# Patient Record
Sex: MALE | Race: WHITE | NOT HISPANIC OR LATINO | Employment: OTHER | URBAN - METROPOLITAN AREA
[De-identification: names, ages, dates, MRNs, and addresses within clinical notes are randomized per-mention and may not be internally consistent; named-entity substitution may affect disease eponyms.]

---

## 2018-01-05 ENCOUNTER — ALLSCRIPTS OFFICE VISIT (OUTPATIENT)
Dept: OTHER | Facility: OTHER | Age: 78
End: 2018-01-05

## 2018-01-06 NOTE — PROGRESS NOTES
Assessment   1  Acute bronchitis (466 0) (J20 9)    Plan   Acute bronchitis    · Azithromycin 250 MG Oral Tablet; TAKE 2 TABLETS ON DAY 1 THEN TAKE 1    TABLET A DAY FOR 4 DAYS   · Promethazine-Codeine 6 25-10 MG/5ML Oral Syrup; TAKE 5 ML EVERY 4 TO 6    HOURS AS NEEDED FOR COUGH    Discussion/Summary   Discussion Summary:    Start Zithromax and use cough med prn 1 wk if not better  Medication SE Review and Pt Understands Tx: Possible side effects of new medications were reviewed with the patient/guardian today  The treatment plan was reviewed with the patient/guardian  The patient/guardian understands and agrees with the treatment plan      Chief Complaint   Chief Complaint Free Text Note Form: Patient is here for cough and congestion  He has about one more day of Augmentin left and took Tessalon  He is no better  History of Present Illness   HPI: 69year old new pt with no significant PMH here for cough, congestion, started on 12/26  He was seen at Urgent Care and treated with Augmentin x 10 days  Also taking Mucinex and Sudafed  Still has wet cough  Going on vacation tomorrow  has no chronic medical conditions and takes no medications  PMH was reviewed  Review of Systems   Complete-Male:      Constitutional: no fever-- and-- no chills  Eyes: No complaints of eye pain, no red eyes, no discharge from eyes, no itchy eyes  ENT: as noted in HPI  Cardiovascular: No complaints of slow heart rate, no fast heart rate, no chest pain, no palpitations, no leg claudication, no lower extremity  Respiratory: cough, but-- no shortness of breath during exertion  Gastrointestinal: No complaints of abdominal pain, no constipation, no nausea or vomiting, no diarrhea or bloody stools  Preventive Quality 65 Older:      Preventive Quality 65 and Older: Falls Risk: The patient fell 0 times in the past 12 months   Urinary Incontinence Symptoms includes: no urinary incontinence       Past Medical History   Active Problems And Past Medical History Reviewed: The active problems and past medical history were reviewed and updated today  Surgical History   Surgical History Reviewed: The surgical history was reviewed and updated today  Family History   Mother    1  Family history of cardiac disorder (V17 49) (Z82 49)  Family History Reviewed: The family history was reviewed and updated today  Social History    · Former smoker (I44 49) (Z39 030)   · Social drinker (V49 89) (Z78 9)  Social History Reviewed: The social history was reviewed and updated today  Allergies   1  CeleBREX CAPS    Vitals   Vital Signs    Recorded: [id number] 01:32PM   Temperature 97 6 F   Heart Rate 90   Systolic 402   Diastolic 76   Height 5 ft 8 in   Weight 194 lb    BMI Calculated 29 5   BSA Calculated 2 02   O2 Saturation 98     Physical Exam        Constitutional      General appearance: No acute distress, well appearing and well nourished  Eyes      Pupils and irises: Equal, round and reactive to light  Ears, Nose, Mouth, and Throat      Otoscopic examination: Tympanic membrance translucent with normal light reflex  Canals patent without erythema  Oropharynx: Abnormal  -- clear PND  Pulmonary      Respiratory effort: Abnormal   Respiratory Findings: wet cough  Auscultation of lungs: Clear to auscultation, equal breath sounds bilaterally, no wheezes, no rales, no rhonci  Cardiovascular      Auscultation of heart: Normal rate and rhythm, normal S1 and S2, without murmurs         Psychiatric      Orientation to person, place and time: Normal        Mood and affect: Normal           Signatures    Electronically signed by : Maegan Christianson MD; Jan 5 2018  2:02PM EST                       (Author)

## 2018-01-22 VITALS
WEIGHT: 194 LBS | OXYGEN SATURATION: 98 % | DIASTOLIC BLOOD PRESSURE: 76 MMHG | HEART RATE: 90 BPM | SYSTOLIC BLOOD PRESSURE: 118 MMHG | BODY MASS INDEX: 29.4 KG/M2 | HEIGHT: 68 IN | TEMPERATURE: 97.6 F

## 2018-05-08 ENCOUNTER — TELEPHONE (OUTPATIENT)
Dept: FAMILY MEDICINE CLINIC | Facility: CLINIC | Age: 78
End: 2018-05-08

## 2018-05-08 DIAGNOSIS — N52.9 ERECTILE DYSFUNCTION, UNSPECIFIED ERECTILE DYSFUNCTION TYPE: Primary | ICD-10-CM

## 2018-05-08 RX ORDER — SILDENAFIL CITRATE 20 MG/1
20 TABLET ORAL ONCE AS NEEDED
Qty: 50 TABLET | Refills: 1 | Status: SHIPPED | OUTPATIENT
Start: 2018-05-08 | End: 2020-03-06

## 2018-05-08 NOTE — TELEPHONE ENCOUNTER
Patient needs a refill on his Viagra but needs it as   [facility]  This is going to be an out of pocket rx, will not need prior auth     This pharmacy is entered in patient's records

## 2018-06-25 ENCOUNTER — OFFICE VISIT (OUTPATIENT)
Dept: FAMILY MEDICINE CLINIC | Facility: CLINIC | Age: 78
End: 2018-06-25
Payer: MEDICARE

## 2018-06-25 VITALS
TEMPERATURE: 97.4 F | OXYGEN SATURATION: 98 % | BODY MASS INDEX: 30.52 KG/M2 | WEIGHT: 201.4 LBS | DIASTOLIC BLOOD PRESSURE: 84 MMHG | SYSTOLIC BLOOD PRESSURE: 124 MMHG | HEIGHT: 68 IN | HEART RATE: 84 BPM

## 2018-06-25 DIAGNOSIS — Z23 NEED FOR PNEUMOCOCCAL VACCINE: ICD-10-CM

## 2018-06-25 DIAGNOSIS — K04.7 DENTAL INFECTION: ICD-10-CM

## 2018-06-25 DIAGNOSIS — Z13.220 LIPID SCREENING: ICD-10-CM

## 2018-06-25 DIAGNOSIS — Z12.5 SCREENING PSA (PROSTATE SPECIFIC ANTIGEN): ICD-10-CM

## 2018-06-25 DIAGNOSIS — Z12.11 COLON CANCER SCREENING: ICD-10-CM

## 2018-06-25 DIAGNOSIS — Z13.1 SCREENING FOR DIABETES MELLITUS: ICD-10-CM

## 2018-06-25 DIAGNOSIS — Z23 NEED FOR SHINGLES VACCINE: ICD-10-CM

## 2018-06-25 DIAGNOSIS — Z00.00 MEDICARE ANNUAL WELLNESS VISIT, INITIAL: Primary | ICD-10-CM

## 2018-06-25 PROCEDURE — G0438 PPPS, INITIAL VISIT: HCPCS | Performed by: FAMILY MEDICINE

## 2018-06-25 PROCEDURE — G0009 ADMIN PNEUMOCOCCAL VACCINE: HCPCS

## 2018-06-25 PROCEDURE — 90670 PCV13 VACCINE IM: CPT

## 2018-06-25 RX ORDER — AMOXICILLIN 500 MG/1
CAPSULE ORAL
Qty: 10 CAPSULE | Refills: 0 | Status: SHIPPED | OUTPATIENT
Start: 2018-06-25 | End: 2018-07-02

## 2018-06-25 RX ORDER — AMOXICILLIN 500 MG/1
CAPSULE ORAL
Refills: 0 | COMMUNITY
Start: 2018-06-20 | End: 2018-06-25 | Stop reason: ALTCHOICE

## 2018-06-25 NOTE — PROGRESS NOTES
Assessment and Plan:  Problem List Items Addressed This Visit     Medicare annual wellness visit, initial - Primary    Dental infection    Relevant Medications    amoxicillin (AMOXIL) 500 mg capsule    Other Relevant Orders    CBC and differential    Need for pneumococcal vaccine      Other Visit Diagnoses     Need for shingles vaccine        Relevant Medications    Zoster Vac Recomb Adjuvanted ([address]) 50 MCG SUSR    Colon cancer screening        Relevant Orders    Cologuard    Screening PSA (prostate specific antigen)        Relevant Orders    PSA, Total Screen    Lipid screening        Relevant Orders    Lipid Panel with Direct LDL reflex    Screening for diabetes mellitus        Relevant Orders    Comprehensive metabolic panel        Health Maintenance Due   Topic Date Due    Annual Wellnes Visit (AWV)  1940    DTaP,Tdap,and Td Vaccines (1 - Tdap) 09/20/1962    PNEUMOCOCCAL POLYSACCHARIDE VACCINE AGE 72 AND OVER  09/20/2006    GLAUCOMA SCREENING 67+ YR  09/20/2008         HPI:  Nancy Rodriguez is a 69 y o  male here for his Initial Wellness Visit  Patient Active Problem List   Diagnosis    Medicare annual wellness visit, initial   [address] infection    Need for pneumococcal vaccine     No past medical history on file  No past surgical history on file    Family History   Problem Relation Age of Onset    Heart disease Mother         Cardiac Disorder    No Known Problems Father      History   Smoking Status    Former Smoker   Smokeless Tobacco    Never Used     History   Alcohol Use    Yes     Comment: Social      History   Drug use: Unknown         Current Outpatient Prescriptions   Medication Sig Dispense Refill    amoxicillin (AMOXIL) 500 mg capsule Take as directed by dentist 10 capsule 0    sildenafil (REVATIO) 20 mg tablet Take 1 tablet (20 mg total) by mouth once as needed (erectile dysfunction) for up to 1 dose 50 tablet 1    Zoster Vac Recomb Adjuvanted (SHINGRIX) 50 MCG SUSR Inject 1 Dose into the shoulder, thigh, or buttocks once for 1 dose 1 each 0     No current facility-administered medications for this visit  Allergies   Allergen Reactions    Celecoxib      Other reaction(s): itchy  Other reaction(s): itchy       There is no immunization history on file for this patient  Patient Care Team:  Cherie Borges MD as PCP - General    Medicare Screening Tests and Risk Assessments:  [facility] Clinical     ISAR:   Previous hospitalizations?:  No       Once in a Lifetime Medicare Screening:   EKG performed?:  No    AAA screening performed? (if performed, please add date to Health Maintenance):  No       Medicare Screening Tests and Risk Assessment:   AAA Risk Assessment     Tobacco use (males only):   Yes   Age over 72 (males only):  Yes    Osteoporosis Risk Assessment    None indicated:  Yes    HIV Risk Assessment    None indicated:  Yes        Drug and Alcohol Use:   Tobacco use    Cigarettes:  former smoker    Quit date:  6/25/1980   Smokeless:  never used smokeless tobacco    Tobacco use duration    Tobacco Cessation Readiness    Alcohol use    Alcohol use:  never    Alcohol Treatment Readiness   Illicit Drug Use    Drug use:  never    Drug type:  no sedative use       Diet & Exercise:   Diet   What is your diet?:  Unhealthy   How many servings a day of the following:   Fruits and Vegetables:  1-2 Meat:  1-2   Whole Grains:  0 Simple Carbs:  0   Dairy:  2 Soda:  0   Coffee:  4 Tea:  0   Exercise    Do you currently exercise?:  yes    Frequency:  daily    Type of exercise:  cardio       Cognitive Impairment Screening:   Depression screening preformed:  Yes     PHQ-9 Depression scale score:  0   Depression screening results:  negative for symptoms   Cognitive Impairment Screening    Do you have difficulty learning or retaining new information?:  No Do you have difficulty handling new tasks?:  No   Do you have difficulty with reasoning?:  No Do you have difficulty with spatial ability and orientation?:  No   Do you have difficulty with language?:  No Do you have difficulty with behavior?:  No       Functional Ability/Level of Safety:   Hearing    Hearing difficulties:  No Bilateral:  normal   Left:  normal Right:  normal   Hearing aid:  No    Hearing Impairment Assessment    Hearing status:  No impairment   Do your family members ever complain that you turn on the radio or T V  too loudly?:  No Do you find that other people have to repeat themselves when talking to you?:  No   Do you have difficulty hearing while talking on the phone?:  No Has anyone ever told you that you are speaking too loudly when talking with them?:  No   Do you have trouble hearing the doorbell or phone ringing?:  No Do you have difficulty hearing such that you feel frustrated talking to people?:  No   Do you feel sad because you cannot hear well?:  No Do you feel inconvienced due to your hearing problem?:  No   Do you think you would be a happier person if you could hear better?:  No Would you be willing to go for a hearing aid fitting if suggested?:  No   Current Activities    Status:  unlimited driving   Help needed with the folllowing:    ADL    Feeding:  Independant   Oral hygiene and Facial grooming:  Independant   Bathing:  Independant   Upper Body Dressing:  Independant   Lower Body Dressing:  Independant   Toileting:  Independant   Bed Mobility:  Independant   Fall Risk   Have you fallen in the last 12 months?:  No Are you unsteady on your feet?:  No    Are you taking any medications that may cause fatigue or dizziness?:  No    Do you rush to the bathroom potentially risking a fall?:  No   Injury History   Polypharmacy:  No Antidepressant Use:  No   Sedative Use:  No Antihypertensive Use:  No   Previous Fall:  No Alcohol Use:  No   Deconditioning:  No Visual Impairment:  No   Cogitive Impairment:  No Mmobility Impairment:  No   Postural Hypotension:  No Urinary Incontinence:  No       Home Safety:   Are there hazards in your environment?:  No   If you fell, would you need help to get back up from the ground?:  No Do you have problems or concerns getting in/out of a bed, chair, tub, or toilet?:  No   Do you feel unsteady when walking?:  No Is your activity limited by pain?:  No   Do you have handrails and grab-bars in the home?:  Yes Are emergency numbers kept by the phone and regularly updated?:  Yes   Are you and/or family members aware of the dangers of smoking in bed?:  No    Do you have working smoke alarms and fire extinguisher?:  Yes Do all household members know how to use them?:  Yes   Have you left the stove on unsupervised?:  No    Home Safety Risk Factors   Unfamilar with surroundings:  No Uneven floors:  No   Stairs or handrail saftey risk:  Yes Loose rugs:  No   Household clutter:  No Poor household lighting:  No   No grab bars in bathroom:  Yes Further evaluation needed:  No       Advanced Directives:   Advanced Directives    Living Will:  No Durable POA for healthcare:  No   Advanced directive:  No    Patient's End of Life Decisions    Reviewed with patient:  Yes Provider agrees with end of life decisions:  Yes   End of life decisions comments:  Five Wishes provided        Urinary Incontinence:   Do you have urinary incontinence?:  No        Glaucoma:            Provider Screening     Preventative Screening/Counseling:   Cardiovascular Screening/Counseling:   (Labs Q5 years, EKG optional one-time)   General:  Risks and Benefits Discussed, Screening Current Counseling:  Healthy Weight, Healthy Diet, Improve Cholesterol Due for: Lab Panel/Analytes:  Lipid Panel         Diabetes Screening/Counseling:   (2 tests/year if Pre-Diabetes or 1 test/year if no Diabetes)   General:  Risks and Benefits Discussed, Screening Current Counseling:  Healthy Diet, Healthy Weight Due for: Labs:  Blood Glucose         Colorectal Cancer Screening/Counseling:   (FOBT Q1 yr; Flex Sig Q4 yrs or Q10 yrs after Screening Colonoscopy; Screening Colonoscpy Q2 yrs High Risk or Q10 yrs Low Risk; Barium Enema Q2 yrs High Risk or Q4 yrs Low Risk)   General:  Risks and Benefits Discussed  Due for: Studies:  Fecal Occult Blood         Prostate Cancer Screening/Counseling:   (Annual)    General:  Risks and Benefits Discussed Due for: Labs:  PSA         Breast Cancer Screening/Counseling:   (Baseline Age 28 - 43; Annual Age 36+)         Cervical Cancer Screening/Counseling:   (Annual for High Risk or Childbearing Age with Abnormal Pap in Last 3 yrs; Every 2 all others)         Osteoporosis Screening/Counseling:   (Every 2 Yrs if at risk or more if medically necessary)   General:  Risks and Benefits Discussed, Patient Declines, Screening Not Indicated           AAA Screening/Counseling:   (Once per Lifetime with risk factors)     Age over 72 (males only):  Yes Tobacco use (males only):  Yes   General:  Patient Declines, Risks and Benefits Discussed           Glaucoma Screening/Counseling:   (Annual)   General:  Screening Current, Risks and Benefits Discussed          HIV Screening/Counseling:   (Voluntary; Once annually for high risk OR 3 times for Pregnancy at diagnosis of IUP; 3rd trimester; and at Labor   General:  Risks and Benefits Discussed, Patient Declines, Screening Not Indicated           Hepatitis C Screening:   Hepatitis C Counseling Provided:   Yes               Immunizations:   Influenza (annual):  Risks & Benefits Discussed, Influenza Recommended Annually, Influenza UTD This Year   Pneumococcal (Once in a Lifetime):  Risks & Benefits Discussed, Pneumococcal Due Today   Zostavax (Medicare D Coverage, Pt >72 yo):  Risks & Benefits Discussed, Zostavox Vaccine Needed Today       Other Preventative Couseling (Non-Medicare Wellness Visit Required):   nutrition counseling performed, fall prevention education provided, alcohol use counseling provided, weight reduction was discussed, Skin self-exam counseling given, Increased physical activity counseling given       Referrals (Non-Medicare Wellness Visit Required):       Medical Equipment/Suppliers:           No exam data present    Physical Exam :  History obtained from the patient  General ROS: negative  Psychological ROS: negative  Ophthalmic ROS: negative  ENT ROS: negative  Allergy and Immunology ROS: negative  Hematological and Lymphatic ROS: negative  Endocrine ROS: negative for - malaise/lethargy, mood swings or palpitations  Respiratory ROS: positive for - cough  Cardiovascular ROS: no chest pain or dyspnea on exertion  Gastrointestinal ROS: no abdominal pain, change in bowel habits, or black or bloody stools  Genito-Urinary ROS: no dysuria, trouble voiding, or hematuria  Musculoskeletal ROS: negative  Neurological ROS: no TIA or stroke symptoms  Dermatological ROS: positive for lump on scalp - seeing Derm  Physical Exam   Constitutional: He is oriented to person, place, and time  He appears well-developed and well-nourished  No distress  HENT:   Head: Normocephalic and atraumatic  Right Ear: External ear normal    Left Ear: External ear normal    Nose: Nose normal    Mouth/Throat: Oropharynx is clear and moist  No oropharyngeal exudate  Eyes: Conjunctivae and EOM are normal  Pupils are equal, round, and reactive to light  Right eye exhibits no discharge  Left eye exhibits no discharge  No scleral icterus  Neck: No thyromegaly present  Cardiovascular: Normal rate, regular rhythm and normal heart sounds  Exam reveals no gallop and no friction rub  No murmur heard  Pulmonary/Chest: Effort normal and breath sounds normal  No respiratory distress  He has no wheezes  He has no rales  Abdominal: Soft  Bowel sounds are normal  He exhibits no distension and no mass  There is no tenderness  There is no rebound and no guarding  No hernia  Small umbilical hernia  Reducible  Nontender  Musculoskeletal: He exhibits no edema  Lymphadenopathy:     He has no cervical adenopathy  Neurological: He is alert and oriented to person, place, and time  No cranial nerve deficit  Skin: Skin is warm and dry  No rash noted  He is not diaphoretic  Psychiatric: He has a normal mood and affect  His behavior is normal  Judgment and thought content normal    Nursing note and vitals reviewed

## 2018-06-26 ENCOUNTER — APPOINTMENT (OUTPATIENT)
Dept: LAB | Facility: CLINIC | Age: 78
End: 2018-06-26
Payer: MEDICARE

## 2018-06-26 DIAGNOSIS — Z13.220 LIPID SCREENING: ICD-10-CM

## 2018-06-26 DIAGNOSIS — Z12.5 SCREENING PSA (PROSTATE SPECIFIC ANTIGEN): ICD-10-CM

## 2018-06-26 DIAGNOSIS — Z13.1 SCREENING FOR DIABETES MELLITUS: ICD-10-CM

## 2018-06-26 DIAGNOSIS — K04.7 DENTAL INFECTION: ICD-10-CM

## 2018-06-26 LAB
ALBUMIN SERPL BCP-MCNC: 3.8 G/DL (ref 3.5–5)
ALP SERPL-CCNC: 46 U/L (ref 46–116)
ALT SERPL W P-5'-P-CCNC: 22 U/L (ref 12–78)
ANION GAP SERPL CALCULATED.3IONS-SCNC: 9 MMOL/L (ref 4–13)
AST SERPL W P-5'-P-CCNC: 18 U/L (ref 5–45)
BASOPHILS # BLD AUTO: 0.05 THOUSANDS/ΜL (ref 0–0.1)
BASOPHILS NFR BLD AUTO: 1 % (ref 0–1)
BILIRUB SERPL-MCNC: 0.52 MG/DL (ref 0.2–1)
BUN SERPL-MCNC: 22 MG/DL (ref 5–25)
CALCIUM SERPL-MCNC: 8.6 MG/DL (ref 8.3–10.1)
CHLORIDE SERPL-SCNC: 106 MMOL/L (ref 100–108)
CHOLEST SERPL-MCNC: 162 MG/DL (ref 50–200)
CO2 SERPL-SCNC: 24 MMOL/L (ref 21–32)
CREAT SERPL-MCNC: 1.26 MG/DL (ref 0.6–1.3)
EOSINOPHIL # BLD AUTO: 0.2 THOUSAND/ΜL (ref 0–0.61)
EOSINOPHIL NFR BLD AUTO: 3 % (ref 0–6)
ERYTHROCYTE [DISTWIDTH] IN BLOOD BY AUTOMATED COUNT: 12.8 % (ref 11.6–15.1)
GFR SERPL CREATININE-BSD FRML MDRD: 55 ML/MIN/1.73SQ M
GLUCOSE P FAST SERPL-MCNC: 161 MG/DL (ref 65–99)
HCT VFR BLD AUTO: 41.9 % (ref 36.5–49.3)
HDLC SERPL-MCNC: 39 MG/DL (ref 40–60)
HGB BLD-MCNC: 13.5 G/DL (ref 12–17)
IMM GRANULOCYTES # BLD AUTO: 0.02 THOUSAND/UL (ref 0–0.2)
IMM GRANULOCYTES NFR BLD AUTO: 0 % (ref 0–2)
LDLC SERPL CALC-MCNC: 107 MG/DL (ref 0–100)
LYMPHOCYTES # BLD AUTO: 2.09 THOUSANDS/ΜL (ref 0.6–4.47)
LYMPHOCYTES NFR BLD AUTO: 31 % (ref 14–44)
MCH RBC QN AUTO: 33.3 PG (ref 26.8–34.3)
MCHC RBC AUTO-ENTMCNC: 32.2 G/DL (ref 31.4–37.4)
MCV RBC AUTO: 103 FL (ref 82–98)
MONOCYTES # BLD AUTO: 0.64 THOUSAND/ΜL (ref 0.17–1.22)
MONOCYTES NFR BLD AUTO: 9 % (ref 4–12)
NEUTROPHILS # BLD AUTO: 3.79 THOUSANDS/ΜL (ref 1.85–7.62)
NEUTS SEG NFR BLD AUTO: 56 % (ref 43–75)
NRBC BLD AUTO-RTO: 0 /100 WBCS
PLATELET # BLD AUTO: 232 THOUSANDS/UL (ref 149–390)
PMV BLD AUTO: 10.6 FL (ref 8.9–12.7)
POTASSIUM SERPL-SCNC: 4 MMOL/L (ref 3.5–5.3)
PROT SERPL-MCNC: 7.3 G/DL (ref 6.4–8.2)
PSA SERPL-MCNC: 1.3 NG/ML (ref 0–4)
RBC # BLD AUTO: 4.06 MILLION/UL (ref 3.88–5.62)
SODIUM SERPL-SCNC: 139 MMOL/L (ref 136–145)
TRIGL SERPL-MCNC: 79 MG/DL
WBC # BLD AUTO: 6.79 THOUSAND/UL (ref 4.31–10.16)

## 2018-06-26 PROCEDURE — 80061 LIPID PANEL: CPT

## 2018-06-26 PROCEDURE — 85025 COMPLETE CBC W/AUTO DIFF WBC: CPT

## 2018-06-26 PROCEDURE — 80053 COMPREHEN METABOLIC PANEL: CPT

## 2018-06-26 PROCEDURE — 36415 COLL VENOUS BLD VENIPUNCTURE: CPT

## 2018-06-26 PROCEDURE — G0103 PSA SCREENING: HCPCS

## 2018-07-03 ENCOUNTER — OFFICE VISIT (OUTPATIENT)
Dept: FAMILY MEDICINE CLINIC | Facility: CLINIC | Age: 78
End: 2018-07-03
Payer: MEDICARE

## 2018-07-03 VITALS
OXYGEN SATURATION: 98 % | RESPIRATION RATE: 16 BRPM | HEART RATE: 77 BPM | SYSTOLIC BLOOD PRESSURE: 128 MMHG | HEIGHT: 68 IN | BODY MASS INDEX: 29.89 KG/M2 | WEIGHT: 197.2 LBS | DIASTOLIC BLOOD PRESSURE: 84 MMHG | TEMPERATURE: 97.7 F

## 2018-07-03 DIAGNOSIS — E11.9 TYPE 2 DIABETES MELLITUS WITHOUT COMPLICATION, WITHOUT LONG-TERM CURRENT USE OF INSULIN (HCC): Primary | ICD-10-CM

## 2018-07-03 PROBLEM — Z00.00 MEDICARE ANNUAL WELLNESS VISIT, INITIAL: Status: RESOLVED | Noted: 2018-06-25 | Resolved: 2018-07-03

## 2018-07-03 LAB
SL AMB POCT HEMOGLOBIN AIC: 7.2
SL AMB POCT UR MICROALBUMIN: NORMAL

## 2018-07-03 PROCEDURE — 82043 UR ALBUMIN QUANTITATIVE: CPT | Performed by: FAMILY MEDICINE

## 2018-07-03 PROCEDURE — 83036 HEMOGLOBIN GLYCOSYLATED A1C: CPT | Performed by: FAMILY MEDICINE

## 2018-07-03 PROCEDURE — 82570 ASSAY OF URINE CREATININE: CPT | Performed by: FAMILY MEDICINE

## 2018-07-03 PROCEDURE — 36416 COLLJ CAPILLARY BLOOD SPEC: CPT | Performed by: FAMILY MEDICINE

## 2018-07-03 PROCEDURE — 99214 OFFICE O/P EST MOD 30 MIN: CPT | Performed by: FAMILY MEDICINE

## 2018-07-03 RX ORDER — LANCETS 28 GAUGE
EACH MISCELLANEOUS
Qty: 100 EACH | Refills: 0 | Status: SHIPPED | OUTPATIENT
Start: 2018-07-03 | End: 2018-08-27 | Stop reason: SDUPTHER

## 2018-07-03 RX ORDER — BLOOD-GLUCOSE METER
KIT MISCELLANEOUS ONCE
Qty: 1 EACH | Refills: 0 | Status: SHIPPED | OUTPATIENT
Start: 2018-07-03 | End: 2018-07-12 | Stop reason: SDUPTHER

## 2018-07-03 NOTE — PROGRESS NOTES
Assessment/Plan:     Diagnoses and all orders for this visit:    Type 2 diabetes mellitus without complication, without long-term current use of insulin ([facility])  -     POCT hemoglobin A1c  -     Blood Glucose Monitoring Suppl (FREESTYLE FREEDOM LITE) w/Device KIT; by Does not apply route once for 1 dose Check fasting glucose today  -     glucose blood (FREESTYLE LITE) test strip; Use as instructed  -     Lancets (FREESTYLE) lancets; Use as instructed        We discussed diabetes  He wants to work on diet and weight loss before starting medications  I advised him to follow a low carb diet  He can start checking his fasting glucose with a goal of   We will recheck his A1c here in 3 months  Foot exam and microalbumin done today  Subjective:      Patient ID: Naomie Boyer is a 69 y o  male  Patient is here to review his blood work  His fasting blood sugar was 161  His A1c today is 7 2%  He was unaware of his elevated glucose  He does eat a lot of pasta and carbs  He denies any polyuria, polydipsia  He denies any FH of DM  He takes no medications for this  His other labs were reviewed and we discussed LDL was slightly elevated  The following portions of the patient's history were reviewed and updated as appropriate: He  has no past medical history on file  He   Patient Active Problem List    Diagnosis Date Noted    Dental infection 06/25/2018    Need for pneumococcal vaccine 06/25/2018     He  has no past surgical history on file  His family history includes Heart disease in his mother; No Known Problems in his father  He  reports that he has quit smoking  He has never used smokeless tobacco  He reports that he drinks alcohol  His drug history is not on file    Current Outpatient Prescriptions   Medication Sig Dispense Refill    sildenafil (REVATIO) 20 mg tablet Take 1 tablet (20 mg total) by mouth once as needed (erectile dysfunction) for up to 1 dose 50 tablet 1    Blood Glucose Monitoring Suppl (FREESTYLE FREEDOM LITE) w/Device KIT by Does not apply route once for 1 dose Check fasting glucose today 1 each 0    glucose blood (FREESTYLE LITE) test strip Use as instructed 100 each 0    Lancets (FREESTYLE) lancets Use as instructed 100 each 0     No current facility-administered medications for this visit  Current Outpatient Prescriptions on File Prior to Visit   Medication Sig    sildenafil (REVATIO) 20 mg tablet Take 1 tablet (20 mg total) by mouth once as needed (erectile dysfunction) for up to 1 dose    [] amoxicillin (AMOXIL) 500 mg capsule Take as directed by dentist     No current facility-administered medications on file prior to visit  He is allergic to celecoxib  Christy Golden Review of Systems   Constitutional: Negative for activity change, appetite change, fatigue and unexpected weight change  Respiratory: Negative for chest tightness and shortness of breath  Cardiovascular: Negative for chest pain and leg swelling  Gastrointestinal: Negative for abdominal pain  Neurological: Negative for headaches  Objective:      /84 (BP Location: Left arm, Patient Position: Sitting, Cuff Size: Adult)   Pulse 77   Temp 97 7 °F (36 5 °C) (Tympanic)   Resp 16   Ht 5' 8" (1 727 m)   Wt 89 4 kg (197 lb 3 2 oz)   SpO2 98%   BMI 29 98 kg/m²          Physical Exam   Constitutional: He is oriented to person, place, and time  He appears well-developed and well-nourished  No distress  HENT:   Head: Normocephalic and atraumatic  Cardiovascular: Normal rate, regular rhythm and normal heart sounds  Exam reveals no gallop and no friction rub  Pulses are no weak pulses  No murmur heard  Pulses:       Dorsalis pedis pulses are 2+ on the right side, and 2+ on the left side  Posterior tibial pulses are 2+ on the right side, and 2+ on the left side  Pulmonary/Chest: Effort normal and breath sounds normal  No respiratory distress  He has no wheezes   He has no rales  He exhibits no tenderness  Musculoskeletal: He exhibits no edema  Feet:   Right Foot:   Skin Integrity: Negative for ulcer, skin breakdown, erythema, warmth, callus or dry skin  Left Foot:   Skin Integrity: Negative for ulcer, skin breakdown, erythema, warmth, callus or dry skin  Neurological: He is alert and oriented to person, place, and time  Skin: He is not diaphoretic  Psychiatric: He has a normal mood and affect  His behavior is normal  Judgment and thought content normal    Nursing note and vitals reviewed  Patient's shoes and socks removed  Right Foot/Ankle   Right Foot Inspection  Skin Exam: skin normal and skin intact no dry skin, no warmth, no callus, no erythema, no maceration, no abnormal color, no pre-ulcer, no ulcer and no callus                          Toe Exam: no swelling, no tenderness, erythema and  no right toe deformity  Sensory   Vibration: intact    Monofilament testing: intact  Vascular  Capillary refills: < 3 seconds  The right DP pulse is 2+  The right PT pulse is 2+  Left Foot/Ankle  Left Foot Inspection  Skin Exam: skin normal and skin intactno dry skin, no warmth, no erythema, no maceration, normal color, no pre-ulcer, no ulcer and no callus                         Toe Exam: no swelling, no tenderness, no erythema and no left toe deformity                   Sensory   Vibration: intact    Monofilament: intact  Vascular  Capillary refills: < 3 seconds  The left DP pulse is 2+  The left PT pulse is 2+  Assign Risk Category:  No deformity present; No loss of protective sensation;  No weak pulses       Risk: 0     Component      Latest Ref Rng & Units 6/26/2018   Sodium      136 - 145 mmol/L 139   Potassium      3 5 - 5 3 mmol/L 4 0   Chloride      100 - 108 mmol/L 106   CO2      21 - 32 mmol/L 24   Anion Gap      4 - 13 mmol/L 9   BUN      5 - 25 mg/dL 22   Creatinine      0 60 - 1 30 mg/dL 1 26   GLUCOSE FASTING      65 - 99 mg/dL 161 (H)   Calcium      8 3 - 10 1 mg/dL 8 6   AST      5 - 45 U/L 18   ALT      12 - 78 U/L 22   Alkaline Phosphatase      46 - 116 U/L 46   Total Protein      6 4 - 8 2 g/dL 7 3   Albumin      3 5 - 5 0 g/dL 3 8   Total Bilirubin      0 20 - 1 00 mg/dL 0 52   eGFR      ml/min/1 73sq m 55

## 2018-07-03 NOTE — PATIENT INSTRUCTIONS
10% - bad control"> 10% - bad control,Hemoglobin A1c (HbA1c) greater than 10% indicating poor diabetic control,Haemoglobin A1c greater than 10% indicating poor diabetic control">   Diabetes Mellitus Type 2 in Adults, Ambulatory Care   GENERAL INFORMATION:   Diabetes mellitus type 2  is a disease that affects how your body uses glucose (sugar)  Insulin helps move sugar out of the blood so it can be used for energy  Normally, when the blood sugar level increases, the pancreas makes more insulin  Type 2 diabetes develops because either the body cannot make enough insulin, or it cannot use the insulin correctly  After many years, your pancreas may stop making insulin  Common symptoms include the following:   · More hunger or thirst than usual     · Frequent urination     · Weight loss without trying     · Blurred vision  Seek immediate care for the following symptoms:   · Severe abdominal pain, or pain that spreads to your back  You may also be vomiting  · Trouble staying awake or focusing    · Shaking or sweating    · Blurred or double vision    · Breath has a fruity, sweet smell    · Breathing is deep and labored, or rapid and shallow    · Heartbeat is fast and weak  Treatment for diabetes mellitus type 2  includes keeping your blood sugar at a normal level  You must eat the right foods, and exercise regularly  You may also need medicine if you cannot control your blood sugar level with nutrition and exercise  Manage diabetes mellitus type 2:   · Check your blood sugar level  You will be taught how to check a small drop of blood in a glucose monitor  Ask your healthcare provider when and how often to check during the day  Ask your healthcare provider what your blood sugar levels should be when you check them  · Keep track of carbohydrates (sugar and starchy foods)  Your blood sugar level can get too high if you eat too many carbohydrates   Your dietitian will help you plan meals and snacks that have the right amount of carbohydrates  · Eat low-fat foods  Some examples are skinless chicken and low-fat milk  · Eat less sodium (salt)  Some examples of high-sodium foods to limit are soy sauce, potato chips, and soup  Do not add salt to food you cook  Limit your use of table salt  · Eat high-fiber foods  Foods that are a good source of fiber include vegetables, whole grain bread, and beans  · Limit alcohol  Alcohol affects your blood sugar level and can make it harder to manage your diabetes  Women should limit alcohol to 1 drink a day  Men should limit alcohol to 2 drinks a day  A drink of alcohol is 12 ounces of beer, 5 ounces of wine, or 1½ ounces of liquor  · Get regular exercise  Exercise can help keep your blood sugar level steady, decrease your risk of heart disease, and help you lose weight  Exercise for at least 30 minutes, 5 days a week  Include muscle strengthening activities 2 days each week  Work with your healthcare provider to create an exercise plan  · Check your feet each day  for injuries or open sores  Ask your healthcare provider for activities you can do if you have an open sore  · Quit smoking  If you smoke, it is never too late to quit  Smoking can worsen the problems that may occur with diabetes  Ask your healthcare provider for information about how to stop smoking if you are having trouble quitting  · Ask about your weight:  Ask healthcare providers if you need to lose weight, and how much to lose  Ask them to help you with a weight loss program  Even a 10 to 15 pound weight loss can help you manage your blood sugar level  · Carry medical alert identification  Wear medical alert jewelry or carry a card that says you have diabetes  Ask your healthcare provider where to get these items  · Ask about vaccines  Diabetes puts you at risk of serious illness if you get the flu, pneumonia, or hepatitis   Ask your healthcare provider if you should get a flu, pneumonia, or hepatitis B vaccine, and when to get the vaccine  Follow up with your healthcare provider as directed:  Write down your questions so you remember to ask them during your visits  CARE AGREEMENT:   You have the right to help plan your care  Learn about your health condition and how it may be treated  Discuss treatment options with your caregivers to decide what care you want to receive  You always have the right to refuse treatment  The above information is an  only  It is not intended as medical advice for individual conditions or treatments  Talk to your doctor, nurse or pharmacist before following any medical regimen to see if it is safe and effective for you  © 2014 [address] is for End User's use only and may not be sold, redistributed or otherwise used for commercial purposes  All illustrations and images included in CareNotes® are the copyrighted property of A D A M [employer]  or Gianni Ravi

## 2018-07-11 ENCOUNTER — OFFICE VISIT (OUTPATIENT)
Dept: FAMILY MEDICINE CLINIC | Facility: CLINIC | Age: 78
End: 2018-07-11
Payer: MEDICARE

## 2018-07-11 ENCOUNTER — TELEPHONE (OUTPATIENT)
Dept: FAMILY MEDICINE CLINIC | Facility: CLINIC | Age: 78
End: 2018-07-11

## 2018-07-11 VITALS
BODY MASS INDEX: 28.55 KG/M2 | DIASTOLIC BLOOD PRESSURE: 76 MMHG | HEART RATE: 84 BPM | SYSTOLIC BLOOD PRESSURE: 110 MMHG | OXYGEN SATURATION: 98 % | TEMPERATURE: 98.4 F | HEIGHT: 68 IN | WEIGHT: 188.4 LBS

## 2018-07-11 DIAGNOSIS — J02.9 SORE THROAT: Primary | ICD-10-CM

## 2018-07-11 DIAGNOSIS — R05.9 COUGH: ICD-10-CM

## 2018-07-11 PROCEDURE — 99213 OFFICE O/P EST LOW 20 MIN: CPT | Performed by: FAMILY MEDICINE

## 2018-07-11 RX ORDER — BENZONATATE 100 MG/1
100 CAPSULE ORAL 3 TIMES DAILY PRN
Qty: 20 CAPSULE | Refills: 0 | Status: SHIPPED | OUTPATIENT
Start: 2018-07-11 | End: 2019-01-11

## 2018-07-11 NOTE — TELEPHONE ENCOUNTER
Ryley Tai came in asking about his glucose esthela because [facility] says they dont have anything for him  In his chart it says Does not apply route once and we're wondering if that is why [facility] isnt fulfilling the order

## 2018-07-11 NOTE — PROGRESS NOTES
Assessment/Plan:     Diagnoses and all orders for this visit:    Sore throat  -     al mag oxide-diphenhydramine-lidocaine viscous (MAGIC MOUTHWASH) 1:1:1 suspension; Swish and spit 10 mL every 4 (four) hours as needed for mouth pain or discomfort    Cough  -     benzonatate (TESSALON PERLES) 100 mg capsule; Take 1 capsule (100 mg total) by mouth 3 (three) times a day as needed for cough          Subjective:      Patient ID: Alina Rodriguez is a 69 y o  male  He is here for sore throat and his tongue is sore  He states he had a very hot meal a few days ago and it seemed to start after this  He also has a dry cough  No current treatment  The following portions of the patient's history were reviewed and updated as appropriate: He  has no past medical history on file  He   Patient Active Problem List    Diagnosis Date Noted    Dental infection 06/25/2018    Need for pneumococcal vaccine 06/25/2018     He  has no past surgical history on file  His family history includes Heart disease in his mother; No Known Problems in his father  He  reports that he has quit smoking  He has never used smokeless tobacco  He reports that he drinks alcohol  His drug history is not on file    Current Outpatient Prescriptions   Medication Sig Dispense Refill    al mag oxide-diphenhydramine-lidocaine viscous (MAGIC MOUTHWASH) 1:1:1 suspension Swish and spit 10 mL every 4 (four) hours as needed for mouth pain or discomfort 180 mL 0    benzonatate (TESSALON PERLES) 100 mg capsule Take 1 capsule (100 mg total) by mouth 3 (three) times a day as needed for cough 20 capsule 0    Blood Glucose Monitoring Suppl (FREESTYLE FREEDOM LITE) w/Device KIT by Does not apply route once for 1 dose Check fasting glucose today 1 each 0    glucose blood (FREESTYLE LITE) test strip Use as instructed 100 each 0    Lancets (FREESTYLE) lancets Use as instructed 100 each 0    sildenafil (REVATIO) 20 mg tablet Take 1 tablet (20 mg total) by mouth once as needed (erectile dysfunction) for up to 1 dose 50 tablet 1     No current facility-administered medications for this visit  Current Outpatient Prescriptions on File Prior to Visit   Medication Sig    Blood Glucose Monitoring Suppl (FREESTYLE FREEDOM LITE) w/Device KIT by Does not apply route once for 1 dose Check fasting glucose today    glucose blood (FREESTYLE LITE) test strip Use as instructed    Lancets (FREESTYLE) lancets Use as instructed    sildenafil (REVATIO) 20 mg tablet Take 1 tablet (20 mg total) by mouth once as needed (erectile dysfunction) for up to 1 dose     No current facility-administered medications on file prior to visit  He is allergic to celecoxib  [place] Review of Systems   Constitutional: Negative for chills and fever  HENT: Positive for sore throat  Negative for congestion  Respiratory: Positive for cough  Negative for shortness of breath  Objective:      /76   Pulse 84   Temp 98 4 °F (36 9 °C)   Ht 5' 8" (1 727 m)   Wt 85 5 kg (188 lb 6 4 oz)   SpO2 98%   BMI 28 65 kg/m²          Physical Exam   Constitutional: He is oriented to person, place, and time  He appears well-developed and well-nourished  No distress  HENT:   Right Ear: Hearing, tympanic membrane, external ear and ear canal normal    Left Ear: Hearing, tympanic membrane, external ear and ear canal normal    Nose: Nose normal    Mouth/Throat: Uvula is midline  No oropharyngeal exudate or posterior oropharyngeal erythema  Cracking L side of tongue   Eyes: Conjunctivae are normal  Pupils are equal, round, and reactive to light  Cardiovascular: Normal rate and normal heart sounds  Exam reveals no gallop and no friction rub  No murmur heard  Pulmonary/Chest: Effort normal and breath sounds normal  No respiratory distress  He has no wheezes  He has no rales  Lymphadenopathy:     He has no cervical adenopathy     Neurological: He is alert and oriented to person, place, and time    Skin: Skin is warm  No rash noted  He is not diaphoretic  Psychiatric: He has a normal mood and affect  His behavior is normal  Judgment and thought content normal    Nursing note and vitals reviewed

## 2018-07-12 DIAGNOSIS — E11.9 TYPE 2 DIABETES MELLITUS WITHOUT COMPLICATION, WITHOUT LONG-TERM CURRENT USE OF INSULIN (HCC): ICD-10-CM

## 2018-07-12 RX ORDER — BLOOD-GLUCOSE METER
KIT MISCELLANEOUS
Qty: 1 EACH | Refills: 0 | Status: SHIPPED | OUTPATIENT
Start: 2018-07-12

## 2018-07-16 ENCOUNTER — TELEPHONE (OUTPATIENT)
Dept: FAMILY MEDICINE CLINIC | Facility: CLINIC | Age: 78
End: 2018-07-16

## 2018-07-16 NOTE — TELEPHONE ENCOUNTER
Patient called and stated that he has been taking the Tessalon Pearles and doesn't seem to be helping - he was wondering if there is anything else you can prescribe for patient

## 2018-07-17 DIAGNOSIS — R05.9 COUGH: Primary | ICD-10-CM

## 2018-07-17 RX ORDER — DEXTROMETHORPHAN HYDROBROMIDE AND PROMETHAZINE HYDROCHLORIDE 15; 6.25 MG/5ML; MG/5ML
5 SYRUP ORAL 4 TIMES DAILY PRN
Qty: 180 ML | Refills: 0 | Status: SHIPPED | OUTPATIENT
Start: 2018-07-17 | End: 2018-07-24

## 2018-07-23 ENCOUNTER — OFFICE VISIT (OUTPATIENT)
Dept: DERMATOLOGY | Facility: CLINIC | Age: 78
End: 2018-07-23
Payer: MEDICARE

## 2018-07-23 DIAGNOSIS — L82.1 SEBORRHEIC KERATOSIS: ICD-10-CM

## 2018-07-23 DIAGNOSIS — L98.9 UNKNOWN SKIN LESION: Primary | ICD-10-CM

## 2018-07-23 DIAGNOSIS — Z13.89 SCREENING FOR SKIN CONDITION: ICD-10-CM

## 2018-07-23 PROCEDURE — 99203 OFFICE O/P NEW LOW 30 MIN: CPT | Performed by: DERMATOLOGY

## 2018-07-23 PROCEDURE — 11307 SHAVE SKIN LESION 1.1-2.0 CM: CPT | Performed by: DERMATOLOGY

## 2018-07-23 PROCEDURE — 88305 TISSUE EXAM BY PATHOLOGIST: CPT | Performed by: PATHOLOGY

## 2018-07-23 PROCEDURE — 88313 SPECIAL STAINS GROUP 2: CPT | Performed by: PATHOLOGY

## 2018-07-23 PROCEDURE — 88342 IMHCHEM/IMCYTCHM 1ST ANTB: CPT | Performed by: PATHOLOGY

## 2018-07-23 NOTE — PATIENT INSTRUCTIONS
skin lesion probable neurofibroma versus neural nevus await results of biopsy if further treatment is indicated though doubtful  Seborrheic Keratosis  Patient reasurred these are normal growths we acquire with age no treatment needed    Screening for Dermatologic Disorders: Nothing else of concern noted on complete exam follow up prn

## 2018-07-23 NOTE — PROGRESS NOTES
[facility]  [address] [place] [address]  [phone]  [phone]     MRN: [id number] : 1940  Encounter: [id number]  Patient Information: Wynell Route  Chief complaint: skin checkup and concerns regarding a growth on his right posterior scalp    History of present illness:  44-year-old male presents for overall skin check concerned regarding numerous growths on his skin and pigmented lesions also concerned regarding a growth that become more prominent over the years on the right posterior scalp  No past medical history on file  No past surgical history on file  Social History   History   Alcohol Use    Yes     Comment: Social     History   Drug use: Unknown     History   Smoking Status    Former Smoker   Smokeless Tobacco    Never Used     Family History   Problem Relation Age of Onset    Heart disease Mother         Cardiac Disorder    No Known Problems Father      Meds/Allergies   Allergies   Allergen Reactions    Celecoxib      Other reaction(s): itchy  Other reaction(s): itchy       Meds:  Prior to Admission medications    Medication Sig Start Date End Date Taking?  Authorizing Provider   al Nadir Speaker oxide-diphenhydramine-lidocaine viscous (MAGIC MOUTHWASH) 1:1:1 suspension Swish and spit 10 mL every 4 (four) hours as needed for mouth pain or discomfort 18  Yes Sandy Earl MD   benzonatate (TESSALON PERLES) 100 mg capsule Take 1 capsule (100 mg total) by mouth 3 (three) times a day as needed for cough 18  Yes Sandy Earl MD   Blood Glucose Monitoring Suppl (FREESTYLE FREEDOM LITE) w/Device KIT Check glucose levels once daily 18  Yes Sandy Earl MD   glucose blood (FREESTYLE LITE) test strip Use as instructed 7/3/18  Yes Sandy Earl MD   Lancets (FREESTYLE) lancets Use as instructed 7/3/18  Yes Sandy Earl MD   promethazine-dextromethorphan [facility]) 6 25-15 mg/5 mL oral syrup Take 5 mL by mouth 4 (four) times a day as needed for cough for up to 7 days 7/17/18 7/24/18 Yes Mallory Kang MD   sildenafil (REVATIO) 20 mg tablet Take 1 tablet (20 mg total) by mouth once as needed (erectile dysfunction) for up to 1 dose 5/8/18  Yes Mallory Kang MD       Subjective:     Review of Systems:    General: negative for - chills, fatigue, fever,  weight gain or weight loss  Psychological: negative for - anxiety, behavioral disorder, concentration difficulties, decreased libido, depression, irritability, memory difficulties, mood swings, sleep disturbances or suicidal ideation  ENT: negative for - hearing difficulties , nasal congestion, nasal discharge, oral lesions, sinus pain, sneezing, sore throat  Allergy and Immunology: negative for - hives, insect bite sensitivity,  Hematological and Lymphatic: negative for - bleeding problems, blood clots,bruising, swollen lymph nodes  Endocrine: negative for - hair pattern changes, hot flashes, malaise/lethargy, mood swings, palpitations, polydipsia/polyuria, skin changes, temperature intolerance or unexpected weight change  Respiratory: negative for - cough, hemoptysis, orthopnea, shortness of breath, or wheezing  Cardiovascular: negative for - chest pain, dyspnea on exertion, edema,  Gastrointestinal: negative for - abdominal pain, nausea/vomiting  Genito-Urinary: negative for - dysuria, incontinence, irregular/heavy menses or urinary frequency/urgency  Musculoskeletal: negative for - gait disturbance, joint pain, joint stiffness, joint swelling, muscle pain, muscular weakness  Dermatological:  As in HPI  Neurological: negative for confusion, dizziness, headaches, impaired coordination/balance, memory loss, numbness/tingling, seizures, speech problems, tremors or weakness       Objective: There were no vitals taken for this visit      Physical Exam:    General Appearance:    Alert, cooperative, no distress   Head:    Normocephalic, without obvious abnormality, atraumatic Skin:   A full skin exam was performed including scalp, head scalp, eyes, ears, nose, lips, neck, chest, axilla, abdomen, back, buttocks, bilateral upper extremities, bilateral lower extremities, hands, feet, fingers, toes, fingernails, and toenails  Normal keratotic papules with greasy stuck on appearance spongiform 13 mm nodule noted on the right skin posterior scalp     Shave excision Procedure Note    Pre-operative Diagnosis:  Irritated  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    Plan:  1  Instructed to keep the wound dry and covered for 24 and clean thereafter  2  Warning signs of infection were reviewed  3  Recommended that the patient use OTC acetaminophen as needed for pain  Locations:   Right posterior scalp  Indications:   Changing lesion    Anesthesia: Lidocaine 1% without epinephrine without added sodium bicarbonate    Procedure Details     Patient informed of the risks (including bleeding and infection) and benefits of the   procedure and Verbal informed consent obtained  The lesion and surrounding area were prepped using alcohol  A Blue blade razor was used to remove the lesion  Hemostasis achieved with aluminum chloride  Petrolatum and a sterile dressing applied  The specimen was sent for pathologic examination  The patient tolerated the procedure(s) well  Complications:  none  Assessment:     1  Unknown skin lesion     2  Seborrheic keratosis     3  Screening for skin condition           Plan:    skin lesion probable neurofibroma versus neural nevus await results of biopsy if further treatment is indicated though doubtful  Seborrheic Keratosis  Patient reasurred these are normal growths we acquire with age no treatment needed    Screening for Dermatologic Disorders: Nothing else of concern noted on complete exam follow up cris Muhammad MD  7/23/2018,9:12 AM    Portions of the record may have been created with voice recognition software   Occasional wrong word or "sound a like" substitutions may have occurred due to the inherent limitations of voice recognition software   Read the chart carefully and recognize, using context, where substitutions have occurred

## 2018-08-27 DIAGNOSIS — E11.9 TYPE 2 DIABETES MELLITUS WITHOUT COMPLICATION, WITHOUT LONG-TERM CURRENT USE OF INSULIN (HCC): ICD-10-CM

## 2018-08-27 RX ORDER — LANCETS 28 GAUGE
EACH MISCELLANEOUS
Qty: 100 EACH | Refills: 0 | Status: SHIPPED | OUTPATIENT
Start: 2018-08-27 | End: 2019-01-11 | Stop reason: SDUPTHER

## 2018-08-27 RX ORDER — LANCETS 28 GAUGE
EACH MISCELLANEOUS
Qty: 100 EACH | Refills: 0 | Status: SHIPPED | OUTPATIENT
Start: 2018-08-27 | End: 2018-08-27 | Stop reason: SDUPTHER

## 2018-10-08 DIAGNOSIS — E11.9 TYPE 2 DIABETES MELLITUS WITHOUT COMPLICATION, WITHOUT LONG-TERM CURRENT USE OF INSULIN (HCC): ICD-10-CM

## 2018-10-09 RX ORDER — BLOOD-GLUCOSE METER
KIT MISCELLANEOUS
Qty: 100 EACH | Refills: 3 | Status: SHIPPED | OUTPATIENT
Start: 2018-10-09 | End: 2019-10-07 | Stop reason: SDUPTHER

## 2019-01-11 ENCOUNTER — OFFICE VISIT (OUTPATIENT)
Dept: FAMILY MEDICINE CLINIC | Facility: CLINIC | Age: 79
End: 2019-01-11
Payer: MEDICARE

## 2019-01-11 VITALS
BODY MASS INDEX: 27.28 KG/M2 | TEMPERATURE: 96.1 F | SYSTOLIC BLOOD PRESSURE: 134 MMHG | OXYGEN SATURATION: 99 % | DIASTOLIC BLOOD PRESSURE: 76 MMHG | HEIGHT: 68 IN | HEART RATE: 84 BPM | WEIGHT: 180 LBS

## 2019-01-11 DIAGNOSIS — E66.3 OVERWEIGHT (BMI 25.0-29.9): ICD-10-CM

## 2019-01-11 DIAGNOSIS — R73.03 PREDIABETES: Primary | ICD-10-CM

## 2019-01-11 PROBLEM — K04.7 DENTAL INFECTION: Status: RESOLVED | Noted: 2018-06-25 | Resolved: 2019-01-11

## 2019-01-11 LAB — SL AMB POCT HEMOGLOBIN AIC: 6 (ref ?–6.5)

## 2019-01-11 PROCEDURE — 99213 OFFICE O/P EST LOW 20 MIN: CPT | Performed by: FAMILY MEDICINE

## 2019-01-11 PROCEDURE — 83036 HEMOGLOBIN GLYCOSYLATED A1C: CPT | Performed by: FAMILY MEDICINE

## 2019-01-11 RX ORDER — LANCETS 28 GAUGE
EACH MISCELLANEOUS
Qty: 100 EACH | Refills: 3 | Status: SHIPPED | OUTPATIENT
Start: 2019-01-11 | End: 2020-05-28

## 2019-01-11 NOTE — PROGRESS NOTES
Assessment/Plan:    No problem-specific Assessment & Plan notes found for this encounter  Diagnoses and all orders for this visit:    Prediabetes  -     Lancets (FREESTYLE) lancets; Test once daily fasting dxE11 9  -     POCT hemoglobin A1c    Overweight (BMI 25 0-29  9)        Encouraged patient to continue healthy lifestyle  We will recheck A1c again in 6 months  BMI Counseling: Body mass index is 27 37 kg/m²  Discussed the patient's BMI with him  The BMI is above average  BMI counseling and education was provided to the patient  Nutrition recommendations include reducing portion sizes and decreasing overall calorie intake  Exercise recommendations include vigorous aerobic physical activity for 75 minutes/week  Subjective:      Patient ID: Ana Anand is a 69 y o  male  He is here for diabetic checkup  He has lost almost 20 lb since July  His A1c is now 6%  Down from 7 2%  He feels well  His fasting blood sugars usually between 90 and 120  He is watching his carbohydrates  Diabetes   Pertinent negatives for hypoglycemia include no headaches  Pertinent negatives for diabetes include no chest pain and no fatigue  The following portions of the patient's history were reviewed and updated as appropriate: He  has no past medical history on file  He   Patient Active Problem List    Diagnosis Date Noted    Prediabetes 01/11/2019    Need for pneumococcal vaccine 06/25/2018     He  has no past surgical history on file  His family history includes Heart disease in his mother; No Known Problems in his father  He  reports that he has quit smoking  He has never used smokeless tobacco  He reports that he drinks alcohol  His drug history is not on file    Current Outpatient Prescriptions   Medication Sig Dispense Refill    Blood Glucose Monitoring Suppl (FREESTYLE FREEDOM LITE) w/Device KIT Check glucose levels once daily 1 each 0    FREESTYLE LITE test strip USE AS DIRECTED DAILY 100 each 3    Lancets (FREESTYLE) lancets Test once daily fasting dxE11 9 100 each 3    sildenafil (REVATIO) 20 mg tablet Take 1 tablet (20 mg total) by mouth once as needed (erectile dysfunction) for up to 1 dose 50 tablet 1     No current facility-administered medications for this visit       Review of Systems   Constitutional: Negative for activity change, appetite change, fatigue and unexpected weight change  Respiratory: Negative for chest tightness and shortness of breath  Cardiovascular: Negative for chest pain and leg swelling  Gastrointestinal: Negative for abdominal pain  Neurological: Negative for headaches  Objective:      /76   Pulse 84   Temp (!) 96 1 °F (35 6 °C)   Ht 5' 8" (1 727 m)   Wt 81 6 kg (180 lb)   SpO2 99%   BMI 27 37 kg/m²          Physical Exam   Constitutional: He is oriented to person, place, and time  He appears well-developed and well-nourished  No distress  HENT:   Head: Normocephalic and atraumatic  Cardiovascular: Normal rate, regular rhythm and normal heart sounds  Exam reveals no gallop and no friction rub  No murmur heard  Pulmonary/Chest: Effort normal and breath sounds normal  No respiratory distress  He has no wheezes  He has no rales  He exhibits no tenderness  Musculoskeletal: He exhibits no edema  Neurological: He is alert and oriented to person, place, and time  Skin: He is not diaphoretic  Psychiatric: He has a normal mood and affect  His behavior is normal  Judgment and thought content normal    Nursing note and vitals reviewed

## 2019-01-11 NOTE — PROGRESS NOTES
Assessment/Plan:    No problem-specific Assessment & Plan notes found for this encounter  Diagnoses and all orders for this visit:    Prediabetes  -     Lancets (FREESTYLE) lancets; Test once daily fasting dxE11 9  -     POCT hemoglobin A1c          Subjective:      Patient ID: Ade Badillo is a 69 y o  male  69year old male here to recheck his A1c  His A1c is 6%  It was 7 2%  He states today his glucose was 97  Typical fasting glucose is 100-120  Diabetes         The following portions of the patient's history were reviewed and updated as appropriate:   He  has no past medical history on file  He   Patient Active Problem List    Diagnosis Date Noted    Need for pneumococcal vaccine 06/25/2018     He  has no past surgical history on file  His family history includes Heart disease in his mother; No Known Problems in his father  He  reports that he has quit smoking  He has never used smokeless tobacco  He reports that he drinks alcohol  His drug history is not on file  Current Outpatient Prescriptions   Medication Sig Dispense Refill    Blood Glucose Monitoring Suppl (FREESTYLE FREEDOM LITE) w/Device KIT Check glucose levels once daily 1 each 0    FREESTYLE LITE test strip USE AS DIRECTED DAILY 100 each 3    Lancets (FREESTYLE) lancets Test once daily fasting dxE11 9 100 each 3    sildenafil (REVATIO) 20 mg tablet Take 1 tablet (20 mg total) by mouth once as needed (erectile dysfunction) for up to 1 dose 50 tablet 1     No current facility-administered medications for this visit        Current Outpatient Prescriptions on File Prior to Visit   Medication Sig    Blood Glucose Monitoring Suppl (FREESTYLE FREEDOM LITE) w/Device KIT Check glucose levels once daily    FREESTYLE LITE test strip USE AS DIRECTED DAILY    sildenafil (REVATIO) 20 mg tablet Take 1 tablet (20 mg total) by mouth once as needed (erectile dysfunction) for up to 1 dose    [DISCONTINUED] marilee Zazueta oxide-diphenhydramine-lidocaine viscous (MAGIC MOUTHWASH) 1:1:1 suspension Swish and spit 10 mL every 4 (four) hours as needed for mouth pain or discomfort    [DISCONTINUED] benzonatate (TESSALON PERLES) 100 mg capsule Take 1 capsule (100 mg total) by mouth 3 (three) times a day as needed for cough    [DISCONTINUED] Lancets (FREESTYLE) lancets TEST BS BID     No current facility-administered medications on file prior to visit  He is allergic to celecoxib  Fabrice Raphael     Review of Systems      Objective:      /76   Pulse 84   Temp (!) 96 1 °F (35 6 °C)   Ht 5' 8" (1 727 m)   Wt 81 6 kg (180 lb)   SpO2 99%   BMI 27 37 kg/m²          Physical Exam

## 2019-02-22 ENCOUNTER — OFFICE VISIT (OUTPATIENT)
Dept: FAMILY MEDICINE CLINIC | Facility: CLINIC | Age: 79
End: 2019-02-22
Payer: MEDICARE

## 2019-02-22 VITALS
DIASTOLIC BLOOD PRESSURE: 70 MMHG | WEIGHT: 179 LBS | HEART RATE: 92 BPM | TEMPERATURE: 97.6 F | OXYGEN SATURATION: 99 % | BODY MASS INDEX: 27.13 KG/M2 | HEIGHT: 68 IN | SYSTOLIC BLOOD PRESSURE: 122 MMHG

## 2019-02-22 DIAGNOSIS — J01.00 ACUTE NON-RECURRENT MAXILLARY SINUSITIS: Primary | ICD-10-CM

## 2019-02-22 PROCEDURE — 99213 OFFICE O/P EST LOW 20 MIN: CPT | Performed by: FAMILY MEDICINE

## 2019-02-22 RX ORDER — AZITHROMYCIN 250 MG/1
TABLET, FILM COATED ORAL
Qty: 6 TABLET | Refills: 0 | Status: SHIPPED | OUTPATIENT
Start: 2019-02-22 | End: 2019-02-26

## 2019-02-22 RX ORDER — PROMETHAZINE HYDROCHLORIDE AND CODEINE PHOSPHATE 6.25; 1 MG/5ML; MG/5ML
5 SYRUP ORAL
Qty: 120 ML | Refills: 0 | Status: SHIPPED | OUTPATIENT
Start: 2019-02-22 | End: 2019-08-02 | Stop reason: SDUPTHER

## 2019-02-22 NOTE — PROGRESS NOTES
[facility] 1940 male MRN: [id number]    Acute Visit        ASSESSMENT/PLAN  Diagnoses and all orders for this visit:    Acute non-recurrent maxillary sinusitis  -     azithromycin (ZITHROMAX) 250 mg tablet; Take 2 tablets today then 1 tablet daily x 4 days  -     promethazine-codeine (PHENERGAN WITH CODEINE) 6 25-10 mg/5 mL syrup; Take 5 mL by mouth daily at bedtime as needed for cough              Future Appointments   Date Time Provider Erum Song   2/22/2019  4:00 PM MD DENISE Will  [facility]   7/12/2019  8:00 AM MD DENISE Will  [facility]        SUBJECTIVE  CC: Cough       Cough, congestion, sore throat x 2 weeks  Not getting better with otc treatment  [facility] is a 69 y o  male who presented for an acute visit complaining of  Review of Systems   Constitutional: Negative for activity change, appetite change, chills and fever  HENT: Positive for congestion and sore throat  Negative for ear pain  Eyes: Negative  Respiratory: Positive for cough  Gastrointestinal: Negative  Historical Information   The patient history was reviewed as follows:  No past medical history on file  No past surgical history on file    Family History   Problem Relation Age of Onset    Heart disease Mother         Cardiac Disorder    No Known Problems Father       Social History   Social History     Substance and Sexual Activity   Alcohol Use Yes    Comment: Social     Social History     Substance and Sexual Activity   Drug Use Not on file     Social History     Tobacco Use   Smoking Status Former Smoker   Smokeless Tobacco Never Used       Medications:   Meds/Allergies   Current Outpatient Medications   Medication Sig Dispense Refill    azithromycin (ZITHROMAX) 250 mg tablet Take 2 tablets today then 1 tablet daily x 4 days 6 tablet 0    Blood Glucose Monitoring Suppl (FREESTYLE FREEDOM LITE) w/Device KIT Check glucose levels once daily 1 each 0    FREESTYLE LITE test strip USE AS DIRECTED DAILY 100 each 3    Lancets (FREESTYLE) lancets Test once daily fasting dxE11 9 100 each 3    promethazine-codeine (PHENERGAN WITH CODEINE) 6 25-10 mg/5 mL syrup Take 5 mL by mouth daily at bedtime as needed for cough 120 mL 0    sildenafil (REVATIO) 20 mg tablet Take 1 tablet (20 mg total) by mouth once as needed (erectile dysfunction) for up to 1 dose 50 tablet 1     No current facility-administered medications for this visit  Allergies   Allergen Reactions    Celecoxib      Other reaction(s): itchy  Other reaction(s): itchy       OBJECTIVE  Vitals:   Vitals:    02/22/19 1330   BP: 122/70   Pulse: 92   Temp: 97 6 °F (36 4 °C)   SpO2: 99%   Weight: 81 2 kg (179 lb)   Height: 5' 8" (1 727 m)       Invasive Devices          None          Physical Exam   Constitutional: He is oriented to person, place, and time  He appears well-developed and well-nourished  No distress  HENT:   Right Ear: Hearing, tympanic membrane, external ear and ear canal normal    Left Ear: Hearing, tympanic membrane, external ear and ear canal normal    Nose: Right sinus exhibits maxillary sinus tenderness  Left sinus exhibits maxillary sinus tenderness  Mouth/Throat: Uvula is midline  No oropharyngeal exudate or posterior oropharyngeal erythema  Clear PND   Eyes: Pupils are equal, round, and reactive to light  Conjunctivae are normal    Cardiovascular: Normal rate and normal heart sounds  Exam reveals no gallop and no friction rub  No murmur heard  Pulmonary/Chest: Effort normal and breath sounds normal  No respiratory distress  He has no wheezes  He has no rales  Lymphadenopathy:     He has no cervical adenopathy  Neurological: He is alert and oriented to person, place, and time  Skin: Skin is warm  No rash noted  Psychiatric: He has a normal mood and affect  His behavior is normal  Judgment and thought content normal    Nursing note and vitals reviewed         Lab:  I have personally reviewed all pertinent results

## 2019-08-02 ENCOUNTER — OFFICE VISIT (OUTPATIENT)
Dept: FAMILY MEDICINE CLINIC | Facility: CLINIC | Age: 79
End: 2019-08-02
Payer: MEDICARE

## 2019-08-02 VITALS
HEIGHT: 68 IN | OXYGEN SATURATION: 96 % | DIASTOLIC BLOOD PRESSURE: 78 MMHG | SYSTOLIC BLOOD PRESSURE: 120 MMHG | BODY MASS INDEX: 27.83 KG/M2 | TEMPERATURE: 98.6 F | HEART RATE: 98 BPM | WEIGHT: 183.6 LBS

## 2019-08-02 DIAGNOSIS — J32.9 RHINOSINUSITIS: Primary | ICD-10-CM

## 2019-08-02 DIAGNOSIS — J31.0 RHINOSINUSITIS: Primary | ICD-10-CM

## 2019-08-02 DIAGNOSIS — J01.00 ACUTE NON-RECURRENT MAXILLARY SINUSITIS: ICD-10-CM

## 2019-08-02 PROCEDURE — 99213 OFFICE O/P EST LOW 20 MIN: CPT | Performed by: NURSE PRACTITIONER

## 2019-08-02 RX ORDER — PROMETHAZINE HYDROCHLORIDE AND CODEINE PHOSPHATE 6.25; 1 MG/5ML; MG/5ML
5 SYRUP ORAL
Qty: 120 ML | Refills: 0 | Status: SHIPPED | OUTPATIENT
Start: 2019-08-02 | End: 2020-03-06

## 2019-08-02 RX ORDER — AZITHROMYCIN 250 MG/1
TABLET, FILM COATED ORAL
Qty: 6 TABLET | Refills: 0 | Status: SHIPPED | OUTPATIENT
Start: 2019-08-02 | End: 2019-08-06

## 2019-08-02 NOTE — PROGRESS NOTES
Assessment/Plan:    No problem-specific Assessment & Plan notes found for this encounter  Diagnoses and all orders for this visit:    Rhinosinusitis  Comments: Will order azithromycin for 5 days   Saline nose spray, salt water gargles  Orders:  -     azithromycin (ZITHROMAX) 250 mg tablet; Take 2 tablets today then 1 tablet daily x 4 days    Acute non-recurrent maxillary sinusitis  -     promethazine-codeine (PHENERGAN WITH CODEINE) 6 25-10 mg/5 mL syrup; Take 5 mL by mouth daily at bedtime as needed for cough          Subjective:      Patient ID: Joo Contreras is a 69 y o  male  Pt here today with sore throat x 3 days  He states the throat itches  He endorses sore throat, coughing, PND  He returned today from [place] where he has been traveling  There have been no fevers or chills  His temperature today is 98 6  His voice is hoarse today in the office  The following portions of the patient's history were reviewed and updated as appropriate:   He  has no past medical history on file  He   Patient Active Problem List    Diagnosis Date Noted    Rhinosinusitis 08/02/2019    Prediabetes 01/11/2019    Need for pneumococcal vaccine 06/25/2018     He  has no past surgical history on file  His family history includes Heart disease in his mother; No Known Problems in his father  He  reports that he has quit smoking  He has never used smokeless tobacco  He reports that he drinks alcohol  His drug history is not on file    Current Outpatient Medications   Medication Sig Dispense Refill    Blood Glucose Monitoring Suppl (FREESTYLE FREEDOM LITE) w/Device KIT Check glucose levels once daily 1 each 0    FREESTYLE LITE test strip USE AS DIRECTED DAILY 100 each 3    Lancets (FREESTYLE) lancets Test once daily fasting dxE11 9 100 each 3    sildenafil (REVATIO) 20 mg tablet Take 1 tablet (20 mg total) by mouth once as needed (erectile dysfunction) for up to 1 dose 50 tablet 1    azithromycin (ZITHROMAX) 250 mg tablet Take 2 tablets today then 1 tablet daily x 4 days 6 tablet 0    promethazine-codeine (PHENERGAN WITH CODEINE) 6 25-10 mg/5 mL syrup Take 5 mL by mouth daily at bedtime as needed for cough 120 mL 0     No current facility-administered medications for this visit  He is allergic to celecoxib  Marcine Grow Review of Systems   Constitutional: Negative for activity change, appetite change, chills, diaphoresis, fatigue, fever and unexpected weight change  HENT: Positive for congestion, postnasal drip, rhinorrhea and sore throat  Negative for ear pain, hearing loss, sinus pressure, sinus pain and sneezing  Eyes: Negative for pain, redness and visual disturbance  Respiratory: Positive for cough  Negative for shortness of breath  Cardiovascular: Negative for chest pain and leg swelling  Gastrointestinal: Negative for abdominal pain, diarrhea, nausea and vomiting  Endocrine: Negative  Genitourinary: Negative  Musculoskeletal: Negative for arthralgias  Neurological: Negative for dizziness and light-headedness  Psychiatric/Behavioral: Negative for behavioral problems and dysphoric mood  Objective:      /78   Pulse 98   Temp 98 6 °F (37 °C) (Tympanic)   Ht 5' 8" (1 727 m)   Wt 83 3 kg (183 lb 9 6 oz)   SpO2 96%   BMI 27 92 kg/m²          Physical Exam   Constitutional: He is oriented to person, place, and time  Vital signs are normal  He appears well-developed and well-nourished  No distress  HENT:   Head: Normocephalic and atraumatic  Right Ear: Tympanic membrane normal    Left Ear: Tympanic membrane normal    Nose: Mucosal edema and rhinorrhea present  Right sinus exhibits no maxillary sinus tenderness and no frontal sinus tenderness  Left sinus exhibits no maxillary sinus tenderness and no frontal sinus tenderness  Mouth/Throat: Mucous membranes are normal  Posterior oropharyngeal erythema present  Tonsils are 0 on the right  Tonsils are 0 on the left     Eyes: Pupils are equal, round, and reactive to light  Neck: Normal range of motion  No thyromegaly present  Cardiovascular: Normal rate, regular rhythm, normal heart sounds and intact distal pulses  No murmur heard  Pulmonary/Chest: Effort normal and breath sounds normal  No respiratory distress  He has no wheezes  Abdominal: Soft  Bowel sounds are normal    Musculoskeletal: Normal range of motion  Neurological: He is alert and oriented to person, place, and time  Skin: Skin is warm and dry  Psychiatric: He has a normal mood and affect

## 2019-08-26 ENCOUNTER — OFFICE VISIT (OUTPATIENT)
Dept: FAMILY MEDICINE CLINIC | Facility: CLINIC | Age: 79
End: 2019-08-26
Payer: MEDICARE

## 2019-08-26 VITALS
WEIGHT: 182.4 LBS | TEMPERATURE: 98.5 F | SYSTOLIC BLOOD PRESSURE: 110 MMHG | DIASTOLIC BLOOD PRESSURE: 68 MMHG | HEIGHT: 68 IN | OXYGEN SATURATION: 98 % | BODY MASS INDEX: 27.65 KG/M2 | HEART RATE: 88 BPM

## 2019-08-26 DIAGNOSIS — Z12.5 SCREENING FOR PROSTATE CANCER: ICD-10-CM

## 2019-08-26 DIAGNOSIS — R73.03 PREDIABETES: Primary | ICD-10-CM

## 2019-08-26 DIAGNOSIS — R05.3 CHRONIC COUGH: ICD-10-CM

## 2019-08-26 DIAGNOSIS — Z23 NEED FOR SHINGLES VACCINE: ICD-10-CM

## 2019-08-26 DIAGNOSIS — Z13.220 SCREENING FOR LIPID DISORDERS: ICD-10-CM

## 2019-08-26 DIAGNOSIS — Z00.00 MEDICARE ANNUAL WELLNESS VISIT, SUBSEQUENT: ICD-10-CM

## 2019-08-26 PROCEDURE — G0439 PPPS, SUBSEQ VISIT: HCPCS | Performed by: FAMILY MEDICINE

## 2019-08-26 PROCEDURE — 99214 OFFICE O/P EST MOD 30 MIN: CPT | Performed by: FAMILY MEDICINE

## 2019-08-26 NOTE — PROGRESS NOTES
Assessment and Plan:     Problem List Items Addressed This Visit        Other    Medicare annual wellness visit, subsequent    Need for shingles vaccine    Prediabetes - Primary         History of Present Illness:     Patient presents for Medicare Annual Wellness visit    Patient Care Team:  Sobia Butts MD as PCP - General     Problem List:     Patient Active Problem List   Diagnosis    Medicare annual wellness visit, subsequent    Need for shingles vaccine    Prediabetes    Rhinosinusitis      Past Medical and Surgical History:     History reviewed  No pertinent past medical history  History reviewed  No pertinent surgical history  Family History:     Family History   Problem Relation Age of Onset    Heart disease Mother         Cardiac Disorder    No Known Problems Father       Social History:     Social History     Tobacco Use   Smoking Status Former Smoker   Smokeless Tobacco Never Used     Social History     Substance and Sexual Activity   Alcohol Use Yes    Comment: Social     Social History     Substance and Sexual Activity   Drug Use Not on file      Medications and Allergies:     Current Outpatient Medications   Medication Sig Dispense Refill    Blood Glucose Monitoring Suppl (FREESTYLE FREEDOM LITE) w/Device KIT Check glucose levels once daily 1 each 0    FREESTYLE LITE test strip USE AS DIRECTED DAILY 100 each 3    Lancets (FREESTYLE) lancets Test once daily fasting dxE11 9 100 each 3    promethazine-codeine (PHENERGAN WITH CODEINE) 6 25-10 mg/5 mL syrup Take 5 mL by mouth daily at bedtime as needed for cough 120 mL 0    sildenafil (REVATIO) 20 mg tablet Take 1 tablet (20 mg total) by mouth once as needed (erectile dysfunction) for up to 1 dose (Patient not taking: Reported on 8/26/2019) 50 tablet 1     No current facility-administered medications for this visit        Allergies   Allergen Reactions    Celecoxib      Other reaction(s): itchy  Other reaction(s): itchy      Immunizations: Immunization History   Administered Date(s) Administered    H1N1, All Formulations 02/01/2010    INFLUENZA 10/18/2018    Pneumococcal Conjugate 13-Valent 06/25/2018      Medicare Screening Tests and Risk Assessments:         Health Risk Assessment:  Patient rates overall health as excellent  Patient feels that their physical health rating is Much better  Eyesight was rated as Same  Hearing was rated as Same  Patient feels that their emotional and mental health rating is Same  Pain experienced by patient in the last 7 days has been None  Patient states that he has experienced no weight loss or gain in last 6 months  Emotional/Mental Health:  Patient has not been feeling nervous/anxious  PHQ-9 Depression Screening:    Frequency of the following problems over the past two weeks:      1  Little interest or pleasure in doing things: 0 - not at all      2  Feeling down, depressed, or hopeless: 0 - not at all  PHQ-2 Score: 0          Broken Bones/Falls: Fall Risk Assessment:    In the past year, patient has experienced: No history of falling in past year          Bladder/Bowel:  Patient has not leaked urine accidently in the last six months  Patient reports no loss of bowel control  Immunizations:  Patient has had a flu vaccination within the last year  Patient has received a pneumonia shot  Patient has received a shingles shot  Patient has not received tetanus/diphtheria shot  Home Safety:  Patient does not have trouble with stairs inside or outside of their home  Patient currently reports that there are no safety hazards present in home, working smoke alarms, working carbon monoxide detectors        Preventative Screenings:   prostate cancer screen performed, colon cancer screen completed, cholesterol screen completed, glaucoma eye exam completed,     Nutrition:  Current diet: Limited junk food and Regular with servings of the following:    Medications:  Patient is not currently taking any over-the-counter supplements  Patient is able to manage medications  Lifestyle Choices:  Patient reports no tobacco use  Patient has smoked or used tobacco in the past   Patient has stopped his tobacco use  Tobacco use quit date: 1980  Patient reports alcohol use  Alcohol use per week: 2  Patient drives a vehicle  Patient wears seat belt  Activities of Daily Living:  Can get out of bed by his or her self, able to dress self, able to make own meals, able to do own shopping, able to bathe self, can do own laundry/housekeeping, can manage own money, pay bills and track expenses    Previous Hospitalizations:  No hospitalization or ED visit in past 12 months        Advanced Directives:  Patient has decided on a power of   Patient has spoken to designated power of   Patient has completed advanced directive  Preventative Screening/Counseling:      Cardiovascular:      General: Risks and Benefits Discussed      Counseling: Healthy Diet and Healthy Weight     Due for Labs/Analytes/Optional EKG: Lipid Panel          Diabetes:      General: Risks and Benefits Discussed and Screening Current      Counseling: Healthy Diet and Healthy Weight          Colorectal Cancer:      General: Risks and Benefits Discussed and Screening Not Indicated          Prostate Cancer:      General: Risks and Benefits Discussed      Due for labs: PSA          Osteoporosis:      General: Screening Not Indicated          AAA:      General: Screening Not Indicated          Glaucoma:      General: Risks and Benefits Discussed and Screening Current          HIV:      General: Screening Not Indicated          Hepatitis C:      General: Screening Not Indicated        Advanced Directives:   Patient has living will for healthcare, has durable POA for healthcare, patient has an advanced directive  Information on ACP and/or AD provided  No 5 wishes given       Immunizations:      Influenza: Risks & Benefits Discussed and Influenza Recommended Annually      Pneumococcal: Risks & Benefits Discussed      Shingrix: Risks & Benefits Discussed and Shingrix Vaccine Needed Today      TD: Risks & Benefits Discussed      TDAP: Risks & Benefits Discussed

## 2019-08-26 NOTE — PATIENT INSTRUCTIONS
Obesity   AMBULATORY CARE:   Obesity  is when your body mass index (BMI) is greater than 30  Your healthcare provider will use your height and weight to measure your BMI  The risks of obesity include  many health problems, such as injuries or physical disability  You may need tests to check for the following:  · Diabetes     · High blood pressure or high cholesterol     · Heart disease     · Gallbladder or liver disease     · Cancer of the colon, breast, prostate, liver, or kidney     · Sleep apnea     · Arthritis or gout  Seek care immediately if:   · You have a severe headache, confusion, or difficulty speaking  · You have weakness on one side of your body  · You have chest pain, sweating, or shortness of breath  Contact your healthcare provider if:   · You have symptoms of gallbladder or liver disease, such as pain in your upper abdomen  · You have knee or hip pain and discomfort while walking  · You have symptoms of diabetes, such as intense hunger and thirst, and frequent urination  · You have symptoms of sleep apnea, such as snoring or daytime sleepiness  · You have questions or concerns about your condition or care  Treatment for obesity  focuses on helping you lose weight to improve your health  Even a small decrease in BMI can reduce the risk for many health problems  Your healthcare provider will help you set a weight-loss goal   · Lifestyle changes  are the first step in treating obesity  These include making healthy food choices and getting regular physical activity  Your healthcare provider may suggest a weight-loss program that involves coaching, education, and therapy  · Medicine  may help you lose weight when it is used with a healthy diet and physical activity  · Surgery  can help you lose weight if you are very obese and have other health problems  There are several types of weight-loss surgery  Ask your healthcare provider for more information    Be successful losing weight:   · Set small, realistic goals  An example of a small goal is to walk for 20 minutes 5 days a week  Anther goal is to lose 5% of your body weight  · Tell friends, family members, and coworkers about your goals  and ask for their support  Ask a friend to lose weight with you, or join a weight-loss support group  · Identify foods or triggers that may cause you to overeat , and find ways to avoid them  Remove tempting high-calorie foods from your home and workplace  Place a bowl of fresh fruit on your kitchen counter  If stress causes you to eat, then find other ways to cope with stress  · Keep a diary to track what you eat and drink  Also write down how many minutes of physical activity you do each day  Weigh yourself once a week and record it in your diary  Eating changes: You will need to eat 500 to 1,000 fewer calories each day than you currently eat to lose 1 to 2 pounds a week  The following changes will help you cut calories:  · Eat smaller portions  Use small plates, no larger than 9 inches in diameter  Fill your plate half full of fruits and vegetables  Measure your food using measuring cups until you know what a serving size looks like  · Eat 3 meals and 1 or 2 snacks each day  Plan your meals in advance  Bruna Stalls and eat at home most of the time  Eat slowly  · Eat fruits and vegetables at every meal   They are low in calories and high in fiber, which makes you feel full  Do not add butter, margarine, or cream sauce to vegetables  Use herbs to season steamed vegetables  · Eat less fat and fewer fried foods  Eat more baked or grilled chicken and fish  These protein sources are lower in calories and fat than red meat  Limit fast food  Dress your salads with olive oil and vinegar instead of bottled dressing  · Limit the amount of sugar you eat  Do not drink sugary beverages  Limit alcohol  Activity changes:  Physical activity is good for your body in many ways   It helps you burn calories and build strong muscles  It decreases stress and depression, and improves your mood  It can also help you sleep better  Talk to your healthcare provider before you begin an exercise program   · Exercise for at least 30 minutes 5 days a week  Start slowly  Set aside time each day for physical activity that you enjoy and that is convenient for you  It is best to do both weight training and an activity that increases your heart rate, such as walking, bicycling, or swimming  · Find ways to be more active  Do yard work and housecleaning  Walk up the stairs instead of using elevators  Spend your leisure time going to events that require walking, such as outdoor festivals or fairs  This extra physical activity can help you lose weight and keep it off  Follow up with your healthcare provider as directed: You may need to meet with a dietitian  Write down your questions so you remember to ask them during your visits  © 2017 [address] [employer] Information is for End User's use only and may not be sold, redistributed or otherwise used for commercial purposes  All illustrations and images included in CareNotes® are the copyrighted property of [employer]  or Gianni Ravi  The above information is an  only  It is not intended as medical advice for individual conditions or treatments  Talk to your doctor, nurse or pharmacist before following any medical regimen to see if it is safe and effective for you  Urinary Incontinence   WHAT YOU NEED TO KNOW:   What is urinary incontinence? Urinary incontinence (UI) is when you lose control of your bladder  What causes UI? UI occurs because your bladder cannot store or empty urine properly  The following are the most common types of UI:  · Stress incontinence  is when you leak urine due to increased bladder pressure  This may happen when you cough, sneeze, or exercise       · Urge incontinence  is when you feel the need to urinate right away and leak urine accidentally  · Mixed incontinence  is when you have both stress and urge UI  What are the signs and symptoms of UI?   · You feel like your bladder does not empty completely when you urinate  · You urinate often and need to urinate immediately  · You leak urine when you sleep, or you wake up with the urge to urinate  · You leak urine when you cough, sneeze, exercise, or laugh  How is UI diagnosed? Your healthcare provider will ask how often you leak urine and whether you have stress or urge symptoms  Tell him which medicines you take, how often you urinate, and how much liquid you drink each day  You may need any of the following tests:  · Urine tests  may show infection or kidney function  · A pelvic exam  may be done to check for blockages  A pelvic exam will also show if your bladder, uterus, or other organs have moved out of place  · An x-ray, ultrasound, or CT  may show problems with parts of your urinary system  You may be given contrast liquid to help your organs show up better in the pictures  Tell the healthcare provider if you have ever had an allergic reaction to contrast liquid  Do not enter the MRI room with anything metal  Metal can cause serious injury  Tell the healthcare provider if you have any metal in or on your body  · A bladder scan  will show how much urine is left in your bladder after you urinate  You will be asked to urinate and then healthcare providers will use a small ultrasound machine to check the urine left in your bladder  · Cystometry  is used to check the function of your urinary system  Your healthcare provider checks the pressure in your bladder while filling it with fluid  Your bladder pressure may also be tested when your bladder is full and while you urinate  How is UI treated? · Medicines  can help strengthen your bladder control      · Electrical stimulation  is used to send a small amount of electrical energy to your pelvic floor muscles  This helps control your bladder function  Electrodes may be placed outside your body or in your rectum  For women, the electrodes may be placed in the vagina  · A bulking agent  may be injected into the wall of your urethra to make it thicker  This helps keep your urethra closed and decreases urine leakage  · Devices  such as a clamp, pessary, or tampon may help stop urine leaks  Ask your healthcare provider for more information about these and other devices  · Surgery  may be needed if other treatments do not work  Several types of surgery can help improve your bladder control  Ask your healthcare provider for more information about the surgery you may need  How can I manage my symptoms? · Do pelvic muscle exercises often  Your pelvic muscles help you stop urinating  Squeeze these muscles tight for 5 seconds, then relax for 5 seconds  Gradually work up to squeezing for 10 seconds  Do 3 sets of 15 repetitions a day, or as directed  This will help strengthen your pelvic muscles and improve bladder control  · A catheter  may be used to help empty your bladder  A catheter is a tiny, plastic tube that is put into your bladder to drain your urine  Your healthcare provider may tell you to use a catheter to prevent your bladder from getting too full and leaking urine  · Keep a UI record  Write down how often you leak urine and how much you leak  Make a note of what you were doing when you leaked urine  · Train your bladder  Go to the bathroom at set times, such as every 2 hours, even if you do not feel the urge to go  You can also try to hold your urine when you feel the urge to go  For example, hold your urine for 5 minutes when you feel the urge to go  As that becomes easier, hold your urine for 10 minutes  · Drink liquids as directed  Ask your healthcare provider how much liquid to drink each day and which liquids are best for you   You may need to limit the amount of liquid you drink to help control your urine leakage  Limit or do not have drinks that contain caffeine or alcohol  Do not drink any liquid right before you go to bed  · Prevent constipation  Eat a variety of high-fiber foods  Good examples are high-fiber cereals, beans, vegetables, and whole-grain breads  Prune juice may help make your bowel movement softer  Walking is the best way to trigger your intestines to have a bowel movement  · Exercise regularly and maintain a healthy weight  Ask your healthcare provider how much you should weigh and about the best exercise plan for you  Weight loss and exercise will decrease pressure on your bladder and help you control your leakage  Ask him to help you create a weight loss plan if you are overweight  When should I seek immediate care? · You have severe pain  · You are confused or cannot think clearly  When should I contact my healthcare provider? · You have a fever  · You see blood in your urine  · You have pain when you urinate  · You have new or worse pain, even after treatment  · Your mouth feels dry or you have vision changes  · Your urine is cloudy or smells bad  · You have questions or concerns about your condition or care  CARE AGREEMENT:   You have the right to help plan your care  Learn about your health condition and how it may be treated  Discuss treatment options with your caregivers to decide what care you want to receive  You always have the right to refuse treatment  The above information is an  only  It is not intended as medical advice for individual conditions or treatments  Talk to your doctor, nurse or pharmacist before following any medical regimen to see if it is safe and effective for you  © 2017 [address] [employer] Information is for End User's use only and may not be sold, redistributed or otherwise used for commercial purposes   All illustrations and images included in CareNotes® are the copyrighted property of [employer]  or Gianni Ravi  Cigarette Smoking and Your Health   AMBULATORY CARE:   Risks to your health if you smoke:  Nicotine and other chemicals found in tobacco damage every cell in your body  Even if you are a light smoker, you have an increased risk for cancer, heart disease, and lung disease  If you are pregnant or have diabetes, smoking increases your risk for complications  Benefits to your health if you stop smoking:   · You decrease respiratory symptoms such as coughing, wheezing, and shortness of breath  · You reduce your risk for cancers of the lung, mouth, throat, kidney, bladder, pancreas, stomach, and cervix  If you already have cancer, you increase the benefits of chemotherapy  You also reduce your risk for cancer returning or a second cancer from developing  · You reduce your risk for heart disease, blood clots, heart attack, and stroke  · You reduce your risk for lung infections, and diseases such as pneumonia, asthma, chronic bronchitis, and emphysema  · Your circulation improves  More oxygen can be delivered to your body  If you have diabetes, you lower your risk for complications, such as kidney, artery, and eye diseases  You also lower your risk for nerve damage  Nerve damage can lead to amputations, poor vision, and blindness  · You improve your body's ability to heal and to fight infections  Benefits to the health of others if you stop smoking:  Tobacco is harmful to nonsmokers who breathe in your secondhand smoke  The following are ways the health of others around you may improve when you stop smoking:  · You lower the risks for lung cancer and heart disease in nonsmoking adults  · If you are pregnant, you lower the risk for miscarriage, early delivery, low birth weight, and stillbirth  You also lower your baby's risk for SIDS, obesity, developmental delay, and neurobehavioral problems, such as ADHD  · If you have children, you lower their risk for ear infections, colds, pneumonia, bronchitis, and asthma  For more information and support to stop smoking:   · Smokefree  gov  Phone: 0- 685 - 219-5594  Web Address: www smokefree  [employer]  Follow up with your healthcare provider as directed:  Write down your questions so you remember to ask them during your visits  © 2017 [address] [employer] Information is for End User's use only and may not be sold, redistributed or otherwise used for commercial purposes  All illustrations and images included in CareNotes® are the copyrighted property of [employer]  or Gianni Ravi  The above information is an  only  It is not intended as medical advice for individual conditions or treatments  Talk to your doctor, nurse or pharmacist before following any medical regimen to see if it is safe and effective for you  Fall Prevention   WHAT YOU NEED TO KNOW:   What is fall prevention? Fall prevention includes ways to make your home and other areas safer  It also includes ways you can move more carefully to prevent a fall  What increases my risk for falls? · Lack of vitamin D    · Not getting enough sleep each night    · Trouble walking or keeping your balance, or foot problems    · Health conditions that cause changes in your blood pressure, vision, or muscle strength and coordination    · Medicines that make you dizzy, weak, or sleepy    · Problems seeing clearly    · Shoes that have high heels or are not supportive    · Tripping hazards, such as items left on the floor, no handrails on the stairs, or broken steps  How can I help protect myself from falls? · Stand or sit up slowly  This may help you keep your balance and prevent falls  If you need to get up during the night, sit up first  Be sure you are fully awake before you stand  Turn on the light before you start walking  Go slowly in case you are still sleepy   Make sure you will not trip over any pets sleeping in the bedroom  · Use assistive devices as directed  Your healthcare provider may suggest that you use a cane or walker to help you keep your balance  You may need to have grab bars put in your bathroom near the toilet or in the shower  · Wear shoes that fit well and have soles that   Wear shoes both inside and outside  Use slippers with good   Do not wear shoes with high heels  · Wear a personal alarm  This is a device that allows you to call 911 if you fall and need help  Ask your healthcare provider for more information  · Stay active  Exercise can help strengthen your muscles and improve your balance  Your healthcare provider may recommend water aerobics or walking  He or she may also recommend physical therapy to improve your coordination  Never start an exercise program without talking to your healthcare provider first      · Manage medical conditions  Keep all appointments with your healthcare providers  Visit your eye doctor as directed  How can I make my home safer? · Add items to prevent falls in the bathroom  Put nonslip strips on your bath or shower floor to prevent you from slipping  Use a bath mat if you do not have carpet in the bathroom  This will prevent you from falling when you step out of the bath or shower  Use a shower seat so you do not need to stand while you shower  Sit on the toilet or a chair in your bathroom to dry yourself and put on clothing  This will prevent you from losing your balance from drying or dressing yourself while you are standing  · Keep paths clear  Remove books, shoes, and other objects from walkways and stairs  Place cords for telephones and lamps out of the way so that you do not need to walk over them  Tape them down if you cannot move them  Remove small rugs  If you cannot remove a rug, secure it with double-sided tape  This will prevent you from tripping  · Install bright lights in your home  Use night lights to help light paths to the bathroom or kitchen  Always turn on the light before you start walking  · Keep items you use often on shelves within reach  Do not use a step stool to help you reach an item  · Paint or place reflective tape on the edges of your stairs  This will help you see the stairs better  Call 911 or have someone else call if:   · You have fallen and are unconscious  · You have fallen and cannot move part of your body  Contact your healthcare provider if:   · You have fallen and have pain or a headache  · You have questions or concerns about your condition or care  CARE AGREEMENT:   You have the right to help plan your care  Learn about your health condition and how it may be treated  Discuss treatment options with your caregivers to decide what care you want to receive  You always have the right to refuse treatment  The above information is an  only  It is not intended as medical advice for individual conditions or treatments  Talk to your doctor, nurse or pharmacist before following any medical regimen to see if it is safe and effective for you  © 2017 [address] [facility] Information is for End User's use only and may not be sold, redistributed or otherwise used for commercial purposes  All illustrations and images included in CareNotes® are the copyrighted property of [employer]  or Gianni Ravi  Advance Directives   WHAT YOU NEED TO KNOW:   What are advance directives? Advance directives are legal documents that state your wishes and plans for medical care  These plans are made ahead of time in case you lose your ability to make decisions for yourself  Advance directives can apply to any medical decision, such as the treatments you want, and if you want to donate organs  What are the types of advance directives? There are many types of advance directives, and each state has rules about how to use them   You may choose a combination of any of the following:  · Living will: This is a written record of the treatment you want  You can also choose which treatments you do not want, which to limit, and which to stop at a certain time  This includes surgery, medicine, IV fluid, and tube feedings  · Durable power of  for healthcare [facility]): This is a written record that states who you want to make healthcare choices for you when you are unable to make them for yourself  This person, called a proxy, is usually a family member or a friend  You may choose more than 1 proxy  · Do not resuscitate (DNR) order:  A DNR order is used in case your heart stops beating or you stop breathing  It is a request not to have certain forms of treatment, such as CPR  A DNR order may be included in other types of advance directives  · Medical directive: This covers the care that you want if you are in a coma, near death, or unable to make decisions for yourself  You can list the treatments you want for each condition  Treatment may include pain medicine, surgery, blood transfusions, dialysis, IV or tube feedings, and a ventilator (breathing machine)  · Values history: This document has questions about your views, beliefs, and how you feel and think about life  This information can help others choose the care that you would choose  Why are advance directives important? An advance directive helps you control your care  Although spoken wishes may be used, it is better to have your wishes written down  Spoken wishes can be misunderstood, or not followed  Treatments may be given even if you do not want them  An advance directive may make it easier for your family to make difficult choices about your care  How do I decide what to put in my advance directives? · Make informed decisions:  Make sure you fully understand treatments or care you may receive   Think about the benefits and problems your decisions could cause for you or your family  Talk to healthcare providers if you have concerns or questions before you write down your wishes  You may also want to talk with your Rastafarian or , or a   Check your state laws to make sure that what you put in your advance directive is legal      · Sign all forms:  Sign and date your advance directive when you have finished  You may also need 2 witnesses to sign the forms  Witnesses cannot be your doctor or his staff, your spouse, heirs or beneficiaries, people you owe money to, or your chosen proxy  Talk to your family, proxy, and healthcare providers about your advance directive  Give each person a copy, and keep one for yourself in a place you can get to easily  Do not keep it hidden or locked away  · Review and revise your plans: You can revise your advance directive at any time, as long as you are able to make decisions  Review your plan every year, and when there are changes in your life, or your health  When you make changes, let your family, proxy, and healthcare providers know  Give each a new copy  Where can I find more information? · [employer]  [address] , [address]   Phone: 9- [phone] - [phone]  Phone: 6- [phone] - [phone]  Web Address: http://www  aafp org  · [address] [facility])  [address] [facility], [address]  Phone: [phone]  Phone: [phone]  Web Address: Yadi harrell  [facility] AGREEMENT:   You have the right to help plan your care  To help with this plan, you must learn about your health condition and treatment options  You must also learn about advance directives and how they are used  Work with your healthcare providers to decide what care will be used to treat you  You always have the right to refuse treatment  The above information is an  only   It is not intended as medical advice for individual conditions or treatments  Talk to your doctor, nurse or pharmacist before following any medical regimen to see if it is safe and effective for you  © 2017 [address] [employer] Information is for End User's use only and may not be sold, redistributed or otherwise used for commercial purposes  All illustrations and images included in CareNotes® are the copyrighted property of A D A M [employer]  or Gianni Ravi

## 2019-10-07 DIAGNOSIS — E11.9 TYPE 2 DIABETES MELLITUS WITHOUT COMPLICATION, WITHOUT LONG-TERM CURRENT USE OF INSULIN (HCC): ICD-10-CM

## 2019-10-07 RX ORDER — BLOOD-GLUCOSE METER
KIT MISCELLANEOUS
Qty: 100 EACH | Refills: 3 | Status: SHIPPED | OUTPATIENT
Start: 2019-10-07 | End: 2021-02-16

## 2020-02-13 ENCOUNTER — OFFICE VISIT (OUTPATIENT)
Dept: FAMILY MEDICINE CLINIC | Facility: CLINIC | Age: 80
End: 2020-02-13
Payer: MEDICARE

## 2020-02-13 VITALS
HEIGHT: 68 IN | DIASTOLIC BLOOD PRESSURE: 78 MMHG | TEMPERATURE: 98 F | RESPIRATION RATE: 18 BRPM | HEART RATE: 80 BPM | SYSTOLIC BLOOD PRESSURE: 112 MMHG | OXYGEN SATURATION: 99 % | WEIGHT: 192.8 LBS | BODY MASS INDEX: 29.22 KG/M2

## 2020-02-13 DIAGNOSIS — N48.9 PENILE LESION: Primary | ICD-10-CM

## 2020-02-13 PROCEDURE — 4040F PNEUMOC VAC/ADMIN/RCVD: CPT | Performed by: FAMILY MEDICINE

## 2020-02-13 PROCEDURE — 1160F RVW MEDS BY RX/DR IN RCRD: CPT | Performed by: FAMILY MEDICINE

## 2020-02-13 PROCEDURE — 87529 HSV DNA AMP PROBE: CPT | Performed by: FAMILY MEDICINE

## 2020-02-13 PROCEDURE — 1036F TOBACCO NON-USER: CPT | Performed by: FAMILY MEDICINE

## 2020-02-13 PROCEDURE — 3008F BODY MASS INDEX DOCD: CPT | Performed by: FAMILY MEDICINE

## 2020-02-13 PROCEDURE — 99214 OFFICE O/P EST MOD 30 MIN: CPT | Performed by: FAMILY MEDICINE

## 2020-02-13 RX ORDER — FLUCONAZOLE 150 MG/1
150 TABLET ORAL ONCE
Qty: 2 TABLET | Refills: 1 | Status: SHIPPED | OUTPATIENT
Start: 2020-02-13 | End: 2020-02-13

## 2020-02-13 RX ORDER — ACYCLOVIR 400 MG/1
400 TABLET ORAL 3 TIMES DAILY
Qty: 30 TABLET | Refills: 0 | Status: SHIPPED | OUTPATIENT
Start: 2020-02-13 | End: 2020-03-06

## 2020-02-13 NOTE — PROGRESS NOTES
BMI Counseling: Body mass index is 29 32 kg/m²  The BMI is above normal  Nutrition recommendations include 3-5 servings of fruits/vegetables daily  Exercise recommendations include exercising 3-5 times per week

## 2020-02-13 NOTE — PROGRESS NOTES
Assessment/Plan:    No problem-specific Assessment & Plan notes found for this encounter  Diagnoses and all orders for this visit:    Penile lesion  After discussing risks and benefits of medication along with side effects advised to take diflucan first if symptoms continue after 1 week advised to start acyclovir  Herpes testing collected  -     fluconazole (DIFLUCAN) 150 mg tablet; Take 1 tablet (150 mg total) by mouth once for 1 dose  -     acyclovir (ZOVIRAX) 400 MG tablet; Take 1 tablet (400 mg total) by mouth 3 (three) times a day for 10 days      Follow up as needed    Subjective:      Patient ID: Theodore Sweeney is a 67 y o  male  Patient is here for a penile lesion present on the tip of his penis for the past 1 month  Not associated with itching or oozing  Denies any penile discharges his partner does not have any symptoms  The following portions of the patient's history were reviewed and updated as appropriate:   He  has no past medical history on file  He   Patient Active Problem List    Diagnosis Date Noted    Penile lesion 02/13/2020    Chronic cough 08/26/2019    Rhinosinusitis 08/02/2019    Prediabetes 01/11/2019    Medicare annual wellness visit, subsequent 06/25/2018    Need for shingles vaccine 06/25/2018     He  has no past surgical history on file  His family history includes Heart disease in his mother; No Known Problems in his father  He  reports that he has quit smoking  He has never used smokeless tobacco  He reports that he drinks alcohol  His drug history is not on file    Current Outpatient Medications   Medication Sig Dispense Refill    Blood Glucose Monitoring Suppl (FREESTYLE FREEDOM LITE) w/Device KIT Check glucose levels once daily 1 each 0    FREESTYLE LITE test strip USE AS DIRECTED DAILY 100 each 3    Lancets (FREESTYLE) lancets Test once daily fasting dxE11 9 100 each 3    acyclovir (ZOVIRAX) 400 MG tablet Take 1 tablet (400 mg total) by mouth 3 (three) times a day for 10 days 30 tablet 0    fluconazole (DIFLUCAN) 150 mg tablet Take 1 tablet (150 mg total) by mouth once for 1 dose 2 tablet 1    promethazine-codeine (PHENERGAN WITH CODEINE) 6 25-10 mg/5 mL syrup Take 5 mL by mouth daily at bedtime as needed for cough (Patient not taking: Reported on 2/13/2020) 120 mL 0    sildenafil (REVATIO) 20 mg tablet Take 1 tablet (20 mg total) by mouth once as needed (erectile dysfunction) for up to 1 dose (Patient not taking: Reported on 8/26/2019) 50 tablet 1     No current facility-administered medications for this visit  Current Outpatient Medications on File Prior to Visit   Medication Sig    Blood Glucose Monitoring Suppl (FREESTYLE FREEDOM LITE) w/Device KIT Check glucose levels once daily    FREESTYLE LITE test strip USE AS DIRECTED DAILY    Lancets (FREESTYLE) lancets Test once daily fasting dxE11 9    promethazine-codeine (PHENERGAN WITH CODEINE) 6 25-10 mg/5 mL syrup Take 5 mL by mouth daily at bedtime as needed for cough (Patient not taking: Reported on 2/13/2020)    sildenafil (REVATIO) 20 mg tablet Take 1 tablet (20 mg total) by mouth once as needed (erectile dysfunction) for up to 1 dose (Patient not taking: Reported on 8/26/2019)     No current facility-administered medications on file prior to visit  He is allergic to celecoxib  Missy Guadalupe Review of Systems   Constitutional: Negative for activity change, appetite change, fatigue and fever  HENT: Negative for congestion and ear discharge  Respiratory: Negative for cough and shortness of breath  Cardiovascular: Negative for chest pain and palpitations  Gastrointestinal: Negative for diarrhea and nausea  Genitourinary: Positive for genital sores  Musculoskeletal: Negative for arthralgias and back pain  Skin: Positive for color change and rash  Neurological: Negative for dizziness and headaches  Psychiatric/Behavioral: Negative for agitation and behavioral problems  Objective:      /78 (BP Location: Left arm, Patient Position: Sitting, Cuff Size: Adult)   Pulse 80   Temp 98 °F (36 7 °C) (Tympanic)   Resp 18   Ht 5' 8" (1 727 m)   Wt 87 5 kg (192 lb 12 8 oz)   SpO2 99%   BMI 29 32 kg/m²          Physical Exam   Constitutional: He is oriented to person, place, and time  He appears well-developed and well-nourished  No distress  Eyes: Pupils are equal, round, and reactive to light  No scleral icterus  Cardiovascular: Normal rate, regular rhythm and normal heart sounds  No murmur heard  Pulmonary/Chest: Effort normal and breath sounds normal  No respiratory distress  He has no wheezes  Abdominal: Soft  Bowel sounds are normal  He exhibits no distension  There is no tenderness  Neurological: He is alert and oriented to person, place, and time  Skin: Skin is warm and dry  Rash noted  He is not diaphoretic  There is erythema  A macular/patchy rash noted on the glans penis bright red colored   Psychiatric: He has a normal mood and affect

## 2020-02-18 LAB
HSV1 DNA SPEC QL NAA+PROBE: NEGATIVE
HSV2 DNA SPEC QL NAA+PROBE: NEGATIVE

## 2020-02-21 ENCOUNTER — TELEPHONE (OUTPATIENT)
Dept: FAMILY MEDICINE CLINIC | Facility: CLINIC | Age: 80
End: 2020-02-21

## 2020-02-25 NOTE — TELEPHONE ENCOUNTER
Yvon Sanches called back to try and speak  Can you please call him back  He can be reached at [phone]

## 2020-03-06 ENCOUNTER — OFFICE VISIT (OUTPATIENT)
Dept: FAMILY MEDICINE CLINIC | Facility: CLINIC | Age: 80
End: 2020-03-06
Payer: MEDICARE

## 2020-03-06 VITALS
TEMPERATURE: 97.6 F | BODY MASS INDEX: 28.92 KG/M2 | DIASTOLIC BLOOD PRESSURE: 72 MMHG | SYSTOLIC BLOOD PRESSURE: 128 MMHG | WEIGHT: 190.8 LBS | OXYGEN SATURATION: 98 % | HEART RATE: 84 BPM | HEIGHT: 68 IN

## 2020-03-06 DIAGNOSIS — Z12.5 SCREENING PSA (PROSTATE SPECIFIC ANTIGEN): ICD-10-CM

## 2020-03-06 DIAGNOSIS — R73.03 PREDIABETES: Primary | ICD-10-CM

## 2020-03-06 PROBLEM — J31.0 RHINOSINUSITIS: Status: RESOLVED | Noted: 2019-08-02 | Resolved: 2020-03-06

## 2020-03-06 PROBLEM — N48.9 PENILE LESION: Status: RESOLVED | Noted: 2020-02-13 | Resolved: 2020-03-06

## 2020-03-06 PROBLEM — J32.9 RHINOSINUSITIS: Status: RESOLVED | Noted: 2019-08-02 | Resolved: 2020-03-06

## 2020-03-06 LAB — SL AMB POCT HEMOGLOBIN AIC: 6.4 (ref ?–6.5)

## 2020-03-06 PROCEDURE — 99213 OFFICE O/P EST LOW 20 MIN: CPT | Performed by: FAMILY MEDICINE

## 2020-03-06 PROCEDURE — 3008F BODY MASS INDEX DOCD: CPT | Performed by: FAMILY MEDICINE

## 2020-03-06 PROCEDURE — 1160F RVW MEDS BY RX/DR IN RCRD: CPT | Performed by: FAMILY MEDICINE

## 2020-03-06 PROCEDURE — 3044F HG A1C LEVEL LT 7.0%: CPT | Performed by: FAMILY MEDICINE

## 2020-03-06 PROCEDURE — 1036F TOBACCO NON-USER: CPT | Performed by: FAMILY MEDICINE

## 2020-03-06 PROCEDURE — 83036 HEMOGLOBIN GLYCOSYLATED A1C: CPT | Performed by: FAMILY MEDICINE

## 2020-03-06 PROCEDURE — 4040F PNEUMOC VAC/ADMIN/RCVD: CPT | Performed by: FAMILY MEDICINE

## 2020-03-06 NOTE — PATIENT INSTRUCTIONS
Prediabetes   AMBULATORY CARE:   Prediabetes  is a blood glucose level that is higher than normal  It is not high enough to be considered diabetes  Prediabetes increases your risk for type 2 diabetes and heart disease  Common symptoms include the following:  Prediabetes may not cause any symptoms, or it may cause symptoms similar to diabetes  These may include any of the following:  · More hunger or thirst than usual     · Frequent urination     · Weight loss without trying     · Blurred vision  Contact your healthcare provider if:   · You have more hunger or thirst than usual      · You are urinating more frequently than normal      · You lose weight without trying  · You have blurred vision  · You have questions or concerns about your condition or care  Medicines  may be given to control your blood sugar levels  Medicine may also be given to lower high blood pressure and high cholesterol  Manage prediabetes:  Weight loss and exercise work best to delay or prevent type 2 diabetes  You can decrease your risk of type 2 diabetes by doing the following:  · Lose weight if you are overweight  A weight loss of 7% of your body weight can help to lower your blood sugar level  For example, if you weigh 200 pounds, you should lose 14 pounds  · Exercise regularly  Exercise can help decrease your blood sugar level  It can also help to decrease your risk of heart disease and help you lose weight  Adults should exercise for at least 150 minutes every week  Spread this amount of exercise over at least 3 days a week  Do not skip exercise more than 2 days in a row  Children should exercise for at least 60 minutes on most days of the week  Examples of exercise include walking or swimming  Do not sit for longer than 30 minutes  Work with your healthcare provider to create an exercise plan  · Decrease the amount of calories you eat to help you lose weight   Eat a variety of fruits and vegetables, and eat whole-grain foods more often  Choose dairy foods, meat, and other protein foods that are low in fat  Eat fewer sweets such as candy, cookies, regular soda, and sweetened drinks  You can also decrease calories by eating smaller portion sizes  Work with your healthcare provider or dietitian to develop a meal plan that is right for you  · Do not smoke  Nicotine can damage blood vessels  Do not use e-cigarettes or smokeless tobacco in place of cigarettes or to help you quit  They still contain nicotine  Ask your healthcare provider for information if you currently smoke and need help quitting  Follow up with your healthcare provider as directed: You will need to return every year to get tested for diabetes  Write down your questions so you remember to ask them during your visits  © 2017 [facility] Information is for End User's use only and may not be sold, redistributed or otherwise used for commercial purposes  All illustrations and images included in CareNotes® are the copyrighted property of A D A M [employer]  or Gianni Ravi  The above information is an  only  It is not intended as medical advice for individual conditions or treatments  Talk to your doctor, nurse or pharmacist before following any medical regimen to see if it is safe and effective for you

## 2020-03-06 NOTE — PROGRESS NOTES
Assessment/Plan:       Problem List Items Addressed This Visit        Other    Prediabetes - Primary    Relevant Orders    POCT hemoglobin A1c (Completed)    Lipid Panel with Direct LDL reflex    Comprehensive metabolic panel    Hemoglobin A1C      Other Visit Diagnoses     Screening PSA (prostate specific antigen)        Relevant Orders    PSA, Total Screen          BMI Counseling: Body mass index is 29 01 kg/m²  The BMI is above normal  Nutrition recommendations include moderation in carbohydrate intake   -reduce carbs in diet  -Repeat a1c in 3 months  Subjective:      Patient ID: Ramez Marsh is a 67 y o  male  67year old here for A1c  He has been watching his diet, but says he was on a cruise recently and eating out more  His a1c is up to 6 4%  It was 6% last year  He is trying to exercise  His fasting glucoses are in the 120s  Not on meds  The following portions of the patient's history were reviewed and updated as appropriate:   He  has no past medical history on file  He   Patient Active Problem List    Diagnosis Date Noted    Chronic cough 08/26/2019    Prediabetes 01/11/2019    Medicare annual wellness visit, subsequent 06/25/2018    Need for shingles vaccine 06/25/2018     He  has no past surgical history on file  His family history includes Heart disease in his mother; No Known Problems in his father  He  reports that he has quit smoking  He has never used smokeless tobacco  He reports that he drinks alcohol  His drug history is not on file  Current Outpatient Medications   Medication Sig Dispense Refill    Blood Glucose Monitoring Suppl (FREESTYLE FREEDOM LITE) w/Device KIT Check glucose levels once daily 1 each 0    FREESTYLE LITE test strip USE AS DIRECTED DAILY 100 each 3    Lancets (FREESTYLE) lancets Test once daily fasting dxE11 9 100 each 3     No current facility-administered medications for this visit        Current Outpatient Medications on File Prior to Visit Medication Sig    Blood Glucose Monitoring Suppl (FREESTYLE FREEDOM LITE) w/Device KIT Check glucose levels once daily    FREESTYLE LITE test strip USE AS DIRECTED DAILY    Lancets (FREESTYLE) lancets Test once daily fasting dxE11 9    [DISCONTINUED] acyclovir (ZOVIRAX) 400 MG tablet Take 1 tablet (400 mg total) by mouth 3 (three) times a day for 10 days (Patient not taking: Reported on 3/6/2020)    [DISCONTINUED] promethazine-codeine (PHENERGAN WITH CODEINE) 6 25-10 mg/5 mL syrup Take 5 mL by mouth daily at bedtime as needed for cough (Patient not taking: Reported on 2/13/2020)    [DISCONTINUED] sildenafil (REVATIO) 20 mg tablet Take 1 tablet (20 mg total) by mouth once as needed (erectile dysfunction) for up to 1 dose (Patient not taking: Reported on 8/26/2019)     No current facility-administered medications on file prior to visit  He is allergic to celecoxib  Sheela Serum Review of Systems   Constitutional: Negative for activity change, appetite change, fatigue and unexpected weight change  Respiratory: Negative for chest tightness and shortness of breath  Cardiovascular: Negative for chest pain and leg swelling  Gastrointestinal: Negative for abdominal pain  Neurological: Negative for headaches  Objective:      /72   Pulse 84   Temp 97 6 °F (36 4 °C)   Ht 5' 8" (1 727 m)   Wt 86 5 kg (190 lb 12 8 oz)   SpO2 98%   BMI 29 01 kg/m²          Physical Exam   Constitutional: He is oriented to person, place, and time  He appears well-developed and well-nourished  No distress  HENT:   Head: Normocephalic and atraumatic  Cardiovascular: Normal rate, regular rhythm and normal heart sounds  Exam reveals no gallop and no friction rub  No murmur heard  Pulmonary/Chest: Effort normal and breath sounds normal  No respiratory distress  He has no wheezes  He has no rales  He exhibits no tenderness  Musculoskeletal: He exhibits no edema     Neurological: He is alert and oriented to person, place, and time  Skin: He is not diaphoretic  Psychiatric: He has a normal mood and affect  His behavior is normal  Judgment and thought content normal    Nursing note and vitals reviewed

## 2020-05-04 ENCOUNTER — TELEMEDICINE (OUTPATIENT)
Dept: FAMILY MEDICINE CLINIC | Facility: CLINIC | Age: 80
End: 2020-05-04
Payer: MEDICARE

## 2020-05-04 VITALS — WEIGHT: 190 LBS | BODY MASS INDEX: 28.79 KG/M2 | HEIGHT: 68 IN

## 2020-05-04 DIAGNOSIS — H02.843 SWELLING OF RIGHT EYELID: Primary | ICD-10-CM

## 2020-05-04 PROCEDURE — 99214 OFFICE O/P EST MOD 30 MIN: CPT | Performed by: FAMILY MEDICINE

## 2020-05-04 RX ORDER — POLYMYXIN B SULFATE AND TRIMETHOPRIM 1; 10000 MG/ML; [USP'U]/ML
1 SOLUTION OPHTHALMIC EVERY 4 HOURS
Qty: 10 ML | Refills: 0 | Status: SHIPPED | OUTPATIENT
Start: 2020-05-04 | End: 2020-07-22 | Stop reason: ALTCHOICE

## 2020-05-04 RX ORDER — MULTIVIT WITH MINERALS/LUTEIN
2000 TABLET ORAL 2 TIMES DAILY
COMMUNITY

## 2020-05-09 ENCOUNTER — HOSPITAL ENCOUNTER (INPATIENT)
Facility: HOSPITAL | Age: 80
LOS: 2 days | Discharge: HOME | DRG: 247 | End: 2020-05-11
Attending: EMERGENCY MEDICINE | Admitting: ANESTHESIOLOGY
Payer: MEDICARE

## 2020-05-09 ENCOUNTER — APPOINTMENT (EMERGENCY)
Dept: RADIOLOGY | Facility: HOSPITAL | Age: 80
DRG: 247 | End: 2020-05-09
Payer: MEDICARE

## 2020-05-09 ENCOUNTER — APPOINTMENT (EMERGENCY)
Dept: INTERVENTIONAL RADIOLOGY/VASCULAR | Facility: HOSPITAL | Age: 80
DRG: 247 | End: 2020-05-09
Attending: EMERGENCY MEDICINE
Payer: MEDICARE

## 2020-05-09 DIAGNOSIS — I21.3 STEMI (ST ELEVATION MYOCARDIAL INFARCTION) (HCC): Primary | ICD-10-CM

## 2020-05-09 DIAGNOSIS — I21.21 ST ELEVATION MYOCARDIAL INFARCTION INVOLVING LEFT CIRCUMFLEX CORONARY ARTERY (HCC): ICD-10-CM

## 2020-05-09 PROBLEM — I21.11 ST ELEVATION MYOCARDIAL INFARCTION INVOLVING RIGHT CORONARY ARTERY (HCC): Status: ACTIVE | Noted: 2020-05-09

## 2020-05-09 PROBLEM — N17.9 AKI (ACUTE KIDNEY INJURY) (HCC): Status: ACTIVE | Noted: 2020-05-09

## 2020-05-09 LAB
ABO GROUP BLD: NORMAL
ABO GROUP BLD: NORMAL
ALBUMIN SERPL BCP-MCNC: 3.2 G/DL (ref 3.5–5)
ALBUMIN SERPL BCP-MCNC: 3.5 G/DL (ref 3.5–5)
ALP SERPL-CCNC: 36 U/L (ref 46–116)
ALP SERPL-CCNC: 42 U/L (ref 46–116)
ALT SERPL W P-5'-P-CCNC: 18 U/L (ref 12–78)
ALT SERPL W P-5'-P-CCNC: 52 U/L (ref 12–78)
ANION GAP SERPL CALCULATED.3IONS-SCNC: 12 MMOL/L (ref 4–13)
ANION GAP SERPL CALCULATED.3IONS-SCNC: 9 MMOL/L (ref 4–13)
APTT PPP: 34 SECONDS (ref 23–37)
AST SERPL W P-5'-P-CCNC: 15 U/L (ref 5–45)
AST SERPL W P-5'-P-CCNC: 285 U/L (ref 5–45)
ATRIAL RATE: 53 BPM
ATRIAL RATE: 62 BPM
BASOPHILS # BLD AUTO: 0.04 THOUSANDS/ΜL (ref 0–0.1)
BASOPHILS # BLD AUTO: 0.04 THOUSANDS/ΜL (ref 0–0.1)
BASOPHILS # BLD AUTO: 0.08 THOUSANDS/ΜL (ref 0–0.1)
BASOPHILS NFR BLD AUTO: 0 % (ref 0–1)
BASOPHILS NFR BLD AUTO: 0 % (ref 0–1)
BASOPHILS NFR BLD AUTO: 1 % (ref 0–1)
BILIRUB DIRECT SERPL-MCNC: 0.09 MG/DL (ref 0–0.2)
BILIRUB SERPL-MCNC: 0.3 MG/DL (ref 0.2–1)
BILIRUB SERPL-MCNC: 0.6 MG/DL (ref 0.2–1)
BLD GP AB SCN SERPL QL: NEGATIVE
BUN SERPL-MCNC: 21 MG/DL (ref 5–25)
BUN SERPL-MCNC: 26 MG/DL (ref 5–25)
CA-I BLD-SCNC: 1.13 MMOL/L (ref 1.12–1.32)
CALCIUM SERPL-MCNC: 8.3 MG/DL (ref 8.3–10.1)
CALCIUM SERPL-MCNC: 8.5 MG/DL (ref 8.3–10.1)
CHLORIDE SERPL-SCNC: 108 MMOL/L (ref 100–108)
CHLORIDE SERPL-SCNC: 109 MMOL/L (ref 100–108)
CHOLEST SERPL-MCNC: 140 MG/DL (ref 50–200)
CO2 SERPL-SCNC: 26 MMOL/L (ref 21–32)
CO2 SERPL-SCNC: 26 MMOL/L (ref 21–32)
CREAT SERPL-MCNC: 1.39 MG/DL (ref 0.6–1.3)
CREAT SERPL-MCNC: 1.57 MG/DL (ref 0.6–1.3)
EOSINOPHIL # BLD AUTO: 0.02 THOUSAND/ΜL (ref 0–0.61)
EOSINOPHIL # BLD AUTO: 0.04 THOUSAND/ΜL (ref 0–0.61)
EOSINOPHIL # BLD AUTO: 0.22 THOUSAND/ΜL (ref 0–0.61)
EOSINOPHIL NFR BLD AUTO: 0 % (ref 0–6)
EOSINOPHIL NFR BLD AUTO: 0 % (ref 0–6)
EOSINOPHIL NFR BLD AUTO: 2 % (ref 0–6)
ERYTHROCYTE [DISTWIDTH] IN BLOOD BY AUTOMATED COUNT: 12.9 % (ref 11.6–15.1)
ERYTHROCYTE [DISTWIDTH] IN BLOOD BY AUTOMATED COUNT: 13 % (ref 11.6–15.1)
ERYTHROCYTE [DISTWIDTH] IN BLOOD BY AUTOMATED COUNT: 13 % (ref 11.6–15.1)
EST. AVERAGE GLUCOSE BLD GHB EST-MCNC: 137 MG/DL
GFR SERPL CREATININE-BSD FRML MDRD: 42 ML/MIN/1.73SQ M
GFR SERPL CREATININE-BSD FRML MDRD: 48 ML/MIN/1.73SQ M
GLUCOSE SERPL-MCNC: 129 MG/DL (ref 65–140)
GLUCOSE SERPL-MCNC: 178 MG/DL (ref 65–140)
HBA1C MFR BLD: 6.4 %
HCT VFR BLD AUTO: 37.5 % (ref 36.5–49.3)
HCT VFR BLD AUTO: 37.5 % (ref 36.5–49.3)
HCT VFR BLD AUTO: 39.6 % (ref 36.5–49.3)
HDLC SERPL-MCNC: 43 MG/DL
HGB BLD-MCNC: 11.8 G/DL (ref 12–17)
HGB BLD-MCNC: 11.9 G/DL (ref 12–17)
HGB BLD-MCNC: 12.7 G/DL (ref 12–17)
IMM GRANULOCYTES # BLD AUTO: 0.03 THOUSAND/UL (ref 0–0.2)
IMM GRANULOCYTES # BLD AUTO: 0.05 THOUSAND/UL (ref 0–0.2)
IMM GRANULOCYTES # BLD AUTO: 0.05 THOUSAND/UL (ref 0–0.2)
IMM GRANULOCYTES NFR BLD AUTO: 0 % (ref 0–2)
IMM GRANULOCYTES NFR BLD AUTO: 0 % (ref 0–2)
IMM GRANULOCYTES NFR BLD AUTO: 1 % (ref 0–2)
INR PPP: 1.1 (ref 0.84–1.19)
INR PPP: 1.14 (ref 0.84–1.19)
KCT BLD-ACNC: 166 SEC (ref 89–137)
KCT BLD-ACNC: 206 SEC (ref 89–137)
KCT BLD-ACNC: 248 SEC (ref 89–137)
LDLC SERPL CALC-MCNC: 85 MG/DL (ref 0–100)
LYMPHOCYTES # BLD AUTO: 1.99 THOUSANDS/ΜL (ref 0.6–4.47)
LYMPHOCYTES # BLD AUTO: 2.07 THOUSANDS/ΜL (ref 0.6–4.47)
LYMPHOCYTES # BLD AUTO: 6.07 THOUSANDS/ΜL (ref 0.6–4.47)
LYMPHOCYTES NFR BLD AUTO: 18 % (ref 14–44)
LYMPHOCYTES NFR BLD AUTO: 19 % (ref 14–44)
LYMPHOCYTES NFR BLD AUTO: 49 % (ref 14–44)
MAGNESIUM SERPL-MCNC: 1.9 MG/DL (ref 1.6–2.6)
MAGNESIUM SERPL-MCNC: 2 MG/DL (ref 1.6–2.6)
MCH RBC QN AUTO: 32.2 PG (ref 26.8–34.3)
MCH RBC QN AUTO: 32.3 PG (ref 26.8–34.3)
MCH RBC QN AUTO: 32.7 PG (ref 26.8–34.3)
MCHC RBC AUTO-ENTMCNC: 31.5 G/DL (ref 31.4–37.4)
MCHC RBC AUTO-ENTMCNC: 31.7 G/DL (ref 31.4–37.4)
MCHC RBC AUTO-ENTMCNC: 32.1 G/DL (ref 31.4–37.4)
MCV RBC AUTO: 102 FL (ref 82–98)
MCV RBC AUTO: 102 FL (ref 82–98)
MCV RBC AUTO: 103 FL (ref 82–98)
MONOCYTES # BLD AUTO: 0.8 THOUSAND/ΜL (ref 0.17–1.22)
MONOCYTES # BLD AUTO: 0.94 THOUSAND/ΜL (ref 0.17–1.22)
MONOCYTES # BLD AUTO: 1.26 THOUSAND/ΜL (ref 0.17–1.22)
MONOCYTES NFR BLD AUTO: 10 % (ref 4–12)
MONOCYTES NFR BLD AUTO: 7 % (ref 4–12)
MONOCYTES NFR BLD AUTO: 9 % (ref 4–12)
NEUTROPHILS # BLD AUTO: 4.6 THOUSANDS/ΜL (ref 1.85–7.62)
NEUTROPHILS # BLD AUTO: 7.46 THOUSANDS/ΜL (ref 1.85–7.62)
NEUTROPHILS # BLD AUTO: 8.27 THOUSANDS/ΜL (ref 1.85–7.62)
NEUTS SEG NFR BLD AUTO: 38 % (ref 43–75)
NEUTS SEG NFR BLD AUTO: 71 % (ref 43–75)
NEUTS SEG NFR BLD AUTO: 75 % (ref 43–75)
NONHDLC SERPL-MCNC: 97 MG/DL
NRBC BLD AUTO-RTO: 0 /100 WBCS
P AXIS: 62 DEGREES
P AXIS: 74 DEGREES
PHOSPHATE SERPL-MCNC: 3.1 MG/DL (ref 2.3–4.1)
PLATELET # BLD AUTO: 167 THOUSANDS/UL (ref 149–390)
PLATELET # BLD AUTO: 173 THOUSANDS/UL (ref 149–390)
PLATELET # BLD AUTO: 204 THOUSANDS/UL (ref 149–390)
PMV BLD AUTO: 10 FL (ref 8.9–12.7)
PMV BLD AUTO: 10.4 FL (ref 8.9–12.7)
PMV BLD AUTO: 10.4 FL (ref 8.9–12.7)
POTASSIUM SERPL-SCNC: 4 MMOL/L (ref 3.5–5.3)
POTASSIUM SERPL-SCNC: 5.3 MMOL/L (ref 3.5–5.3)
PR INTERVAL: 150 MS
PR INTERVAL: 152 MS
PROT SERPL-MCNC: 6.4 G/DL (ref 6.4–8.2)
PROT SERPL-MCNC: 6.8 G/DL (ref 6.4–8.2)
PROTHROMBIN TIME: 14.3 SECONDS (ref 11.6–14.5)
PROTHROMBIN TIME: 14.7 SECONDS (ref 11.6–14.5)
QRS AXIS: 59 DEGREES
QRS AXIS: 62 DEGREES
QRSD INTERVAL: 72 MS
QRSD INTERVAL: 74 MS
QT INTERVAL: 462 MS
QT INTERVAL: 468 MS
QTC INTERVAL: 439 MS
QTC INTERVAL: 468 MS
RBC # BLD AUTO: 3.65 MILLION/UL (ref 3.88–5.62)
RBC # BLD AUTO: 3.69 MILLION/UL (ref 3.88–5.62)
RBC # BLD AUTO: 3.88 MILLION/UL (ref 3.88–5.62)
RH BLD: POSITIVE
RH BLD: POSITIVE
SODIUM SERPL-SCNC: 144 MMOL/L (ref 136–145)
SODIUM SERPL-SCNC: 146 MMOL/L (ref 136–145)
SPECIMEN EXPIRATION DATE: NORMAL
SPECIMEN SOURCE: ABNORMAL
T WAVE AXIS: 82 DEGREES
T WAVE AXIS: 88 DEGREES
TRIGL SERPL-MCNC: 60 MG/DL
TROPONIN I SERPL-MCNC: 0.06 NG/ML
TROPONIN I SERPL-MCNC: >40 NG/ML
VENTRICULAR RATE: 53 BPM
VENTRICULAR RATE: 62 BPM
WBC # BLD AUTO: 10.52 THOUSAND/UL (ref 4.31–10.16)
WBC # BLD AUTO: 11.25 THOUSAND/UL (ref 4.31–10.16)
WBC # BLD AUTO: 12.26 THOUSAND/UL (ref 4.31–10.16)

## 2020-05-09 PROCEDURE — C1769 GUIDE WIRE: HCPCS | Performed by: EMERGENCY MEDICINE

## 2020-05-09 PROCEDURE — C1887 CATHETER, GUIDING: HCPCS | Performed by: EMERGENCY MEDICINE

## 2020-05-09 PROCEDURE — 85347 COAGULATION TIME ACTIVATED: CPT

## 2020-05-09 PROCEDURE — 93005 ELECTROCARDIOGRAM TRACING: CPT

## 2020-05-09 PROCEDURE — 93454 CORONARY ARTERY ANGIO S&I: CPT | Performed by: EMERGENCY MEDICINE

## 2020-05-09 PROCEDURE — 86901 BLOOD TYPING SEROLOGIC RH(D): CPT | Performed by: ANESTHESIOLOGY

## 2020-05-09 PROCEDURE — C9600 PERC DRUG-EL COR STENT SING: HCPCS | Performed by: EMERGENCY MEDICINE

## 2020-05-09 PROCEDURE — 80048 BASIC METABOLIC PNL TOTAL CA: CPT | Performed by: EMERGENCY MEDICINE

## 2020-05-09 PROCEDURE — C1894 INTRO/SHEATH, NON-LASER: HCPCS | Performed by: EMERGENCY MEDICINE

## 2020-05-09 PROCEDURE — 99291 CRITICAL CARE FIRST HOUR: CPT

## 2020-05-09 PROCEDURE — 96366 THER/PROPH/DIAG IV INF ADDON: CPT

## 2020-05-09 PROCEDURE — 86900 BLOOD TYPING SEROLOGIC ABO: CPT | Performed by: ANESTHESIOLOGY

## 2020-05-09 PROCEDURE — C9606 PERC D-E COR REVASC W AMI S: HCPCS | Performed by: EMERGENCY MEDICINE

## 2020-05-09 PROCEDURE — 80061 LIPID PANEL: CPT | Performed by: EMERGENCY MEDICINE

## 2020-05-09 PROCEDURE — 83735 ASSAY OF MAGNESIUM: CPT | Performed by: EMERGENCY MEDICINE

## 2020-05-09 PROCEDURE — 93454 CORONARY ARTERY ANGIO S&I: CPT | Performed by: INTERNAL MEDICINE

## 2020-05-09 PROCEDURE — 85610 PROTHROMBIN TIME: CPT | Performed by: EMERGENCY MEDICINE

## 2020-05-09 PROCEDURE — C1874 STENT, COATED/COV W/DEL SYS: HCPCS

## 2020-05-09 PROCEDURE — C1760 CLOSURE DEV, VASC: HCPCS | Performed by: EMERGENCY MEDICINE

## 2020-05-09 PROCEDURE — 84484 ASSAY OF TROPONIN QUANT: CPT | Performed by: EMERGENCY MEDICINE

## 2020-05-09 PROCEDURE — C1725 CATH, TRANSLUMIN NON-LASER: HCPCS | Performed by: EMERGENCY MEDICINE

## 2020-05-09 PROCEDURE — 86850 RBC ANTIBODY SCREEN: CPT | Performed by: ANESTHESIOLOGY

## 2020-05-09 PROCEDURE — 83036 HEMOGLOBIN GLYCOSYLATED A1C: CPT | Performed by: NURSE PRACTITIONER

## 2020-05-09 PROCEDURE — 96375 TX/PRO/DX INJ NEW DRUG ADDON: CPT

## 2020-05-09 PROCEDURE — 96365 THER/PROPH/DIAG IV INF INIT: CPT

## 2020-05-09 PROCEDURE — 93010 ELECTROCARDIOGRAM REPORT: CPT | Performed by: INTERNAL MEDICINE

## 2020-05-09 PROCEDURE — 1124F ACP DISCUSS-NO DSCNMKR DOCD: CPT | Performed by: INTERNAL MEDICINE

## 2020-05-09 PROCEDURE — 99285 EMERGENCY DEPT VISIT HI MDM: CPT | Performed by: EMERGENCY MEDICINE

## 2020-05-09 PROCEDURE — NC001 PR NO CHARGE: Performed by: INTERNAL MEDICINE

## 2020-05-09 PROCEDURE — 80076 HEPATIC FUNCTION PANEL: CPT | Performed by: EMERGENCY MEDICINE

## 2020-05-09 PROCEDURE — 85610 PROTHROMBIN TIME: CPT | Performed by: NURSE PRACTITIONER

## 2020-05-09 PROCEDURE — 83735 ASSAY OF MAGNESIUM: CPT | Performed by: NURSE PRACTITIONER

## 2020-05-09 PROCEDURE — 82330 ASSAY OF CALCIUM: CPT | Performed by: NURSE PRACTITIONER

## 2020-05-09 PROCEDURE — 99222 1ST HOSP IP/OBS MODERATE 55: CPT | Performed by: ANESTHESIOLOGY

## 2020-05-09 PROCEDURE — 36415 COLL VENOUS BLD VENIPUNCTURE: CPT | Performed by: EMERGENCY MEDICINE

## 2020-05-09 PROCEDURE — 027034Z DILATION OF CORONARY ARTERY, ONE ARTERY WITH DRUG-ELUTING INTRALUMINAL DEVICE, PERCUTANEOUS APPROACH: ICD-10-PCS | Performed by: INTERNAL MEDICINE

## 2020-05-09 PROCEDURE — 85025 COMPLETE CBC W/AUTO DIFF WBC: CPT | Performed by: EMERGENCY MEDICINE

## 2020-05-09 PROCEDURE — 84100 ASSAY OF PHOSPHORUS: CPT | Performed by: NURSE PRACTITIONER

## 2020-05-09 PROCEDURE — 92941 PRQ TRLML REVSC TOT OCCL AMI: CPT | Performed by: INTERNAL MEDICINE

## 2020-05-09 PROCEDURE — 85025 COMPLETE CBC W/AUTO DIFF WBC: CPT | Performed by: INTERNAL MEDICINE

## 2020-05-09 PROCEDURE — 80053 COMPREHEN METABOLIC PANEL: CPT | Performed by: NURSE PRACTITIONER

## 2020-05-09 PROCEDURE — 85025 COMPLETE CBC W/AUTO DIFF WBC: CPT | Performed by: NURSE PRACTITIONER

## 2020-05-09 PROCEDURE — 96374 THER/PROPH/DIAG INJ IV PUSH: CPT

## 2020-05-09 PROCEDURE — 85730 THROMBOPLASTIN TIME PARTIAL: CPT | Performed by: EMERGENCY MEDICINE

## 2020-05-09 PROCEDURE — B211YZZ FLUOROSCOPY OF MULTIPLE CORONARY ARTERIES USING OTHER CONTRAST: ICD-10-PCS | Performed by: INTERNAL MEDICINE

## 2020-05-09 RX ORDER — NITROGLYCERIN 20 MG/100ML
INJECTION INTRAVENOUS CODE/TRAUMA/SEDATION MEDICATION
Status: COMPLETED | OUTPATIENT
Start: 2020-05-09 | End: 2020-05-09

## 2020-05-09 RX ORDER — EPTIFIBATIDE 0.75 MG/ML
1 INJECTION, SOLUTION INTRAVENOUS CONTINUOUS
Status: DISCONTINUED | OUTPATIENT
Start: 2020-05-09 | End: 2020-05-09 | Stop reason: SDUPTHER

## 2020-05-09 RX ORDER — VERAPAMIL HCL 2.5 MG/ML
AMPUL (ML) INTRAVENOUS CODE/TRAUMA/SEDATION MEDICATION
Status: DISCONTINUED | OUTPATIENT
Start: 2020-05-09 | End: 2020-05-09

## 2020-05-09 RX ORDER — ASPIRIN 81 MG/1
81 TABLET, CHEWABLE ORAL DAILY
Status: DISCONTINUED | OUTPATIENT
Start: 2020-05-10 | End: 2020-05-09

## 2020-05-09 RX ORDER — SODIUM CHLORIDE 9 MG/ML
75 INJECTION, SOLUTION INTRAVENOUS CONTINUOUS
Status: DISCONTINUED | OUTPATIENT
Start: 2020-05-09 | End: 2020-05-09

## 2020-05-09 RX ORDER — ONDANSETRON 2 MG/ML
4 INJECTION INTRAMUSCULAR; INTRAVENOUS ONCE
Status: COMPLETED | OUTPATIENT
Start: 2020-05-09 | End: 2020-05-09

## 2020-05-09 RX ORDER — MORPHINE SULFATE 4 MG/ML
4 INJECTION, SOLUTION INTRAMUSCULAR; INTRAVENOUS EVERY 4 HOURS PRN
Status: DISCONTINUED | OUTPATIENT
Start: 2020-05-09 | End: 2020-05-11 | Stop reason: HOSPADM

## 2020-05-09 RX ORDER — HEPARIN SODIUM 1000 [USP'U]/ML
4000 INJECTION, SOLUTION INTRAVENOUS; SUBCUTANEOUS
Status: DISCONTINUED | OUTPATIENT
Start: 2020-05-09 | End: 2020-05-09

## 2020-05-09 RX ORDER — ATORVASTATIN CALCIUM 40 MG/1
80 TABLET, FILM COATED ORAL
Status: DISCONTINUED | OUTPATIENT
Start: 2020-05-09 | End: 2020-05-11 | Stop reason: HOSPADM

## 2020-05-09 RX ORDER — HEPARIN SODIUM 1000 [USP'U]/ML
2000 INJECTION, SOLUTION INTRAVENOUS; SUBCUTANEOUS
Status: DISCONTINUED | OUTPATIENT
Start: 2020-05-09 | End: 2020-05-09

## 2020-05-09 RX ORDER — LANOLIN ALCOHOL/MO/W.PET/CERES
6 CREAM (GRAM) TOPICAL
Status: DISCONTINUED | OUTPATIENT
Start: 2020-05-09 | End: 2020-05-11 | Stop reason: HOSPADM

## 2020-05-09 RX ORDER — EPTIFIBATIDE 0.75 MG/ML
1 INJECTION, SOLUTION INTRAVENOUS CONTINUOUS
Status: DISPENSED | OUTPATIENT
Start: 2020-05-09 | End: 2020-05-09

## 2020-05-09 RX ORDER — EPTIFIBATIDE 0.75 MG/ML
1 INJECTION, SOLUTION INTRAVENOUS CONTINUOUS
Status: DISCONTINUED | OUTPATIENT
Start: 2020-05-09 | End: 2020-05-09

## 2020-05-09 RX ORDER — SODIUM CHLORIDE 9 MG/ML
3 INJECTION INTRAVENOUS
Status: DISCONTINUED | OUTPATIENT
Start: 2020-05-09 | End: 2020-05-11 | Stop reason: HOSPADM

## 2020-05-09 RX ORDER — HEPARIN SODIUM 1000 [USP'U]/ML
4000 INJECTION, SOLUTION INTRAVENOUS; SUBCUTANEOUS ONCE
Status: DISCONTINUED | OUTPATIENT
Start: 2020-05-09 | End: 2020-05-09 | Stop reason: SDUPTHER

## 2020-05-09 RX ORDER — EPTIFIBATIDE 0.75 MG/ML
INJECTION, SOLUTION INTRAVENOUS
Status: COMPLETED | OUTPATIENT
Start: 2020-05-09 | End: 2020-05-09

## 2020-05-09 RX ORDER — ASPIRIN 81 MG/1
81 TABLET ORAL DAILY
Status: DISCONTINUED | OUTPATIENT
Start: 2020-05-09 | End: 2020-05-09

## 2020-05-09 RX ORDER — HEPARIN SODIUM 10000 [USP'U]/100ML
3-20 INJECTION, SOLUTION INTRAVENOUS
Status: DISCONTINUED | OUTPATIENT
Start: 2020-05-09 | End: 2020-05-09

## 2020-05-09 RX ORDER — ATORVASTATIN CALCIUM 40 MG/1
40 TABLET, FILM COATED ORAL
Status: DISCONTINUED | OUTPATIENT
Start: 2020-05-09 | End: 2020-05-09

## 2020-05-09 RX ORDER — HEPARIN SODIUM 1000 [USP'U]/ML
INJECTION, SOLUTION INTRAVENOUS; SUBCUTANEOUS CODE/TRAUMA/SEDATION MEDICATION
Status: COMPLETED | OUTPATIENT
Start: 2020-05-09 | End: 2020-05-09

## 2020-05-09 RX ORDER — EPTIFIBATIDE 20 MG/10ML
INJECTION INTRAVENOUS CODE/TRAUMA/SEDATION MEDICATION
Status: COMPLETED | OUTPATIENT
Start: 2020-05-09 | End: 2020-05-09

## 2020-05-09 RX ORDER — LIDOCAINE WITH 8.4% SOD BICARB 0.9%(10ML)
SYRINGE (ML) INJECTION CODE/TRAUMA/SEDATION MEDICATION
Status: COMPLETED | OUTPATIENT
Start: 2020-05-09 | End: 2020-05-09

## 2020-05-09 RX ORDER — SODIUM CHLORIDE 9 MG/ML
75 INJECTION, SOLUTION INTRAVENOUS CONTINUOUS
Status: DISPENSED | OUTPATIENT
Start: 2020-05-09 | End: 2020-05-09

## 2020-05-09 RX ORDER — FAMOTIDINE 20 MG/1
20 TABLET, FILM COATED ORAL DAILY
Status: DISCONTINUED | OUTPATIENT
Start: 2020-05-09 | End: 2020-05-11 | Stop reason: HOSPADM

## 2020-05-09 RX ORDER — HEPARIN SODIUM 1000 [USP'U]/ML
4000 INJECTION, SOLUTION INTRAVENOUS; SUBCUTANEOUS ONCE
Status: COMPLETED | OUTPATIENT
Start: 2020-05-09 | End: 2020-05-09

## 2020-05-09 RX ORDER — ASPIRIN 81 MG/1
81 TABLET ORAL DAILY
Status: DISCONTINUED | OUTPATIENT
Start: 2020-05-10 | End: 2020-05-11 | Stop reason: HOSPADM

## 2020-05-09 RX ORDER — CALCIUM CARBONATE 200(500)MG
500 TABLET,CHEWABLE ORAL DAILY PRN
Status: DISCONTINUED | OUTPATIENT
Start: 2020-05-09 | End: 2020-05-11 | Stop reason: HOSPADM

## 2020-05-09 RX ORDER — ACETAMINOPHEN 325 MG/1
650 TABLET ORAL EVERY 8 HOURS PRN
Status: DISCONTINUED | OUTPATIENT
Start: 2020-05-09 | End: 2020-05-11 | Stop reason: HOSPADM

## 2020-05-09 RX ADMIN — ATORVASTATIN CALCIUM 80 MG: 40 TABLET, FILM COATED ORAL at 16:23

## 2020-05-09 RX ADMIN — HEPARIN SODIUM 1000 UNITS: 1000 INJECTION INTRAVENOUS; SUBCUTANEOUS at 03:51

## 2020-05-09 RX ADMIN — VERAPAMIL HYDROCHLORIDE 2.5 MG: 2.5 INJECTION, SOLUTION INTRAVENOUS at 03:00

## 2020-05-09 RX ADMIN — MORPHINE SULFATE 4 MG: 4 INJECTION INTRAVENOUS at 02:46

## 2020-05-09 RX ADMIN — HEPARIN SODIUM 4000 UNITS: 1000 INJECTION INTRAVENOUS; SUBCUTANEOUS at 02:36

## 2020-05-09 RX ADMIN — SODIUM CHLORIDE 75 ML/HR: 0.9 INJECTION, SOLUTION INTRAVENOUS at 04:54

## 2020-05-09 RX ADMIN — VERAPAMIL HYDROCHLORIDE 2.5 MG: 2.5 INJECTION, SOLUTION INTRAVENOUS at 03:10

## 2020-05-09 RX ADMIN — HEPARIN SODIUM 5000 UNITS: 1000 INJECTION INTRAVENOUS; SUBCUTANEOUS at 03:11

## 2020-05-09 RX ADMIN — EPTIFIBATIDE 8.5 ML: 2 INJECTION, SOLUTION INTRAVENOUS at 04:04

## 2020-05-09 RX ADMIN — FAMOTIDINE 20 MG: 20 TABLET ORAL at 11:12

## 2020-05-09 RX ADMIN — IODIXANOL 150 ML: 320 INJECTION, SOLUTION INTRAVASCULAR at 04:25

## 2020-05-09 RX ADMIN — TICAGRELOR 90 MG: 90 TABLET ORAL at 22:08

## 2020-05-09 RX ADMIN — Medication 2 ML: at 03:10

## 2020-05-09 RX ADMIN — Medication 6 ML: at 03:16

## 2020-05-09 RX ADMIN — HEPARIN SODIUM 1000 UNITS: 1000 INJECTION INTRAVENOUS; SUBCUTANEOUS at 03:39

## 2020-05-09 RX ADMIN — EPTIFIBATIDE 1 MCG/KG/MIN: 0.75 INJECTION, SOLUTION INTRAVENOUS at 03:52

## 2020-05-09 RX ADMIN — NITROGLYCERIN 200 MCG: 20 INJECTION INTRAVENOUS at 03:57

## 2020-05-09 RX ADMIN — TICAGRELOR 180 MG: 90 TABLET ORAL at 04:10

## 2020-05-09 RX ADMIN — HEPARIN SODIUM 3000 UNITS: 1000 INJECTION INTRAVENOUS; SUBCUTANEOUS at 03:32

## 2020-05-09 RX ADMIN — ONDANSETRON 4 MG: 2 INJECTION INTRAMUSCULAR; INTRAVENOUS at 02:45

## 2020-05-09 RX ADMIN — HEPARIN SODIUM 4000 UNITS: 1000 INJECTION INTRAVENOUS; SUBCUTANEOUS at 03:18

## 2020-05-09 RX ADMIN — METOPROLOL TARTRATE 12.5 MG: 25 TABLET ORAL at 22:08

## 2020-05-09 RX ADMIN — EPTIFIBATIDE 1 MCG/KG/MIN: 0.75 INJECTION, SOLUTION INTRAVENOUS at 15:23

## 2020-05-09 RX ADMIN — MELATONIN 6 MG: at 22:07

## 2020-05-09 RX ADMIN — HEPARIN SODIUM AND DEXTROSE 11.1 UNITS/KG/HR: 10000; 5 INJECTION INTRAVENOUS at 02:37

## 2020-05-09 RX ADMIN — EPTIFIBATIDE 8.5 ML: 2 INJECTION, SOLUTION INTRAVENOUS at 03:52

## 2020-05-10 ENCOUNTER — APPOINTMENT (INPATIENT)
Dept: NON INVASIVE DIAGNOSTICS | Facility: HOSPITAL | Age: 80
DRG: 247 | End: 2020-05-10
Payer: MEDICARE

## 2020-05-10 LAB
ALBUMIN SERPL BCP-MCNC: 3.1 G/DL (ref 3.5–5)
ALP SERPL-CCNC: 38 U/L (ref 46–116)
ALT SERPL W P-5'-P-CCNC: 39 U/L (ref 12–78)
ANION GAP SERPL CALCULATED.3IONS-SCNC: 10 MMOL/L (ref 4–13)
AST SERPL W P-5'-P-CCNC: 151 U/L (ref 5–45)
ATRIAL RATE: 53 BPM
BASOPHILS # BLD AUTO: 0.04 THOUSANDS/ΜL (ref 0–0.1)
BASOPHILS NFR BLD AUTO: 1 % (ref 0–1)
BILIRUB SERPL-MCNC: 1.1 MG/DL (ref 0.2–1)
BUN SERPL-MCNC: 15 MG/DL (ref 5–25)
CALCIUM SERPL-MCNC: 8.1 MG/DL (ref 8.3–10.1)
CHLORIDE SERPL-SCNC: 105 MMOL/L (ref 100–108)
CO2 SERPL-SCNC: 25 MMOL/L (ref 21–32)
CREAT SERPL-MCNC: 1.36 MG/DL (ref 0.6–1.3)
EOSINOPHIL # BLD AUTO: 0.14 THOUSAND/ΜL (ref 0–0.61)
EOSINOPHIL NFR BLD AUTO: 2 % (ref 0–6)
ERYTHROCYTE [DISTWIDTH] IN BLOOD BY AUTOMATED COUNT: 12.9 % (ref 11.6–15.1)
GFR SERPL CREATININE-BSD FRML MDRD: 49 ML/MIN/1.73SQ M
GLUCOSE SERPL-MCNC: 138 MG/DL (ref 65–140)
HCT VFR BLD AUTO: 35.3 % (ref 36.5–49.3)
HGB BLD-MCNC: 11.1 G/DL (ref 12–17)
IMM GRANULOCYTES # BLD AUTO: 0.03 THOUSAND/UL (ref 0–0.2)
IMM GRANULOCYTES NFR BLD AUTO: 0 % (ref 0–2)
LYMPHOCYTES # BLD AUTO: 2.09 THOUSANDS/ΜL (ref 0.6–4.47)
LYMPHOCYTES NFR BLD AUTO: 24 % (ref 14–44)
MCH RBC QN AUTO: 32.3 PG (ref 26.8–34.3)
MCHC RBC AUTO-ENTMCNC: 31.4 G/DL (ref 31.4–37.4)
MCV RBC AUTO: 103 FL (ref 82–98)
MONOCYTES # BLD AUTO: 0.81 THOUSAND/ΜL (ref 0.17–1.22)
MONOCYTES NFR BLD AUTO: 9 % (ref 4–12)
NEUTROPHILS # BLD AUTO: 5.67 THOUSANDS/ΜL (ref 1.85–7.62)
NEUTS SEG NFR BLD AUTO: 64 % (ref 43–75)
NRBC BLD AUTO-RTO: 0 /100 WBCS
P AXIS: 57 DEGREES
PLATELET # BLD AUTO: 143 THOUSANDS/UL (ref 149–390)
PMV BLD AUTO: 10.4 FL (ref 8.9–12.7)
POTASSIUM SERPL-SCNC: 4.2 MMOL/L (ref 3.5–5.3)
PR INTERVAL: 150 MS
PROT SERPL-MCNC: 6.2 G/DL (ref 6.4–8.2)
QRS AXIS: 55 DEGREES
QRSD INTERVAL: 74 MS
QT INTERVAL: 466 MS
QTC INTERVAL: 437 MS
RBC # BLD AUTO: 3.44 MILLION/UL (ref 3.88–5.62)
SODIUM SERPL-SCNC: 140 MMOL/L (ref 136–145)
T WAVE AXIS: 82 DEGREES
VENTRICULAR RATE: 53 BPM
WBC # BLD AUTO: 8.78 THOUSAND/UL (ref 4.31–10.16)

## 2020-05-10 PROCEDURE — 93306 TTE W/DOPPLER COMPLETE: CPT | Performed by: INTERNAL MEDICINE

## 2020-05-10 PROCEDURE — 93005 ELECTROCARDIOGRAM TRACING: CPT

## 2020-05-10 PROCEDURE — 80053 COMPREHEN METABOLIC PANEL: CPT | Performed by: PHYSICIAN ASSISTANT

## 2020-05-10 PROCEDURE — 85025 COMPLETE CBC W/AUTO DIFF WBC: CPT | Performed by: PHYSICIAN ASSISTANT

## 2020-05-10 PROCEDURE — 99232 SBSQ HOSP IP/OBS MODERATE 35: CPT | Performed by: INTERNAL MEDICINE

## 2020-05-10 PROCEDURE — 93010 ELECTROCARDIOGRAM REPORT: CPT | Performed by: INTERNAL MEDICINE

## 2020-05-10 PROCEDURE — 99233 SBSQ HOSP IP/OBS HIGH 50: CPT | Performed by: STUDENT IN AN ORGANIZED HEALTH CARE EDUCATION/TRAINING PROGRAM

## 2020-05-10 PROCEDURE — C8929 TTE W OR WO FOL WCON,DOPPLER: HCPCS

## 2020-05-10 RX ADMIN — TICAGRELOR 90 MG: 90 TABLET ORAL at 21:37

## 2020-05-10 RX ADMIN — FAMOTIDINE 20 MG: 20 TABLET ORAL at 08:42

## 2020-05-10 RX ADMIN — METOPROLOL TARTRATE 12.5 MG: 25 TABLET ORAL at 21:00

## 2020-05-10 RX ADMIN — ENOXAPARIN SODIUM 40 MG: 40 INJECTION SUBCUTANEOUS at 21:37

## 2020-05-10 RX ADMIN — METOPROLOL TARTRATE 12.5 MG: 25 TABLET ORAL at 08:44

## 2020-05-10 RX ADMIN — ATORVASTATIN CALCIUM 80 MG: 40 TABLET, FILM COATED ORAL at 15:54

## 2020-05-10 RX ADMIN — TICAGRELOR 90 MG: 90 TABLET ORAL at 08:43

## 2020-05-10 RX ADMIN — PERFLUTREN 0.6 ML/MIN: 6.52 INJECTION, SUSPENSION INTRAVENOUS at 11:08

## 2020-05-10 RX ADMIN — ENOXAPARIN SODIUM 40 MG: 40 INJECTION SUBCUTANEOUS at 14:00

## 2020-05-10 RX ADMIN — ASPIRIN 81 MG: 81 TABLET, COATED ORAL at 08:43

## 2020-05-10 RX ADMIN — MELATONIN 6 MG: at 21:38

## 2020-05-11 VITALS
HEIGHT: 68 IN | SYSTOLIC BLOOD PRESSURE: 116 MMHG | TEMPERATURE: 98.8 F | WEIGHT: 194.67 LBS | RESPIRATION RATE: 19 BRPM | DIASTOLIC BLOOD PRESSURE: 70 MMHG | OXYGEN SATURATION: 98 % | HEART RATE: 81 BPM | BODY MASS INDEX: 29.5 KG/M2

## 2020-05-11 LAB
ALBUMIN SERPL BCP-MCNC: 3.3 G/DL (ref 3.5–5)
ALP SERPL-CCNC: 43 U/L (ref 46–116)
ALT SERPL W P-5'-P-CCNC: 32 U/L (ref 12–78)
ANION GAP SERPL CALCULATED.3IONS-SCNC: 10 MMOL/L (ref 4–13)
AST SERPL W P-5'-P-CCNC: 85 U/L (ref 5–45)
ATRIAL RATE: 62 BPM
ATRIAL RATE: 63 BPM
BASOPHILS # BLD AUTO: 0.03 THOUSANDS/ΜL (ref 0–0.1)
BASOPHILS NFR BLD AUTO: 0 % (ref 0–1)
BILIRUB SERPL-MCNC: 1.1 MG/DL (ref 0.2–1)
BUN SERPL-MCNC: 20 MG/DL (ref 5–25)
CALCIUM SERPL-MCNC: 8.6 MG/DL (ref 8.3–10.1)
CHLORIDE SERPL-SCNC: 104 MMOL/L (ref 100–108)
CO2 SERPL-SCNC: 23 MMOL/L (ref 21–32)
CREAT SERPL-MCNC: 1.36 MG/DL (ref 0.6–1.3)
EOSINOPHIL # BLD AUTO: 0.21 THOUSAND/ΜL (ref 0–0.61)
EOSINOPHIL NFR BLD AUTO: 3 % (ref 0–6)
ERYTHROCYTE [DISTWIDTH] IN BLOOD BY AUTOMATED COUNT: 12.8 % (ref 11.6–15.1)
GFR SERPL CREATININE-BSD FRML MDRD: 49 ML/MIN/1.73SQ M
GLUCOSE SERPL-MCNC: 134 MG/DL (ref 65–140)
HCT VFR BLD AUTO: 39 % (ref 36.5–49.3)
HGB BLD-MCNC: 12.6 G/DL (ref 12–17)
IMM GRANULOCYTES # BLD AUTO: 0.03 THOUSAND/UL (ref 0–0.2)
IMM GRANULOCYTES NFR BLD AUTO: 0 % (ref 0–2)
LYMPHOCYTES # BLD AUTO: 1.87 THOUSANDS/ΜL (ref 0.6–4.47)
LYMPHOCYTES NFR BLD AUTO: 22 % (ref 14–44)
MAGNESIUM SERPL-MCNC: 2 MG/DL (ref 1.6–2.6)
MCH RBC QN AUTO: 32.3 PG (ref 26.8–34.3)
MCHC RBC AUTO-ENTMCNC: 32.3 G/DL (ref 31.4–37.4)
MCV RBC AUTO: 100 FL (ref 82–98)
MONOCYTES # BLD AUTO: 0.82 THOUSAND/ΜL (ref 0.17–1.22)
MONOCYTES NFR BLD AUTO: 10 % (ref 4–12)
NEUTROPHILS # BLD AUTO: 5.54 THOUSANDS/ΜL (ref 1.85–7.62)
NEUTS SEG NFR BLD AUTO: 65 % (ref 43–75)
NRBC BLD AUTO-RTO: 0 /100 WBCS
P AXIS: 46 DEGREES
P AXIS: 54 DEGREES
PLATELET # BLD AUTO: 170 THOUSANDS/UL (ref 149–390)
PMV BLD AUTO: 10.6 FL (ref 8.9–12.7)
POTASSIUM SERPL-SCNC: 4.2 MMOL/L (ref 3.5–5.3)
PR INTERVAL: 140 MS
PR INTERVAL: 142 MS
PROT SERPL-MCNC: 6.8 G/DL (ref 6.4–8.2)
QRS AXIS: 35 DEGREES
QRS AXIS: 40 DEGREES
QRSD INTERVAL: 74 MS
QRSD INTERVAL: 74 MS
QT INTERVAL: 408 MS
QT INTERVAL: 412 MS
QTC INTERVAL: 417 MS
QTC INTERVAL: 418 MS
RBC # BLD AUTO: 3.9 MILLION/UL (ref 3.88–5.62)
SODIUM SERPL-SCNC: 137 MMOL/L (ref 136–145)
T WAVE AXIS: 53 DEGREES
T WAVE AXIS: 60 DEGREES
VENTRICULAR RATE: 62 BPM
VENTRICULAR RATE: 63 BPM
WBC # BLD AUTO: 8.5 THOUSAND/UL (ref 4.31–10.16)

## 2020-05-11 PROCEDURE — 97165 OT EVAL LOW COMPLEX 30 MIN: CPT

## 2020-05-11 PROCEDURE — 99239 HOSP IP/OBS DSCHRG MGMT >30: CPT | Performed by: STUDENT IN AN ORGANIZED HEALTH CARE EDUCATION/TRAINING PROGRAM

## 2020-05-11 PROCEDURE — 99231 SBSQ HOSP IP/OBS SF/LOW 25: CPT | Performed by: INTERNAL MEDICINE

## 2020-05-11 PROCEDURE — 85025 COMPLETE CBC W/AUTO DIFF WBC: CPT | Performed by: PHYSICIAN ASSISTANT

## 2020-05-11 PROCEDURE — 97161 PT EVAL LOW COMPLEX 20 MIN: CPT

## 2020-05-11 PROCEDURE — 83735 ASSAY OF MAGNESIUM: CPT | Performed by: PHYSICIAN ASSISTANT

## 2020-05-11 PROCEDURE — 93010 ELECTROCARDIOGRAM REPORT: CPT | Performed by: INTERNAL MEDICINE

## 2020-05-11 PROCEDURE — 80053 COMPREHEN METABOLIC PANEL: CPT | Performed by: PHYSICIAN ASSISTANT

## 2020-05-11 RX ORDER — FAMOTIDINE 20 MG/1
20 TABLET, FILM COATED ORAL DAILY
Qty: 30 TABLET | Refills: 0 | Status: SHIPPED | OUTPATIENT
Start: 2020-05-12 | End: 2020-09-22 | Stop reason: SDUPTHER

## 2020-05-11 RX ORDER — CALCIUM CARBONATE 200(500)MG
500 TABLET,CHEWABLE ORAL DAILY PRN
Qty: 10 TABLET | Refills: 0 | Status: SHIPPED | OUTPATIENT
Start: 2020-05-11 | End: 2020-05-21

## 2020-05-11 RX ORDER — ATORVASTATIN CALCIUM 80 MG/1
80 TABLET, FILM COATED ORAL
Qty: 60 TABLET | Refills: 0 | Status: SHIPPED | OUTPATIENT
Start: 2020-05-11 | End: 2020-07-08 | Stop reason: SDUPTHER

## 2020-05-11 RX ORDER — ASPIRIN 81 MG/1
81 TABLET ORAL DAILY
Qty: 60 TABLET | Refills: 0 | Status: SHIPPED | OUTPATIENT
Start: 2020-05-12 | End: 2022-06-27

## 2020-05-11 RX ADMIN — TICAGRELOR 90 MG: 90 TABLET ORAL at 10:20

## 2020-05-11 RX ADMIN — FAMOTIDINE 20 MG: 20 TABLET ORAL at 10:20

## 2020-05-11 RX ADMIN — ENOXAPARIN SODIUM 40 MG: 40 INJECTION SUBCUTANEOUS at 10:20

## 2020-05-11 RX ADMIN — ASPIRIN 81 MG: 81 TABLET, COATED ORAL at 10:20

## 2020-05-11 RX ADMIN — METOPROLOL TARTRATE 12.5 MG: 25 TABLET ORAL at 10:20

## 2020-05-14 ENCOUNTER — TRANSITIONAL CARE MANAGEMENT (OUTPATIENT)
Dept: FAMILY MEDICINE CLINIC | Facility: CLINIC | Age: 80
End: 2020-05-14

## 2020-05-15 ENCOUNTER — OFFICE VISIT (OUTPATIENT)
Dept: CARDIOLOGY CLINIC | Facility: CLINIC | Age: 80
End: 2020-05-15
Payer: MEDICARE

## 2020-05-15 VITALS
HEIGHT: 68 IN | HEART RATE: 73 BPM | WEIGHT: 189.7 LBS | BODY MASS INDEX: 28.75 KG/M2 | OXYGEN SATURATION: 99 % | SYSTOLIC BLOOD PRESSURE: 126 MMHG | DIASTOLIC BLOOD PRESSURE: 78 MMHG

## 2020-05-15 DIAGNOSIS — I25.110 CORONARY ARTERY DISEASE INVOLVING NATIVE CORONARY ARTERY OF NATIVE HEART WITH UNSTABLE ANGINA PECTORIS (HCC): Primary | ICD-10-CM

## 2020-05-15 DIAGNOSIS — R73.03 PREDIABETES: ICD-10-CM

## 2020-05-15 DIAGNOSIS — I10 ESSENTIAL HYPERTENSION: ICD-10-CM

## 2020-05-15 DIAGNOSIS — E78.2 MIXED HYPERLIPIDEMIA: ICD-10-CM

## 2020-05-15 PROBLEM — I25.10 CORONARY ARTERY DISEASE: Status: ACTIVE | Noted: 2020-05-15

## 2020-05-15 PROCEDURE — 1036F TOBACCO NON-USER: CPT | Performed by: PHYSICIAN ASSISTANT

## 2020-05-15 PROCEDURE — 3074F SYST BP LT 130 MM HG: CPT | Performed by: PHYSICIAN ASSISTANT

## 2020-05-15 PROCEDURE — 1111F DSCHRG MED/CURRENT MED MERGE: CPT | Performed by: PHYSICIAN ASSISTANT

## 2020-05-15 PROCEDURE — 4040F PNEUMOC VAC/ADMIN/RCVD: CPT | Performed by: PHYSICIAN ASSISTANT

## 2020-05-15 PROCEDURE — 3008F BODY MASS INDEX DOCD: CPT | Performed by: PHYSICIAN ASSISTANT

## 2020-05-15 PROCEDURE — 3066F NEPHROPATHY DOC TX: CPT | Performed by: PHYSICIAN ASSISTANT

## 2020-05-15 PROCEDURE — 3044F HG A1C LEVEL LT 7.0%: CPT | Performed by: PHYSICIAN ASSISTANT

## 2020-05-15 PROCEDURE — 3078F DIAST BP <80 MM HG: CPT | Performed by: PHYSICIAN ASSISTANT

## 2020-05-15 PROCEDURE — 1160F RVW MEDS BY RX/DR IN RCRD: CPT | Performed by: PHYSICIAN ASSISTANT

## 2020-05-15 PROCEDURE — 99213 OFFICE O/P EST LOW 20 MIN: CPT | Performed by: PHYSICIAN ASSISTANT

## 2020-05-18 ENCOUNTER — TELEPHONE (OUTPATIENT)
Dept: CARDIOLOGY CLINIC | Facility: CLINIC | Age: 80
End: 2020-05-18

## 2020-05-18 ENCOUNTER — OFFICE VISIT (OUTPATIENT)
Dept: FAMILY MEDICINE CLINIC | Facility: CLINIC | Age: 80
End: 2020-05-18
Payer: MEDICARE

## 2020-05-18 VITALS
DIASTOLIC BLOOD PRESSURE: 80 MMHG | HEART RATE: 63 BPM | WEIGHT: 189 LBS | SYSTOLIC BLOOD PRESSURE: 120 MMHG | HEIGHT: 68 IN | BODY MASS INDEX: 28.64 KG/M2 | TEMPERATURE: 98.7 F | OXYGEN SATURATION: 98 %

## 2020-05-18 DIAGNOSIS — I21.21 ST ELEVATION MYOCARDIAL INFARCTION INVOLVING LEFT CIRCUMFLEX CORONARY ARTERY (HCC): Primary | ICD-10-CM

## 2020-05-18 DIAGNOSIS — R73.03 PREDIABETES: ICD-10-CM

## 2020-05-18 DIAGNOSIS — I25.110 CORONARY ARTERY DISEASE INVOLVING NATIVE CORONARY ARTERY OF NATIVE HEART WITH UNSTABLE ANGINA PECTORIS (HCC): Primary | ICD-10-CM

## 2020-05-18 DIAGNOSIS — E78.2 MIXED HYPERLIPIDEMIA: ICD-10-CM

## 2020-05-18 PROCEDURE — 99495 TRANSJ CARE MGMT MOD F2F 14D: CPT | Performed by: FAMILY MEDICINE

## 2020-05-20 ENCOUNTER — PATIENT OUTREACH (OUTPATIENT)
Dept: CASE MANAGEMENT | Facility: OTHER | Age: 80
End: 2020-05-20

## 2020-05-26 ENCOUNTER — OFFICE VISIT (OUTPATIENT)
Dept: CARDIOLOGY CLINIC | Facility: CLINIC | Age: 80
End: 2020-05-26
Payer: MEDICARE

## 2020-05-26 VITALS
HEIGHT: 68 IN | OXYGEN SATURATION: 99 % | HEART RATE: 66 BPM | SYSTOLIC BLOOD PRESSURE: 102 MMHG | DIASTOLIC BLOOD PRESSURE: 70 MMHG | BODY MASS INDEX: 28.49 KG/M2 | WEIGHT: 188 LBS

## 2020-05-26 DIAGNOSIS — I25.10 ATHEROSCLEROSIS OF NATIVE CORONARY ARTERY OF NATIVE HEART WITHOUT ANGINA PECTORIS: Primary | ICD-10-CM

## 2020-05-26 DIAGNOSIS — E78.2 MIXED HYPERLIPIDEMIA: ICD-10-CM

## 2020-05-26 DIAGNOSIS — R73.03 PREDIABETES: ICD-10-CM

## 2020-05-26 DIAGNOSIS — I21.21 ST ELEVATION MYOCARDIAL INFARCTION INVOLVING LEFT CIRCUMFLEX CORONARY ARTERY (HCC): ICD-10-CM

## 2020-05-26 PROCEDURE — 4040F PNEUMOC VAC/ADMIN/RCVD: CPT | Performed by: INTERNAL MEDICINE

## 2020-05-26 PROCEDURE — 1111F DSCHRG MED/CURRENT MED MERGE: CPT | Performed by: INTERNAL MEDICINE

## 2020-05-26 PROCEDURE — 3008F BODY MASS INDEX DOCD: CPT | Performed by: INTERNAL MEDICINE

## 2020-05-26 PROCEDURE — 3066F NEPHROPATHY DOC TX: CPT | Performed by: INTERNAL MEDICINE

## 2020-05-26 PROCEDURE — 99212 OFFICE O/P EST SF 10 MIN: CPT | Performed by: INTERNAL MEDICINE

## 2020-05-26 PROCEDURE — 3074F SYST BP LT 130 MM HG: CPT | Performed by: INTERNAL MEDICINE

## 2020-05-26 PROCEDURE — 3078F DIAST BP <80 MM HG: CPT | Performed by: INTERNAL MEDICINE

## 2020-05-26 PROCEDURE — 1036F TOBACCO NON-USER: CPT | Performed by: INTERNAL MEDICINE

## 2020-05-26 PROCEDURE — 3044F HG A1C LEVEL LT 7.0%: CPT | Performed by: INTERNAL MEDICINE

## 2020-05-26 PROCEDURE — 1160F RVW MEDS BY RX/DR IN RCRD: CPT | Performed by: INTERNAL MEDICINE

## 2020-05-28 ENCOUNTER — TELEPHONE (OUTPATIENT)
Dept: CARDIOLOGY CLINIC | Facility: CLINIC | Age: 80
End: 2020-05-28

## 2020-05-28 DIAGNOSIS — R73.03 PREDIABETES: ICD-10-CM

## 2020-05-28 RX ORDER — LANCETS 28 GAUGE
EACH MISCELLANEOUS
Qty: 100 EACH | Refills: 3 | Status: SHIPPED | OUTPATIENT
Start: 2020-05-28

## 2020-06-04 DIAGNOSIS — I25.10 CORONARY ARTERY DISEASE INVOLVING NATIVE CORONARY ARTERY OF NATIVE HEART WITHOUT ANGINA PECTORIS: Primary | ICD-10-CM

## 2020-06-04 RX ORDER — CLOPIDOGREL BISULFATE 75 MG/1
75 TABLET ORAL DAILY
Qty: 90 TABLET | Refills: 5 | Status: SHIPPED | OUTPATIENT
Start: 2020-06-04 | End: 2020-07-08 | Stop reason: SDUPTHER

## 2020-06-04 RX ORDER — CLOPIDOGREL 300 MG/1
300 TABLET, FILM COATED ORAL ONCE
Qty: 1 TABLET | Refills: 0 | Status: SHIPPED | OUTPATIENT
Start: 2020-06-04 | End: 2020-10-21 | Stop reason: ALTCHOICE

## 2020-06-16 DIAGNOSIS — M17.0 PRIMARY OSTEOARTHRITIS OF BOTH KNEES: Primary | ICD-10-CM

## 2020-07-08 ENCOUNTER — TELEPHONE (OUTPATIENT)
Dept: CARDIOLOGY CLINIC | Facility: CLINIC | Age: 80
End: 2020-07-08

## 2020-07-08 DIAGNOSIS — I21.21 ST ELEVATION MYOCARDIAL INFARCTION INVOLVING LEFT CIRCUMFLEX CORONARY ARTERY (HCC): ICD-10-CM

## 2020-07-08 DIAGNOSIS — I25.10 CORONARY ARTERY DISEASE INVOLVING NATIVE CORONARY ARTERY OF NATIVE HEART WITHOUT ANGINA PECTORIS: ICD-10-CM

## 2020-07-08 RX ORDER — CLOPIDOGREL BISULFATE 75 MG/1
75 TABLET ORAL DAILY
Qty: 90 TABLET | Refills: 3 | Status: SHIPPED | OUTPATIENT
Start: 2020-07-08 | End: 2021-09-03

## 2020-07-08 RX ORDER — ATORVASTATIN CALCIUM 80 MG/1
80 TABLET, FILM COATED ORAL
Qty: 90 TABLET | Refills: 3 | Status: SHIPPED | OUTPATIENT
Start: 2020-07-08 | End: 2021-06-18

## 2020-07-10 ENCOUNTER — OFFICE VISIT (OUTPATIENT)
Dept: OBGYN CLINIC | Facility: CLINIC | Age: 80
End: 2020-07-10
Payer: MEDICARE

## 2020-07-10 VITALS
TEMPERATURE: 97.1 F | BODY MASS INDEX: 28.28 KG/M2 | SYSTOLIC BLOOD PRESSURE: 146 MMHG | DIASTOLIC BLOOD PRESSURE: 86 MMHG | WEIGHT: 186 LBS

## 2020-07-10 DIAGNOSIS — M17.0 PRIMARY OSTEOARTHRITIS OF BOTH KNEES: Primary | ICD-10-CM

## 2020-07-10 PROCEDURE — 3079F DIAST BP 80-89 MM HG: CPT | Performed by: ORTHOPAEDIC SURGERY

## 2020-07-10 PROCEDURE — 1036F TOBACCO NON-USER: CPT | Performed by: ORTHOPAEDIC SURGERY

## 2020-07-10 PROCEDURE — 3066F NEPHROPATHY DOC TX: CPT | Performed by: ORTHOPAEDIC SURGERY

## 2020-07-10 PROCEDURE — 3044F HG A1C LEVEL LT 7.0%: CPT | Performed by: ORTHOPAEDIC SURGERY

## 2020-07-10 PROCEDURE — 99213 OFFICE O/P EST LOW 20 MIN: CPT | Performed by: ORTHOPAEDIC SURGERY

## 2020-07-10 PROCEDURE — 1160F RVW MEDS BY RX/DR IN RCRD: CPT | Performed by: ORTHOPAEDIC SURGERY

## 2020-07-10 PROCEDURE — 4040F PNEUMOC VAC/ADMIN/RCVD: CPT | Performed by: ORTHOPAEDIC SURGERY

## 2020-07-10 PROCEDURE — 20610 DRAIN/INJ JOINT/BURSA W/O US: CPT | Performed by: PHYSICIAN ASSISTANT

## 2020-07-10 PROCEDURE — 1111F DSCHRG MED/CURRENT MED MERGE: CPT | Performed by: ORTHOPAEDIC SURGERY

## 2020-07-10 PROCEDURE — 3077F SYST BP >= 140 MM HG: CPT | Performed by: ORTHOPAEDIC SURGERY

## 2020-07-10 RX ORDER — BUPIVACAINE HYDROCHLORIDE 5 MG/ML
3.5 INJECTION, SOLUTION PERINEURAL
Status: COMPLETED | OUTPATIENT
Start: 2020-07-10 | End: 2020-07-10

## 2020-07-10 RX ORDER — METHYLPREDNISOLONE ACETATE 40 MG/ML
2 INJECTION, SUSPENSION INTRA-ARTICULAR; INTRALESIONAL; INTRAMUSCULAR; SOFT TISSUE
Status: COMPLETED | OUTPATIENT
Start: 2020-07-10 | End: 2020-07-10

## 2020-07-10 RX ADMIN — METHYLPREDNISOLONE ACETATE 2 ML: 40 INJECTION, SUSPENSION INTRA-ARTICULAR; INTRALESIONAL; INTRAMUSCULAR; SOFT TISSUE at 11:54

## 2020-07-10 RX ADMIN — BUPIVACAINE HYDROCHLORIDE 3.5 ML: 5 INJECTION, SOLUTION PERINEURAL at 11:54

## 2020-07-10 NOTE — PROGRESS NOTES
ASSESSMENT/PLAN:    Diagnoses and all orders for this visit:    Primary osteoarthritis of both knees    Other orders  -     Large joint arthrocentesis        Both knees were injected with Depo-Medrol and Marcaine  He tolerated the injections quite well  He will return to our office for follow up in three months  Return in about 3 months (around 10/10/2020)  _____________________________________________________  CHIEF COMPLAINT:  Chief Complaint   Patient presents with    Left Knee - Pain    Right Knee - Pain         SUBJECTIVE:  Shine Taveras is a 67 y o  male who presents to our office for his quarterly corticosteroid injections  He states his knees are still giving him pain  He would like bilateral injections in his knees today  He denies any numbness or tingling  He denies any fever or chills       PAST MEDICAL HISTORY:  Past Medical History:   Diagnosis Date    Diabetes mellitus ([address] )     diet control    Prediabetes        PAST SURGICAL HISTORY:  Past Surgical History:   Procedure Laterality Date    CARDIAC SURGERY      HERNIA REPAIR         FAMILY HISTORY:  Family History   Problem Relation Age of Onset    Heart disease Mother         Cardiac Disorder    No Known Problems Father        SOCIAL HISTORY:  Social History     Tobacco Use    Smoking status: Former Smoker    Smokeless tobacco: Never Used   Substance Use Topics    Alcohol use: Yes     Comment: Social    Drug use: Never       MEDICATIONS:    Current Outpatient Medications:     Ascorbic Acid (VITAMIN C) 1000 MG tablet, Take 2,000 mg by mouth 2 (two) times a day , Disp: , Rfl:     aspirin (ECOTRIN LOW STRENGTH) 81 mg EC tablet, Take 1 tablet (81 mg total) by mouth daily, Disp: 60 tablet, Rfl: 0    atorvastatin (LIPITOR) 80 mg tablet, Take 1 tablet (80 mg total) by mouth daily with dinner, Disp: 90 tablet, Rfl: 3    Blood Glucose Monitoring Suppl (FREESTYLE FREEDOM LITE) w/Device KIT, Check glucose levels once daily, Disp: 1 each, Rfl: 0    clopidogrel (PLAVIX) 75 mg tablet, Take 1 tablet (75 mg total) by mouth daily, Disp: 90 tablet, Rfl: 3    FREESTYLE LITE test strip, USE AS DIRECTED DAILY, Disp: 100 each, Rfl: 3    Lancets (FREESTYLE) lancets, TEST ONCE DAILY FASTING DXE11 9, Disp: 100 each, Rfl: 3    metoprolol tartrate (LOPRESSOR) 25 mg tablet, Take 1 tablet (25 mg total) by mouth every 12 (twelve) hours, Disp: 180 tablet, Rfl: 3    polymyxin b-trimethoprim (POLYTRIM) ophthalmic solution, Administer 1 drop to the right eye every 4 (four) hours, Disp: 10 mL, Rfl: 0    clopidogrel (PLAVIX) 300 mg, Take 1 tablet (300 mg total) by mouth once for 1 dose, Disp: 1 tablet, Rfl: 0    famotidine (PEPCID) 20 mg tablet, Take 1 tablet (20 mg total) by mouth daily, Disp: 30 tablet, Rfl: 0    ALLERGIES:  Allergies   Allergen Reactions    Celecoxib      Other reaction(s): itchy  Other reaction(s): itchy       ROS:  Review of Systems     Constitutional: Negative for fatigue, fever or loss of apetite  HENT: Negative  Respiratory: Negative for shortness of breath, dyspnea  Cardiovascular: Negative for chest pain/tightness  Gastrointestinal: Negative for abdominal pain, N/V  Endocrine: Negative for cold/heat intolerance, unexplained weight loss/gain  Genitourinary: Negative for flank pain, dysuria, hematuria  Musculoskeletal: Positive for arthralgia   Skin: Negative for rash  Neurological: Negative for numbness or tingling  Psychiatric/Behavioral: Negative for agitation  _____________________________________________________  PHYSICAL EXAMINATION:    Blood pressure 146/86, temperature (!) 97 1 °F (36 2 °C), temperature source Tympanic, weight 84 4 kg (186 lb)  Constitutional: Oriented to person, place, and time  Appears well-developed and well-nourished  No distress  HENT:   Head: Normocephalic  Eyes: Conjunctivae are normal  Right eye exhibits no discharge  Left eye exhibits no discharge  No scleral icterus  Cardiovascular: Normal rate  Pulmonary/Chest: Effort normal    Neurological: Alert and oriented to person, place, and time  Skin: Skin is warm and dry  No rash noted  Not diaphoretic  No erythema  No pallor  Psychiatric: Normal mood and affect  Behavior is normal  Judgment and thought content normal       MUSCULOSKELETAL EXAMINATION:   Physical Exam  Ortho Exam      Bilateral lower extremities are neurovascularly intact  Toes are pink and mobile   Compartments are soft  No warmth, erythema or ecchymosis  ROM of knees are from 5-115 degrees  Negative Lachman, drawer or pivot shift  No medial instability  Medial joint line tenderness, slight lateral joint line tenderness  Patellofemoral crepitation    Objective:  BP Readings from Last 1 Encounters:   07/10/20 146/86      Wt Readings from Last 1 Encounters:   07/10/20 84 4 kg (186 lb)        BMI:   Estimated body mass index is 28 28 kg/m² as calculated from the following:    Height as of 5/26/20: 5' 8" (1 727 m)  Weight as of this encounter: 84 4 kg (186 lb)      PROCEDURES PERFORMED:  Large joint arthrocentesis: bilateral knee  Date/Time: 7/10/2020 11:54 AM  Consent given by: patient  Site marked: site marked  Supporting Documentation  Indications: pain   Procedure Details  Location: knee - bilateral knee  Needle size: 22 G  Approach: lateral    Medications (Right): 3 5 mL bupivacaine 0 5 %; 2 mL methylPREDNISolone acetate 40 mg/mLMedications (Left): 3 5 mL bupivacaine 0 5 %; 2 mL methylPREDNISolone acetate 40 mg/mL   Patient tolerance: patient tolerated the procedure well with no immediate complications  Dressing:  Sterile dressing applied            Willam Flores PA-C

## 2020-07-22 ENCOUNTER — APPOINTMENT (OUTPATIENT)
Dept: RADIOLOGY | Facility: CLINIC | Age: 80
End: 2020-07-22
Payer: MEDICARE

## 2020-07-22 ENCOUNTER — OFFICE VISIT (OUTPATIENT)
Dept: FAMILY MEDICINE CLINIC | Facility: CLINIC | Age: 80
End: 2020-07-22
Payer: MEDICARE

## 2020-07-22 VITALS
SYSTOLIC BLOOD PRESSURE: 120 MMHG | HEART RATE: 52 BPM | WEIGHT: 183.4 LBS | HEIGHT: 68 IN | BODY MASS INDEX: 27.8 KG/M2 | OXYGEN SATURATION: 98 % | DIASTOLIC BLOOD PRESSURE: 82 MMHG | TEMPERATURE: 97.2 F

## 2020-07-22 DIAGNOSIS — M25.475 SWELLING OF FOOT JOINT, LEFT: ICD-10-CM

## 2020-07-22 DIAGNOSIS — M79.672 LEFT FOOT PAIN: ICD-10-CM

## 2020-07-22 DIAGNOSIS — M79.672 LEFT FOOT PAIN: Primary | ICD-10-CM

## 2020-07-22 PROCEDURE — 3008F BODY MASS INDEX DOCD: CPT | Performed by: PHYSICIAN ASSISTANT

## 2020-07-22 PROCEDURE — 3044F HG A1C LEVEL LT 7.0%: CPT | Performed by: PHYSICIAN ASSISTANT

## 2020-07-22 PROCEDURE — 73630 X-RAY EXAM OF FOOT: CPT

## 2020-07-22 PROCEDURE — 1160F RVW MEDS BY RX/DR IN RCRD: CPT | Performed by: PHYSICIAN ASSISTANT

## 2020-07-22 PROCEDURE — 99213 OFFICE O/P EST LOW 20 MIN: CPT | Performed by: PHYSICIAN ASSISTANT

## 2020-07-22 PROCEDURE — 3074F SYST BP LT 130 MM HG: CPT | Performed by: PHYSICIAN ASSISTANT

## 2020-07-22 PROCEDURE — 1036F TOBACCO NON-USER: CPT | Performed by: PHYSICIAN ASSISTANT

## 2020-07-22 PROCEDURE — 3079F DIAST BP 80-89 MM HG: CPT | Performed by: PHYSICIAN ASSISTANT

## 2020-07-22 PROCEDURE — 4040F PNEUMOC VAC/ADMIN/RCVD: CPT | Performed by: PHYSICIAN ASSISTANT

## 2020-07-22 PROCEDURE — 3066F NEPHROPATHY DOC TX: CPT | Performed by: PHYSICIAN ASSISTANT

## 2020-07-22 NOTE — PROGRESS NOTES
Sahra Ricketts 1940 male MRN: [id number]    Acute Visit        ASSESSMENT/PLAN  Problem List Items Addressed This Visit        Other    Left foot pain - Primary    Relevant Orders    XR foot 3+ vw left (Completed)      Other Visit Diagnoses     Swelling of foot joint, left        Relevant Orders    XR foot 3+ vw left (Completed)        Xray, tylenol for pain, ice the foot, elevate  Will follow up with results of xray  Future Appointments   Date Time Provider Erum Nohemi   10/12/2020  8:15 AM Linda Barraza DO [facility]        SUBJECTIVE  CC: Foot Pain (Patient seen in office today for a c/o having left foot pain - patient stated that 2 days ago he dropped a ladder on it - swollen and bruised)       Pt presents with complaints of dropping a ladder on his L foot 2 days ago  Pain is mild/moderate but the swelling and brusing is significant  There is echymosis on the top of the foot and the toes  He can bare weight but there is pain  Full ROM of toes and ankle  No sensation changes  Sahra Ricketts is a 67 y o  male who presented for an acute visit complaining of  Review of Systems   Constitutional: Negative for chills, fatigue and fever  HENT: Negative for congestion, ear pain, hearing loss, nosebleeds, postnasal drip, rhinorrhea, sinus pressure, sinus pain, sneezing and sore throat  Eyes: Negative for pain, discharge, itching and visual disturbance  Respiratory: Negative for cough, chest tightness, shortness of breath and wheezing  Cardiovascular: Negative for chest pain, palpitations and leg swelling  Gastrointestinal: Negative for abdominal pain, blood in stool, constipation, diarrhea, nausea and vomiting  Genitourinary: Negative for frequency and urgency  Musculoskeletal: Positive for joint swelling  Left foot pain, bruising, swelling    Skin: Positive for color change  Neurological: Negative for dizziness, light-headedness and numbness         Historical Information   The patient history was reviewed as follows:  Past Medical History:   Diagnosis Date    Diabetes mellitus ([address] )     diet control    Prediabetes      Past Surgical History:   Procedure Laterality Date    CARDIAC SURGERY      HERNIA REPAIR       Family History   Problem Relation Age of Onset    Heart disease Mother         Cardiac Disorder    No Known Problems Father       Social History   Social History     Substance and Sexual Activity   Alcohol Use Yes    Comment: Social     Social History     Substance and Sexual Activity   Drug Use Never     Social History     Tobacco Use   Smoking Status Former Smoker   Smokeless Tobacco Never Used       Medications:   Meds/Allergies   Current Outpatient Medications   Medication Sig Dispense Refill    aspirin (ECOTRIN LOW STRENGTH) 81 mg EC tablet Take 1 tablet (81 mg total) by mouth daily 60 tablet 0    atorvastatin (LIPITOR) 80 mg tablet Take 1 tablet (80 mg total) by mouth daily with dinner 90 tablet 3    clopidogrel (PLAVIX) 300 mg Take 1 tablet (300 mg total) by mouth once for 1 dose 1 tablet 0    metoprolol tartrate (LOPRESSOR) 25 mg tablet Take 1 tablet (25 mg total) by mouth every 12 (twelve) hours 180 tablet 3    Ascorbic Acid (VITAMIN C) 1000 MG tablet Take 2,000 mg by mouth 2 (two) times a day       Blood Glucose Monitoring Suppl (FREESTYLE FREEDOM LITE) w/Device KIT Check glucose levels once daily 1 each 0    clopidogrel (PLAVIX) 75 mg tablet Take 1 tablet (75 mg total) by mouth daily 90 tablet 3    famotidine (PEPCID) 20 mg tablet Take 1 tablet (20 mg total) by mouth daily 30 tablet 0    FREESTYLE LITE test strip USE AS DIRECTED DAILY 100 each 3    Lancets (FREESTYLE) lancets TEST ONCE DAILY FASTING DXE11 9 100 each 3     No current facility-administered medications for this visit          Allergies   Allergen Reactions    Celecoxib      Other reaction(s): itchy  Other reaction(s): itchy       OBJECTIVE  Vitals:   Vitals:    07/22/20 1010   BP: 120/82   Pulse: (!) 52   Temp: (!) 97 2 °F (36 2 °C)   SpO2: 98%   Weight: 83 2 kg (183 lb 6 4 oz)   Height: 5' 8" (1 727 m)       Invasive Devices     None                 Physical Exam   Constitutional: He is oriented to person, place, and time  He appears well-developed and well-nourished  No distress  HENT:   Head: Normocephalic and atraumatic  Right Ear: External ear normal    Left Ear: External ear normal    Eyes: Conjunctivae are normal    Neck: Normal range of motion  Cardiovascular: Normal rate, regular rhythm and normal heart sounds  No murmur heard  Pulmonary/Chest: Effort normal and breath sounds normal  No respiratory distress  He has no wheezes  He has no rales  Musculoskeletal: Normal range of motion  Left foot: There is tenderness, bony tenderness and swelling  There is normal capillary refill, no crepitus and no deformity  Neurological: He is alert and oriented to person, place, and time  No sensory deficit  Skin: Skin is warm and dry  Capillary refill takes less than 2 seconds  Ecchymosis noted  Psychiatric: He has a normal mood and affect  His behavior is normal    Nursing note and vitals reviewed  Lab:  I have personally reviewed all pertinent results

## 2020-07-31 ENCOUNTER — PATIENT OUTREACH (OUTPATIENT)
Dept: FAMILY MEDICINE CLINIC | Facility: CLINIC | Age: 80
End: 2020-07-31

## 2020-07-31 NOTE — PROGRESS NOTES
Outpatient Care Management Note:  Received BPCI hand off of Danielle Hanley  DX-acute [facility]  Chart notes reviewed  Will continue to track until completion of BPCI episode

## 2020-08-10 ENCOUNTER — EPISODE CHANGES (OUTPATIENT)
Dept: CASE MANAGEMENT | Facility: OTHER | Age: 80
End: 2020-08-10

## 2020-09-16 DIAGNOSIS — I21.21 ST ELEVATION MYOCARDIAL INFARCTION INVOLVING LEFT CIRCUMFLEX CORONARY ARTERY (HCC): ICD-10-CM

## 2020-09-22 ENCOUNTER — TELEPHONE (OUTPATIENT)
Dept: FAMILY MEDICINE CLINIC | Facility: CLINIC | Age: 80
End: 2020-09-22

## 2020-09-22 DIAGNOSIS — I21.21 ST ELEVATION MYOCARDIAL INFARCTION INVOLVING LEFT CIRCUMFLEX CORONARY ARTERY (HCC): ICD-10-CM

## 2020-09-22 RX ORDER — FAMOTIDINE 20 MG/1
TABLET, FILM COATED ORAL
Qty: 30 TABLET | Refills: 0 | OUTPATIENT
Start: 2020-09-22

## 2020-09-22 RX ORDER — FAMOTIDINE 20 MG/1
20 TABLET, FILM COATED ORAL DAILY
Qty: 90 TABLET | Refills: 1 | Status: SHIPPED | OUTPATIENT
Start: 2020-09-22 | End: 2022-06-14

## 2020-09-22 NOTE — TELEPHONE ENCOUNTER
Please schedule office visit to go over labs (have him get labs done) and AWV    20 min appt is fine for both

## 2020-10-01 ENCOUNTER — TELEPHONE (OUTPATIENT)
Dept: CARDIOLOGY CLINIC | Facility: CLINIC | Age: 80
End: 2020-10-01

## 2020-10-12 ENCOUNTER — OFFICE VISIT (OUTPATIENT)
Dept: OBGYN CLINIC | Facility: CLINIC | Age: 80
End: 2020-10-12
Payer: MEDICARE

## 2020-10-12 VITALS
HEIGHT: 68 IN | WEIGHT: 183 LBS | TEMPERATURE: 97.5 F | SYSTOLIC BLOOD PRESSURE: 124 MMHG | HEART RATE: 61 BPM | DIASTOLIC BLOOD PRESSURE: 80 MMHG | BODY MASS INDEX: 27.74 KG/M2

## 2020-10-12 DIAGNOSIS — M17.0 PRIMARY OSTEOARTHRITIS OF BOTH KNEES: Primary | ICD-10-CM

## 2020-10-12 DIAGNOSIS — M65.311 TRIGGER THUMB, RIGHT THUMB: ICD-10-CM

## 2020-10-12 PROBLEM — M18.11 ARTHRITIS OF CARPOMETACARPAL (CMC) JOINT OF RIGHT THUMB: Status: ACTIVE | Noted: 2020-10-12

## 2020-10-12 PROCEDURE — 20550 NJX 1 TENDON SHEATH/LIGAMENT: CPT | Performed by: ORTHOPAEDIC SURGERY

## 2020-10-12 PROCEDURE — 20610 DRAIN/INJ JOINT/BURSA W/O US: CPT | Performed by: ORTHOPAEDIC SURGERY

## 2020-10-12 PROCEDURE — 99213 OFFICE O/P EST LOW 20 MIN: CPT | Performed by: ORTHOPAEDIC SURGERY

## 2020-10-12 RX ORDER — BETAMETHASONE SODIUM PHOSPHATE AND BETAMETHASONE ACETATE 3; 3 MG/ML; MG/ML
3 INJECTION, SUSPENSION INTRA-ARTICULAR; INTRALESIONAL; INTRAMUSCULAR; SOFT TISSUE
Status: COMPLETED | OUTPATIENT
Start: 2020-10-12 | End: 2020-10-12

## 2020-10-12 RX ORDER — BUPIVACAINE HYDROCHLORIDE 2.5 MG/ML
0.5 INJECTION, SOLUTION INFILTRATION; PERINEURAL
Status: COMPLETED | OUTPATIENT
Start: 2020-10-12 | End: 2020-10-12

## 2020-10-12 RX ORDER — HYALURONATE SODIUM 10 MG/ML
20 SYRINGE (ML) INTRAARTICULAR
Status: COMPLETED | OUTPATIENT
Start: 2020-10-12 | End: 2020-10-12

## 2020-10-12 RX ADMIN — BETAMETHASONE SODIUM PHOSPHATE AND BETAMETHASONE ACETATE 3 MG: 3; 3 INJECTION, SUSPENSION INTRA-ARTICULAR; INTRALESIONAL; INTRAMUSCULAR; SOFT TISSUE at 08:26

## 2020-10-12 RX ADMIN — BUPIVACAINE HYDROCHLORIDE 0.5 ML: 2.5 INJECTION, SOLUTION INFILTRATION; PERINEURAL at 08:26

## 2020-10-12 RX ADMIN — Medication 20 MG: at 08:26

## 2020-10-19 ENCOUNTER — OFFICE VISIT (OUTPATIENT)
Dept: OBGYN CLINIC | Facility: CLINIC | Age: 80
End: 2020-10-19
Payer: MEDICARE

## 2020-10-19 VITALS
DIASTOLIC BLOOD PRESSURE: 66 MMHG | SYSTOLIC BLOOD PRESSURE: 122 MMHG | BODY MASS INDEX: 27.74 KG/M2 | TEMPERATURE: 97.3 F | WEIGHT: 183 LBS | HEIGHT: 68 IN

## 2020-10-19 DIAGNOSIS — M17.0 PRIMARY OSTEOARTHRITIS OF BOTH KNEES: Primary | ICD-10-CM

## 2020-10-19 PROCEDURE — 20610 DRAIN/INJ JOINT/BURSA W/O US: CPT | Performed by: ORTHOPAEDIC SURGERY

## 2020-10-19 RX ORDER — HYALURONATE SODIUM 10 MG/ML
20 SYRINGE (ML) INTRAARTICULAR
Status: COMPLETED | OUTPATIENT
Start: 2020-10-19 | End: 2020-10-19

## 2020-10-19 RX ADMIN — Medication 20 MG: at 09:03

## 2020-10-21 ENCOUNTER — OFFICE VISIT (OUTPATIENT)
Dept: FAMILY MEDICINE CLINIC | Facility: CLINIC | Age: 80
End: 2020-10-21
Payer: MEDICARE

## 2020-10-21 VITALS
BODY MASS INDEX: 28.55 KG/M2 | SYSTOLIC BLOOD PRESSURE: 128 MMHG | RESPIRATION RATE: 16 BRPM | WEIGHT: 188.4 LBS | HEART RATE: 74 BPM | TEMPERATURE: 96.7 F | OXYGEN SATURATION: 97 % | HEIGHT: 68 IN | DIASTOLIC BLOOD PRESSURE: 74 MMHG

## 2020-10-21 DIAGNOSIS — Z00.00 MEDICARE ANNUAL WELLNESS VISIT, SUBSEQUENT: ICD-10-CM

## 2020-10-21 DIAGNOSIS — Z23 NEED FOR PNEUMOCOCCAL VACCINATION: Primary | ICD-10-CM

## 2020-10-21 PROCEDURE — 90732 PPSV23 VACC 2 YRS+ SUBQ/IM: CPT

## 2020-10-21 PROCEDURE — G0009 ADMIN PNEUMOCOCCAL VACCINE: HCPCS

## 2020-10-21 PROCEDURE — G0439 PPPS, SUBSEQ VISIT: HCPCS | Performed by: FAMILY MEDICINE

## 2020-10-26 ENCOUNTER — OFFICE VISIT (OUTPATIENT)
Dept: OBGYN CLINIC | Facility: CLINIC | Age: 80
End: 2020-10-26
Payer: MEDICARE

## 2020-10-26 VITALS
BODY MASS INDEX: 28.49 KG/M2 | HEIGHT: 68 IN | TEMPERATURE: 98.5 F | SYSTOLIC BLOOD PRESSURE: 120 MMHG | DIASTOLIC BLOOD PRESSURE: 64 MMHG | WEIGHT: 188 LBS

## 2020-10-26 DIAGNOSIS — M17.0 PRIMARY OSTEOARTHRITIS OF BOTH KNEES: Primary | ICD-10-CM

## 2020-10-26 PROCEDURE — 20610 DRAIN/INJ JOINT/BURSA W/O US: CPT | Performed by: ORTHOPAEDIC SURGERY

## 2020-10-26 RX ORDER — HYALURONATE SODIUM 10 MG/ML
20 SYRINGE (ML) INTRAARTICULAR
Status: COMPLETED | OUTPATIENT
Start: 2020-10-26 | End: 2020-10-26

## 2020-10-26 RX ADMIN — Medication 20 MG: at 09:35

## 2020-11-02 ENCOUNTER — OFFICE VISIT (OUTPATIENT)
Dept: OBGYN CLINIC | Facility: CLINIC | Age: 80
End: 2020-11-02
Payer: MEDICARE

## 2020-11-02 VITALS
HEART RATE: 69 BPM | HEIGHT: 68 IN | WEIGHT: 188 LBS | SYSTOLIC BLOOD PRESSURE: 117 MMHG | DIASTOLIC BLOOD PRESSURE: 76 MMHG | BODY MASS INDEX: 28.49 KG/M2 | TEMPERATURE: 97.5 F

## 2020-11-02 DIAGNOSIS — M17.0 PRIMARY OSTEOARTHRITIS OF BOTH KNEES: Primary | ICD-10-CM

## 2020-11-02 PROCEDURE — 20610 DRAIN/INJ JOINT/BURSA W/O US: CPT | Performed by: ORTHOPAEDIC SURGERY

## 2020-11-02 RX ORDER — HYALURONATE SODIUM 10 MG/ML
20 SYRINGE (ML) INTRAARTICULAR
Status: COMPLETED | OUTPATIENT
Start: 2020-11-02 | End: 2020-11-02

## 2020-11-02 RX ADMIN — Medication 20 MG: at 08:43

## 2020-11-09 ENCOUNTER — OFFICE VISIT (OUTPATIENT)
Dept: OBGYN CLINIC | Facility: CLINIC | Age: 80
End: 2020-11-09
Payer: MEDICARE

## 2020-11-09 VITALS
DIASTOLIC BLOOD PRESSURE: 68 MMHG | SYSTOLIC BLOOD PRESSURE: 104 MMHG | HEART RATE: 62 BPM | HEIGHT: 68 IN | TEMPERATURE: 97.2 F | BODY MASS INDEX: 28.49 KG/M2 | WEIGHT: 188 LBS

## 2020-11-09 DIAGNOSIS — M17.0 PRIMARY OSTEOARTHRITIS OF BOTH KNEES: Primary | ICD-10-CM

## 2020-11-09 PROCEDURE — 20610 DRAIN/INJ JOINT/BURSA W/O US: CPT | Performed by: ORTHOPAEDIC SURGERY

## 2020-11-09 RX ORDER — HYALURONATE SODIUM 10 MG/ML
20 SYRINGE (ML) INTRAARTICULAR
Status: COMPLETED | OUTPATIENT
Start: 2020-11-09 | End: 2020-11-09

## 2020-11-09 RX ADMIN — Medication 20 MG: at 09:09

## 2020-11-30 ENCOUNTER — OFFICE VISIT (OUTPATIENT)
Dept: CARDIOLOGY CLINIC | Facility: CLINIC | Age: 80
End: 2020-11-30
Payer: MEDICARE

## 2020-11-30 VITALS
WEIGHT: 192 LBS | BODY MASS INDEX: 29.1 KG/M2 | HEART RATE: 81 BPM | TEMPERATURE: 98.4 F | RESPIRATION RATE: 18 BRPM | DIASTOLIC BLOOD PRESSURE: 70 MMHG | SYSTOLIC BLOOD PRESSURE: 122 MMHG | HEIGHT: 68 IN | OXYGEN SATURATION: 98 %

## 2020-11-30 DIAGNOSIS — I25.10 CAD S/P PERCUTANEOUS CORONARY ANGIOPLASTY: ICD-10-CM

## 2020-11-30 DIAGNOSIS — Z98.61 CAD S/P PERCUTANEOUS CORONARY ANGIOPLASTY: ICD-10-CM

## 2020-11-30 DIAGNOSIS — I25.10 ATHEROSCLEROSIS OF NATIVE CORONARY ARTERY OF NATIVE HEART WITHOUT ANGINA PECTORIS: Primary | ICD-10-CM

## 2020-11-30 DIAGNOSIS — E78.2 MIXED HYPERLIPIDEMIA: ICD-10-CM

## 2020-11-30 PROCEDURE — 99213 OFFICE O/P EST LOW 20 MIN: CPT | Performed by: INTERNAL MEDICINE

## 2020-12-02 ENCOUNTER — LAB (OUTPATIENT)
Dept: LAB | Facility: CLINIC | Age: 80
End: 2020-12-02
Payer: MEDICARE

## 2020-12-02 DIAGNOSIS — I25.110 CORONARY ARTERY DISEASE INVOLVING NATIVE CORONARY ARTERY OF NATIVE HEART WITH UNSTABLE ANGINA PECTORIS (HCC): ICD-10-CM

## 2020-12-02 DIAGNOSIS — Z12.5 SCREENING PSA (PROSTATE SPECIFIC ANTIGEN): ICD-10-CM

## 2020-12-02 DIAGNOSIS — I25.10 ATHEROSCLEROSIS OF NATIVE CORONARY ARTERY OF NATIVE HEART WITHOUT ANGINA PECTORIS: ICD-10-CM

## 2020-12-02 DIAGNOSIS — R73.03 PREDIABETES: ICD-10-CM

## 2020-12-02 DIAGNOSIS — E78.2 MIXED HYPERLIPIDEMIA: ICD-10-CM

## 2020-12-02 LAB
ALBUMIN SERPL BCP-MCNC: 3.8 G/DL (ref 3.5–5)
ALP SERPL-CCNC: 48 U/L (ref 46–116)
ALT SERPL W P-5'-P-CCNC: 35 U/L (ref 12–78)
ANION GAP SERPL CALCULATED.3IONS-SCNC: 5 MMOL/L (ref 4–13)
AST SERPL W P-5'-P-CCNC: 25 U/L (ref 5–45)
BASOPHILS # BLD AUTO: 0.07 THOUSANDS/ΜL (ref 0–0.1)
BASOPHILS NFR BLD AUTO: 1 % (ref 0–1)
BILIRUB SERPL-MCNC: 0.34 MG/DL (ref 0.2–1)
BUN SERPL-MCNC: 21 MG/DL (ref 5–25)
CALCIUM SERPL-MCNC: 9.1 MG/DL (ref 8.3–10.1)
CHLORIDE SERPL-SCNC: 111 MMOL/L (ref 100–108)
CHOLEST SERPL-MCNC: 88 MG/DL (ref 50–200)
CO2 SERPL-SCNC: 28 MMOL/L (ref 21–32)
CREAT SERPL-MCNC: 1.31 MG/DL (ref 0.6–1.3)
EOSINOPHIL # BLD AUTO: 0.14 THOUSAND/ΜL (ref 0–0.61)
EOSINOPHIL NFR BLD AUTO: 2 % (ref 0–6)
ERYTHROCYTE [DISTWIDTH] IN BLOOD BY AUTOMATED COUNT: 12.4 % (ref 11.6–15.1)
EST. AVERAGE GLUCOSE BLD GHB EST-MCNC: 146 MG/DL
GFR SERPL CREATININE-BSD FRML MDRD: 51 ML/MIN/1.73SQ M
GLUCOSE P FAST SERPL-MCNC: 146 MG/DL (ref 65–99)
HBA1C MFR BLD: 6.7 %
HCT VFR BLD AUTO: 42.7 % (ref 36.5–49.3)
HDLC SERPL-MCNC: 45 MG/DL
HGB BLD-MCNC: 13.8 G/DL (ref 12–17)
IMM GRANULOCYTES # BLD AUTO: 0.01 THOUSAND/UL (ref 0–0.2)
IMM GRANULOCYTES NFR BLD AUTO: 0 % (ref 0–2)
LDLC SERPL CALC-MCNC: 34 MG/DL (ref 0–100)
LYMPHOCYTES # BLD AUTO: 2.56 THOUSANDS/ΜL (ref 0.6–4.47)
LYMPHOCYTES NFR BLD AUTO: 35 % (ref 14–44)
MCH RBC QN AUTO: 32.9 PG (ref 26.8–34.3)
MCHC RBC AUTO-ENTMCNC: 32.3 G/DL (ref 31.4–37.4)
MCV RBC AUTO: 102 FL (ref 82–98)
MONOCYTES # BLD AUTO: 0.7 THOUSAND/ΜL (ref 0.17–1.22)
MONOCYTES NFR BLD AUTO: 10 % (ref 4–12)
NEUTROPHILS # BLD AUTO: 3.88 THOUSANDS/ΜL (ref 1.85–7.62)
NEUTS SEG NFR BLD AUTO: 52 % (ref 43–75)
NRBC BLD AUTO-RTO: 0 /100 WBCS
PLATELET # BLD AUTO: 183 THOUSANDS/UL (ref 149–390)
PMV BLD AUTO: 10.7 FL (ref 8.9–12.7)
POTASSIUM SERPL-SCNC: 4.3 MMOL/L (ref 3.5–5.3)
PROT SERPL-MCNC: 7.1 G/DL (ref 6.4–8.2)
PSA SERPL-MCNC: 1.8 NG/ML (ref 0–4)
RBC # BLD AUTO: 4.2 MILLION/UL (ref 3.88–5.62)
SODIUM SERPL-SCNC: 144 MMOL/L (ref 136–145)
TRIGL SERPL-MCNC: 47 MG/DL
WBC # BLD AUTO: 7.36 THOUSAND/UL (ref 4.31–10.16)

## 2020-12-02 PROCEDURE — 36415 COLL VENOUS BLD VENIPUNCTURE: CPT

## 2020-12-02 PROCEDURE — 83036 HEMOGLOBIN GLYCOSYLATED A1C: CPT

## 2020-12-02 PROCEDURE — G0103 PSA SCREENING: HCPCS

## 2020-12-02 PROCEDURE — 80061 LIPID PANEL: CPT

## 2020-12-02 PROCEDURE — 80053 COMPREHEN METABOLIC PANEL: CPT

## 2020-12-02 PROCEDURE — 85025 COMPLETE CBC W/AUTO DIFF WBC: CPT

## 2021-01-04 ENCOUNTER — OFFICE VISIT (OUTPATIENT)
Dept: OBGYN CLINIC | Facility: CLINIC | Age: 81
End: 2021-01-04
Payer: MEDICARE

## 2021-01-04 VITALS
BODY MASS INDEX: 29.1 KG/M2 | SYSTOLIC BLOOD PRESSURE: 114 MMHG | HEART RATE: 75 BPM | HEIGHT: 68 IN | WEIGHT: 192 LBS | DIASTOLIC BLOOD PRESSURE: 67 MMHG

## 2021-01-04 DIAGNOSIS — M17.0 PRIMARY OSTEOARTHRITIS OF BOTH KNEES: Primary | ICD-10-CM

## 2021-01-04 PROCEDURE — 99213 OFFICE O/P EST LOW 20 MIN: CPT | Performed by: ORTHOPAEDIC SURGERY

## 2021-01-04 NOTE — PROGRESS NOTES
Assessment/Plan:    No problem-specific Assessment & Plan notes found for this encounter  Diagnoses and all orders for this visit:    Primary osteoarthritis of both knees          The patient is doing quite well  Continue home exercise program   Continue stretching  Can start viscosupplementation any time after May 9, 2021    Subjective:      Patient ID: Darrick Gardner is a 79 y o  male  HPI     The patient has a history of degenerative joint disease of his bilateral knees  He is nearly 2 months status post viscosupplementation  He offers no complaints along his knees  He denies any numbness or tingling  He denies any fever chills  The following portions of the patient's history were reviewed and updated as appropriate: allergies, current medications, past family history, past medical history, past social history, past surgical history and problem list     Review of Systems   Constitutional: Negative for chills, fever and unexpected weight change  HENT: Negative for hearing loss, nosebleeds and sore throat  Eyes: Negative for pain, redness and visual disturbance  Respiratory: Negative for cough, shortness of breath and wheezing  Cardiovascular: Negative for chest pain, palpitations and leg swelling  Gastrointestinal: Negative for abdominal pain, nausea and vomiting  Endocrine: Negative for polydipsia and polyuria  Genitourinary: Negative for dysuria and hematuria  Musculoskeletal: Positive for arthralgias and gait problem  Negative for back pain, joint swelling, myalgias, neck pain and neck stiffness  As noted in HPI   Skin: Negative for rash and wound  Neurological: Negative for dizziness, numbness and headaches  Psychiatric/Behavioral: Negative for decreased concentration and suicidal ideas  The patient is not nervous/anxious            Objective:      /67 (BP Location: Left arm, Patient Position: Sitting, Cuff Size: Large)   Pulse 75   Ht 5' 8" (1 727 m) Wt 87 1 kg (192 lb)   BMI 29 19 kg/m²          Physical Exam      Bilateral lower extremities are neurovascular intact  Toes are pink and mobile  Compartments are soft  Range of motion both his knees are from 5-115 degrees  There is a negative Lachman's, drawer, pivot shift test   There is no medial or lateral instability  No joint tenderness is present today  Patellofemoral crepitation is present bilaterally    Neurovascular intact distally

## 2021-01-20 DIAGNOSIS — Z23 ENCOUNTER FOR IMMUNIZATION: ICD-10-CM

## 2021-02-08 ENCOUNTER — IMMUNIZATIONS (OUTPATIENT)
Dept: FAMILY MEDICINE CLINIC | Facility: HOSPITAL | Age: 81
End: 2021-02-08

## 2021-02-08 DIAGNOSIS — Z23 ENCOUNTER FOR IMMUNIZATION: Primary | ICD-10-CM

## 2021-02-08 PROCEDURE — 91301 SARS-COV-2 / COVID-19 MRNA VACCINE (MODERNA) 100 MCG: CPT

## 2021-02-08 PROCEDURE — 0011A SARS-COV-2 / COVID-19 MRNA VACCINE (MODERNA) 100 MCG: CPT

## 2021-02-16 DIAGNOSIS — E11.9 TYPE 2 DIABETES MELLITUS WITHOUT COMPLICATION, WITHOUT LONG-TERM CURRENT USE OF INSULIN (HCC): ICD-10-CM

## 2021-02-16 RX ORDER — BLOOD-GLUCOSE METER
KIT MISCELLANEOUS
Qty: 100 EACH | Refills: 3 | Status: SHIPPED | OUTPATIENT
Start: 2021-02-16 | End: 2022-02-18

## 2021-03-06 ENCOUNTER — IMMUNIZATIONS (OUTPATIENT)
Dept: FAMILY MEDICINE CLINIC | Facility: HOSPITAL | Age: 81
End: 2021-03-06

## 2021-03-06 DIAGNOSIS — Z23 ENCOUNTER FOR IMMUNIZATION: Primary | ICD-10-CM

## 2021-03-06 PROCEDURE — 91301 SARS-COV-2 / COVID-19 MRNA VACCINE (MODERNA) 100 MCG: CPT

## 2021-03-06 PROCEDURE — 0012A SARS-COV-2 / COVID-19 MRNA VACCINE (MODERNA) 100 MCG: CPT

## 2021-03-19 ENCOUNTER — TELEPHONE (OUTPATIENT)
Dept: FAMILY MEDICINE CLINIC | Facility: CLINIC | Age: 81
End: 2021-03-19

## 2021-03-19 NOTE — TELEPHONE ENCOUNTER
Had a sharp Pain in his buttocks- came on suddenly in the middle the night  No chest pain or shortness of breath  He developed some clamminess an faint feeling  He says the called 911  He checked his vitals and called their on-call provider and he was told he was stable stay home  Patient says the pain has resolved  He has no further symptoms at this time    Advised him to monitor and proceed to the ER if his symptoms return

## 2021-03-19 NOTE — TELEPHONE ENCOUNTER
Seymour Aquino woke up around 5am  And felt faint, numbness in his arms, clamy and a sharp pain in his lower back, buttock  They called 911 and everything went well, bp was 107/80 and sugar 170  So he did not go to the hospital  They just wanted you to know and would like you to call him if you get a chance

## 2021-06-05 ENCOUNTER — NURSE TRIAGE (OUTPATIENT)
Dept: OTHER | Facility: OTHER | Age: 81
End: 2021-06-05

## 2021-06-05 NOTE — TELEPHONE ENCOUNTER
Reason for Disposition   Cough has been present for > 3 weeks    Answer Assessment - Initial Assessment Questions  1  ONSET: "When did the throat start hurting?" (Hours or days ago)       It started two weeks ago  2  SEVERITY: "How bad is the sore throat?" (Scale 1-10; mild, moderate or severe)    - MILD (1-3):  doesn't interfere with eating or normal activities    - MODERATE (4-7): interferes with eating some solids and normal activities    - SEVERE (8-10):  excruciating pain, interferes with most normal activities    - SEVERE DYSPHAGIA: can't swallow liquids, drooling      It does'nt hurt  Its scratchy and annoying  3  STREP EXPOSURE: "Has there been any exposure to strep within the past week?" If so, ask: "What type of contact occurred?"       none  4  VIRAL SYMPTOMS: "Are there any symptoms of a cold, such as a runny nose, cough, hoarse voice or red eyes?"       Mild cough  5  FEVER: "Do you have a fever?" If so, ask: "What is your temperature, how was it measured, and when did it start?"      none  6  PUS ON THE TONSILS: "Is there pus on the tonsils in the back of your throat?"      none  7  OTHER SYMPTOMS: "Do you have any other symptoms?" (e g , difficulty breathing, headache, rash)      cough  Cough at night  Daytime is better    Answer Assessment - Initial Assessment Questions  1  ONSET: "When did the cough begin?"       Two weeks   2  SEVERITY: "How bad is the cough today?"       It only happens at night mostly, not much during the day  3  RESPIRATORY DISTRESS: "Describe your breathing "       No difficulty breathing  4  FEVER: "Do you have a fever?" If so, ask: "What is your temperature, how was it measured, and when did it start?"      none  5  HEMOPTYSIS: "Are you coughing up any blood?" If so ask: "How much?" (flecks, streaks, tablespoons, etc )      none  6  TREATMENT: "What have you done so far to treat the cough?" (e g , meds, fluids, humidifier)      Hot tea  7   CARDIAC HISTORY: "Do you have any history of heart disease?" (e g , heart attack, congestive heart failure)       hypertention  8  LUNG HISTORY: "Do you have any history of lung disease?"  (e g , pulmonary embolus, asthma, emphysema)      Frequent cough  9  PE RISK FACTORS: "Do you have a history of blood clots?" (or: recent major surgery, recent prolonged travel, bedridden)        10  OTHER SYMPTOMS: "Do you have any other symptoms? (e g , runny nose, wheezing, chest pain)        No wheezing or chest pain    12   TRAVEL: "Have you traveled out of the country in the last month?" (e g , travel history, exposures)    Protocols used: COUGH - ACUTE NON-PRODUCTIVE-ADULT-AH, SORE THROAT-ADULT-AH

## 2021-06-05 NOTE — TELEPHONE ENCOUNTER
Regarding: Sore throat  ----- Message from Funmilayo Camacho sent at 6/5/2021 10:00 AM EDT -----  "I have a sore throat, and cough    It is really annoying "

## 2021-06-08 ENCOUNTER — TELEPHONE (OUTPATIENT)
Dept: CARDIOLOGY CLINIC | Facility: CLINIC | Age: 81
End: 2021-06-08

## 2021-06-08 ENCOUNTER — OFFICE VISIT (OUTPATIENT)
Dept: CARDIOLOGY CLINIC | Facility: CLINIC | Age: 81
End: 2021-06-08
Payer: MEDICARE

## 2021-06-08 VITALS
SYSTOLIC BLOOD PRESSURE: 118 MMHG | OXYGEN SATURATION: 96 % | WEIGHT: 202.8 LBS | DIASTOLIC BLOOD PRESSURE: 78 MMHG | BODY MASS INDEX: 30.74 KG/M2 | HEART RATE: 70 BPM | HEIGHT: 68 IN

## 2021-06-08 DIAGNOSIS — R73.03 PREDIABETES: ICD-10-CM

## 2021-06-08 DIAGNOSIS — E78.2 MIXED HYPERLIPIDEMIA: ICD-10-CM

## 2021-06-08 DIAGNOSIS — I10 ESSENTIAL HYPERTENSION: ICD-10-CM

## 2021-06-08 DIAGNOSIS — I25.10 ATHEROSCLEROSIS OF NATIVE CORONARY ARTERY OF NATIVE HEART WITHOUT ANGINA PECTORIS: Primary | ICD-10-CM

## 2021-06-08 PROCEDURE — 99214 OFFICE O/P EST MOD 30 MIN: CPT | Performed by: INTERNAL MEDICINE

## 2021-06-08 NOTE — TELEPHONE ENCOUNTER
Pt had an appt w/ Dr Ar Armstrong this morn & pt was to call back & let us know how many different meds he was taking & pt said 4 (which matches the AVS & what has been confirmed w/ the MA)

## 2021-06-08 NOTE — PROGRESS NOTES
[facility] [place]  [id number] [facility] [place] [address]  Cardiology Follow Up    Jake Roy  1940  [id number]      1  Atherosclerosis of native coronary artery of native heart without angina pectoris  Comprehensive metabolic panel    Lipid Panel with Direct LDL reflex    HEMOGLOBIN A1C W/ EAG ESTIMATION    CBC and differential   2  Essential hypertension  HEMOGLOBIN A1C W/ EAG ESTIMATION   3  Mixed hyperlipidemia     4   Prediabetes         Chief Complaint   Patient presents with    Follow-up     6 month follow up       Interval History:    64-year-old male with coronary artery disease status post acute inferior STEMI in May 2020 post PCI of proximal circumflex with drug-eluting stent x1(RPDA 50% stenosis),  Hyperlipidemia, prediabetic presented for cardiology continuity of care     since last clinic visit patient is doing well   He denies chest pain, shortness of breath, palpitation, dizziness, orthopnea, leg edema or loss of consciousness   He is going to gym 3 to 4 times a week and denies any cardiac symptoms during exercise        current medications reviewed (Patient will call us back once he confirms home medication)  Labs from December 2020 reviewed   LDL 34    Review of Systems:   all review of system negative except as mentioned above    Patient Active Problem List   Diagnosis    Medicare annual wellness visit, subsequent    Need for shingles vaccine    Prediabetes    Chronic cough    ST elevation myocardial infarction involving left circumflex coronary artery ([address] )    [place] (acute kidney injury) ([address] )    Coronary artery disease    Hypertension    Mixed hyperlipidemia    Primary osteoarthritis of both knees    Left foot pain    Trigger thumb, right thumb     Past Medical History:   Diagnosis Date    Diabetes mellitus ([address] )     diet control    Heart disease 05/09/2020    Heart attack    Prediabetes      Social History     Socioeconomic History    Marital status: Single     Spouse name: Not on file    Number of children: Not on file    Years of education: Not on file    Highest education level: Not on file   Occupational History    Not on file   Social Needs    Financial resource strain: Not on file    Food insecurity     Worry: Not on file     Inability: Not on file    Transportation needs     Medical: Not on file     Non-medical: Not on file   Tobacco Use    Smoking status: Former Smoker    Smokeless tobacco: Never Used   Substance and Sexual Activity    Alcohol use: Yes     Comment: Social    Drug use: Never    Sexual activity: Not on file   Lifestyle    Physical activity     Days per week: Not on file     Minutes per session: Not on file    Stress: Not on file   Relationships    Social connections     Talks on phone: Not on file     Gets together: Not on file     Attends [employer]ism service: Not on file     Active member of club or organization: Not on file     Attends meetings of clubs or organizations: Not on file     Relationship status: Not on file    Intimate partner violence     Fear of current or ex partner: Not on file     Emotionally abused: Not on file     Physically abused: Not on file     Forced sexual activity: Not on file   Other Topics Concern    Not on file   Social History Narrative    Not on file      Family History   Problem Relation Age of Onset    Heart disease Mother         Cardiac Disorder    No Known Problems Father      Past Surgical History:   Procedure Laterality Date    CARDIAC SURGERY      HERNIA REPAIR         Current Outpatient Medications:     Ascorbic Acid (VITAMIN C) 1000 MG tablet, Take 2,000 mg by mouth 2 (two) times a day , Disp: , Rfl:     aspirin (ECOTRIN LOW STRENGTH) 81 mg EC tablet, Take 1 tablet (81 mg total) by mouth daily, Disp: 60 tablet, Rfl: 0    atorvastatin (LIPITOR) 80 mg tablet, Take 1 tablet (80 mg total) by mouth daily with dinner, Disp: 90 tablet, Rfl: 3    Blood Glucose Monitoring Suppl (FREESTYLE FREEDOM LITE) w/Device KIT, Check glucose levels once daily, Disp: 1 each, Rfl: 0    clopidogrel (PLAVIX) 75 mg tablet, Take 1 tablet (75 mg total) by mouth daily, Disp: 90 tablet, Rfl: 3    FREESTYLE LITE test strip, USE AS DIRECTED DAILY, Disp: 100 each, Rfl: 3    Lancets (FREESTYLE) lancets, TEST ONCE DAILY FASTING DXE11 [id number], Disp: 100 each, Rfl: 3    metoprolol tartrate (LOPRESSOR) 25 mg tablet, Take 1 tablet (25 mg total) by mouth every 12 (twelve) hours, Disp: 180 tablet, Rfl: 3    famotidine (PEPCID) 20 mg tablet, Take 1 tablet (20 mg total) by mouth daily (Patient not taking: Reported on 6/8/2021), Disp: 90 tablet, Rfl: 1  Allergies   Allergen Reactions    Celecoxib      Other reaction(s): itchy  Other reaction(s): itchy       Labs:  No visits with results within 6 Month(s) from this visit     Latest known visit with results is:   Lab on 12/02/2020   Component Date Value    Cholesterol 12/02/2020 88     Triglycerides 12/02/2020 47     HDL, Direct 12/02/2020 45     LDL Calculated 12/02/2020 34     Sodium 12/02/2020 144     Potassium 12/02/2020 4 3     Chloride 12/02/2020 111*    CO2 12/02/2020 28     ANION GAP 12/02/2020 5     BUN 12/02/2020 21     Creatinine 12/02/2020 1 31*    Glucose, Fasting 12/02/2020 146*    Calcium 12/02/2020 9 1     AST 12/02/2020 25     ALT 12/02/2020 35     Alkaline Phosphatase 12/02/2020 48     Total Protein 12/02/2020 7 1     Albumin 12/02/2020 3 8     Total Bilirubin 12/02/2020 0 34     eGFR 12/02/2020 51     Hemoglobin A1C 12/02/2020 6 7*    EAG 12/02/2020 146     PSA 12/02/2020 1 8     WBC 12/02/2020 7 36     RBC 12/02/2020 4 20     Hemoglobin 12/02/2020 13 8     Hematocrit 12/02/2020 42 7     MCV 12/02/2020 102*    MCH 12/02/2020 32 9     MCHC 12/02/2020 32 3     RDW 12/02/2020 12 4     MPV 12/02/2020 10 7     Platelets 74/49/1518 183     nRBC 12/02/2020 0     Neutrophils Relative 12/02/2020 52     Immat GRANS % 12/02/2020 0  Lymphocytes Relative 12/02/2020 35     Monocytes Relative 12/02/2020 10     Eosinophils Relative 12/02/2020 2     Basophils Relative 12/02/2020 1     Neutrophils Absolute 12/02/2020 3 88     Immature Grans Absolute 12/02/2020 0 01     Lymphocytes Absolute 12/02/2020 2 56     Monocytes Absolute 12/02/2020 0 70     Eosinophils Absolute 12/02/2020 0 14     Basophils Absolute 12/02/2020 0 07      Imaging: No results found  Physical Exam:  General:   obese, awake, alert and oriented x3, not in distress  Neck: supple, no JVD  Eyes: PERRL, conjunctiva normal  Lungs:  Bilateral air entry positive, no wheeze/rhonchi or crackle  Heart:  S1-S2 normal, no murmur  Abdomen:  Soft ,nondistended ,nontender, bowel sounds positive  Extremities:  No leg edema, no deformity, ROM normal  Neuro:  Moving all extremities, speech clear  Skin: warm, no rash    /78 (BP Location: Left arm, Patient Position: Sitting, Cuff Size: Standard)   Pulse 70   Ht 5' 8" (1 727 m)   Wt 92 kg (202 lb 12 8 oz)   SpO2 96%   BMI 30 84 kg/m²     Cardiographics :  May 2020:   Echo showed low normal LVEF 45-50% with mild hypokinesis of mid anterolateral wall, grade 1 diastolic dysfunction     Cardiac catheterization on 05/09/2020:  Proximal circumflex 99% thrombotic occlusion with SANCHEZ 3 flow status post successful PCI with drug-eluting stent Xience [place]ita(2 5 x 23 post dilated with 2 75 noncompliant balloon), RPDA medium caliber vessel with 50% stenosis in mid      Assessment:    1  Coronary artery disease   Status post PCI of proximal circumflex with drug-eluting stent x1 for inferior STEMI in May 2020( RPDA 50% stenosis)   denies chest pain or shortness of breath    2  Hyperlipidemia  3  Prediabetic  4    Osteoarthritis of knees    Recommendations:     patient is doing well from cardiology standpoint at present time   Patient to continue aspirin, Plavix, Lipitor 80 mg, metoprolol tartrate 25 mg twice a day     I would get repeat blood test to include lipid panel and CMP   if LDL is well controlled will discuss about decreasing Lipitor    Patient was advised to take low-salt, low-fat/ low-cholesterol  prediabeticdiet  His advised to lose weight       Return to clinic in 6 months or early if needed   Above all discussed with patient    Patient understands and agrees

## 2021-06-09 ENCOUNTER — OFFICE VISIT (OUTPATIENT)
Dept: FAMILY MEDICINE CLINIC | Facility: CLINIC | Age: 81
End: 2021-06-09
Payer: MEDICARE

## 2021-06-09 VITALS
BODY MASS INDEX: 30.31 KG/M2 | HEART RATE: 84 BPM | SYSTOLIC BLOOD PRESSURE: 116 MMHG | HEIGHT: 68 IN | RESPIRATION RATE: 18 BRPM | OXYGEN SATURATION: 97 % | TEMPERATURE: 98.4 F | WEIGHT: 200 LBS | DIASTOLIC BLOOD PRESSURE: 74 MMHG

## 2021-06-09 DIAGNOSIS — J06.9 ACUTE URI: Primary | ICD-10-CM

## 2021-06-09 PROCEDURE — 99214 OFFICE O/P EST MOD 30 MIN: CPT | Performed by: PHYSICIAN ASSISTANT

## 2021-06-09 RX ORDER — AZITHROMYCIN 250 MG/1
TABLET, FILM COATED ORAL
Qty: 6 TABLET | Refills: 0 | Status: SHIPPED | OUTPATIENT
Start: 2021-06-09 | End: 2021-06-14

## 2021-06-09 RX ORDER — DEXTROMETHORPHAN HYDROBROMIDE AND PROMETHAZINE HYDROCHLORIDE 15; 6.25 MG/5ML; MG/5ML
5 SOLUTION ORAL 2 TIMES DAILY PRN
Qty: 118 ML | Refills: 0 | Status: SHIPPED | OUTPATIENT
Start: 2021-06-09 | End: 2021-11-23 | Stop reason: ALTCHOICE

## 2021-06-09 RX ORDER — BENZONATATE 100 MG/1
100 CAPSULE ORAL 2 TIMES DAILY
Qty: 20 CAPSULE | Refills: 0 | Status: CANCELLED | OUTPATIENT
Start: 2021-06-09

## 2021-06-09 NOTE — PROGRESS NOTES
BMI Counseling: Body mass index is 30 41 kg/m²  The BMI is above normal  Nutrition recommendations include reducing portion sizes

## 2021-06-09 NOTE — PROGRESS NOTES
Assessment/Plan:    No problem-specific Assessment & Plan notes found for this encounter  Problem List Items Addressed This Visit     None      Visit Diagnoses     Acute URI    -  Primary    Relevant Medications    azithromycin (Zithromax) 250 mg tablet    Promethazine-DM (PHENERGAN-DM) 6 25-15 mg/5 mL oral syrup            Subjective:      Patient ID: Dionne Jorge is a 79 y o  male  78-year-old male presents for evaluation of sore throat, cough  Ongoing for several days  States he gets the same thing every year  Does well with zpak  Patient not taking any OTC medications  The following portions of the patient's history were reviewed and updated as appropriate: allergies, current medications, past family history, past medical history, past surgical history and problem list     Review of Systems   Constitutional: Negative for chills, fatigue and fever  HENT: Positive for sore throat  Negative for congestion, ear pain, sinus pain and trouble swallowing  Eyes: Negative for pain, discharge and redness  Respiratory: Positive for cough  Negative for chest tightness, shortness of breath and wheezing  Cardiovascular: Negative for chest pain, palpitations and leg swelling  Gastrointestinal: Negative for abdominal pain, diarrhea, nausea and vomiting  Musculoskeletal: Negative for arthralgias, joint swelling and myalgias  Skin: Negative for rash  Neurological: Negative for dizziness, weakness, numbness and headaches  Objective:      /74 (BP Location: Left arm, Patient Position: Sitting)   Pulse 84   Temp 98 4 °F (36 9 °C)   Resp 18   Ht 5' 8" (1 727 m)   Wt 90 7 kg (200 lb)   SpO2 97%   BMI 30 41 kg/m²          Physical Exam  Vitals signs and nursing note reviewed  Constitutional:       Appearance: He is well-developed  HENT:      Head: Normocephalic        Right Ear: Hearing and tympanic membrane normal       Left Ear: Hearing and tympanic membrane normal  Nose: No mucosal edema  Mouth/Throat:      Pharynx: Uvula midline  Posterior oropharyngeal erythema present  Cardiovascular:      Rate and Rhythm: Normal rate and regular rhythm  Pulmonary:      Effort: Pulmonary effort is normal       Breath sounds: Normal breath sounds

## 2021-06-14 DIAGNOSIS — I21.21 ST ELEVATION MYOCARDIAL INFARCTION INVOLVING LEFT CIRCUMFLEX CORONARY ARTERY (HCC): ICD-10-CM

## 2021-06-17 ENCOUNTER — LAB (OUTPATIENT)
Dept: LAB | Facility: CLINIC | Age: 81
End: 2021-06-17
Payer: MEDICARE

## 2021-06-17 DIAGNOSIS — I25.10 ATHEROSCLEROSIS OF NATIVE CORONARY ARTERY OF NATIVE HEART WITHOUT ANGINA PECTORIS: ICD-10-CM

## 2021-06-17 LAB
ALBUMIN SERPL BCP-MCNC: 3.9 G/DL (ref 3.5–5)
ALP SERPL-CCNC: 48 U/L (ref 46–116)
ALT SERPL W P-5'-P-CCNC: 31 U/L (ref 12–78)
ANION GAP SERPL CALCULATED.3IONS-SCNC: 5 MMOL/L (ref 4–13)
AST SERPL W P-5'-P-CCNC: 15 U/L (ref 5–45)
BASOPHILS # BLD AUTO: 0.06 THOUSANDS/ΜL (ref 0–0.1)
BASOPHILS NFR BLD AUTO: 1 % (ref 0–1)
BILIRUB SERPL-MCNC: 0.55 MG/DL (ref 0.2–1)
BUN SERPL-MCNC: 19 MG/DL (ref 5–25)
CALCIUM SERPL-MCNC: 8.9 MG/DL (ref 8.3–10.1)
CHLORIDE SERPL-SCNC: 111 MMOL/L (ref 100–108)
CHOLEST SERPL-MCNC: 85 MG/DL (ref 50–200)
CO2 SERPL-SCNC: 26 MMOL/L (ref 21–32)
CREAT SERPL-MCNC: 1.29 MG/DL (ref 0.6–1.3)
EOSINOPHIL # BLD AUTO: 0.28 THOUSAND/ΜL (ref 0–0.61)
EOSINOPHIL NFR BLD AUTO: 4 % (ref 0–6)
ERYTHROCYTE [DISTWIDTH] IN BLOOD BY AUTOMATED COUNT: 12.7 % (ref 11.6–15.1)
EST. AVERAGE GLUCOSE BLD GHB EST-MCNC: 157 MG/DL
GFR SERPL CREATININE-BSD FRML MDRD: 52 ML/MIN/1.73SQ M
GLUCOSE P FAST SERPL-MCNC: 164 MG/DL (ref 65–99)
HBA1C MFR BLD: 7.1 %
HCT VFR BLD AUTO: 42.8 % (ref 36.5–49.3)
HDLC SERPL-MCNC: 41 MG/DL
HGB BLD-MCNC: 13.7 G/DL (ref 12–17)
IMM GRANULOCYTES # BLD AUTO: 0.01 THOUSAND/UL (ref 0–0.2)
IMM GRANULOCYTES NFR BLD AUTO: 0 % (ref 0–2)
LDLC SERPL CALC-MCNC: 36 MG/DL (ref 0–100)
LYMPHOCYTES # BLD AUTO: 2.69 THOUSANDS/ΜL (ref 0.6–4.47)
LYMPHOCYTES NFR BLD AUTO: 35 % (ref 14–44)
MCH RBC QN AUTO: 32.4 PG (ref 26.8–34.3)
MCHC RBC AUTO-ENTMCNC: 32 G/DL (ref 31.4–37.4)
MCV RBC AUTO: 101 FL (ref 82–98)
MONOCYTES # BLD AUTO: 0.79 THOUSAND/ΜL (ref 0.17–1.22)
MONOCYTES NFR BLD AUTO: 10 % (ref 4–12)
NEUTROPHILS # BLD AUTO: 3.87 THOUSANDS/ΜL (ref 1.85–7.62)
NEUTS SEG NFR BLD AUTO: 50 % (ref 43–75)
NRBC BLD AUTO-RTO: 0 /100 WBCS
PLATELET # BLD AUTO: 190 THOUSANDS/UL (ref 149–390)
PMV BLD AUTO: 11.2 FL (ref 8.9–12.7)
POTASSIUM SERPL-SCNC: 4.2 MMOL/L (ref 3.5–5.3)
PROT SERPL-MCNC: 7.3 G/DL (ref 6.4–8.2)
RBC # BLD AUTO: 4.23 MILLION/UL (ref 3.88–5.62)
SODIUM SERPL-SCNC: 142 MMOL/L (ref 136–145)
TRIGL SERPL-MCNC: 39 MG/DL
WBC # BLD AUTO: 7.7 THOUSAND/UL (ref 4.31–10.16)

## 2021-06-17 PROCEDURE — 83036 HEMOGLOBIN GLYCOSYLATED A1C: CPT | Performed by: INTERNAL MEDICINE

## 2021-06-17 PROCEDURE — 80061 LIPID PANEL: CPT

## 2021-06-17 PROCEDURE — 80053 COMPREHEN METABOLIC PANEL: CPT

## 2021-06-17 PROCEDURE — 85025 COMPLETE CBC W/AUTO DIFF WBC: CPT

## 2021-06-17 PROCEDURE — 36415 COLL VENOUS BLD VENIPUNCTURE: CPT | Performed by: INTERNAL MEDICINE

## 2021-06-18 ENCOUNTER — TELEPHONE (OUTPATIENT)
Dept: CARDIOLOGY CLINIC | Facility: CLINIC | Age: 81
End: 2021-06-18

## 2021-06-18 RX ORDER — ATORVASTATIN CALCIUM 40 MG/1
40 TABLET, FILM COATED ORAL DAILY
Qty: 90 TABLET | Refills: 3 | Status: SHIPPED | OUTPATIENT
Start: 2021-06-18 | End: 2022-07-01

## 2021-06-18 NOTE — RESULT ENCOUNTER NOTE
Please call the patient and inform him that his labs overall looks okay  His cholesterol is well controlled and we should decrease Lipitor from 80 mg to 40 mg daily     His HbA1c is 7 1 mildly elevated as compared to prior and should watch his sugar intake and follow-up with PCP

## 2021-06-18 NOTE — TELEPHONE ENCOUNTER
----- Message from China Guerin MD sent at 6/18/2021 10:49 AM EDT -----   Please call the patient and inform him that his labs overall looks okay  His cholesterol is well controlled and we should decrease Lipitor from 80 mg to 40 mg daily     His HbA1c is 7 1 mildly elevated as compared to prior and should watch his sugar intake and follow-up with PCP
Left message for patient to call back
Patient returned phone call ~ advised the results  Verbally understands 
Unable to assess due to medical condition

## 2021-07-19 ENCOUNTER — TELEPHONE (OUTPATIENT)
Dept: FAMILY MEDICINE CLINIC | Facility: CLINIC | Age: 81
End: 2021-07-19

## 2021-07-19 NOTE — TELEPHONE ENCOUNTER
Pt called to inform you that he has an appointment scheduled w/ Dr Chu Bacon on 7/28 to have his frequent sore throat evaluated  Any questions or concerns please contact pt  Thank you!

## 2021-07-24 NOTE — H&P (VIEW-ONLY)
Assessment/Plan:    Based upon the history and current physical findings, I have recommended that the patient undergo microdirect laryngoscopy with excision of the vocal cord lesion  All risks, benefits, alternatives, and complications of the procedure have been reviewed in detail  The risks of this surgery include but are not limited to: bleeding, infection, need for further surgery, recurrence of lesion, dysphonia (hoarseness), dysphagia (difficulty swallowing), and airway compromise  The patient / parent understands and accepts all the risks of the surgery  Pre-operative labs have been ordered  Medical clearance is needed  Post-operative prescriptions and instructions have been given to the patient  The patient was instructed to fill the medications in preparation for the surgery  A post operative appointment has been made for the patient  If any questions should arise prior to or after the surgery, the patient was instructed to call my office and I will be glad to discuss any questions or concerns they may have  We have also discussed the possibility of speech therapy  Because his voice has been hoarse for many years and he has had nodules in the past I suspect this would not be successful but was given as an option  He is aware that if he really forms any nodules in the future this would be more imperative  He may also require speech therapy afterwards if he develops recurrent hoarseness  Encounter Diagnosis     ICD-10-CM    1  Dysphonia  R49 0    2  Vocal cord nodule  J38 2 oxyCODONE-acetaminophen (PERCOCET) 5-325 mg per tablet              Patient ID: Luis Manuel Marinelli is a 79 y o  male  Pieter Dyer is here for hoarseness and sore throats  He states that he gets hoarse about two times a year and it is usually associated with some pain and cough  He usually takes some cough medication and antibiotics and it improves  However, the hoarseness does not usually improve completely    Because he has had hoarseness for years and works as a teacher he would like to see if there is anything which can be done to improve his voice  He currently has no sore throat or cough  He has no difficulty with swallowing or breathing  In preparation for this visit all prior office notes, prior consultations, emergency room visits, blood work results, and imaging studies were personally reviewed  A total of 5 minutes was spent reviewing all of this information          The following portions of the patient's history were reviewed and updated as appropriate: allergies, current medications, past family history, past medical history, past social history, past surgical history and problem list       Past Medical History:   Diagnosis Date    Diabetes mellitus ([facility] [address] )     diet control    Heart disease 05/09/2020    Heart attack    Prediabetes        Past Surgical History:   Procedure Laterality Date    CARDIAC SURGERY  05/2020    stent placement   [address]    with vocal cord polyp removal       Social History     Tobacco Use    Smoking status: Former Smoker    Smokeless tobacco: Never Used   Vaping Use    Vaping Use: Never used   Substance Use Topics    Alcohol use: Yes     Comment: Social    Drug use: Never       Current Outpatient Medications on File Prior to Visit   Medication Sig Dispense Refill    Ascorbic Acid (VITAMIN C) 1000 MG tablet Take 2,000 mg by mouth 2 (two) times a day       atorvastatin (LIPITOR) 40 mg tablet Take 1 tablet (40 mg total) by mouth daily 90 tablet 3    Blood Glucose Monitoring Suppl (FREESTYLE FREEDOM LITE) w/Device KIT Check glucose levels once daily 1 each 0    clopidogrel (PLAVIX) 75 mg tablet Take 1 tablet (75 mg total) by mouth daily 90 tablet 3    FREESTYLE LITE test strip USE AS DIRECTED DAILY 100 each 3    Lancets (FREESTYLE) lancets TEST ONCE DAILY FASTING DXE11 9 100 each 3    metoprolol tartrate (LOPRESSOR) 25 mg tablet TAKE 1 TABLET (25 MG TOTAL) BY MOUTH EVERY 12 (TWELVE) HOURS 180 tablet 3    metoprolol tartrate (LOPRESSOR) 25 mg tablet Take 1 tablet (25 mg total) by mouth every 12 (twelve) hours 180 tablet 3    aspirin (ECOTRIN LOW STRENGTH) 81 mg EC tablet Take 1 tablet (81 mg total) by mouth daily 60 tablet 0    famotidine (PEPCID) 20 mg tablet Take 1 tablet (20 mg total) by mouth daily (Patient not taking: Reported on 6/8/2021) 90 tablet 1    Promethazine-DM (PHENERGAN-DM) 6 25-15 mg/5 mL oral syrup Take 5 mL by mouth 2 (two) times a day as needed for cough (Patient not taking: Reported on 7/28/2021) 118 mL 0     No current facility-administered medications on file prior to visit  Allergies   Allergen Reactions    Celecoxib      Other reaction(s): itchy  Other reaction(s): itchy           Review of Systems   Constitutional: Negative for activity change, appetite change, fatigue and unexpected weight change  HENT: Positive for voice change  Negative for congestion, ear discharge, ear pain, facial swelling, hearing loss, nosebleeds, postnasal drip, rhinorrhea, sinus pressure, sinus pain, sneezing, sore throat, tinnitus and trouble swallowing  Eyes: Negative for photophobia, pain, discharge, itching and visual disturbance  Respiratory: Negative for cough, chest tightness and shortness of breath  Musculoskeletal: Negative for gait problem, neck pain and neck stiffness  Skin: Negative for rash and wound  Allergic/Immunologic: Negative for environmental allergies  Neurological: Negative for dizziness, facial asymmetry and speech difficulty  Hematological: Negative for adenopathy  Psychiatric/Behavioral: Negative for sleep disturbance  The patient is not nervous/anxious  /82   Pulse 68   Ht 5' 7" (1 702 m)   Wt 93 kg (205 lb)   BMI 32 11 kg/m²       PHYSICAL  EXAMINATION    CONSTITUTION:    Appears appropriate for age  No evidence of any acute distress  Communicates normally      Voice quality is hoarse  Alert and oriented  HEAD/FACE:    Atraumatic, normocephalic on inspection  No scars present  Salivary glands are normal in texture and size without any asymmetry  Facial nerve function is symmetric and normal     EYES:    Extraocular muscles intact in both eyes, normal gaze bilaterally and no evidence of nystagmus  Pupils equal, round, and accommodate to light bilaterally  EARS:    External ears normal     External canals are clear and dry  Tympanic membranes intact with normal mobility, no effusion, no retraction, no perforation  Post auricular area is normal    NOSE:    External nose without deformity  Internal mucosa pink and moist     Septum with a very slight deflection left floor  Inferior nasal turbinates normal in color and size bilaterally    ORAL CAVITY:    Lips normal and healthy in appearance  Dentition normal     Gums healthy, pink and moist     Tongue appears pink and moist with no lesions  Floor of mouth pink, moist, and smooth  Submandibular ducts patent with clear saliva  Parotid ducts patent with clear saliva  Oral mucosa pink and moist     Hard palate normal in appearance without any lesions  OROPHARYNX:    Soft palate pink and moist without any lesions  Uvula midline without any lesions  Tonsils grade 1 bilaterally  Posterior pharynx pink and moist without any lesions    NECK:    Supple and symmetric  No masses noted  Trachea midline  No thyromegaly or nodules noted  LYMPH:    No palpable adenopathy in left or right neck    SKIN:    No rashes  No lesions noted  HEART:   Regular rate and rhythm    LUNGS:  Clear to auscultation bilaterally    Nasopharyngolaryngoscopy:    Verbal consent was obtained from the patient / parent  The patient was placed in the upright position in the examination chair   The bilateral nasal cavity was anesthetized and decongested with a solution of phenylephrine: lidocaine (1:1) placed on cotton pledgets  Exam was delayed five minutes to allow for topical decongestion and anesthetic effect  The cotton pledget(s) was/were removed  The scope was passed through the nasal cavity into the posterior nasopharynx  Evaluation of the pharynx and larynx was carried out with the following findings:    Eustachian Tube Orifice:  Patent bilaterally without edema or obstruction  Nasopharynx:  Smooth mucosa without adenoid bed or mass  Oropharynx:  No masses or lesions present  Vallecula:  No abnormalities, pink moist mucosa  Tongue Base:  Normal without masses or asymmetry  Epiglottis:  Normal mucosa on vallecular and laryngeal surfaces  Pyriform Aperture:  Mucosa appears pink and moist without masses  Arytenoid Mucosa/Aryepiglottic Folds:  Normal mucosa without evidence of edema, hyperemia, mass or ulcer  False Vocal cords:  Normal mucosa, no evidence of mass, lesion or edema  True Vocal cords: White in color, no masses, nodules, polyps, edema, paresis or paralysis of the left cord - the right cord with a nodule anterior 1/3rd  Subglottic Space: The airway is patent and there are no lesions  After careful evaluation of the above structures, the scope was removed from the nasal cavity  The patient tolerated the procedure well

## 2021-07-28 ENCOUNTER — APPOINTMENT (OUTPATIENT)
Dept: RADIOLOGY | Facility: CLINIC | Age: 81
End: 2021-07-28
Payer: MEDICARE

## 2021-07-28 ENCOUNTER — APPOINTMENT (OUTPATIENT)
Dept: LAB | Facility: CLINIC | Age: 81
End: 2021-07-28
Payer: MEDICARE

## 2021-07-28 DIAGNOSIS — M79.671 RIGHT FOOT PAIN: ICD-10-CM

## 2021-07-28 DIAGNOSIS — D68.9 COAGULOPATHY (HCC): ICD-10-CM

## 2021-07-28 DIAGNOSIS — J38.2 VOCAL CORD NODULE: ICD-10-CM

## 2021-07-28 LAB
APTT PPP: 35 SECONDS (ref 23–37)
INR PPP: 1.07 (ref 0.84–1.19)
PROTHROMBIN TIME: 13.9 SECONDS (ref 11.6–14.5)

## 2021-07-28 PROCEDURE — 85730 THROMBOPLASTIN TIME PARTIAL: CPT

## 2021-07-28 PROCEDURE — 36415 COLL VENOUS BLD VENIPUNCTURE: CPT

## 2021-07-28 PROCEDURE — 85610 PROTHROMBIN TIME: CPT

## 2021-07-28 PROCEDURE — 73630 X-RAY EXAM OF FOOT: CPT

## 2021-07-28 PROCEDURE — 71046 X-RAY EXAM CHEST 2 VIEWS: CPT

## 2021-07-29 ENCOUNTER — OFFICE VISIT (OUTPATIENT)
Dept: FAMILY MEDICINE CLINIC | Facility: CLINIC | Age: 81
End: 2021-07-29
Payer: MEDICARE

## 2021-07-29 VITALS
WEIGHT: 204 LBS | TEMPERATURE: 97.6 F | DIASTOLIC BLOOD PRESSURE: 81 MMHG | SYSTOLIC BLOOD PRESSURE: 128 MMHG | BODY MASS INDEX: 32.02 KG/M2 | OXYGEN SATURATION: 99 % | HEART RATE: 73 BPM | HEIGHT: 67 IN

## 2021-07-29 DIAGNOSIS — Z01.818 PRE-OP TESTING: Primary | ICD-10-CM

## 2021-07-29 DIAGNOSIS — I25.110 CORONARY ARTERY DISEASE INVOLVING NATIVE CORONARY ARTERY OF NATIVE HEART WITH UNSTABLE ANGINA PECTORIS (HCC): ICD-10-CM

## 2021-07-29 PROCEDURE — 93000 ELECTROCARDIOGRAM COMPLETE: CPT | Performed by: PHYSICIAN ASSISTANT

## 2021-07-29 PROCEDURE — 99214 OFFICE O/P EST MOD 30 MIN: CPT | Performed by: PHYSICIAN ASSISTANT

## 2021-07-29 NOTE — PROGRESS NOTES
Abigail Gonzalez 1940 male MRN: [id number]        ASSESSMENT/PLAN  Problem List Items Addressed This Visit        Cardiovascular and Mediastinum    Coronary artery disease      Other Visit Diagnoses     Pre-op testing    -  Primary    Relevant Orders    POCT ECG          SUBJECTIVE  CC: Pre-op Exam (Vocal cord repair)      HPI:  Abigail Gonzalez is a 79 y o  male with vocal cord nodule who is planning to undergo laryngoscopy with excision of vocal cord lesion under general by Dr Nikolay Ly on Tuesday 08/03/2021  Patient has not traveled outside the [address] [place] [address] Road  within the last 14 days  Has not had complications with anesthesia in the past   Functional status: good    Review of Systems   Constitutional: Negative for chills, fatigue and fever  HENT: Positive for voice change (raspy)  Negative for congestion, ear pain, sinus pain, sore throat and trouble swallowing  Eyes: Negative for pain, discharge and redness  Respiratory: Negative for cough, chest tightness, shortness of breath and wheezing  Cardiovascular: Negative for chest pain, palpitations and leg swelling  Gastrointestinal: Negative for abdominal pain, diarrhea, nausea and vomiting  Musculoskeletal: Negative for arthralgias, joint swelling and myalgias  Skin: Negative for rash  Neurological: Negative for dizziness, weakness, numbness and headaches           Historical Information   The patient history was reviewed as follows:    Past Medical History:   Diagnosis Date    Diabetes mellitus ([address] )     diet control    Heart disease 05/09/2020    Heart attack    Prediabetes      Past Surgical History:   Procedure Laterality Date    CARDIAC SURGERY  05/2020    stent placement    HERNIA REPAIR      LARYNGOSCOPY  1978    with vocal cord polyp removal     Family History   Problem Relation Age of Onset    Heart disease Mother         Cardiac Disorder    No Known Problems Father       Social History       Medications:     Current Outpatient Medications:     Ascorbic Acid (VITAMIN C) 1000 MG tablet, Take 2,000 mg by mouth 2 (two) times a day , Disp: , Rfl:     aspirin (ECOTRIN LOW STRENGTH) 81 mg EC tablet, Take 1 tablet (81 mg total) by mouth daily, Disp: 60 tablet, Rfl: 0    atorvastatin (LIPITOR) 40 mg tablet, Take 1 tablet (40 mg total) by mouth daily, Disp: 90 tablet, Rfl: 3    Blood Glucose Monitoring Suppl (FREESTYLE FREEDOM LITE) w/Device KIT, Check glucose levels once daily, Disp: 1 each, Rfl: 0    clopidogrel (PLAVIX) 75 mg tablet, Take 1 tablet (75 mg total) by mouth daily, Disp: 90 tablet, Rfl: 3    famotidine (PEPCID) 20 mg tablet, Take 1 tablet (20 mg total) by mouth daily (Patient not taking: Reported on 6/8/2021), Disp: 90 tablet, Rfl: 1    FREESTYLE LITE test strip, USE AS DIRECTED DAILY, Disp: 100 each, Rfl: 3    Lancets (FREESTYLE) lancets, TEST ONCE DAILY FASTING DXE11 9, Disp: 100 each, Rfl: 3    metoprolol tartrate (LOPRESSOR) 25 mg tablet, TAKE 1 TABLET (25 MG TOTAL) BY MOUTH EVERY 12 (TWELVE) HOURS, Disp: 180 tablet, Rfl: 3    metoprolol tartrate (LOPRESSOR) 25 mg tablet, Take 1 tablet (25 mg total) by mouth every 12 (twelve) hours, Disp: 180 tablet, Rfl: 3    oxyCODONE-acetaminophen (PERCOCET) 5-325 mg per tablet, Take 1 tablet by mouth every 6 (six) hours as needed for moderate painMax Daily Amount: 4 tablets, Disp: 10 tablet, Rfl: 0    Promethazine-DM (PHENERGAN-DM) 6 25-15 mg/5 mL oral syrup, Take 5 mL by mouth 2 (two) times a day as needed for cough (Patient not taking: Reported on 7/28/2021), Disp: 118 mL, Rfl: 0  Allergies   Allergen Reactions    Celecoxib      Other reaction(s): itchy  Other reaction(s): itchy       OBJECTIVE    Vitals:   Vitals:    07/29/21 0805   BP: 128/81   Pulse: 73   Temp: 97 6 °F (36 4 °C)   SpO2: 99%   Weight: 92 5 kg (204 lb)   Height: 5' 7" (1 702 m)           Physical Exam  Vitals and nursing note reviewed  Constitutional:       Appearance: He is well-developed     HENT: Head: Normocephalic  Right Ear: Hearing and tympanic membrane normal       Left Ear: Hearing and tympanic membrane normal       Nose: No mucosal edema  Mouth/Throat:      Pharynx: Uvula midline  Posterior oropharyngeal erythema present  Cardiovascular:      Rate and Rhythm: Normal rate and regular rhythm  Pulmonary:      Effort: Pulmonary effort is normal       Breath sounds: Normal breath sounds  High Risk Surgery: no  CAD: yes  CHF: no  CVD: no  DM2 on insulin: no  Cr>2: no        Everardo Brittney Stout is undergoing a Minimal risk surgery  He is at [address] for major adverse cardiac event (MACE)  He may proceed with surgery as planned without further workup        [facility]AL[facility]   7/29/2021  8:32 AM

## 2021-08-02 ENCOUNTER — ANESTHESIA EVENT (OUTPATIENT)
Dept: PERIOP | Facility: HOSPITAL | Age: 81
End: 2021-08-02
Payer: MEDICARE

## 2021-08-02 NOTE — PRE-PROCEDURE INSTRUCTIONS
Pre-Surgery Instructions:   Medication Instructions    Ascorbic Acid (VITAMIN C) 1000 MG tablet stop taking    aspirin (ECOTRIN LOW STRENGTH) 81 mg EC tablet Patient was instructed by Physician and understands  don't hold    atorvastatin (LIPITOR) 40 mg tablet ok take morning day of surgery with sips of water    clopidogrel (PLAVIX) 75 mg tablet Patient was instructed by Physician and understands  stop 7/29/2021    metoprolol tartrate (LOPRESSOR) 25 mg tablet ok take morning day of surgery with sips of water   Have you had / have a sore throat? No  Have you had / have a cough less than 1 week? No  Have you had / have a fever greater than 100 0 - 100  4? No  Are you experiencing any shortness of breath? No  Fully vaccinated    Review with patient via phone medications and showering instructions  Advised don't take NSAID's, ok tylenol products  Advised ASC call with surgery schedule time, nothing eat or drink after midnight  Verbalized understanding

## 2021-08-03 ENCOUNTER — HOSPITAL ENCOUNTER (OUTPATIENT)
Facility: HOSPITAL | Age: 81
Setting detail: OUTPATIENT SURGERY
Discharge: HOME | End: 2021-08-03
Attending: OTOLARYNGOLOGY | Admitting: OTOLARYNGOLOGY
Payer: MEDICARE

## 2021-08-03 ENCOUNTER — ANESTHESIA (OUTPATIENT)
Dept: PERIOP | Facility: HOSPITAL | Age: 81
End: 2021-08-03
Payer: MEDICARE

## 2021-08-03 VITALS
BODY MASS INDEX: 32.18 KG/M2 | TEMPERATURE: 97.3 F | OXYGEN SATURATION: 95 % | WEIGHT: 205 LBS | DIASTOLIC BLOOD PRESSURE: 77 MMHG | RESPIRATION RATE: 16 BRPM | SYSTOLIC BLOOD PRESSURE: 170 MMHG | HEIGHT: 67 IN | HEART RATE: 82 BPM

## 2021-08-03 DIAGNOSIS — J38.2 VOCAL CORD NODULE: ICD-10-CM

## 2021-08-03 PROCEDURE — 88307 TISSUE EXAM BY PATHOLOGIST: CPT | Performed by: PATHOLOGY

## 2021-08-03 PROCEDURE — 31541 LARYNSCOP W/TUMR EXC + SCOPE: CPT | Performed by: OTOLARYNGOLOGY

## 2021-08-03 PROCEDURE — 88342 IMHCHEM/IMCYTCHM 1ST ANTB: CPT | Performed by: PATHOLOGY

## 2021-08-03 PROCEDURE — 88312 SPECIAL STAINS GROUP 1: CPT | Performed by: PATHOLOGY

## 2021-08-03 PROCEDURE — 88341 IMHCHEM/IMCYTCHM EA ADD ANTB: CPT | Performed by: PATHOLOGY

## 2021-08-03 RX ORDER — ONDANSETRON 2 MG/ML
INJECTION INTRAMUSCULAR; INTRAVENOUS AS NEEDED
Status: DISCONTINUED | OUTPATIENT
Start: 2021-08-03 | End: 2021-08-03

## 2021-08-03 RX ORDER — ROCURONIUM BROMIDE 10 MG/ML
INJECTION, SOLUTION INTRAVENOUS AS NEEDED
Status: DISCONTINUED | OUTPATIENT
Start: 2021-08-03 | End: 2021-08-03

## 2021-08-03 RX ORDER — SODIUM CHLORIDE 9 MG/ML
125 INJECTION, SOLUTION INTRAVENOUS CONTINUOUS
Status: DISCONTINUED | OUTPATIENT
Start: 2021-08-03 | End: 2021-08-03 | Stop reason: HOSPADM

## 2021-08-03 RX ORDER — ACETAMINOPHEN 325 MG/1
650 TABLET ORAL EVERY 4 HOURS PRN
Status: DISCONTINUED | OUTPATIENT
Start: 2021-08-03 | End: 2021-08-03 | Stop reason: HOSPADM

## 2021-08-03 RX ORDER — LIDOCAINE HYDROCHLORIDE AND EPINEPHRINE 10; 10 MG/ML; UG/ML
INJECTION, SOLUTION INFILTRATION; PERINEURAL AS NEEDED
Status: DISCONTINUED | OUTPATIENT
Start: 2021-08-03 | End: 2021-08-03 | Stop reason: HOSPADM

## 2021-08-03 RX ORDER — DEXTROSE AND SODIUM CHLORIDE 5; .9 G/100ML; G/100ML
125 INJECTION, SOLUTION INTRAVENOUS CONTINUOUS
Status: DISCONTINUED | OUTPATIENT
Start: 2021-08-03 | End: 2021-08-03 | Stop reason: HOSPADM

## 2021-08-03 RX ORDER — ONDANSETRON 2 MG/ML
4 INJECTION INTRAMUSCULAR; INTRAVENOUS EVERY 6 HOURS PRN
Status: DISCONTINUED | OUTPATIENT
Start: 2021-08-03 | End: 2021-08-03 | Stop reason: HOSPADM

## 2021-08-03 RX ORDER — SUCCINYLCHOLINE/SOD CL,ISO/PF 100 MG/5ML
SYRINGE (ML) INTRAVENOUS AS NEEDED
Status: DISCONTINUED | OUTPATIENT
Start: 2021-08-03 | End: 2021-08-03

## 2021-08-03 RX ORDER — PROPOFOL 10 MG/ML
INJECTION, EMULSION INTRAVENOUS AS NEEDED
Status: DISCONTINUED | OUTPATIENT
Start: 2021-08-03 | End: 2021-08-03

## 2021-08-03 RX ORDER — MAGNESIUM HYDROXIDE 1200 MG/15ML
LIQUID ORAL AS NEEDED
Status: DISCONTINUED | OUTPATIENT
Start: 2021-08-03 | End: 2021-08-03 | Stop reason: HOSPADM

## 2021-08-03 RX ORDER — FENTANYL CITRATE 50 UG/ML
INJECTION, SOLUTION INTRAMUSCULAR; INTRAVENOUS AS NEEDED
Status: DISCONTINUED | OUTPATIENT
Start: 2021-08-03 | End: 2021-08-03

## 2021-08-03 RX ORDER — OXYMETAZOLINE HYDROCHLORIDE 0.05 G/100ML
SPRAY NASAL AS NEEDED
Status: DISCONTINUED | OUTPATIENT
Start: 2021-08-03 | End: 2021-08-03 | Stop reason: HOSPADM

## 2021-08-03 RX ORDER — DEXAMETHASONE SODIUM PHOSPHATE 4 MG/ML
INJECTION, SOLUTION INTRA-ARTICULAR; INTRALESIONAL; INTRAMUSCULAR; INTRAVENOUS; SOFT TISSUE AS NEEDED
Status: DISCONTINUED | OUTPATIENT
Start: 2021-08-03 | End: 2021-08-03

## 2021-08-03 RX ORDER — EPHEDRINE SULFATE 50 MG/ML
INJECTION INTRAVENOUS AS NEEDED
Status: DISCONTINUED | OUTPATIENT
Start: 2021-08-03 | End: 2021-08-03

## 2021-08-03 RX ORDER — FENTANYL CITRATE/PF 50 MCG/ML
25 SYRINGE (ML) INJECTION
Status: DISCONTINUED | OUTPATIENT
Start: 2021-08-03 | End: 2021-08-03 | Stop reason: HOSPADM

## 2021-08-03 RX ORDER — OXYCODONE HYDROCHLORIDE AND ACETAMINOPHEN 5; 325 MG/1; MG/1
2 TABLET ORAL EVERY 4 HOURS PRN
Status: DISCONTINUED | OUTPATIENT
Start: 2021-08-03 | End: 2021-08-03 | Stop reason: HOSPADM

## 2021-08-03 RX ORDER — LIDOCAINE HYDROCHLORIDE 20 MG/ML
INJECTION, SOLUTION EPIDURAL; INFILTRATION; INTRACAUDAL; PERINEURAL AS NEEDED
Status: DISCONTINUED | OUTPATIENT
Start: 2021-08-03 | End: 2021-08-03

## 2021-08-03 RX ORDER — ONDANSETRON 2 MG/ML
4 INJECTION INTRAMUSCULAR; INTRAVENOUS ONCE AS NEEDED
Status: DISCONTINUED | OUTPATIENT
Start: 2021-08-03 | End: 2021-08-03 | Stop reason: HOSPADM

## 2021-08-03 RX ADMIN — EPHEDRINE SULFATE 10 MG: 50 INJECTION, SOLUTION INTRAVENOUS at 11:01

## 2021-08-03 RX ADMIN — EPHEDRINE SULFATE 10 MG: 50 INJECTION, SOLUTION INTRAVENOUS at 11:08

## 2021-08-03 RX ADMIN — SODIUM CHLORIDE: 0.9 INJECTION, SOLUTION INTRAVENOUS at 11:16

## 2021-08-03 RX ADMIN — LIDOCAINE HYDROCHLORIDE 100 MG: 20 INJECTION, SOLUTION EPIDURAL; INFILTRATION; INTRACAUDAL; PERINEURAL at 10:51

## 2021-08-03 RX ADMIN — ROCURONIUM BROMIDE 5 MG: 50 INJECTION, SOLUTION INTRAVENOUS at 10:51

## 2021-08-03 RX ADMIN — DEXAMETHASONE SODIUM PHOSPHATE 8 MG: 4 INJECTION INTRA-ARTICULAR; INTRALESIONAL; INTRAMUSCULAR; INTRAVENOUS; SOFT TISSUE at 10:51

## 2021-08-03 RX ADMIN — Medication 100 MG: at 10:51

## 2021-08-03 RX ADMIN — ONDANSETRON 4 MG: 2 INJECTION INTRAMUSCULAR; INTRAVENOUS at 10:51

## 2021-08-03 RX ADMIN — FENTANYL CITRATE 50 MCG: 50 INJECTION INTRAMUSCULAR; INTRAVENOUS at 10:51

## 2021-08-03 RX ADMIN — FENTANYL CITRATE 50 MCG: 50 INJECTION INTRAMUSCULAR; INTRAVENOUS at 11:27

## 2021-08-03 RX ADMIN — PROPOFOL 160 MG: 10 INJECTION, EMULSION INTRAVENOUS at 10:51

## 2021-08-03 RX ADMIN — SODIUM CHLORIDE 125 ML/HR: 0.9 INJECTION, SOLUTION INTRAVENOUS at 08:32

## 2021-08-03 RX ADMIN — REMIFENTANIL HYDROCHLORIDE 0.14 MCG/KG/MIN: 1 INJECTION, POWDER, LYOPHILIZED, FOR SOLUTION INTRAVENOUS at 10:51

## 2021-08-03 NOTE — DISCHARGE INSTRUCTIONS
[facility]    POSTOPERATIVE LARYNGOSCOPY INSTRUCTION    1  If any vocal cord biopsies were taken, you should rest the voice for 7 days  You should not whisper or shout  2   If you have any severe pain not alleviated by medication, fever of 101°F or greater, or difficulty breathing, please call our office at [phone] or [phone] or go directly to      the emergency room  3   No smoking  Avoid second hand smoke exposure  Smoking will delay healing  4   Coughing up blood mixed with mucus may be normal   Call for any significant bleeding  5   If you were given a prescription for pain medication follow appropriate directions on label  If no prescription was given you may take over the counter medication as needed  6   If you were given a prescription for steroids (Prednisone, Prednisolone) you may begin taking this the day following the surgery

## 2021-08-03 NOTE — INTERVAL H&P NOTE
H&P reviewed  After examining the patient I find no changes in the patients condition since the H&P had been written      Vitals:    08/03/21 0827   BP: 129/62   Pulse: 59   Resp: 16   Temp: 98 °F (36 7 °C)   SpO2: 100%

## 2021-08-03 NOTE — ANESTHESIA PREPROCEDURE EVALUATION
Procedure:  MICRODIRECT LARYNGOSCOPY WITH  EXCISION OF VOCAL CORD LESION (N/A Throat)    Relevant Problems   CARDIO   (+) Coronary artery disease   (+) Hypertension   (+) Mixed hyperlipidemia   (+) ST elevation myocardial infarction involving left circumflex coronary artery (HCC)      /RENAL   (+) [place] (acute kidney injury) (HCC)      MUSCULOSKELETAL   (+) Primary osteoarthritis of both knees        Physical Exam    Airway    Mallampati score: II  TM Distance: >3 FB  Neck ROM: full     Dental   No notable dental hx     Cardiovascular  Rhythm: regular, Rate: normal, Cardiovascular exam normal    Pulmonary  Pulmonary exam normal Breath sounds clear to auscultation,     Other Findings        Anesthesia Plan  ASA Score- 3     Anesthesia Type- general with ASA Monitors  Additional Monitors:   Airway Plan:     Comment: STEMI 2020-[facility])          Plan Factors-    Chart reviewed  EKG reviewed  Existing labs reviewed  Patient summary reviewed  Patient is not a current smoker  Patient instructed to abstain from smoking on day of procedure  Patient did not smoke on day of surgery  Induction- intravenous  Postoperative Plan-     Informed Consent- Anesthetic plan and risks discussed with patient

## 2021-08-03 NOTE — OP NOTE
OPERATIVE REPORT  PATIENT NAME: Mariaelena Gold    :  1940  MRN: [id number]  Pt Location: [facility] OR ROOM 03    SURGERY DATE: 8/3/2021    Surgeon(s) and Role:     * Floyd Trujillo DO - Primary    Preop Diagnosis:  Vocal cord nodule [J38 2]    Post-Op Diagnosis Codes:     * Vocal cord nodule [J38 2]    Procedure(s) (LRB):  MICRODIRECT LARYNGOSCOPY WITH  EXCISION OF VOCAL CORD LESION (N/A)    Specimen(s):  ID Type Source Tests Collected by Time Destination   1 : Right Vocal Cord Lesion Tissue Vocal cord TISSUE EXAM Floyd Trujillo DO 8/3/2021 1112        Estimated Blood Loss:   Minimal    Drains:  * No LDAs found *    Anesthesia Type:   General    Operative Indications:  Vocal cord nodule [J38 2]      Operative Findings:  Right vocal cord lesion    Complications:   None    Procedure and Technique:  Patient was taken to the operating room for the above procedure  He was placed on the OR table in supine position and placed under general anesthesia with the endotracheal tube  Table was turned 90° he was placed on a shoulder roll with head in slight extension  Formal time-out was obtained after he was prepped and draped in the usual fashion  Upper dental guard was placed and a Dedo laryngoscope was used to evaluate the larynx  This was suspended with a Lewy suspension bar and a large chest pad  Microscope was used to evaluate the area  There was noted to be a white lesion on the anterior 1/3 of the right vocal cord  I had suspected that this was a nodule but is actually above the vocal fold mucosa  Using the sataloff needle I injected 0 1 mL of 1% xylocaine with 1 100,000 epinephrine into the area just below the lesion  I then used a grasping forceps and a straight scissor to remove this atraumatically from the vocal fold  Pre and postoperative pictures were obtained and placed in the chart under the media section  There was no bleeding  At this point the laryngoscope was removed    The upper dental guard was removed  He had the shoulder roll removed  He was turned back to normal position and awoken  Endotracheal tube was removed and he was taken to recovery in stable condition     I was present for the entire procedure    Patient Disposition:  PACU     SIGNATURE: Floyd Trujillo DO  DATE: August 3, 2021  TIME: 11:15 AM

## 2021-09-03 DIAGNOSIS — I25.10 CORONARY ARTERY DISEASE INVOLVING NATIVE CORONARY ARTERY OF NATIVE HEART WITHOUT ANGINA PECTORIS: ICD-10-CM

## 2021-09-03 RX ORDER — CLOPIDOGREL BISULFATE 75 MG/1
TABLET ORAL
Qty: 90 TABLET | Refills: 3 | Status: SHIPPED | OUTPATIENT
Start: 2021-09-03 | End: 2022-06-14

## 2021-11-23 ENCOUNTER — OFFICE VISIT (OUTPATIENT)
Dept: FAMILY MEDICINE CLINIC | Facility: CLINIC | Age: 81
End: 2021-11-23
Payer: MEDICARE

## 2021-11-23 VITALS
WEIGHT: 203.2 LBS | SYSTOLIC BLOOD PRESSURE: 128 MMHG | BODY MASS INDEX: 31.89 KG/M2 | HEIGHT: 67 IN | HEART RATE: 72 BPM | DIASTOLIC BLOOD PRESSURE: 84 MMHG | OXYGEN SATURATION: 97 % | TEMPERATURE: 97.9 F

## 2021-11-23 DIAGNOSIS — N48.1 BALANITIS: ICD-10-CM

## 2021-11-23 DIAGNOSIS — H00.014 HORDEOLUM EXTERNUM OF LEFT UPPER EYELID: Primary | ICD-10-CM

## 2021-11-23 DIAGNOSIS — Z00.00 MEDICARE ANNUAL WELLNESS VISIT, SUBSEQUENT: ICD-10-CM

## 2021-11-23 DIAGNOSIS — Z23 FLU VACCINE NEED: ICD-10-CM

## 2021-11-23 PROBLEM — M79.672 LEFT FOOT PAIN: Status: RESOLVED | Noted: 2020-07-22 | Resolved: 2021-11-23

## 2021-11-23 PROCEDURE — 90662 IIV NO PRSV INCREASED AG IM: CPT | Performed by: FAMILY MEDICINE

## 2021-11-23 PROCEDURE — 1123F ACP DISCUSS/DSCN MKR DOCD: CPT | Performed by: FAMILY MEDICINE

## 2021-11-23 PROCEDURE — G0008 ADMIN INFLUENZA VIRUS VAC: HCPCS | Performed by: FAMILY MEDICINE

## 2021-11-23 PROCEDURE — 99214 OFFICE O/P EST MOD 30 MIN: CPT | Performed by: FAMILY MEDICINE

## 2021-11-23 PROCEDURE — G0439 PPPS, SUBSEQ VISIT: HCPCS | Performed by: FAMILY MEDICINE

## 2021-11-23 RX ORDER — METRONIDAZOLE 500 MG/1
2000 TABLET ORAL ONCE
Qty: 4 TABLET | Refills: 0 | Status: SHIPPED | OUTPATIENT
Start: 2021-11-23 | End: 2021-11-23

## 2021-11-23 RX ORDER — AMOXICILLIN AND CLAVULANATE POTASSIUM 875; 125 MG/1; MG/1
1 TABLET, FILM COATED ORAL EVERY 12 HOURS SCHEDULED
Qty: 14 TABLET | Refills: 0 | Status: SHIPPED | OUTPATIENT
Start: 2021-11-23 | End: 2021-11-30

## 2021-11-23 RX ORDER — NEOMYCIN SULFATE, POLYMYXIN B SULFATE AND DEXAMETHASONE 3.5; 10000; 1 MG/ML; [USP'U]/ML; MG/ML
1 SUSPENSION/ DROPS OPHTHALMIC 3 TIMES DAILY
Qty: 5 ML | Refills: 1 | Status: SHIPPED | OUTPATIENT
Start: 2021-11-23 | End: 2022-05-16

## 2021-11-23 RX ORDER — FLUCONAZOLE 150 MG/1
150 TABLET ORAL ONCE
Qty: 1 TABLET | Refills: 0 | Status: SHIPPED | OUTPATIENT
Start: 2021-11-23 | End: 2021-11-23

## 2022-01-10 ENCOUNTER — OFFICE VISIT (OUTPATIENT)
Dept: CARDIOLOGY CLINIC | Facility: CLINIC | Age: 82
End: 2022-01-10
Payer: MEDICARE

## 2022-01-10 VITALS
DIASTOLIC BLOOD PRESSURE: 78 MMHG | SYSTOLIC BLOOD PRESSURE: 128 MMHG | WEIGHT: 201 LBS | OXYGEN SATURATION: 96 % | RESPIRATION RATE: 16 BRPM | HEART RATE: 58 BPM | BODY MASS INDEX: 31.55 KG/M2 | HEIGHT: 67 IN

## 2022-01-10 DIAGNOSIS — E78.2 MIXED HYPERLIPIDEMIA: ICD-10-CM

## 2022-01-10 DIAGNOSIS — R73.03 PREDIABETES: ICD-10-CM

## 2022-01-10 DIAGNOSIS — I25.10 ATHEROSCLEROSIS OF NATIVE CORONARY ARTERY OF NATIVE HEART WITHOUT ANGINA PECTORIS: Primary | ICD-10-CM

## 2022-01-10 PROCEDURE — 99214 OFFICE O/P EST MOD 30 MIN: CPT | Performed by: INTERNAL MEDICINE

## 2022-01-10 RX ORDER — FLUCONAZOLE 150 MG/1
TABLET ORAL
COMMUNITY
Start: 2021-11-23 | End: 2022-05-16

## 2022-01-10 NOTE — PROGRESS NOTES
[facility]  [address] [facility] [id number]  Cardiology Follow Up    Isaac Soler  1940  [id number]      1  Atherosclerosis of native coronary artery of native heart without angina pectoris  Lipid Panel with Direct LDL reflex   2  Mixed hyperlipidemia  Lipid Panel with Direct LDL reflex   3  BMI 31 0-31 9,adult     4  Prediabetes         Chief Complaint   Patient presents with    Follow-up       Interval History:  81-year-old male with coronary artery disease status post PCI of proximal circumflex with drug-eluting stent x1 for inferior STEMI in May 2020(RPDA 50% stenosis), hyperlipidemia, prediabetic presented for cardiology continuity of care    Since last clinic visit patient is doing well  He denies chest pain, shortness of breath, palpitation, dizziness, orthopnea, leg edema or loss of consciousness  He was going to the gym but due to COVID-19 situation currently walking on treadmill at home  He does walk 45 minutes to an hour 5 times a week without any chest pain or shortness of breath  Is able to lost 2-3 lb since last clinic visit    Patient had some hoarseness of voice and underwent ENT procedure and was noted to have vocal cord nodule which was removed and was benign    Current medications reviewed  Labs reviewed from June 2021  LDL 36    Review of Systems:   All review of system negative except as mentioned above    Patient Active Problem List   Diagnosis    Medicare annual wellness visit, subsequent    Need for shingles vaccine    Prediabetes    Chronic cough    ST elevation myocardial infarction involving left circumflex coronary artery (HCC)    [place] [facility] kidney injury) [facility] [facility]    Coronary artery disease    Hypertension    Mixed hyperlipidemia    Primary osteoarthritis of both knees    Trigger thumb, right thumb    Hordeolum externum of left upper eyelid    Balanitis     Past Medical History:   Diagnosis Date    Diabetes mellitus ([address] )     diet control    Heart disease 05/09/2020    Heart attack    Hyperlipidemia     Myocardial infarction ([facility] [address] )     Prediabetes      Social History     Socioeconomic History    Marital status: Single     Spouse name: Not on file    Number of children: Not on file    Years of education: Not on file    Highest education level: Not on file   Occupational History    Not on file   Tobacco Use    Smoking status: Former Smoker     Packs/day: 1 00     Years: 25 00     Pack years: 25 00    Smokeless tobacco: Never Used   Vaping Use    Vaping Use: Never used   Substance and Sexual Activity    Alcohol use: Yes     Comment: Social twice a month    Drug use: Never    Sexual activity: Not on file   Other Topics Concern    Not on file   Social History Narrative    Not on file     Social Determinants of Health     Financial Resource Strain: Not on file   Food Insecurity: Not on file   Transportation Needs: Not on file   Physical Activity: Not on file   Stress: Not on file   Social Connections: Not on file   Intimate Partner Violence: Not on file   Housing Stability: Not on file      Family History   Problem Relation Age of Onset    Heart disease Mother         Cardiac Disorder    No Known Problems Father     No Known Problems Sister      Past Surgical History:   Procedure Laterality Date    CARDIAC SURGERY  05/2020    stent placement    COLONOSCOPY     [address]    with vocal cord polyp removal    LARYNGOSCOPY N/A 8/3/2021    Procedure: MICRODIRECT LARYNGOSCOPY WITH  EXCISION OF VOCAL CORD LESION;  Surgeon: Orman Kussmaul, DO;  Location: [facility];  Service: ENT       Current Outpatient Medications:     Ascorbic Acid (VITAMIN C) 1000 MG tablet, Take 2,000 mg by mouth 2 (two) times a day , Disp: , Rfl:     aspirin (ECOTRIN LOW STRENGTH) 81 mg EC tablet, Take 1 tablet (81 mg total) by mouth daily, Disp: 60 tablet, Rfl: 0    atorvastatin (LIPITOR) 40 mg tablet, Take 1 tablet (40 mg total) by mouth daily, Disp: 90 tablet, Rfl: 3    Blood Glucose Monitoring Suppl (FREESTYLE FREEDOM LITE) w/Device KIT, Check glucose levels once daily, Disp: 1 each, Rfl: 0    clopidogrel (PLAVIX) 75 mg tablet, TAKE 1 TABLET BY MOUTH EVERY DAY, Disp: 90 tablet, Rfl: 3    fluconazole (DIFLUCAN) 150 mg tablet, , Disp: , Rfl:     fluticasone (FLONASE) 50 mcg/act nasal spray, 2 sprays into each nostril daily, Disp: 16 g, Rfl: 11    FREESTYLE LITE test strip, USE AS DIRECTED DAILY, Disp: 100 each, Rfl: 3    Lancets (FREESTYLE) lancets, TEST ONCE DAILY FASTING DXE11 9, Disp: 100 each, Rfl: 3    metoprolol tartrate (LOPRESSOR) 25 mg tablet, TAKE 1 TABLET (25 MG TOTAL) BY MOUTH EVERY 12 (TWELVE) HOURS, Disp: 180 tablet, Rfl: 3    famotidine (PEPCID) 20 mg tablet, Take 1 tablet (20 mg total) by mouth daily (Patient not taking: Reported on 6/8/2021), Disp: 90 tablet, Rfl: 1    neomycin-polymyxin-dexamethasone (MAXITROL) ophthalmic suspension, Administer 1 drop into the left eye 3 (three) times a day (Patient not taking: Reported on 1/10/2022 ), Disp: 5 mL, Rfl: 1  Allergies   Allergen Reactions    Celecoxib      Other reaction(s): itchy  Other reaction(s): itchy       Labs:  Admission on 08/03/2021, Discharged on 08/03/2021   Component Date Value    Case Report 08/03/2021                      Value:Surgical Pathology Report                         Case: [id number]                                   Authorizing Provider:  Geena Christopher DO          Collected:           08/03/2021 1112              Ordering Location:     [facility]        Received:            08/03/2021 [address] [place] [address] Operating Room                                                     Pathologist:           Kerri Sheldon MD                                                        Specimen:    Vocal cord, Right Vocal Cord Lesion                                                        Final Diagnosis 08/03/2021 Value:This result contains rich text formatting which cannot be displayed here   Note 08/03/2021                      Value: This result contains rich text formatting which cannot be displayed here   Additional Information 08/03/2021                      Value: This result contains rich text formatting which cannot be displayed here  [facility] Description 08/03/2021                      Value: This result contains rich text formatting which cannot be displayed here  Appointment on 07/28/2021   Component Date Value    PTT 07/28/2021 35     Protime 07/28/2021 13 9     INR 07/28/2021 1 07      Imaging: No results found  Physical Exam:  General:  Obese, awake, alert and oriented x3, not in distress  Neck: supple, no JVD  Eyes: PERRL, conjunctiva normal  Lungs:  Bilateral air entry positive, no wheeze/rhonchi or crackle  Heart:  S1-S2 normal, no murmur  Abdomen:  Soft ,nondistended ,nontender, bowel sounds positive  Extremities:  No leg edema, no deformity, ROM normal  Neuro:  Moving all extremities, speech clear  Skin: warm, no rash    /78 (BP Location: Left arm, Patient Position: Sitting, Cuff Size: Standard)   Pulse 58   Resp 16   Ht 5' 7" (1 702 m)   Wt 91 2 kg (201 lb)   SpO2 96%   BMI 31 48 kg/m²     Cardiographics :  May 2020:   Echo showed low normal LVEF 45-50% with mild hypokinesis of mid anterolateral wall, grade 1 diastolic dysfunction     Cardiac catheterization on 05/09/2020:  Proximal circumflex 99% thrombotic occlusion with SANCHEZ 3 flow status post successful PCI with drug-eluting stent Xience Perlita(2 5 x 23 post dilated with 2 75 noncompliant balloon), RPDA medium caliber vessel with 50% stenosis in mid      Assessment:    1  Coronary artery disease  Status post PCI of proximal circumflex with drug-eluting stent x1 for inferior STEMI in May 2020(RPDA 50% stenosis)    2  Hyperlipidemia  LDL at goal   Lipitor was decreased from 80 to 40 mg during last with  3  Prediabetic  4  Osteoarthritis of knee  5  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    Recommendations:    Patient is doing well from cardiology standpoint at present time  Patient to continue aspirin, Plavix, Lipitor 40 mg, metoprolol tartrate 25 mg twice a day  Risk and benefit of continuing dual antiplatelet discussed with the patient patient would like to continue for few months and will discontinue in May 2020 to  He was advised to stop aspirin and continue Plavix 75 mg daily    Advised to take low-salt, low-fat/low-cholesterol prediabetic diet and try to lose weight  Repeat lipid panel ordered    Return to clinic in 6 months or early as needed  Above all discussed with patient    Patient understands and agrees

## 2022-01-24 ENCOUNTER — OFFICE VISIT (OUTPATIENT)
Dept: FAMILY MEDICINE CLINIC | Facility: CLINIC | Age: 82
End: 2022-01-24
Payer: MEDICARE

## 2022-01-24 ENCOUNTER — APPOINTMENT (OUTPATIENT)
Dept: RADIOLOGY | Facility: CLINIC | Age: 82
End: 2022-01-24
Payer: MEDICARE

## 2022-01-24 VITALS
BODY MASS INDEX: 31.55 KG/M2 | DIASTOLIC BLOOD PRESSURE: 78 MMHG | WEIGHT: 201 LBS | HEIGHT: 67 IN | HEART RATE: 82 BPM | RESPIRATION RATE: 18 BRPM | SYSTOLIC BLOOD PRESSURE: 132 MMHG

## 2022-01-24 DIAGNOSIS — M79.641 RIGHT HAND PAIN: ICD-10-CM

## 2022-01-24 DIAGNOSIS — N48.9 PENILE LESION: ICD-10-CM

## 2022-01-24 DIAGNOSIS — R21 RASH AND NONSPECIFIC SKIN ERUPTION: Primary | ICD-10-CM

## 2022-01-24 PROCEDURE — 99214 OFFICE O/P EST MOD 30 MIN: CPT | Performed by: PHYSICIAN ASSISTANT

## 2022-01-24 PROCEDURE — 73130 X-RAY EXAM OF HAND: CPT

## 2022-01-24 RX ORDER — HYDROXYZINE HYDROCHLORIDE 25 MG/1
25 TABLET, FILM COATED ORAL
Qty: 30 TABLET | Refills: 0 | Status: CANCELLED | OUTPATIENT
Start: 2022-01-24

## 2022-01-24 RX ORDER — HYDROXYZINE HYDROCHLORIDE 10 MG/1
10 TABLET, FILM COATED ORAL
Qty: 30 TABLET | Refills: 0 | Status: SHIPPED | OUTPATIENT
Start: 2022-01-24 | End: 2022-02-14

## 2022-01-24 RX ORDER — PREDNISONE 10 MG/1
10 TABLET ORAL DAILY
Qty: 5 TABLET | Refills: 0 | Status: SHIPPED | OUTPATIENT
Start: 2022-01-24 | End: 2022-01-29

## 2022-01-24 NOTE — PROGRESS NOTES
Assessment/Plan:    No problem-specific Assessment & Plan notes found for this encounter  Problem List Items Addressed This Visit     None      Visit Diagnoses     Rash and nonspecific skin eruption    -  Primary    Relevant Medications    triamcinolone (KENALOG) 0 1 % ointment    Diclofenac Sodium (VOLTAREN) 1 %    predniSONE 10 mg tablet    hydrOXYzine HCL (ATARAX) 10 mg tablet    Right hand pain        Relevant Medications    Diclofenac Sodium (VOLTAREN) 1 %    predniSONE 10 mg tablet    Other Relevant Orders    XR hand 3+ vw right    Penile lesion        Relevant Orders    Ambulatory Referral to Urology            Subjective:      Patient ID: Caleb Figueroa is a [de-identified] y o  male  Patient presents today for a rash  He describes an intense itchy rash on bilateral lower legs  He noticed this a few weeks ago  He has not hand any new exposures  He denies drainage or fever  He states the itching is worse at night keeping him awake  No one else in the home has a similar rash    Patient was treated for balanitis in November with diflucan, flagyl and states he still has a penile lesion that is bothersome  At times has penile pain  Denies discharge or any urinary complaints  Denies history or concern for STD  Patient c/o R handed pain without injury  He states the pain is worse with trying to make a fist  He describes it as a stiffness  He is left hand dominant  The following portions of the patient's history were reviewed and updated as appropriate: allergies, current medications, past medical history, past social history, past surgical history and problem list     Review of Systems   Constitutional: Negative for chills, fatigue and fever  HENT: Negative for congestion, ear pain, sinus pain, sore throat and trouble swallowing  Eyes: Negative for pain, discharge and redness  Respiratory: Negative for cough, chest tightness, shortness of breath and wheezing      Cardiovascular: Negative for chest pain, palpitations and leg swelling  Gastrointestinal: Negative for abdominal pain, diarrhea, nausea and vomiting  Genitourinary: Positive for penile pain  Musculoskeletal: Positive for arthralgias  Negative for joint swelling and myalgias  Skin: Positive for rash  Neurological: Negative for dizziness, weakness, numbness and headaches  Objective:      /78 (BP Location: Left arm, Patient Position: Sitting)   Pulse 82   Resp 18   Ht 5' 7" (1 702 m)   Wt 91 2 kg (201 lb)   BMI 31 48 kg/m²          Physical Exam  Constitutional:       General: He is not in acute distress  Appearance: He is well-developed  Cardiovascular:      Rate and Rhythm: Normal rate and regular rhythm  Heart sounds: Normal heart sounds  Pulmonary:      Effort: Pulmonary effort is normal       Breath sounds: Normal breath sounds  Musculoskeletal:      Right hand: Swelling (mild swelling in 2nd and 3rd MCP joints) present  Decreased range of motion (unable to fully make a fist)        Left hand: Normal    Skin:

## 2022-01-26 NOTE — PROGRESS NOTES
1/27/2022      Chief Complaint   Patient presents with    Penile Lesion         Assessment and Plan    [de-identified] y o  male -- New patient    1  Penile lesion  - balanitis versus herpetic lesion  - will trial Lotrisone  - patient should follow-up after his vacation for reassessment  - patient would like to hold off on STD panel at this time  - discussed circumcision with patient today, however patient is hesitant  - call with any questions or concerns in the meantime  - All questions answered; patient understands and agrees with plan        History of Present Illness  Sasha White is a [de-identified] y o  male new patient here for evaluation of penile lesion  Patient saw PCP for 01/24/2022 for concerns of penile lesion  Patient states he does have a bilateral rash on his lower extremities as well and was instructed to apply moisturizing cream   Patient was treated for balanitis in November 2021, however states he has had a penile lesion since that time that is not painful  Has tried oral antifungals in the past without benefit  Patient states he is uncircumcised  Denies any urinary symptoms  Denies gross hematuria, dysuria, flank pain, suprapubic pressure, fever, chills, nausea, vomiting  Denies exposures to STDs  Denies seeing urology in the past      Review of Systems   Constitutional: Negative for activity change, appetite change, chills and fever  HENT: Negative for congestion and trouble swallowing  Respiratory: Negative for cough and shortness of breath  Cardiovascular: Negative for chest pain, palpitations and leg swelling  Gastrointestinal: Negative for abdominal pain, constipation, diarrhea, nausea and vomiting  Genitourinary: Negative for difficulty urinating, dysuria, flank pain, frequency, hematuria and urgency  Penile lesion   Musculoskeletal: Negative for back pain and gait problem  Skin: Negative for wound  Allergic/Immunologic: Negative for immunocompromised state     Neurological: Negative for dizziness and syncope  Hematological: Does not bruise/bleed easily  Psychiatric/Behavioral: Negative for confusion  All other systems reviewed and are negative  Vitals  Vitals:    01/27/22 0818   BP: 116/74   Pulse: 72   SpO2: 99%   Weight: 89 8 kg (198 lb)   Height: 5' 7" (1 702 m)       Physical Exam  Constitutional:       General: He is not in acute distress  Appearance: Normal appearance  He is not ill-appearing, toxic-appearing or diaphoretic  HENT:      Head: Normocephalic  Nose: No congestion  Eyes:      General: No scleral icterus  Right eye: No discharge  Left eye: No discharge  Conjunctiva/sclera: Conjunctivae normal       Pupils: Pupils are equal, round, and reactive to light  Pulmonary:      Effort: Pulmonary effort is normal    Genitourinary:     Comments: Uncircumcised penis, normal phallus, orthotopic and patent meatus  Small areas of erythema and edema along these of penis under foreskin  No discrete nodules or ulceration  No drainage or edema  Testes are smooth and descended bilaterally, non-tender without palpable mass  Scrotal and inguinal skin without abnormalities  Musculoskeletal:      Cervical back: Normal range of motion  Skin:     General: Skin is warm and dry  Coloration: Skin is not jaundiced or pale  Findings: No bruising, erythema, lesion or rash  Neurological:      General: No focal deficit present  Mental Status: He is alert and oriented to person, place, and time  Mental status is at baseline  Gait: Gait normal    Psychiatric:         Mood and Affect: Mood normal          Behavior: Behavior normal          Thought Content:  Thought content normal          Judgment: Judgment normal            Past History  Past Medical History:   Diagnosis Date    Diabetes mellitus ([facility] )     diet control    Heart disease 05/09/2020    Heart attack    Hyperlipidemia     [facility]     Penile lesion     Prediabetes      Social History     Socioeconomic History    Marital status: Single     Spouse name: None    Number of children: None    Years of education: None    Highest education level: None   Occupational History    None   Tobacco Use    Smoking status: Former Smoker     Packs/day: 1 00     Years: 25 00     Pack years: 25 00    Smokeless tobacco: Never Used   Vaping Use    Vaping Use: Never used   Substance and Sexual Activity    Alcohol use: Yes     Comment: Social twice a month    Drug use: Never    Sexual activity: None   Other Topics Concern    None   Social History Narrative    None     Social Determinants of Health     Financial Resource Strain: Not on file   Food Insecurity: Not on file   Transportation Needs: Not on file   Physical Activity: Not on file   Stress: Not on file   Social Connections: Not on file   Intimate Partner Violence: Not on file   Housing Stability: Not on file     Social History     Tobacco Use   Smoking Status Former Smoker    Packs/day: 1 00    Years: 25 00    Pack years: 25 00   Smokeless Tobacco Never Used     Family History   Problem Relation Age of Onset    Heart disease Mother         Cardiac Disorder    No Known Problems Father     No Known Problems Sister        The following portions of the patient's history were reviewed and updated as appropriate: allergies, current medications, past medical history, past social history, past surgical history and problem list     Results  No results found for this or any previous visit (from the past 1 hour(s)) ]  Lab Results   Component Value Date    PSA 1 8 12/02/2020    PSA 1 3 06/26/2018     Lab Results   Component Value Date    CALCIUM 8 9 06/17/2021    K 4 2 06/17/2021    CO2 26 06/17/2021     (H) 06/17/2021    BUN 19 06/17/2021    CREATININE 1 29 06/17/2021     Lab Results   Component Value Date    WBC 7 70 06/17/2021    HGB 13 7 06/17/2021    HCT 42 8 06/17/2021     (H) 06/17/2021  06/17/2021       Loren Dailey PA-C

## 2022-01-27 ENCOUNTER — OFFICE VISIT (OUTPATIENT)
Dept: UROLOGY | Facility: CLINIC | Age: 82
End: 2022-01-27
Payer: MEDICARE

## 2022-01-27 VITALS
OXYGEN SATURATION: 99 % | HEART RATE: 72 BPM | HEIGHT: 67 IN | SYSTOLIC BLOOD PRESSURE: 116 MMHG | BODY MASS INDEX: 31.08 KG/M2 | WEIGHT: 198 LBS | DIASTOLIC BLOOD PRESSURE: 74 MMHG

## 2022-01-27 DIAGNOSIS — N48.9 PENILE LESION: ICD-10-CM

## 2022-01-27 PROCEDURE — 99204 OFFICE O/P NEW MOD 45 MIN: CPT | Performed by: PHYSICIAN ASSISTANT

## 2022-01-27 RX ORDER — CLOTRIMAZOLE AND BETAMETHASONE DIPROPIONATE 10; .64 MG/G; MG/G
CREAM TOPICAL 2 TIMES DAILY
Qty: 30 G | Refills: 0 | Status: SHIPPED | OUTPATIENT
Start: 2022-01-27 | End: 2022-05-16

## 2022-02-14 DIAGNOSIS — R21 RASH AND NONSPECIFIC SKIN ERUPTION: ICD-10-CM

## 2022-02-14 RX ORDER — HYDROXYZINE HYDROCHLORIDE 10 MG/1
10 TABLET, FILM COATED ORAL
Qty: 30 TABLET | Refills: 0 | Status: SHIPPED | OUTPATIENT
Start: 2022-02-14 | End: 2022-05-16

## 2022-02-18 DIAGNOSIS — E11.9 TYPE 2 DIABETES MELLITUS WITHOUT COMPLICATION, WITHOUT LONG-TERM CURRENT USE OF INSULIN (HCC): ICD-10-CM

## 2022-02-18 RX ORDER — BLOOD-GLUCOSE METER
KIT MISCELLANEOUS
Qty: 100 STRIP | Refills: 3 | Status: SHIPPED | OUTPATIENT
Start: 2022-02-18 | End: 2022-02-18

## 2022-03-07 NOTE — PROGRESS NOTES
3/11/2022      Chief Complaint   Patient presents with    Follow-up         Assessment and Plan    [de-identified] y o  male    1  Penile lesion  - patient use Lotrisone with benefit  - doing well overall  - reviewed recommendations for circumcision, however patient is hesitant  - follow-up on an as-needed basis  - call with any questions or concerns in the meantime  - All questions answered; patient understands and agrees with plan      History of Present Illness  Estephanie Willis is a [de-identified] y o  male patient with history of penile lesion here for follow up  Patient was previously seen in consultation in January of this year  Patient had a penile lesion and Lotrisone was prescribed  Patient declined STD panel and circumcision at that time  Patient states he is doing well overall and denies any new or worsening symptoms  States that his lesion has improved  Denies gross hematuria, dysuria, fever, chills, nausea, vomiting, flank pain, suprapubic pain  Review of Systems   Constitutional: Negative for activity change, appetite change, chills and fever  HENT: Negative for congestion and trouble swallowing  Respiratory: Negative for cough and shortness of breath  Cardiovascular: Negative for chest pain, palpitations and leg swelling  Gastrointestinal: Negative for abdominal pain, constipation, diarrhea, nausea and vomiting  Genitourinary: Negative for difficulty urinating, dysuria, flank pain, frequency, hematuria and urgency  Musculoskeletal: Negative for back pain and gait problem  Skin: Negative for wound  Allergic/Immunologic: Negative for immunocompromised state  Neurological: Negative for dizziness and syncope  Hematological: Does not bruise/bleed easily  Psychiatric/Behavioral: Negative for confusion  All other systems reviewed and are negative        Vitals  Vitals:    03/11/22 0857   Weight: 89 4 kg (197 lb)   Height: 5' 7" (1 702 m)       Physical Exam  Constitutional:       General: He is not in acute distress  Appearance: Normal appearance  He is not ill-appearing, toxic-appearing or diaphoretic  HENT:      Head: Normocephalic  Nose: No congestion  Eyes:      General: No scleral icterus  Right eye: No discharge  Left eye: No discharge  Conjunctiva/sclera: Conjunctivae normal       Pupils: Pupils are equal, round, and reactive to light  Pulmonary:      Effort: Pulmonary effort is normal    Genitourinary:     Comments: unircumcised penis, normal phallus, orthotopic and patent meatus  Testes are smooth and descended bilaterally, non-tender without palpable mass  Scrotal and inguinal skin without abnormalities    Musculoskeletal:      Cervical back: Normal range of motion  Skin:     General: Skin is warm and dry  Coloration: Skin is not jaundiced or pale  Findings: No bruising, erythema, lesion or rash  Neurological:      General: No focal deficit present  Mental Status: He is alert and oriented to person, place, and time  Mental status is at baseline  Gait: Gait normal    Psychiatric:         Mood and Affect: Mood normal          Behavior: Behavior normal          Thought Content:  Thought content normal          Judgment: Judgment normal            Past History  Past Medical History:   Diagnosis Date    Diabetes mellitus ([facility] )     diet control    Heart disease 05/09/2020    Heart attack    Hyperlipidemia     Myocardial infarction ([facility] )     Penile lesion     Prediabetes      Social History     Socioeconomic History    Marital status: Single     Spouse name: None    Number of children: None    Years of education: None    Highest education level: None   Occupational History    None   Tobacco Use    Smoking status: Former Smoker     Packs/day: 1 00     Years: 25 00     Pack years: 25 00    Smokeless tobacco: Never Used   Vaping Use    Vaping Use: Never used   Substance and Sexual Activity    Alcohol use: Yes     Comment: Social twice a month    Drug use: Never    Sexual activity: None   Other Topics Concern    None   Social History Narrative    None     Social Determinants of Health     Financial Resource Strain: Not on file   Food Insecurity: Not on file   Transportation Needs: Not on file   Physical Activity: Not on file   Stress: Not on file   Social Connections: Not on file   Intimate Partner Violence: Not on file   Housing Stability: Not on file     Social History     Tobacco Use   Smoking Status Former Smoker    Packs/day: 1 00    Years: 25 00    Pack years: 25 00   Smokeless Tobacco Never Used     Family History   Problem Relation Age of Onset    Heart disease Mother         Cardiac Disorder    No Known Problems Father     No Known Problems Sister        The following portions of the patient's history were reviewed and updated as appropriate: allergies, current medications, past medical history, past social history, past surgical history and problem list     Results  No results found for this or any previous visit (from the past 1 hour(s)) ]  Lab Results   Component Value Date    PSA 1 8 12/02/2020    PSA 1 3 06/26/2018     Lab Results   Component Value Date    CALCIUM 8 9 06/17/2021    K 4 2 06/17/2021    CO2 26 06/17/2021     (H) 06/17/2021    BUN 19 06/17/2021    CREATININE 1 29 06/17/2021     Lab Results   Component Value Date    WBC 7 70 06/17/2021    HGB 13 7 06/17/2021    HCT 42 8 06/17/2021     (H) 06/17/2021     06/17/2021       Minna Alejandra PA-C

## 2022-03-11 ENCOUNTER — OFFICE VISIT (OUTPATIENT)
Dept: UROLOGY | Facility: CLINIC | Age: 82
End: 2022-03-11
Payer: MEDICARE

## 2022-03-11 VITALS
HEART RATE: 56 BPM | BODY MASS INDEX: 30.92 KG/M2 | OXYGEN SATURATION: 96 % | SYSTOLIC BLOOD PRESSURE: 124 MMHG | HEIGHT: 67 IN | DIASTOLIC BLOOD PRESSURE: 76 MMHG | WEIGHT: 197 LBS

## 2022-03-11 DIAGNOSIS — N48.9 PENILE LESION: Primary | ICD-10-CM

## 2022-03-11 PROCEDURE — 99213 OFFICE O/P EST LOW 20 MIN: CPT | Performed by: PHYSICIAN ASSISTANT

## 2022-05-16 ENCOUNTER — OFFICE VISIT (OUTPATIENT)
Dept: FAMILY MEDICINE CLINIC | Facility: CLINIC | Age: 82
End: 2022-05-16
Payer: MEDICARE

## 2022-05-16 VITALS
TEMPERATURE: 97.8 F | HEART RATE: 72 BPM | WEIGHT: 203 LBS | OXYGEN SATURATION: 98 % | SYSTOLIC BLOOD PRESSURE: 122 MMHG | DIASTOLIC BLOOD PRESSURE: 64 MMHG | BODY MASS INDEX: 31.86 KG/M2 | HEIGHT: 67 IN

## 2022-05-16 DIAGNOSIS — J40 BRONCHITIS: Primary | ICD-10-CM

## 2022-05-16 PROCEDURE — 99213 OFFICE O/P EST LOW 20 MIN: CPT | Performed by: PHYSICIAN ASSISTANT

## 2022-05-16 RX ORDER — AZITHROMYCIN 250 MG/1
TABLET, FILM COATED ORAL
Qty: 6 TABLET | Refills: 0 | Status: SHIPPED | OUTPATIENT
Start: 2022-05-16 | End: 2022-05-21

## 2022-05-16 NOTE — PROGRESS NOTES
Assessment/Plan:       Problem List Items Addressed This Visit    None     Visit Diagnoses     Bronchitis    -  Primary    Relevant Medications    azithromycin (Zithromax) 250 mg tablet        10 days of sx, no improving with OTC medication, cover with azithromycin  Mucinex, flonase, antihistamine, fluids  Rapid covid negative  Lungs clear today    Subjective:      Patient ID: Natanael Castro is a [de-identified] y o  male  Pt presents with about 1 week of cough, congestion, PND, sinus pressure  He is using flonase and an OTC allergy medication  He has a hx of recurrent bronchitis  No fevers  No SOB  The following portions of the patient's history were reviewed and updated as appropriate:   He  has a past medical history of Diabetes mellitus ([facility] ), Heart disease (05/09/2020), Hyperlipidemia, Myocardial infarction ([facility] ), Penile lesion, and Prediabetes  He   Patient Active Problem List    Diagnosis Date Noted    Hordeolum externum of left upper eyelid 11/23/2021    Balanitis 11/23/2021    Trigger thumb, right thumb 10/12/2020    Primary osteoarthritis of both knees 07/10/2020    Coronary artery disease 05/15/2020    Hypertension 05/15/2020    Mixed hyperlipidemia 05/15/2020    ST elevation myocardial infarction involving left circumflex coronary artery ([facility] ) 05/09/2020    AUGUSTA (acute kidney injury) ([facility] ) 05/09/2020    Chronic cough 08/26/2019    Prediabetes 01/11/2019    Medicare annual wellness visit, subsequent 06/25/2018    Need for shingles vaccine 06/25/2018     He  has a past surgical history that includes Hernia repair; Cardiac surgery (05/2020); Laryngoscopy (1978); Colonoscopy; and LARYNGOSCOPY (N/A, 8/3/2021)  His family history includes Heart disease in his mother; No Known Problems in his father and sister  He  reports that he has quit smoking  He has a 25 00 pack-year smoking history  He has never used smokeless tobacco  He reports current alcohol use   He reports that he does not use drugs   Current Outpatient Medications   Medication Sig Dispense Refill    azithromycin (Zithromax) 250 mg tablet Take 2 tablets (500 mg total) by mouth daily for 1 day, THEN 1 tablet (250 mg total) daily for 4 days  6 tablet 0    Ascorbic Acid (VITAMIN C) 1000 MG tablet Take 2,000 mg by mouth 2 (two) times a day       aspirin (ECOTRIN LOW STRENGTH) 81 mg EC tablet Take 1 tablet (81 mg total) by mouth daily 60 tablet 0    atorvastatin (LIPITOR) 40 mg tablet Take 1 tablet (40 mg total) by mouth daily 90 tablet 3    Blood Glucose Monitoring Suppl (FREESTYLE FREEDOM LITE) w/Device KIT Check glucose levels once daily 1 each 0    clopidogrel (PLAVIX) 75 mg tablet TAKE 1 TABLET BY MOUTH EVERY DAY 90 tablet 3    Diclofenac Sodium (VOLTAREN) 1 % Apply 2 g topically 4 (four) times a day 100 g 0    famotidine (PEPCID) 20 mg tablet Take 1 tablet (20 mg total) by mouth daily (Patient not taking: Reported on 6/8/2021) 90 tablet 1    glucose blood (FREESTYLE LITE) test strip Use 1 each 3 (three) times a day 300 strip 1    Lancets (FREESTYLE) lancets TEST ONCE DAILY FASTING DXE11 9 100 each 3    metoprolol tartrate (LOPRESSOR) 25 mg tablet TAKE 1 TABLET (25 MG TOTAL) BY MOUTH EVERY 12 (TWELVE) HOURS 180 tablet 3     No current facility-administered medications for this visit       Current Outpatient Medications on File Prior to Visit   Medication Sig    Ascorbic Acid (VITAMIN C) 1000 MG tablet Take 2,000 mg by mouth 2 (two) times a day     aspirin (ECOTRIN LOW STRENGTH) 81 mg EC tablet Take 1 tablet (81 mg total) by mouth daily    atorvastatin (LIPITOR) 40 mg tablet Take 1 tablet (40 mg total) by mouth daily    Blood Glucose Monitoring Suppl (FREESTYLE FREEDOM LITE) w/Device KIT Check glucose levels once daily    clopidogrel (PLAVIX) 75 mg tablet TAKE 1 TABLET BY MOUTH EVERY DAY    Diclofenac Sodium (VOLTAREN) 1 % Apply 2 g topically 4 (four) times a day    famotidine (PEPCID) 20 mg tablet Take 1 tablet (20 mg total) by mouth daily (Patient not taking: Reported on 6/8/2021)    glucose blood (FREESTYLE LITE) test strip Use 1 each 3 (three) times a day    Lancets (FREESTYLE) lancets TEST ONCE DAILY FASTING DXE11 9    metoprolol tartrate (LOPRESSOR) 25 mg tablet TAKE 1 TABLET (25 MG TOTAL) BY MOUTH EVERY 12 (TWELVE) HOURS    [DISCONTINUED] clotrimazole-betamethasone (LOTRISONE) 1-0 05 % cream Apply topically 2 (two) times a day (Patient not taking: Reported on 3/28/2022 )    [DISCONTINUED] fluconazole (DIFLUCAN) 150 mg tablet  (Patient not taking: Reported on 3/11/2022 )    [DISCONTINUED] fluticasone (FLONASE) 50 mcg/act nasal spray 2 sprays into each nostril daily (Patient taking differently: 2 sprays into each nostril as needed  )    [DISCONTINUED] hydrOXYzine HCL (ATARAX) 10 mg tablet TAKE 1 TABLET (10 MG TOTAL) BY MOUTH DAILY AT BEDTIME AS NEEDED FOR ITCHING (Patient not taking: Reported on 3/11/2022 )    [DISCONTINUED] neomycin-polymyxin-dexamethasone (MAXITROL) ophthalmic suspension Administer 1 drop into the left eye 3 (three) times a day (Patient not taking: Reported on 1/10/2022 )    [DISCONTINUED] triamcinolone (KENALOG) 0 1 % ointment Apply topically 2 (two) times a day (Patient not taking: Reported on 3/11/2022 )     No current facility-administered medications on file prior to visit  He is allergic to celecoxib  Dee Espinal Review of Systems   Constitutional: Positive for fatigue  Negative for chills and fever  HENT: Positive for congestion, postnasal drip, rhinorrhea and sinus pressure  Negative for ear pain, hearing loss, nosebleeds, sinus pain, sneezing and sore throat  Eyes: Negative for pain, discharge, itching and visual disturbance  Respiratory: Positive for cough  Negative for chest tightness, shortness of breath and wheezing  Cardiovascular: Negative for chest pain, palpitations and leg swelling     Gastrointestinal: Negative for abdominal pain, blood in stool, constipation, diarrhea, nausea and vomiting  Genitourinary: Negative for frequency and urgency  Neurological: Negative for dizziness, light-headedness and numbness  Objective:      /64 (BP Location: Left arm, Patient Position: Sitting, Cuff Size: Large)   Pulse 72   Temp 97 8 °F (36 6 °C)   Ht 5' 7" (1 702 m)   Wt 92 1 kg (203 lb)   SpO2 98%   BMI 31 79 kg/m²          Physical Exam  Vitals and nursing note reviewed  Constitutional:       General: He is not in acute distress  Appearance: Normal appearance  HENT:      Head: Normocephalic and atraumatic  Right Ear: Tympanic membrane, ear canal and external ear normal       Left Ear: Tympanic membrane, ear canal and external ear normal       Nose: Congestion present  Mouth/Throat:      Mouth: Mucous membranes are moist       Pharynx: Oropharynx is clear  No oropharyngeal exudate or posterior oropharyngeal erythema  Eyes:      Pupils: Pupils are equal, round, and reactive to light  Cardiovascular:      Rate and Rhythm: Normal rate and regular rhythm  Heart sounds: Normal heart sounds  No murmur heard  Pulmonary:      Effort: Pulmonary effort is normal  No respiratory distress  Breath sounds: Normal breath sounds  No wheezing, rhonchi or rales  Abdominal:      General: Bowel sounds are normal       Palpations: Abdomen is soft  Musculoskeletal:         General: Normal range of motion  Cervical back: Normal range of motion and neck supple  Lymphadenopathy:      Cervical: No cervical adenopathy  Skin:     General: Skin is warm and dry  Neurological:      Mental Status: He is alert and oriented to person, place, and time     Psychiatric:         Mood and Affect: Mood and affect normal

## 2022-05-24 DIAGNOSIS — M79.641 RIGHT HAND PAIN: ICD-10-CM

## 2022-06-05 DIAGNOSIS — I21.21 ST ELEVATION MYOCARDIAL INFARCTION INVOLVING LEFT CIRCUMFLEX CORONARY ARTERY (HCC): ICD-10-CM

## 2022-06-14 ENCOUNTER — OFFICE VISIT (OUTPATIENT)
Dept: CARDIOLOGY CLINIC | Facility: CLINIC | Age: 82
End: 2022-06-14
Payer: MEDICARE

## 2022-06-14 VITALS
HEART RATE: 71 BPM | OXYGEN SATURATION: 96 % | HEIGHT: 67 IN | WEIGHT: 201 LBS | RESPIRATION RATE: 16 BRPM | BODY MASS INDEX: 31.55 KG/M2 | DIASTOLIC BLOOD PRESSURE: 68 MMHG | SYSTOLIC BLOOD PRESSURE: 108 MMHG

## 2022-06-14 DIAGNOSIS — E78.2 MIXED HYPERLIPIDEMIA: ICD-10-CM

## 2022-06-14 DIAGNOSIS — I25.10 ATHEROSCLEROSIS OF NATIVE CORONARY ARTERY OF NATIVE HEART WITHOUT ANGINA PECTORIS: Primary | ICD-10-CM

## 2022-06-14 PROCEDURE — 99214 OFFICE O/P EST MOD 30 MIN: CPT | Performed by: INTERNAL MEDICINE

## 2022-06-14 NOTE — PROGRESS NOTES
[facility] [place] [id number] [facility] [place] [address]  Cardiology Follow Up    Mauro Gonzales  1940  [id number]      1  Atherosclerosis of native coronary artery of native heart without angina pectoris     2  Mixed hyperlipidemia  Lipid Panel with Direct LDL reflex       Chief Complaint   Patient presents with    Follow-up       Interval History:   64-year-old male with coronary artery disease status post PCI of proximal circumflex with drug-eluting stent x1 for inferior STEMI in May 2022(RPDA 50% stenosis), hyperlipidemia, prediabetic presented for cardiology continuity of care    Since last clinic visit patient is doing fine  He denies chest pain, shortness of breath, palpitation, dizziness, orthopnea, leg edema or loss of conscious  He goes to gym and does treadmill walking and exercise without any chest pain or shortness of breath    Current medications reviewed  No recent labs available for review    Review of Systems:   All review of system negative except as mentioned above    Patient Active Problem List   Diagnosis    Medicare annual wellness visit, subsequent    Need for shingles vaccine    Prediabetes    Chronic cough    ST elevation myocardial infarction involving left circumflex coronary artery (HCC)    [place] (acute [facility]) ([facility] )    Coronary artery disease    Hypertension    Mixed hyperlipidemia    Primary osteoarthritis of both knees    Trigger thumb, right thumb    Hordeolum externum of left upper eyelid    Balanitis     Past Medical History:   Diagnosis Date    Diabetes mellitus ([facility] )     diet control    Heart disease 05/09/2020    Heart attack    Hyperlipidemia     Myocardial infarction ([facility] )     Penile lesion     Prediabetes      Social History     Socioeconomic History    Marital status: Single     Spouse name: Not on file    Number of children: Not on file    Years of education: Not on file    Highest education level: Not on file Occupational History    Not on file   Tobacco Use    Smoking status: Former Smoker     Packs/day: 1 00     Years: 25 00     Pack years: 25 00    Smokeless tobacco: Never Used   Vaping Use    Vaping Use: Never used   Substance and Sexual Activity    Alcohol use: Yes     Comment: Social twice a month    Drug use: Never    Sexual activity: Not on file   Other Topics Concern    Not on file   Social History Narrative    Not on file     Social Determinants of Health     Financial Resource Strain: Not on file   Food Insecurity: Not on file   Transportation Needs: Not on file   Physical Activity: Not on file   Stress: Not on file   Social Connections: Not on file   Intimate Partner Violence: Not on file   Housing Stability: Not on file      Family History   Problem Relation Age of Onset    Heart disease Mother         Cardiac Disorder    No Known Problems Father     No Known Problems Sister      Past Surgical History:   Procedure Laterality Date    CARDIAC SURGERY  05/2020    stent placement    COLONOSCOPY     [address]    with vocal cord polyp removal    LARYNGOSCOPY N/A 8/3/2021    Procedure: MICRODIRECT LARYNGOSCOPY WITH  EXCISION OF VOCAL CORD LESION;  Surgeon: Shaniqua Ware DO;  Location: [facility];  Service: ENT       Current Outpatient Medications:     Ascorbic Acid (VITAMIN C) 1000 MG tablet, Take 2,000 mg by mouth 2 (two) times a day , Disp: , Rfl:     aspirin (ECOTRIN LOW STRENGTH) 81 mg EC tablet, Take 1 tablet (81 mg total) by mouth daily, Disp: 60 tablet, Rfl: 0    atorvastatin (LIPITOR) 40 mg tablet, Take 1 tablet (40 mg total) by mouth daily, Disp: 90 tablet, Rfl: 3    Blood Glucose Monitoring Suppl (FREESTYLE FREEDOM LITE) w/Device KIT, Check glucose levels once daily, Disp: 1 each, Rfl: 0    Diclofenac Sodium (VOLTAREN) 1 %, Apply 2 g topically in the morning and 2 g at noon and 2 g in the evening and 2 g before bedtime  , Disp: 100 g, Rfl: 0   famotidine (PEPCID) 20 mg tablet, Take 1 tablet (20 mg total) by mouth daily, Disp: 90 tablet, Rfl: 1    glucose blood (FREESTYLE LITE) test strip, Use 1 each 3 (three) times a day, Disp: 300 strip, Rfl: 1    Lancets (FREESTYLE) lancets, TEST ONCE DAILY FASTING DXE11 9, Disp: 100 each, Rfl: 3    metoprolol tartrate (LOPRESSOR) 25 mg tablet, TAKE 1 TABLET (25 MG TOTAL) BY MOUTH EVERY 12 (TWELVE) HOURS, Disp: 180 tablet, Rfl: 3  Allergies   Allergen Reactions    Celecoxib      Other reaction(s): itchy  Other reaction(s): itchy       Labs:  No visits with results within 6 Month(s) from this visit  Latest known visit with results is:   Admission on 08/03/2021, Discharged on 08/03/2021   Component Date Value    Case Report 08/03/2021                      Value:Surgical Pathology Report                         Case: [id number]                                   Authorizing Provider:  Howard Saha DO          Collected:           08/03/2021 1112              Ordering Location:     [facility]        Received:            08/03/2021 [address] [place] [address] Operating Room                                                     Pathologist:           Todd Grider MD                                                        Specimen:    Vocal cord, Right Vocal Cord Lesion                                                        Final Diagnosis 08/03/2021                      Value: This result contains rich text formatting which cannot be displayed here   Note 08/03/2021                      Value: This result contains rich text formatting which cannot be displayed here   Additional Information 08/03/2021                      Value: This result contains rich text formatting which cannot be displayed here  Marek Sanchez Gross Description 08/03/2021                      Value: This result contains rich text formatting which cannot be displayed here  Imaging: No results found      Physical Exam:  General:  moderate built, awake, alert and oriented x3, not in distress  Neck: supple, no JVD  Eyes: PERRL, conjunctiva normal  Lungs:  Bilateral air entry positive, no wheeze/rhonchi or crackle  Heart:  S1-S2 normal, no murmur  Abdomen:  Soft ,nondistended ,nontender, bowel sounds positive  Extremities:  No leg edema, no deformity, ROM normal, varicose vein of lower extremities  Neuro:  Moving all extremities, speech clear  Skin: warm, no rash    /68 (BP Location: Left arm, Patient Position: Sitting, Cuff Size: Standard)   Pulse 71   Resp 16   Ht 5' 7" (1 702 m)   Wt 91 2 kg (201 lb)   SpO2 96%   BMI 31 48 kg/m²     Cardiographics :  May 2020:   Echo showed low normal LVEF 45-50% with mild hypokinesis of mid anterolateral wall, grade 1 diastolic dysfunction     Cardiac catheterization on 05/09/2020:  Proximal circumflex 99% thrombotic occlusion with SANCHEZ 3 flow status post successful PCI with drug-eluting stent Xience Perlita(2 5 x 23 post dilated with 2 75 noncompliant balloon), RPDA medium caliber vessel with 50% stenosis in mid         Assessment:    1  Coronary artery disease  Status post PCI of proximal circumflex with drug-eluting stent x1 for inferior STEMI in May 2022  At present time denies chest pain or shortness of breath    2  Hyperlipidemia  3  Prediabetic  4  Osteoarthritis of the knee  5  Vocal cord nodule status post biopsy    Recommendations:    Patient is doing well from cardiology standpoint at present time  Patient advised to stop Plavix and continue aspirin 81 mg for life long  Continue Lipitor 40 mg, metoprolol tartrate 25 mg twice a day    Patient did not get a chance to do lipid panel which was ordered during last clinic visit  Repeat lipid panel ordered  Advised to take low-salt, low-fat/low-cholesterol prediabetic diet    Return to clinic in 6 months or early as needed  Above all discussed with patient    Patient understands and agrees

## 2022-06-15 ENCOUNTER — APPOINTMENT (OUTPATIENT)
Dept: LAB | Facility: CLINIC | Age: 82
End: 2022-06-15
Payer: MEDICARE

## 2022-06-15 DIAGNOSIS — E78.2 MIXED HYPERLIPIDEMIA: ICD-10-CM

## 2022-06-15 LAB
CHOLEST SERPL-MCNC: 104 MG/DL
HDLC SERPL-MCNC: 41 MG/DL
LDLC SERPL CALC-MCNC: 53 MG/DL (ref 0–100)
TRIGL SERPL-MCNC: 50 MG/DL

## 2022-06-15 PROCEDURE — 36415 COLL VENOUS BLD VENIPUNCTURE: CPT

## 2022-06-15 PROCEDURE — 80061 LIPID PANEL: CPT

## 2022-06-16 DIAGNOSIS — M79.641 RIGHT HAND PAIN: ICD-10-CM

## 2022-06-27 ENCOUNTER — OFFICE VISIT (OUTPATIENT)
Dept: FAMILY MEDICINE CLINIC | Facility: CLINIC | Age: 82
End: 2022-06-27
Payer: MEDICARE

## 2022-06-27 ENCOUNTER — APPOINTMENT (OUTPATIENT)
Dept: RADIOLOGY | Facility: CLINIC | Age: 82
End: 2022-06-27
Payer: MEDICARE

## 2022-06-27 VITALS
WEIGHT: 205.4 LBS | HEART RATE: 77 BPM | TEMPERATURE: 98.9 F | BODY MASS INDEX: 32.17 KG/M2 | OXYGEN SATURATION: 96 % | SYSTOLIC BLOOD PRESSURE: 122 MMHG | DIASTOLIC BLOOD PRESSURE: 82 MMHG

## 2022-06-27 DIAGNOSIS — S89.91XA SHIN INJURY, RIGHT, INITIAL ENCOUNTER: Primary | ICD-10-CM

## 2022-06-27 DIAGNOSIS — S89.91XA SHIN INJURY, RIGHT, INITIAL ENCOUNTER: ICD-10-CM

## 2022-06-27 PROCEDURE — 73590 X-RAY EXAM OF LOWER LEG: CPT

## 2022-06-27 PROCEDURE — 99214 OFFICE O/P EST MOD 30 MIN: CPT | Performed by: PHYSICIAN ASSISTANT

## 2022-06-27 NOTE — PROGRESS NOTES
Assessment/Plan:       Problem List Items Addressed This Visit    None     Visit Diagnoses     Shin injury, right, initial encounter    -  Primary    Relevant Orders    XR tibia fibula 2 vw right        bruising of R shin, TTP  Recommend ice, compression  XR to r/o fracture  Follow up prn     Subjective:      Patient ID: Netta Montelongo is a [de-identified] y o  male  Pt presents with concerns of banging his R shin about 1 week ago  Notes tenderness and pain with walking  No leg swelling  There is bruising  No CP/SOB  No skin tears  The following portions of the patient's history were reviewed and updated as appropriate:   He  has a past medical history of Diabetes mellitus ([facility] ), Heart disease (05/09/2020), Hyperlipidemia, Myocardial infarction ([facility] ), Penile lesion, and Prediabetes  He   Patient Active Problem List    Diagnosis Date Noted    Hordeolum externum of left upper eyelid 11/23/2021    Balanitis 11/23/2021    Trigger thumb, right thumb 10/12/2020    Primary osteoarthritis of both knees 07/10/2020    Coronary artery disease 05/15/2020    Hypertension 05/15/2020    Mixed hyperlipidemia 05/15/2020    ST elevation myocardial infarction involving left circumflex coronary artery ([facility] ) 05/09/2020    AUGUSTA (acute kidney injury) ([facility] ) 05/09/2020    Chronic cough 08/26/2019    Prediabetes 01/11/2019    Medicare annual wellness visit, subsequent 06/25/2018    Need for shingles vaccine 06/25/2018     He  has a past surgical history that includes Hernia repair; Cardiac surgery (05/2020); Laryngoscopy (1978); Colonoscopy; and LARYNGOSCOPY (N/A, 8/3/2021)  His family history includes Heart disease in his mother; No Known Problems in his father and sister  He  reports that he has quit smoking  He has a 25 00 pack-year smoking history  He has never used smokeless tobacco  He reports current alcohol use  He reports that he does not use drugs    Current Outpatient Medications   Medication Sig Dispense Refill    Ascorbic Acid (VITAMIN C) 1000 MG tablet Take 2,000 mg by mouth 2 (two) times a day       aspirin (ECOTRIN LOW STRENGTH) 81 mg EC tablet Take 1 tablet (81 mg total) by mouth daily 60 tablet 0    atorvastatin (LIPITOR) 40 mg tablet Take 1 tablet (40 mg total) by mouth daily 90 tablet 3    Blood Glucose Monitoring Suppl (FREESTYLE FREEDOM LITE) w/Device KIT Check glucose levels once daily 1 each 0    Diclofenac Sodium (VOLTAREN) 1 % APPLY 2 G TOPICALLY IN THE MORNING AND 2 G AT NOON AND 2 G IN THE EVENING AND 2 G BEFORE BEDTIME  100 g 0    glucose blood (FREESTYLE LITE) test strip Use 1 each 3 (three) times a day 300 strip 1    Lancets (FREESTYLE) lancets TEST ONCE DAILY FASTING DXE11 9 100 each 3    metoprolol tartrate (LOPRESSOR) 25 mg tablet TAKE 1 TABLET (25 MG TOTAL) BY MOUTH EVERY 12 (TWELVE) HOURS 180 tablet 3    famotidine (PEPCID) 20 mg tablet Take 1 tablet (20 mg total) by mouth daily 90 tablet 1     No current facility-administered medications for this visit  Current Outpatient Medications on File Prior to Visit   Medication Sig    Ascorbic Acid (VITAMIN C) 1000 MG tablet Take 2,000 mg by mouth 2 (two) times a day     aspirin (ECOTRIN LOW STRENGTH) 81 mg EC tablet Take 1 tablet (81 mg total) by mouth daily    atorvastatin (LIPITOR) 40 mg tablet Take 1 tablet (40 mg total) by mouth daily    Blood Glucose Monitoring Suppl (FREESTYLE FREEDOM LITE) w/Device KIT Check glucose levels once daily    Diclofenac Sodium (VOLTAREN) 1 % APPLY 2 G TOPICALLY IN THE MORNING AND 2 G AT NOON AND 2 G IN THE EVENING AND 2 G BEFORE BEDTIME      glucose blood (FREESTYLE LITE) test strip Use 1 each 3 (three) times a day    Lancets (FREESTYLE) lancets TEST ONCE DAILY FASTING DXE11 9    metoprolol tartrate (LOPRESSOR) 25 mg tablet TAKE 1 TABLET (25 MG TOTAL) BY MOUTH EVERY 12 (TWELVE) HOURS    famotidine (PEPCID) 20 mg tablet Take 1 tablet (20 mg total) by mouth daily     No current facility-administered medications on file prior to visit  He is allergic to celecoxib  Esperanza Samayoa Review of Systems   Constitutional: Negative  Musculoskeletal: Positive for arthralgias  Skin: Positive for color change  Neurological: Negative for weakness and numbness  Objective:      /82 (BP Location: Left arm, Patient Position: Sitting)   Pulse 77   Temp 98 9 °F (37 2 °C)   Wt 93 2 kg (205 lb 6 4 oz)   SpO2 96%   BMI 32 17 kg/m²          Physical Exam  Vitals and nursing note reviewed  Constitutional:       General: He is not in acute distress  Appearance: Normal appearance  He is not ill-appearing  Pulmonary:      Effort: Pulmonary effort is normal  No respiratory distress  Musculoskeletal:         General: Tenderness (R anterior shin, no calf TTP or leg swelling  there is overlying bruising) present  Skin:     General: Skin is warm and dry  Findings: Bruising present  Neurological:      Mental Status: He is alert and oriented to person, place, and time

## 2022-07-01 DIAGNOSIS — I25.10 ATHEROSCLEROSIS OF NATIVE CORONARY ARTERY OF NATIVE HEART WITHOUT ANGINA PECTORIS: ICD-10-CM

## 2022-07-01 RX ORDER — ATORVASTATIN CALCIUM 40 MG/1
TABLET, FILM COATED ORAL
Qty: 90 TABLET | Refills: 3 | Status: SHIPPED | OUTPATIENT
Start: 2022-07-01

## 2022-07-19 ENCOUNTER — TELEPHONE (OUTPATIENT)
Dept: FAMILY MEDICINE CLINIC | Facility: CLINIC | Age: 82
End: 2022-07-19

## 2022-07-19 DIAGNOSIS — U07.1 COVID-19: Primary | ICD-10-CM

## 2022-07-19 LAB
SARS-COV-2 AG UPPER RESP QL IA: POSITIVE
VALID CONTROL: ABNORMAL

## 2022-07-19 PROCEDURE — 87811 SARS-COV-2 COVID19 W/OPTIC: CPT | Performed by: FAMILY MEDICINE

## 2022-07-19 NOTE — TELEPHONE ENCOUNTER
Pt + covid  No virtual appts available  Pt just had questions regarding OTC medications to take  So far mild "sinus like" symptoms

## 2022-07-19 NOTE — TELEPHONE ENCOUNTER
Pt has fever, sore throat, cough and feels lathargic  Pt req covid swab  Would like medication called into pharmacy  He requests the Paxlovid if possible  No appts available and Urgent care wont do anything

## 2022-07-19 NOTE — TELEPHONE ENCOUNTER
Can take saline nasal spray or Flonase/Nasonex for congestion, Coricidin brand cough/cold medication which shouldn't cause any issue with blood pressure

## 2022-08-09 ENCOUNTER — TELEPHONE (OUTPATIENT)
Dept: FAMILY MEDICINE CLINIC | Facility: CLINIC | Age: 82
End: 2022-08-09

## 2022-08-09 DIAGNOSIS — R05.3 CHRONIC COUGH: Primary | ICD-10-CM

## 2022-08-09 NOTE — TELEPHONE ENCOUNTER
Leah Sawant calls on pt's behalf requesting an ENT referral  States pt has a dry cough that cough medication is not helping, everyday for now longer periods of time  She was unsure if he could be seen by Dr Pat Mcclure again for this? Please advise and call Leah Sawant @ [phone]  Thanks!

## 2022-08-09 NOTE — TELEPHONE ENCOUNTER
Patient stated that he was very unsatisfied with Dr Robert Govea and would like to see someone else

## 2022-08-09 NOTE — TELEPHONE ENCOUNTER
He can see Dr Selam Sim again  Last seen March 2022  Likely does not need a referral - can just call them   Please give info

## 2022-08-31 ENCOUNTER — EVALUATION (OUTPATIENT)
Dept: SPEECH THERAPY | Facility: CLINIC | Age: 82
End: 2022-08-31
Payer: MEDICARE

## 2022-08-31 DIAGNOSIS — R49.9 UNSPECIFIED VOICE AND RESONANCE DISORDER: ICD-10-CM

## 2022-08-31 DIAGNOSIS — R49.0 MUSCLE TENSION DYSPHONIA: Primary | ICD-10-CM

## 2022-08-31 DIAGNOSIS — R05.3 CHRONIC COUGH: ICD-10-CM

## 2022-08-31 PROCEDURE — 92507 TX SP LANG VOICE COMM INDIV: CPT

## 2022-08-31 PROCEDURE — 92524 BEHAVRAL QUALIT ANALYS VOICE: CPT

## 2022-08-31 NOTE — PROGRESS NOTES
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  Patient's c/c are vocal hoarseness and running out of breath when speaking  Patient with extensive hx of vocal hoarseness  Patient also had vocal polyps removed in the 70s and ~1 year ago  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, with no recurrence  Patient started Protonix and famotidine and reports that his cough has decreased by 80%  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, and has a loud vocal quality overall  Patient also reports yelling at full volume during arguments, which he suspects could be related  Patient went through radiation for ring worm as a child and was told there was correlation with laryngeal cancer, patient reporting this has been refuted by various doctors  8/15/22 per Dr Mabel Willis, ENT  "Procedure: flexible nasopharyngeal endoscopic examination revealed R septal spur, no polyps, no vocal nodules, muscle tension w/ phonation"    Patient's goal(s): "I would like to [facility] [employer] "    Reason for referral: Change in vocal quality  Prior functional status: Communication effective and appropriate in all situations  Clinically complex situations: N/A    Hearing: "Age - related, occasionally I'll put in a hearing amplifier" Patient refusing to pursue hearing aids at this time    Vision: WFL, Wears glasses    Home environment/lifestyle: Lives with significant other  Highest level of education: College  Vocational status: , not currently working    Mental status: Alert  Behavior status: Cooperative  Communication modalities: Verbal  Rehabilitation prognosis: Good rehab potential to reach and maintain prior level of function    Assessments    VOICE EVALUATION:  -Chief complaint: Vocal hoarseness, SOB when speaking    -Onset: Gradual (beginning: Patient doesn't know, "it's been awhile")     -Voice history: Allergies, Sinusitis, Post nasal drip, Hearing loss, Hiatal hernia, Reflux and Cough     -Occupational/vocal demands: High vocal demands, patient is a talker    -Water intake: 32 oz water    -Caffeine intake: 3 cups AM, 1/2 decaf 1/2 caff    -Alcohol intake: None    -Smoking?: Quit 42 years ago    -Throat clearing/coughing?: Yes chronic     -Previous voice tx?: No    -Other History Hx of polyps      1  Voice Handicap Index (VHI): The VHI is a list of 30 statements that many people have used to describe their voices and the effects of their voices on their lives  Patient indicated how frequently he has the same experience using a rating point scale (never = 0, almost never = 1, sometimes = 2, almost always = 3, and always = 4)  Patient's results were as follows:    Subscale: Score: Self-Perceived Impairment Level:   Physical 19/40 Moderate   Functional 7/40 Mild   Emotional 2/40 Mild        TOTAL 28/120 Mild       PERCEPTUAL VOICE ASSESSMENT:    2  Consensus Auditory Perceptual Evaluation of Voice (CAPE-V): The CAPE-V rates auditory-perceptual qualities of a patients voice  The overall severity, roughness, breathiness, strain, pitch and loudness are rated during several tasks including general conversation, vowel prolongation, and reading of sentences  Patient also read the [place] to assess the coordination of respiration and voice production  The ratings of the abovementioned parameters were plotted on a 100-millimeter scale, with 0 corresponding to typical normal voice and 100 indicating a deviant voice      Parameter: Rating: Severity & Perceptual Observations:   *Overall Severity Roughness 75/100 Moderate   Roughness 85/100 Severe   Breathiness 0/100 N/A   Strain 45/100 Moderate   Pitch 0/100 N/A   Loudness 50/100 Moderate       RESPIRATORY EFFICIENCY:   3  S:Z Ratio Task: Patient was instructed to sustain the sounds /s/ and /z/ across three trials to examine the coordination and efficiency of respiration and voice production  Normative data suggests that adults can prolong these sounds for 20-25 seconds  Ratios of 1 4 and above are consistent with laryngeal inefficiency, and ratios of 2 0 and above are suggestive of vocal fold pathology  Task: Trial 1 (sec): Trial 2 (sec): Trial 3 (sec):   /s/ 4 91 5 23 5 85   /z/ 6 00 4 21 5 09     Best /s/: 5 85  Best /z/: 6 00      Ratio: 1:1      4  Maximum Phonation Time: Patient was instructed to sustain the sound /ah/ across three trials to measure respiratory and laryngeal coordination and efficiency  Adults are typically able to prolong these vowels sound for 15-20 seconds  Reduced MPT may suggest poor respiratory support, or poor medial glottal closure  Trial 1 (sec): Trial 2 (sec): Trial 3 (sec):   3 10 3 15 3 20     Average Duration: 3 15 seconds      MEASURES OF PITCH:  The SeeMore Interactive-Pitch IV, a computer-driven voice analysis program, was used to collect objective voice data using the Visi-Pitch Multidimensional Voice Program (MDVP) & Real Time Pitch Program (MTPP)  5  Multi-Dimensional Voice Profile (MDVP): This is obtained on the phonation of the sound /a/, in which dimensions of the voice, including frequncy perturbations, amplitude perturbations, variations in F0, noise to harmonic ratio, voice turbulence Index, soft phonation Index, tremours in frequency and amplitude, degree of subharmonics, and degree of voicing apart from the frequency and amplitude, are reflected        Normative Data:   Mean Fundamental Frequency (F0) 182 07 Hz 145 22 Hz   Jitter (RAP) 0 284% 0 345%   Shimmer 7 666% 2 523%   Grafv-me-Pzsyfrosn Ratio (NHR) 0 075 0 122       6  Habitual Pitch: Patient was instructed to engage in two different speaking tasks (counting and reading) to calculate the pitch he uses most frequently  Task: Mean F0 (Hz) Min-Max Range (Hz) Normative Data:   Speaking (counting 1-20) 158  128 Hz (100 Hz -150 Hz)   Reading ("[place]) 167  128 Hz (100 Hz -150 Hz)       7  Maximal Phonational Frequency Range: Patient was instructed to voice from lowest to highest notes  Task Min-Max Range (Hz) Normative Data:   Gliding  Hz 77 Hz-576 Hz         Patient was educated on various vocal abuse behaviors and vocal hygiene throughout assessment  Vocal abuses included decreased water and increased caffeine intake, coughing and throat-clearing, thoracic/clavicular breathing, straining voice, whispering  Vocal hygiene strategies included increased water intake, decreased caffeine intake, throat-clearing awareness, increased breath support/diaphragmatic breathing, compensatory strategies to minimize vocal abuses during work day, confidential voice  Patient was agreeable  Goals    Short Term Goals    1  Patient will be educated on vocal hygiene and demonstrate understanding of recommendations (increased water intake, diet modifications) to facilitate increased quality of voice, to be achieved in 4-6 weeks  2  Patient will increase MPT to >10-15 seconds, to be achieved in 4-6 weeks  3  Pt will utilize diaphragmatic breathing during oral sentence/paragraph reading in 80% of opportunities to facilitate increased breath support for voice/speaking, to be achieved in 4-6 weeks  4  Patient will be educated on the use and implementation of Resonant Voice, to be achieved in 4-6 weeks  5  Patient will complete vocal function exercises to increase vocal fold efficiency and endurance, to be achieved in 4-6 weeks  6  Patient will complete conversational training therapy exercises with improvement of vocal quality  Long Term  1  Patient will improve vocal quality for increased functional communication by discharge  2  Patient will show overall improvement on acoustic and perceptual measures  Impressions/Recommendations    Impressions:   -Patient presents with moderate dysphonia characterized by hoarseness and SOB when speaking, most likely as a result of MTD/reflux  Patient describes the onset as gradual, and his symptoms of hoarseness as chronic  Patient also reporting a chronic cough/throat clear that has improved significantly (~80%) with reflux medication  Patient engages in a variety of vocally abusive behaviors including loud volume, excessive talking, yelling, and decreased water intake  Patient is a former  and although he is not currently working, still "talks all day"   Per ENT and results of evaluation, recommend a course of voice therapy to focus on both direct and indirect treatment     Recommendations:  -Patient would benefit from outpatient skilled Speech Therapy services: Voice therapy    -Frequency: 1-2x weekly  -Duration: 4-6 weeks    -Intervention certification from: 1/35/2320  -Intervention certification to: 39/5/6394    -Intervention comments:   Education, tracking, VFEs, CTT

## 2022-09-08 ENCOUNTER — OFFICE VISIT (OUTPATIENT)
Dept: SPEECH THERAPY | Facility: CLINIC | Age: 82
End: 2022-09-08
Payer: MEDICARE

## 2022-09-08 DIAGNOSIS — R49.9 UNSPECIFIED VOICE AND RESONANCE DISORDER: ICD-10-CM

## 2022-09-08 DIAGNOSIS — R49.0 MUSCLE TENSION DYSPHONIA: Primary | ICD-10-CM

## 2022-09-08 DIAGNOSIS — R05.3 CHRONIC COUGH: ICD-10-CM

## 2022-09-08 PROCEDURE — 92507 TX SP LANG VOICE COMM INDIV: CPT

## 2022-09-08 NOTE — PROGRESS NOTES
Daily Speech Treatment Note    Today's date: 2022  Patients name: Minna King  : 1940  MRN: [id number]  Safety measures: N/A  Referring provider: Marek Garcia MD    Encounter Diagnosis     ICD-10-CM    1  Muscle tension dysphonia  R49 0    2  Unspecified voice and resonance disorder  R49 9    3  Chronic cough  R05 3          Visit Trackin/10  Re-eval 10/1/22    Subjective/Behavioral: Patient arrived on time to session today  Patient reporting his voice has been "good" and cough has been minimal  Patient rating his voice a 5 (1 being the worst and 10 being the best)  Objective:  Short Term Goals  1  Patient will be educated on vocal hygiene and demonstrate understanding of recommendations (increased water intake, diet modifications) to facilitate increased quality of voice, to be achieved in 4-6 weeks   Patient reporting that he is drinking 64oz per day  Patient more aware of loud voice  2  Patient will increase MPT to >10-15 seconds, to be achieved in 4-6 weeks  3  Pt will utilize diaphragmatic breathing during oral sentence/paragraph reading in 80% of opportunities to facilitate increased breath support for voice/speaking, to be achieved in 4-6 weeks   Patient completed ratio breathing 4:8 seconds and counting 1-20 with breaths every 4 numbers successfully  Needed reminders to breathe in through the nose  4  Patient will be educated on the use and implementation of Resonant Voice, to be achieved in 4-6 weeks   Started at phoneme level and moved up to words  Patient needed cues to start with light contact and breathe in through the nose  Patient noting feeling sensation of vibration on lips  5  Patient will complete vocal function exercises to increase vocal fold efficiency and endurance, to be achieved in 4-6 weeks  6  Patient will complete conversational training therapy exercises with improvement of vocal quality       7  NEW Patient will complete reflux severity index  9/8 Patient scored a 17 on the reflux severity index, consistent with a significant reflux disorder  Long Term  1  Patient will improve vocal quality for increased functional communication by discharge  2  Patient will show overall improvement on acoustic and perceptual measures  Assessment: Patient increasing awareness overall  Plan:  -Continue with current plan of care

## 2022-09-12 ENCOUNTER — OFFICE VISIT (OUTPATIENT)
Dept: SPEECH THERAPY | Facility: CLINIC | Age: 82
End: 2022-09-12
Payer: MEDICARE

## 2022-09-12 DIAGNOSIS — R05.3 CHRONIC COUGH: ICD-10-CM

## 2022-09-12 DIAGNOSIS — R49.0 MUSCLE TENSION DYSPHONIA: Primary | ICD-10-CM

## 2022-09-12 DIAGNOSIS — R49.9 UNSPECIFIED VOICE AND RESONANCE DISORDER: ICD-10-CM

## 2022-09-12 PROCEDURE — 92507 TX SP LANG VOICE COMM INDIV: CPT

## 2022-09-12 NOTE — PROGRESS NOTES
Daily Speech Treatment Note    Today's date: 2022  Patients name: Mary Beth Kitchen  : 1940  MRN: [id number]  Safety measures: N/A  Referring provider: Gee Phelps MD    Encounter Diagnosis     ICD-10-CM    1  Muscle tension dysphonia  R49 0    2  Unspecified voice and resonance disorder  R49 9    3  Chronic cough  R05 3        Visit Tracking:  3/10  Re-eval 10/1/22    Subjective/Behavioral: Patient arrived on time to session today  Patient reporting coughing more than usual at night lately  Patient rating his voice a 7 (1 being the worst and 10 being the best)  Objective:  Short Term Goals  1  Patient will be educated on vocal hygiene and demonstrate understanding of recommendations (increased water intake, diet modifications) to facilitate increased quality of voice, to be achieved in 4-6 weeks   Patient reporting that he is drinking 64oz per day  Patient more aware of loud voice  2  Patient will increase MPT to >10-15 seconds, to be achieved in 4-6 weeks  3  Pt will utilize diaphragmatic breathing during oral sentence/paragraph reading in 80% of opportunities to facilitate increased breath support for voice/speaking, to be achieved in 4-6 weeks   Patient completed ratio breathing 4:8 seconds and counting 1-20 with breaths every 4 numbers successfully  Needed reminders to breathe in through the nose   Patient completing 6:12 seconds with diaphragmatic, counting 1-20 with breaths  Patient completed /h/ words to reduce glottal attack  Patient becoming more aware of when glottal attack is occurring  4  Patient will be educated on the use and implementation of Resonant Voice, to be achieved in 4-6 weeks   Started at phoneme level and moved up to words  Patient needed cues to start with light contact and breathe in through the nose  Patient noting feeling sensation of vibration on lips    Practiced resonant voice, phoneme level to word level (reminders to use light contact and breathe through) with /m/ and /n/  Completed /m/ sentences as well  5  Patient will complete vocal function exercises to increase vocal fold efficiency and endurance, to be achieved in 4-6 weeks  6  Patient will complete conversational training therapy exercises with improvement of vocal quality  7  NEW Patient will complete reflux severity index  9/8 Patient scored a 17 on the reflux severity index, consistent with a significant reflux disorder  Goal met  Long Term  1  Patient will improve vocal quality for increased functional communication by discharge  2  Patient will show overall improvement on acoustic and perceptual measures  Assessment: Patient continues increasing awareness overall  Patient reporting completing exercises respiration exercises more than minimum recommended  Plan:  -Continue with current plan of care

## 2022-09-19 ENCOUNTER — OFFICE VISIT (OUTPATIENT)
Dept: SPEECH THERAPY | Facility: CLINIC | Age: 82
End: 2022-09-19
Payer: MEDICARE

## 2022-09-19 DIAGNOSIS — R05.3 CHRONIC COUGH: ICD-10-CM

## 2022-09-19 DIAGNOSIS — R49.0 MUSCLE TENSION DYSPHONIA: Primary | ICD-10-CM

## 2022-09-19 DIAGNOSIS — R49.9 UNSPECIFIED VOICE AND RESONANCE DISORDER: ICD-10-CM

## 2022-09-19 PROCEDURE — 92507 TX SP LANG VOICE COMM INDIV: CPT

## 2022-09-19 NOTE — PROGRESS NOTES
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    3  Chronic cough  R05 3          Visit Trackin/10  Re-eval 10/1/22     Subjective/Behavioral: Patient arrived on time to session today  Patient reporting coughing more than usual at night lately  Patient rating his voice a 7 (1 being the worst and 10 being the best)  Objective:  Short Term Goals  1  Patient will be educated on vocal hygiene and demonstrate understanding of recommendations (increased water intake, diet modifications) to facilitate increased quality of voice, to be achieved in 4-6 weeks   Patient reporting that he is drinking 64oz per day  Patient more aware of loud voice   Patient clearing throat throughout session  2  Patient will increase MPT to >10-15 seconds, to be achieved in 4-6 weeks   Trialed adduction exercises, patient unsuccessful  Educated about EMST today  Plan to trial spirometer next session  3  Pt will utilize diaphragmatic breathing during oral sentence/paragraph reading in 80% of opportunities to facilitate increased breath support for voice/speaking, to be achieved in 4-6 weeks   Patient completed ratio breathing 4:8 seconds and counting 1-20 with breaths every 4 numbers successfully  Needed reminders to breathe in through the nose   Patient completing 6:12 seconds with diaphragmatic, counting 1-20 with breaths  Patient completed /h/ words to reduce glottal attack  Patient becoming more aware of when glottal attack is occurring   Practiced /h/ words, needed reminders to reduce glottal attack and be light  Patient completing 4:8, feeling like he is running out of air at 8   Patient with difficulty maintaining breath support throughout sentences, needing reminders to breathe through the nose in an appropriate spot  4  Patient will be educated on the use and implementation of Resonant Voice, to be achieved in 4-6 weeks  9/8 Started at phoneme level and moved up to words  Patient needed cues to start with light contact and breathe in through the nose  Patient noting feeling sensation of vibration on lips  9/12 Practiced resonant voice, phoneme level to word level (reminders to use light contact and breathe through) with /m/ and /n/  Completed /m/ sentences as well  9/19 Practicing /m/ and /n/ up to sentence level  Patient kuldeep remembering to use light contact and breath through nose  5  Patient will complete vocal function exercises to increase vocal fold efficiency and endurance, to be achieved in 4-6 weeks  6  Patient will complete conversational training therapy exercises with improvement of vocal quality  7  NEW Patient will complete reflux severity index  9/8 Patient scored a 17 on the reflux severity index, consistent with a significant reflux disorder  Goal met  Long Term  1  Patient will improve vocal quality for increased functional communication by discharge  2  Patient will show overall improvement on acoustic and perceptual measures  Assessment: Patient continues increasing awareness overall  Patient reporting completing exercises respiration exercises more than minimum recommended  Plan:  -Continue with current plan of care

## 2022-09-26 ENCOUNTER — OFFICE VISIT (OUTPATIENT)
Dept: SPEECH THERAPY | Facility: CLINIC | Age: 82
End: 2022-09-26
Payer: MEDICARE

## 2022-09-26 DIAGNOSIS — R05.3 CHRONIC COUGH: ICD-10-CM

## 2022-09-26 DIAGNOSIS — R49.9 UNSPECIFIED VOICE AND RESONANCE DISORDER: ICD-10-CM

## 2022-09-26 DIAGNOSIS — R49.0 MUSCLE TENSION DYSPHONIA: Primary | ICD-10-CM

## 2022-09-26 PROCEDURE — 92507 TX SP LANG VOICE COMM INDIV: CPT

## 2022-09-26 NOTE — PROGRESS NOTES
Daily Speech Treatment Note    Today's date: 2022  Patients name: Dionne Jorge  : 1940  MRN: [id number]  Safety measures: N/A  Referring provider: Gaylon Simmonds, MD    Encounter Diagnosis     ICD-10-CM    1  Muscle tension dysphonia  R49 0    2  Unspecified voice and resonance disorder  R49 9    3  Chronic cough  R05 3        Visit Trackin/10  Re-eval 10/1/22     Subjective/Behavioral: Patient arrived on time to session today  Patient reporting coughing less than last week  Patient reporting gum is helping  Patient rating his voice between a 6-7 (1 being the worst and 10 being the best)  Patient reporting mucous, taking Flonase for this 3x a week  Patient looking for a new hearing amplifier  Objective:  Short Term Goals  1  Patient will be educated on vocal hygiene and demonstrate understanding of recommendations (increased water intake, diet modifications) to facilitate increased quality of voice, to be achieved in 4-6 weeks   Patient reporting that he is drinking 64oz per day  Patient more aware of loud voice   Patient clearing throat throughout session   Patient reporting drinking 4 bottles of water a day  Educated patient on silent cough, trialed in session today  2  Patient will increase MPT to >10-15 seconds, to be achieved in 4- weeks   Trialed adduction exercises, patient unsuccessful  Educated about EMST today  Plan to trial spirometer next session  3  Pt will utilize diaphragmatic breathing during oral sentence/paragraph reading in 80% of opportunities to facilitate increased breath support for voice/speaking, to be achieved in 4-6 weeks   Patient completed ratio breathing 4:8 seconds and counting 1-20 with breaths every 4 numbers successfully  Needed reminders to breathe in through the nose   Patient completing 6:12 seconds with diaphragmatic, counting 1-20 with breaths  Patient completed /h/ words to reduce glottal attack   Patient becoming more aware of when glottal attack is occurring  9/19 Practiced /h/ words, needed reminders to reduce glottal attack and be light  Patient completing 4:8, feeling like he is running out of air at 8  Patient with difficulty maintaining breath support throughout sentences, needing reminders to breathe through the nose in an appropriate spot  9/26 Patient getting to 12 seconds on exhale at times  /h/ at sentence level successfully, paragraph level needed reminders to breathe through the nose  4  Patient will be educated on the use and implementation of Resonant Voice, to be achieved in 4-6 weeks  9/8 Started at phoneme level and moved up to words  Patient needed cues to start with light contact and breathe in through the nose  Patient noting feeling sensation of vibration on lips  9/12 Practiced resonant voice, phoneme level to word level (reminders to use light contact and breathe through) with /m/ and /n/  Completed /m/ sentences as well  9/19 Practicing /m/ and /n/ up to sentence level  Patient kuldeep remembering to use light contact and breath through nose  9/26 /m/ n/ /v/ /r/ /g/ at word level, patient doing well with reducing glottal attack  5  Patient will complete vocal function exercises to increase vocal fold efficiency and endurance, to be achieved in 4-6 weeks  6  Patient will complete conversational training therapy exercises with improvement of vocal quality  7  NEW Patient will complete reflux severity index  9/8 Patient scored a 17 on the reflux severity index, consistent with a significant reflux disorder  Goal met  Long Term  1  Patient will improve vocal quality for increased functional communication by discharge  2  Patient will show overall improvement on acoustic and perceptual measures  Assessment: Patient continues increasing awareness overall  Educated on relationship between MTD/decreased respiratory support  Plan:  -Continue with current plan of care

## 2022-10-03 ENCOUNTER — EVALUATION (OUTPATIENT)
Dept: SPEECH THERAPY | Facility: CLINIC | Age: 82
End: 2022-10-03
Payer: MEDICARE

## 2022-10-03 DIAGNOSIS — R05.3 CHRONIC COUGH: ICD-10-CM

## 2022-10-03 DIAGNOSIS — K21.9 LARYNGOPHARYNGEAL REFLUX (LPR): ICD-10-CM

## 2022-10-03 DIAGNOSIS — R49.9 UNSPECIFIED VOICE AND RESONANCE DISORDER: ICD-10-CM

## 2022-10-03 DIAGNOSIS — R49.0 MUSCLE TENSION DYSPHONIA: Primary | ICD-10-CM

## 2022-10-03 PROCEDURE — 92524 BEHAVRAL QUALIT ANALYS VOICE: CPT

## 2022-10-03 NOTE — LETTER
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    3  Chronic cough  R05 3    4  Laryngopharyngeal reflux (LPR)  K21 9        Dear Dr Mac Rolling: Thank you for your recent referral of Minna King  Please review the attached evaluation summary from Jay's recent visit  Please verify that you agree with the plan of care by signing the attached order  If you have any questions or concerns, please do not hesitate to call  I sincerely appreciate the opportunity to share in the care of one of your patients and hope to have another opportunity to work with you in the near future  Sincerely,    Gianni Abarca, SLP      Referring Provider:     Based upon review of the patient's progress and continued therapy plan, it is my medical opinion that Ana Anand should continue speech therapy treatment at the Speech Therapy at [address] [place] [address]  Ave:                    Britton Harkins MD  [facility] [address]  Via In [address] [place] [address] [address] Pathology Re-Evaluation/Discharge Note    Today's date: 10/3/2022  Patients name: Minna King  : 1940  MRN: [id number]  Safety measures: N/A  Referring provider: Marek Garcia MD      Visit Trackin/10 (6 visits)  Re-eval due 11/3/22    Subjective comments: Patient arrived on time to session  Patient used Flonase this AM, reporting increase in mucous today possibly due to weather/changing seasons  Patient's goal(s): "I guess, in a sense I've almost reached, the dry cough recently has not been around"  Assessments    VOICE RE-EVALUATION:  -Update: Patient does not have hearing aids/amplification at this time, going to look into purchasing a new device soon   Patient feels his cough has improved significantly  IE (below) indicates the scores from the initial evaluation (8/31/22)  1  Voice Handicap Index (VHI): The VHI is a list of 30 statements that many people have used to describe their voices and the effects of their voices on their lives  Patient indicated how frequently he has the same experience using a rating point scale (never = 0, almost never = 1, sometimes = 2, almost always = 3, and always = 4)  Patient's results were as follows:    Subscale: Score: Self-Perceived Impairment Level: IE: Status:   Physical 18/40 Moderate 19 IMPROVEMENT   Functional 8/40 Mild 7 DECLINE   Emotional 5/40 Mild 2 DECLINE          TOTAL 31/120 Mild 28 DECLINE       PERCEPTUAL VOICE ASSESSMENT:    2  Consensus Auditory Perceptual Evaluation of Voice (CAPE-V): The CAPE-V rates auditory-perceptual qualities of a patients voice  The overall severity, roughness, breathiness, strain, pitch and loudness are rated during several tasks including general conversation, vowel prolongation, and reading of sentences  Patient also read the [place] Passage to assess the coordination of respiration and voice production  The ratings of the abovementioned parameters were plotted on a 100-millimeter scale, with 0 corresponding to typical normal voice and 100 indicating a  deviant voice  Parameter: Rating: Severity & Perceptual Observations:   *Overall Severity Roughness 60/100 Moderate   Roughness 50/100 Moderate   Breathiness 30/100 Mild   Strain 10/100 Mild   Pitch 0/100 Mild   Loudness 30/100 Mild       RESPIRATORY EFFICIENCY:   3  S:Z Ratio Task: Patient was instructed to sustain the sounds /s/ and /z/ across three trials to examine the coordination and efficiency of respiration and voice production  Normative data suggests that adults can prolong these sounds for 20-25 seconds   Ratios of 1 4 and above are consistent with laryngeal inefficiency, and ratios of 2 0 and above are suggestive of vocal fold pathology  Task: Trial 1 (sec): Trial 2 (sec): Trial 3 (sec):   /s/ 10 72 9 44 12 85   /z/ 11 70 12 40 10 79     Best /s/: 12 85  Best /z/: 12 40      Ratio: 1:1 (IE: 1:1) Improvement in time sustained in both /s/ and /z/      4  Maximum Phonation Time: Patient was instructed to sustain the sound /ah/ across three trials to measure respiratory and laryngeal coordination and efficiency  Adults are typically able to prolong these vowels sound for 15-20 seconds  Reduced MPT may suggest poor respiratory support, or poor medial glottal closure  Trial 1 (sec): Trial 2 (sec): Trial 3 (sec):   6 44 6 50 6 36     Average Duration: 6 43 seconds (IE: 3 15) Improvement      MEASURES OF PITCH:  Red Crowat, a computer-driven voice analysis program, was used to collect objective voice data  5  Multi-Dimensional Voice Profile (MDVP): This is obtained on the phonation of the sound /a/, in which dimensions of the voice, including frequncy perturbations, amplitude perturbations, variations in F0, noise to harmonic ratio, voice turbulence Index, soft phonation Index, tremours in frequency and amplitude, degree of subharmonics, and degree of voicing apart from the frequency and amplitude, are reflected  Normative Data:   Mean Fundamental Frequency (F0) 118 78 Hz  (IE: 182 07) 145 22 Hz   Jitter (RAP) 0 673%  (IE: 0 284) 0 345%   Shimmer 8 956%  (IE: 7 666) 2 523%   Xyaiw-ep-Gksngnsvj Ratio (NHR) 0 135  (IE: 0 075) 0 112         6  Habitual Pitch: Patient was instructed to engage in two different speaking tasks (counting and reading) to calculate the pitch he uses most frequently  Task: Mean F0 (Hz) Min-Max Range (Hz) Normative Data:   Speaking (counting 1-20) 153 57  (IE: 158) 66  29  (IE: ) 128 Hz (100 Hz -150 Hz)   Reading ("Grandfather Passage) 147 601  (IE: 167) 67  98  (IE: ) 128 Hz (100 Hz -150 Hz)     7   Maximal Phonational Frequency Range: Patient was instructed to voice from lowest to highest notes  Task Min-Max Range (Hz) Normative Data:   Gliding 80  93  (IE: ) 77 Hz-576 Hz         Patient was educated on various vocal abuse behaviors and vocal hygiene throughout assessment  Vocal abuses included decreased water and increased caffeine intake, coughing and throat-clearing, thoracic/clavicular breathing, straining voice, whispering  Vocal hygiene strategies included increased water intake, decreased caffeine intake, throat-clearing awareness, increased breath support/diaphragmatic breathing, compensatory strategies to minimize vocal abuses during work day, confidential voice  Patient was agreeable  Goals    1  Patient will be educated on vocal hygiene and demonstrate understanding of recommendations (increased water intake, diet modifications) to facilitate increased quality of voice, to be achieved in 4-6 weeks  9/8 Patient reporting that he is drinking 64oz per day  Patient more aware of loud voice  9/19 Patient clearing throat throughout session  9/26 Patient reporting drinking 4 bottles of water a day  Educated patient on silent cough, trialed in session today  Goal achieved  2  Patient will increase MPT to >10-15 seconds, to be achieved in 4-6 weeks  9/19 Trialed adduction exercises, patient unsuccessful  Educated about EMST today  Plan to trial spirometer next session  10/3 Spirometer on hold at this point, improving with managing breath support overall  MPT up to 6 43 seconds today  Goal not achieved  3  Pt will utilize diaphragmatic breathing during oral sentence/paragraph reading in 80% of opportunities to facilitate increased breath support for voice/speaking, to be achieved in 4-6 weeks  9/8 Patient completed ratio breathing 4:8 seconds and counting 1-20 with breaths every 4 numbers successfully  Needed reminders to breathe in through the nose  9/12 Patient completing 6:12 seconds with diaphragmatic, counting 1-20 with breaths   Patient completed /h/ words to reduce glottal attack  Patient becoming more aware of when glottal attack is occurring  9/19 Practiced /h/ words, needed reminders to reduce glottal attack and be light  Patient completing 4:8, feeling like he is running out of air at 8  Patient with difficulty maintaining breath support throughout sentences, needing reminders to breathe through the nose in an appropriate spot  9/26 Patient getting to 12 seconds on exhale at times  /h/ at sentence level successfully, paragraph level needed reminders to breathe through the nose  Goal partially achieved  4  Patient will be educated on the use and implementation of Resonant Voice, to be achieved in 4-6 weeks  9/8 Started at phoneme level and moved up to words  Patient needed cues to start with light contact and breathe in through the nose  Patient noting feeling sensation of vibration on lips  9/12 Practiced resonant voice, phoneme level to word level (reminders to use light contact and breathe through) with /m/ and /n/  Completed /m/ sentences as well  9/19 Practicing /m/ and /n/ up to sentence level  Patient kuldeep remembering to use light contact and breath through nose  9/26 /m/ n/ /v/ /r/ /g/ at word level, patient doing well with reducing glottal attack  Goal achieved  5  Patient will complete vocal function exercises to increase vocal fold efficiency and endurance, to be achieved in 4-6 weeks  Goal not addressed     6  Patient will complete conversational training therapy exercises with improvement of vocal quality  Goal not addressed    7  NEW Patient will complete reflux severity index  9/8 Patient scored a 17 on the reflux severity index, consistent with a significant reflux disorder  Goal achieved  Long Term  1  Patient will improve vocal quality for increased functional communication by discharge  2  Patient will show overall improvement on acoustic and perceptual measures       Impressions/Recommendations    Impressions:   -Patient presents with mild-moderate muscle tension dysphonia characterized by hoarseness and SOB when speaking  Patient also experiencing reflux leading to mucous in the throat and a hx of chronic dry cough  Patient feels that cough has resolved at this point and would like to transition to an independent HEP  Patient has made good progress toward all goals and perceptual/acoustic measures from today showed improvements as compared to initial evaluation  Patient overall becoming more aware of what he needs to do to minimize vocal abuse throughout the day  Patient to be discharged to HEP at this time, advised to call or E-mail with any questions or concerns         Recommendations:  -Patient to be discharged from outpatient skilled Speech Therapy services: Patient made good progress toward goals in plan of care and has reached maximum potential  Patient to be discharged to an independent Home Exercise Program     -Frequency: No treatment is warranted at this time   -Duration: N/A

## 2022-10-03 NOTE — PROGRESS NOTES
Speech-Language Pathology Re-Evaluation/Discharge Note    Today's date: 10/3/2022  Patients name: Tevin Fry  : 1940  MRN: [id number]  Safety measures: N/A  Referring provider: Yanci Ochoa MD      Visit Trackin/10 (6 visits)  Re-eval due 11/3/22    Subjective comments: Patient arrived on time to session  Patient used Flonase this AM, reporting increase in mucous today possibly due to weather/changing seasons  Patient's goal(s): "I guess, in a sense I've almost reached, the dry cough recently has not been around"  Assessments    VOICE RE-EVALUATION:  -Update: Patient does not have hearing aids/amplification at this time, going to look into purchasing a new device soon  Patient feels his cough has improved significantly  IE (below) indicates the scores from the initial evaluation (22)  1  Voice Handicap Index (VHI): The VHI is a list of 30 statements that many people have used to describe their voices and the effects of their voices on their lives  Patient indicated how frequently he has the same experience using a rating point scale (never = 0, almost never = 1, sometimes = 2, almost always = 3, and always = 4)  Patient's results were as follows:    Subscale: Score: Self-Perceived Impairment Level: IE: Status:   Physical  Moderate 19 IMPROVEMENT   Functional  Mild 7 DECLINE   Emotional  Mild 2 DECLINE          TOTAL 120 Mild 28 DECLINE       PERCEPTUAL VOICE ASSESSMENT:    2  Consensus Auditory Perceptual Evaluation of Voice (CAPE-V): The CAPE-V rates auditory-perceptual qualities of a patients voice  The overall severity, roughness, breathiness, strain, pitch and loudness are rated during several tasks including general conversation, vowel prolongation, and reading of sentences  Patient also read the [place] Passage to assess the coordination of respiration and voice production   The ratings of the abovementioned parameters were plotted on a 100-millimeter scale, with 0 corresponding to typical normal voice and 100 indicating a  deviant voice  Parameter: Rating: Severity & Perceptual Observations:   *Overall Severity Roughness 60/100 Moderate   Roughness 50/100 Moderate   Breathiness 30/100 Mild   Strain 10/100 Mild   Pitch 0/100 Mild   Loudness 30/100 Mild       RESPIRATORY EFFICIENCY:   3  S:Z Ratio Task: Patient was instructed to sustain the sounds /s/ and /z/ across three trials to examine the coordination and efficiency of respiration and voice production  Normative data suggests that adults can prolong these sounds for 20-25 seconds  Ratios of 1 4 and above are consistent with laryngeal inefficiency, and ratios of 2 0 and above are suggestive of vocal fold pathology  Task: Trial 1 (sec): Trial 2 (sec): Trial 3 (sec):   /s/ 10 72 9 44 12 85   /z/ 11 70 12 40 10 79     Best /s/: 12 85  Best /z/: 12 40      Ratio: 1:1 (IE: 1:1) Improvement in time sustained in both /s/ and /z/      4  Maximum Phonation Time: Patient was instructed to sustain the sound /ah/ across three trials to measure respiratory and laryngeal coordination and efficiency  Adults are typically able to prolong these vowels sound for 15-20 seconds  Reduced MPT may suggest poor respiratory support, or poor medial glottal closure  Trial 1 (sec): Trial 2 (sec): Trial 3 (sec):   6 44 6 50 6 36     Average Duration: 6 43 seconds (IE: 3 15) Improvement      MEASURES OF PITCH:  Praat, a computer-driven voice analysis program, was used to collect objective voice data  5  Multi-Dimensional Voice Profile (MDVP):  This is obtained on the phonation of the sound /a/, in which dimensions of the voice, including frequncy perturbations, amplitude perturbations, variations in F0, noise to harmonic ratio, voice turbulence Index, soft phonation Index, tremours in frequency and amplitude, degree of subharmonics, and degree of voicing apart from the frequency and amplitude, are reflected  Normative Data:   Mean Fundamental Frequency (F0) 118 78 Hz  (IE: 182 07) 145 22 Hz   Jitter (RAP) 0 673%  (IE: 0 284) 0 345%   Shimmer 8 956%  (IE: 7 666) 2 523%   Jwcem-ds-Ciwssmuxk Ratio (NHR) 0 135  (IE: 0 075) 0 112         6  Habitual Pitch: Patient was instructed to engage in two different speaking tasks (counting and reading) to calculate the pitch he uses most frequently  Task: Mean F0 (Hz) Min-Max Range (Hz) Normative Data:   Speaking (counting 1-20) 153 57  (IE: 158) 66  29  (IE: ) 128 Hz (100 Hz -150 Hz)   Reading ("Grandfather Passage) 147 601  (IE: 167) 67  98  (IE: ) 128 Hz (100 Hz -150 Hz)     7  Maximal Phonational Frequency Range: Patient was instructed to voice from lowest to highest notes  Task Min-Max Range (Hz) Normative Data:   Gliding 80  93  (IE: ) 77 Hz-576 Hz         Patient was educated on various vocal abuse behaviors and vocal hygiene throughout assessment  Vocal abuses included decreased water and increased caffeine intake, coughing and throat-clearing, thoracic/clavicular breathing, straining voice, whispering  Vocal hygiene strategies included increased water intake, decreased caffeine intake, throat-clearing awareness, increased breath support/diaphragmatic breathing, compensatory strategies to minimize vocal abuses during work day, confidential voice  Patient was agreeable  Goals    1  Patient will be educated on vocal hygiene and demonstrate understanding of recommendations (increased water intake, diet modifications) to facilitate increased quality of voice, to be achieved in 4-6 weeks  9/8 Patient reporting that he is drinking 64oz per day  Patient more aware of loud voice  9/19 Patient clearing throat throughout session  9/26 Patient reporting drinking 4 bottles of water a day  Educated patient on silent cough, trialed in session today  Goal achieved       2  Patient will increase MPT to >10-15 seconds, to be achieved in 4-6 weeks  9/19 Trialed adduction exercises, patient unsuccessful  Educated about EMST today  Plan to trial spirometer next session  10/3 Spirometer on hold at this point, improving with managing breath support overall  MPT up to 6 43 seconds today  Goal not achieved  3  Pt will utilize diaphragmatic breathing during oral sentence/paragraph reading in 80% of opportunities to facilitate increased breath support for voice/speaking, to be achieved in 4-6 weeks  9/8 Patient completed ratio breathing 4:8 seconds and counting 1-20 with breaths every 4 numbers successfully  Needed reminders to breathe in through the nose  9/12 Patient completing 6:12 seconds with diaphragmatic, counting 1-20 with breaths  Patient completed /h/ words to reduce glottal attack  Patient becoming more aware of when glottal attack is occurring  9/19 Practiced /h/ words, needed reminders to reduce glottal attack and be light  Patient completing 4:8, feeling like he is running out of air at 8  Patient with difficulty maintaining breath support throughout sentences, needing reminders to breathe through the nose in an appropriate spot  9/26 Patient getting to 12 seconds on exhale at times  /h/ at sentence level successfully, paragraph level needed reminders to breathe through the nose  Goal partially achieved  4  Patient will be educated on the use and implementation of Resonant Voice, to be achieved in 4-6 weeks  9/8 Started at phoneme level and moved up to words  Patient needed cues to start with light contact and breathe in through the nose  Patient noting feeling sensation of vibration on lips  9/12 Practiced resonant voice, phoneme level to word level (reminders to use light contact and breathe through) with /m/ and /n/  Completed /m/ sentences as well  9/19 Practicing /m/ and /n/ up to sentence level  Patient kuldeep remembering to use light contact and breath through nose   9/26 /m/ n/ /v/ /r/ /g/ at word level, patient doing well with reducing glottal attack  Goal achieved  5  Patient will complete vocal function exercises to increase vocal fold efficiency and endurance, to be achieved in 4-6 weeks  Goal not addressed     6  Patient will complete conversational training therapy exercises with improvement of vocal quality  Goal not addressed    7  NEW Patient will complete reflux severity index  9/8 Patient scored a 17 on the reflux severity index, consistent with a significant reflux disorder  Goal achieved  Long Term  1  Patient will improve vocal quality for increased functional communication by discharge  2  Patient will show overall improvement on acoustic and perceptual measures  Impressions/Recommendations    Impressions:   -Patient presents with mild-moderate muscle tension dysphonia characterized by hoarseness and SOB when speaking  Patient also experiencing reflux leading to mucous in the throat and a hx of chronic dry cough  Patient feels that cough has resolved at this point and would like to transition to an independent HEP  Patient has made good progress toward all goals and perceptual/acoustic measures from today showed improvements as compared to initial evaluation  Patient overall becoming more aware of what he needs to do to minimize vocal abuse throughout the day  Patient to be [facility] to [facility] at this time, advised to call or E-mail with any questions or concerns         Recommendations:  -Patient to be discharged from outpatient skilled Speech Therapy services: Patient made good progress toward goals in plan of care and has reached maximum potential  Patient to be discharged to an independent Home Exercise Program     -Frequency: No treatment is warranted at this time   -Duration: N/A

## 2022-10-10 ENCOUNTER — APPOINTMENT (OUTPATIENT)
Dept: SPEECH THERAPY | Facility: CLINIC | Age: 82
End: 2022-10-10

## 2022-10-18 ENCOUNTER — APPOINTMENT (OUTPATIENT)
Dept: SPEECH THERAPY | Facility: CLINIC | Age: 82
End: 2022-10-18

## 2022-10-24 ENCOUNTER — APPOINTMENT (OUTPATIENT)
Dept: SPEECH THERAPY | Facility: CLINIC | Age: 82
End: 2022-10-24

## 2022-10-31 ENCOUNTER — APPOINTMENT (OUTPATIENT)
Dept: SPEECH THERAPY | Facility: CLINIC | Age: 82
End: 2022-10-31

## 2022-12-16 ENCOUNTER — OFFICE VISIT (OUTPATIENT)
Dept: FAMILY MEDICINE CLINIC | Facility: CLINIC | Age: 82
End: 2022-12-16

## 2022-12-16 VITALS
WEIGHT: 200 LBS | HEART RATE: 54 BPM | SYSTOLIC BLOOD PRESSURE: 128 MMHG | OXYGEN SATURATION: 98 % | BODY MASS INDEX: 30.31 KG/M2 | DIASTOLIC BLOOD PRESSURE: 72 MMHG | HEIGHT: 68 IN | TEMPERATURE: 95.5 F

## 2022-12-16 DIAGNOSIS — N18.30 STAGE 3 CHRONIC KIDNEY DISEASE, UNSPECIFIED WHETHER STAGE 3A OR 3B CKD (HCC): ICD-10-CM

## 2022-12-16 DIAGNOSIS — Z11.59 SCREENING FOR VIRAL DISEASE: ICD-10-CM

## 2022-12-16 DIAGNOSIS — K21.9 LARYNGOPHARYNGEAL REFLUX (LPR): ICD-10-CM

## 2022-12-16 DIAGNOSIS — J06.9 VIRAL URI WITH COUGH: Primary | ICD-10-CM

## 2022-12-16 DIAGNOSIS — E11.9 TYPE 2 DIABETES MELLITUS WITHOUT COMPLICATION, WITHOUT LONG-TERM CURRENT USE OF INSULIN (HCC): ICD-10-CM

## 2022-12-16 DIAGNOSIS — H00.011 HORDEOLUM EXTERNUM OF RIGHT UPPER EYELID: ICD-10-CM

## 2022-12-16 DIAGNOSIS — E11.65 UNCONTROLLED TYPE 2 DIABETES MELLITUS WITH HYPERGLYCEMIA (HCC): ICD-10-CM

## 2022-12-16 DIAGNOSIS — Z00.00 MEDICARE ANNUAL WELLNESS VISIT, SUBSEQUENT: ICD-10-CM

## 2022-12-16 PROBLEM — D68.9 COAGULOPATHY (HCC): Status: ACTIVE | Noted: 2022-12-16

## 2022-12-16 PROBLEM — D68.9 COAGULOPATHY (HCC): Status: RESOLVED | Noted: 2022-12-16 | Resolved: 2022-12-16

## 2022-12-16 PROBLEM — N17.9 AKI (ACUTE KIDNEY INJURY) (HCC): Status: RESOLVED | Noted: 2020-05-09 | Resolved: 2022-12-16

## 2022-12-16 PROBLEM — N48.1 BALANITIS: Status: RESOLVED | Noted: 2021-11-23 | Resolved: 2022-12-16

## 2022-12-16 PROBLEM — R73.03 PREDIABETES: Status: RESOLVED | Noted: 2019-01-11 | Resolved: 2022-12-16

## 2022-12-16 PROBLEM — H00.014 HORDEOLUM EXTERNUM OF LEFT UPPER EYELID: Status: RESOLVED | Noted: 2021-11-23 | Resolved: 2022-12-16

## 2022-12-16 PROBLEM — Z23 NEED FOR SHINGLES VACCINE: Status: RESOLVED | Noted: 2018-06-25 | Resolved: 2022-12-16

## 2022-12-16 LAB
LEFT EYE DIABETIC RETINOPATHY: ABNORMAL
LEFT EYE IMAGE QUALITY: ABNORMAL
LEFT EYE MACULAR EDEMA: ABNORMAL
LEFT EYE OTHER RETINOPATHY: ABNORMAL
RIGHT EYE DIABETIC RETINOPATHY: ABNORMAL
RIGHT EYE IMAGE QUALITY: ABNORMAL
RIGHT EYE MACULAR EDEMA: ABNORMAL
RIGHT EYE OTHER RETINOPATHY: ABNORMAL
SARS-COV-2 AG UPPER RESP QL IA: NEGATIVE
SEVERITY (EYE EXAM): ABNORMAL
SL AMB POCT HEMOGLOBIN AIC: 8.8 (ref ?–6.5)
VALID CONTROL: NORMAL

## 2022-12-16 RX ORDER — PANTOPRAZOLE SODIUM 40 MG/1
40 TABLET, DELAYED RELEASE ORAL DAILY
Qty: 60 TABLET | Refills: 1 | Status: SHIPPED | OUTPATIENT
Start: 2022-12-16

## 2022-12-16 NOTE — ASSESSMENT & PLAN NOTE
This is a viral upper respiratory infection  The expected course is 7-10 days  We reviewed OTC medications that are recommended to treat the symptoms  Tylenol or Motrin can be used as needed for pain or fevers  [facility] COVID swab negative

## 2022-12-16 NOTE — PROGRESS NOTES
Assessment and Plan:     Problem List Items Addressed This Visit        Endocrine    Type 2 diabetes mellitus without complication, without long-term current use of insulin ([facility] [address] )       Lab Results   Component Value Date    HGBA1C 7 1 (H) 06/17/2021   Discussed uncontrolled diabetes  Recommended diabetes education  Start Metformin 500 mg BID  Discussed common side effects  Lifestyle changes recommended  Provided hand outs about diet  Repeat A1c in 3 months         Relevant Medications    metFORMIN (GLUCOPHAGE) 500 mg tablet    Other Relevant Orders    Lipid Panel with Direct LDL reflex    IRIS Diabetic eye exam    Ambulatory Referral to Diabetic Education    Microalbumin / creatinine urine ratio    Uncontrolled type 2 diabetes mellitus with hyperglycemia (HCC)    Relevant Medications    metFORMIN (GLUCOPHAGE) 500 mg tablet       Respiratory    Viral URI with cough - Primary     This is a viral upper respiratory infection  The expected course is 7-10 days  We reviewed OTC medications that are recommended to treat the symptoms  Tylenol or Motrin can be used as needed for pain or fevers  IH COVID swab negative  Genitourinary    Stage 3 chronic kidney disease, unspecified whether stage 3a or 3b CKD (HCC)     Lab Results   Component Value Date    EGFR 52 06/17/2021    EGFR 51 12/02/2020    EGFR 49 05/11/2020    CREATININE 1 29 06/17/2021    CREATININE 1 31 (H) 12/02/2020    CREATININE 1 36 (H) 05/11/2020   update labs         Relevant Orders    Comprehensive metabolic panel       Other    Medicare annual wellness visit, subsequent    Hordeolum externum of right upper eyelid     Advised warm compresses         Other Visit Diagnoses     Screening for viral disease        Relevant Orders    POCT Rapid Covid Ag (Completed)        BMI Counseling: Body mass index is 30 41 kg/m²  The BMI is above normal  Nutrition recommendations include encouraging healthy choices of fruits and vegetables   Exercise recommendations include exercising 3-5 times per week  No pharmacotherapy was ordered  Rationale for BMI follow-up plan is due to patient being overweight or obese  Depression Screening and Follow-up Plan: Patient was screened for depression during today's encounter  They screened negative with a PHQ-2 score of 0  Preventive health issues were discussed with patient, and age appropriate screening tests were ordered as noted in patient's After Visit Summary  Personalized health advice and appropriate referrals for health education or preventive services given if needed, as noted in patient's After Visit Summary  History of Present Illness:     Patient presents for a Medicare Wellness Visit    Here for cough, congestion, redness of R upper eyelid, sore throat  Took OTC flu medication  No fever, some chills and body aches  He is diabetic - labs are overdue  He says he thought he just had prediabetes  a1c today Is up to 8 8%  not on medications  Diet has been unhealthy  Loves candy and sweets  Denies symptoms such as polyuria or poydipsia  Patient Care Team:  Lenin Harris MD as PCP - General     Review of Systems:     Review of Systems   Constitutional: Positive for chills  Negative for fever  HENT: Positive for congestion  Eyes:        Swelling and redness R upper eyelid   Respiratory: Positive for cough  Negative for shortness of breath and wheezing  Cardiovascular: Negative  Gastrointestinal: Negative           Problem List:     Patient Active Problem List   Diagnosis   • Medicare annual wellness visit, subsequent   • Chronic cough   • ST elevation myocardial infarction involving left circumflex coronary artery (HCC)   • Coronary artery disease   • Hypertension   • Mixed hyperlipidemia   • Primary osteoarthritis of both knees   • Trigger thumb, right thumb   • Stage 3 chronic kidney disease, unspecified whether stage 3a or 3b CKD (HCC)   • Hordeolum externum of right upper eyelid • Viral URI with cough   • Type 2 diabetes mellitus without complication, without long-term current use of insulin (HCC)   • Uncontrolled type 2 diabetes mellitus with hyperglycemia [facility])      Past Medical and Surgical History:     Past Medical History:   Diagnosis Date   • Diabetes mellitus ([address] )     diet control   • Heart disease 05/09/2020    Heart attack   • Hyperlipidemia    • Myocardial infarction [facility])    • Penile lesion    • Prediabetes      Past Surgical History:   Procedure Laterality Date   • CARDIAC SURGERY  05/2020    stent placement   • COLONOSCOPY     • HERNIA REPAIR     • LARYNGOSCOPY  1978    with vocal cord polyp removal   • LARYNGOSCOPY N/A 8/3/2021    Procedure: MICRODIRECT LARYNGOSCOPY WITH  EXCISION OF VOCAL CORD LESION;  Surgeon: Nehemiah Lucero DO;  Location: [facility];  Service: ENT      Family History:     Family History   Problem Relation Age of Onset   • Heart disease Mother         Cardiac Disorder   • No Known Problems Father    • No Known Problems Sister       Social History:     Social History     Socioeconomic History   • Marital status: Single     Spouse name: None   • Number of children: None   • Years of education: None   • Highest education level: None   Occupational History   • None   Tobacco Use   • Smoking status: Former     Packs/day: 1 00     Years: 25 00     Pack years: 25 00     Types: Cigarettes   • Smokeless tobacco: Never   Vaping Use   • Vaping Use: Never used   Substance and Sexual Activity   • Alcohol use: Yes     Comment: Social twice a month   • Drug use: Never   • Sexual activity: None   Other Topics Concern   • None   Social History Narrative   • None     Social Determinants of Health     Financial Resource Strain: Unknown   • Difficulty of Paying Living Expenses: Patient refused   Food Insecurity: Not on file   Transportation Needs: Unknown   • Lack of Transportation (Medical): Patient refused   • Lack of Transportation (Non-Medical): Patient refused Physical Activity: Not on file   Stress: Not on file   Social Connections: Not on file   Intimate Partner Violence: Not on file   Housing Stability: Not on file      Medications and Allergies:     Current Outpatient Medications   Medication Sig Dispense Refill   • metFORMIN (GLUCOPHAGE) 500 mg tablet Take 1 tablet (500 mg total) by mouth 2 (two) times a day with meals 60 tablet 5   • Ascorbic Acid (VITAMIN C) 1000 MG tablet Take 2,000 mg by mouth 2 (two) times a day      • aspirin (ECOTRIN LOW STRENGTH) 81 mg EC tablet Take 1 tablet (81 mg total) by mouth daily 60 tablet 0   • atorvastatin (LIPITOR) 40 mg tablet TAKE 1 TABLET BY MOUTH EVERY DAY 90 tablet 3   • Blood Glucose Monitoring Suppl (FREESTYLE FREEDOM LITE) w/Device KIT Check glucose levels once daily 1 each 0   • Diclofenac Sodium (VOLTAREN) 1 % APPLY 2 G TOPICALLY IN THE MORNING AND 2 G AT NOON AND 2 G IN THE EVENING AND 2 G BEFORE BEDTIME  100 g 0   • glucose blood (FREESTYLE LITE) test strip Use 1 each 3 (three) times a day 300 strip 1   • Lancets (FREESTYLE) lancets TEST ONCE DAILY FASTING DXE11 9 100 each 3   • metoprolol tartrate (LOPRESSOR) 25 mg tablet TAKE 1 TABLET (25 MG TOTAL) BY MOUTH EVERY 12 (TWELVE) HOURS 180 tablet 3   • pantoprazole (PROTONIX) 40 mg tablet Take 1 tablet (40 mg total) by mouth daily Take in morning 60 tablet 1     No current facility-administered medications for this visit       Allergies   Allergen Reactions   • Celecoxib      Other reaction(s): itchy  Other reaction(s): itchy      Immunizations:     Immunization History   Administered Date(s) Administered   • COVID-19 MODERNA VACC 0 5 ML IM 02/08/2021, 03/06/2021, 10/13/2021   • H1N1, All Formulations 02/01/2010   • INFLUENZA 10/18/2018, 10/19/2020   • Influenza, high dose seasonal 0 7 mL 10/19/2020, 11/23/2021   • Influenza, injectable, quadrivalent, preservative free 0 5 mL 10/16/2019   • Pneumococcal Conjugate 13-Valent 06/25/2018   • Pneumococcal Polysaccharide PPV23 10/21/2020      Health Maintenance: There are no preventive care reminders to display for this patient  Topic Date Due   • Hepatitis B Vaccine (1 of 3 - 3-dose series) Never done   • COVID-19 Vaccine (4 - Booster for Moderna series) 02/13/2022   • Influenza Vaccine (1) 09/01/2022      Medicare Screening Tests and Risk Assessments:     Glory Coreas is here for his Subsequent Wellness visit  Last Medicare Wellness visit information reviewed, patient interviewed, no change since last AWV  Health Risk Assessment:   Patient rates overall health as good  Patient feels that their physical health rating is same  Patient is very satisfied with their life  Eyesight was rated as same  Hearing was rated as slightly worse  Patient feels that their emotional and mental health rating is same  Patients states they are never, rarely angry  Patient states they are sometimes unusually tired/fatigued  Pain experienced in the last 7 days has been some  Patient's pain rating has been 4/10  Patient states that he has experienced no weight loss or gain in last 6 months  Depression Screening:   PHQ-2 Score: 0      Fall Risk Screening: In the past year, patient has experienced: no history of falling in past year      Home Safety:  Patient does not have trouble with stairs inside or outside of their home  Patient has working smoke alarms and has working carbon monoxide detector  Home safety hazards include: none  Nutrition:   Current diet is Regular  Medications:   Patient is not currently taking any over-the-counter supplements  Patient is able to manage medications  Activities of Daily Living (ADLs)/Instrumental Activities of Daily Living (IADLs):   Walk and transfer into and out of bed and chair?: Yes  Dress and groom yourself?: Yes    Bathe or shower yourself?: Yes    Feed yourself?  Yes  Do your laundry/housekeeping?: Yes  Manage your money, pay your bills and track your expenses?: Yes  Make your own meals?: Yes Do your own shopping?: Yes    Previous Hospitalizations:   Any hospitalizations or ED visits within the last 12 months?: No    How many hospitalizations have you had in the last year?: 1-2    Advance Care Planning:     Advanced directive: Yes    Five wishes given: No      Cognitive Screening:   Provider or family/friend/caregiver concerned regarding cognition?: No    PREVENTIVE SCREENINGS      Cardiovascular Screening:    General: History Lipid Disorder, Screening Current, Screening Not Indicated and Risks and Benefits Discussed    Due for: Lipid Panel      Diabetes Screening:     General: History Diabetes, Screening Current and Risks and Benefits Discussed    Due for: Blood Glucose      Colorectal Cancer Screening:     General: Risks and Benefits Discussed and Screening Not Indicated      Prostate Cancer Screening:    General: Screening Current      Osteoporosis Screening:    General: Screening Not Indicated      Abdominal Aortic Aneurysm (AAA) Screening:    Risk factors include: tobacco use        General: Screening Not Indicated      Lung Cancer Screening:     General: Screening Not Indicated      Hepatitis C Screening:    General: Screening Not Indicated    Hep C Screening Accepted: No     Screening, Brief Intervention, and Referral to Treatment (SBIRT)    Screening  Typical number of drinks in a day: 0  Typical number of drinks in a week: 0  Interpretation: Low risk drinking behavior  Single Item Drug Screening:  How often have you used an illegal drug (including marijuana) or a prescription medication for non-medical reasons in the past year? never    Single Item Drug Screen Score: 0  Interpretation: Negative screen for possible drug use disorder    Brief Intervention  Alcohol & drug use screenings were reviewed  No concerns regarding substance use disorder identified  Other Counseling Topics:   Regular weightbearing exercise and calcium and vitamin D intake  No results found       Physical Exam: /72 (BP Location: Left arm, Patient Position: Sitting, Cuff Size: Large)   Pulse (!) 54   Temp (!) 95 5 °F (35 3 °C)   Ht 5' 8" (1 727 m)   Wt 90 7 kg (200 lb)   SpO2 98%   BMI 30 41 kg/m²     Physical Exam  Vitals and nursing note reviewed  Constitutional:       General: He is not in acute distress  Appearance: He is well-developed  He is not diaphoretic  HENT:      Head: Normocephalic and atraumatic  Right Ear: Tympanic membrane, ear canal and external ear normal       Left Ear: Tympanic membrane, ear canal and external ear normal       Mouth/Throat:      Mouth: Mucous membranes are moist       Pharynx: No pharyngeal swelling, oropharyngeal exudate or posterior oropharyngeal erythema  Tonsils: No tonsillar exudate  Eyes:      Conjunctiva/sclera: Conjunctivae normal       Comments: Stye R upper eye lid   Cardiovascular:      Rate and Rhythm: Normal rate and regular rhythm  Pulses: no weak pulses          Dorsalis pedis pulses are 2+ on the right side and 2+ on the left side  Posterior tibial pulses are 2+ on the right side and 2+ on the left side  Heart sounds: Normal heart sounds  No murmur heard  No friction rub  No gallop  Pulmonary:      Effort: Pulmonary effort is normal  No respiratory distress  Breath sounds: Normal breath sounds  No stridor  No wheezing or rales  Chest:      Chest wall: No tenderness  Feet:      Right foot:      Skin integrity: No ulcer, skin breakdown, erythema, warmth, callus or dry skin  Left foot:      Skin integrity: No ulcer, skin breakdown, erythema, warmth, callus or dry skin  Skin:     Findings: No rash  Neurological:      Mental Status: He is alert  Psychiatric:         Behavior: Behavior normal          Thought Content: Thought content normal          Judgment: Judgment normal         Patient's shoes and socks removed  Right Foot/Ankle   Right Foot Inspection  Skin Exam: skin normal and skin intact  No dry skin, no warmth, no callus, no erythema, no maceration, no abnormal color, no pre-ulcer, no ulcer and no callus  Toe Exam: No swelling, no tenderness, erythema and  no right toe deformity    Sensory   Vibration: intact  Monofilament testing: intact    Vascular  Capillary refills: < 3 seconds  The right DP pulse is 2+  The right PT pulse is 2+  Left Foot/Ankle  Left Foot Inspection  Skin Exam: skin normal and skin intact  No dry skin, no warmth, no erythema, no maceration, normal color, no pre-ulcer, no ulcer and no callus  Toe Exam: No swelling, no tenderness, no erythema and no left toe deformity  Sensory   Vibration: intact  Monofilament testing: intact    Vascular  Capillary refills: < 3 seconds  The left DP pulse is 2+  The left PT pulse is 2+       Assign Risk Category  No deformity present  No loss of protective sensation  No weak pulses  Risk: 0      Maegan Christianson MD

## 2022-12-16 NOTE — PATIENT INSTRUCTIONS
Medicare Preventive Visit Patient Instructions  Thank you for completing your Welcome to Medicare Visit or Medicare Annual Wellness Visit today  Your next wellness visit will be due in one year (12/17/2023)  The screening/preventive services that you may require over the next 5-10 years are detailed below  Some tests may not apply to you based off risk factors and/or age  Screening tests ordered at today's visit but not completed yet may show as past due  Also, please note that scanned in results may not display below  Preventive Screenings:  Service Recommendations Previous Testing/Comments   Colorectal Cancer Screening  · Colonoscopy    · Fecal Occult Blood Test (FOBT)/Fecal Immunochemical Test (FIT)  · Fecal DNA/Cologuard Test  · Flexible Sigmoidoscopy Age: 39-70 years old   Colonoscopy: every 10 years (May be performed more frequently if at higher risk)  OR  FOBT/FIT: every 1 year  OR  Cologuard: every 3 years  OR  Sigmoidoscopy: every 5 years  Screening may be recommended earlier than age 39 if at higher risk for colorectal cancer  Also, an individualized decision between you and your healthcare provider will decide whether screening between the ages of 74-80 would be appropriate   Colonoscopy: Not on file  FOBT/FIT: Not on file  Cologuard: Not on file  Sigmoidoscopy: Not on file          Prostate Cancer Screening Individualized decision between patient and health care provider in men between ages of 53-78   Medicare will cover every 12 months beginning on the day after your 50th birthday PSA: 1 8 ng/mL     Screening Current     Hepatitis C Screening Once for adults born between 1945 and 1965  More frequently in patients at high risk for Hepatitis C Hep C Antibody: Not on file        Diabetes Screening 1-2 times per year if you're at risk for diabetes or have pre-diabetes Fasting glucose: 164 mg/dL (6/17/2021)  A1C: 7 1 % (6/17/2021)  History Diabetes  Screening Current   Cholesterol Screening Once every 5 years if you don't have a lipid disorder  May order more often based on risk factors  Lipid panel: 06/15/2022  History Lipid Disorder  Screening Current  Screening Not Indicated      Other Preventive Screenings Covered by Medicare:  1  Abdominal Aortic Aneurysm (AAA) Screening: covered once if your at risk  You're considered to be at risk if you have a family history of AAA or a male between the age of 73-68 who smoking at least 100 cigarettes in your lifetime  2  Lung Cancer Screening: covers low dose CT scan once per year if you meet all of the following conditions: (1) Age 50-69; (2) No signs or symptoms of lung cancer; (3) Current smoker or have quit smoking within the last 15 years; (4) You have a tobacco smoking history of at least 20 pack years (packs per day x number of years you smoked); (5) You get a written order from a healthcare provider  3  Glaucoma Screening: covered annually if you're considered high risk: (1) You have diabetes OR (2) Family history of glaucoma OR (3)  aged 48 and older OR (3)  American aged 72 and older  3  Osteoporosis Screening: covered every 2 years if you meet one of the following conditions: (1) Have a vertebral abnormality; (2) On glucocorticoid therapy for more than 3 months; (3) Have primary hyperparathyroidism; (4) On osteoporosis medications and need to assess response to drug therapy  5  HIV Screening: covered annually if you're between the age of 12-76  Also covered annually if you are younger than 13 and older than 72 with risk factors for HIV infection  For pregnant patients, it is covered up to 3 times per pregnancy      Immunizations:  Immunization Recommendations   Influenza Vaccine Annual influenza vaccination during flu season is recommended for all persons aged >= 6 months who do not have contraindications   Pneumococcal Vaccine   * Pneumococcal conjugate vaccine = PCV13 (Prevnar 13), PCV15 (Vaxneuvance), PCV20 (Prevnar 20)  * Pneumococcal polysaccharide vaccine = PPSV23 (Pneumovax) Adults 2364 years old: 1-3 doses may be recommended based on certain risk factors  Adults 72 years old: 1-2 doses may be recommended based off what pneumonia vaccine you previously received   Hepatitis B Vaccine 3 dose series if at intermediate or high risk (ex: diabetes, end stage renal disease, liver disease)   Tetanus (Td) Vaccine - COST NOT COVERED BY MEDICARE PART B Following completion of primary series, a booster dose should be given every 10 years to maintain immunity against tetanus  Td may also be given as tetanus wound prophylaxis  Tdap Vaccine - COST NOT COVERED BY MEDICARE PART B Recommended at least once for all adults  For pregnant patients, recommended with each pregnancy  Shingles Vaccine (Shingrix) - COST NOT COVERED BY MEDICARE PART B  2 shot series recommended in those aged 48 and above     Health Maintenance Due:  There are no preventive care reminders to display for this patient  Immunizations Due:      Topic Date Due   • Hepatitis B Vaccine (1 of 3 - 3-dose series) Never done   • COVID-19 Vaccine (4 - Booster for Moderna series) 02/13/2022   • Influenza Vaccine (1) 09/01/2022     Advance Directives   What are advance directives? Advance directives are legal documents that state your wishes and plans for medical care  These plans are made ahead of time in case you lose your ability to make decisions for yourself  Advance directives can apply to any medical decision, such as the treatments you want, and if you want to donate organs  What are the types of advance directives? There are many types of advance directives, and each state has rules about how to use them  You may choose a combination of any of the following:  · Living will: This is a written record of the treatment you want  You can also choose which treatments you do not want, which to limit, and which to stop at a certain time   This includes surgery, medicine, IV fluid, and tube feedings  · Durable power of  for healthcare [facility]): This is a written record that states who you want to make healthcare choices for you when you are unable to make them for yourself  This person, called a proxy, is usually a family member or a friend  You may choose more than 1 proxy  · Do not resuscitate (DNR) order:  A DNR order is used in case your heart stops beating or you stop breathing  It is a request not to have certain forms of treatment, such as CPR  A DNR order may be included in other types of advance directives  · Medical directive: This covers the care that you want if you are in a coma, near death, or unable to make decisions for yourself  You can list the treatments you want for each condition  Treatment may include pain medicine, surgery, blood transfusions, dialysis, IV or tube feedings, and a ventilator (breathing machine)  · Values history: This document has questions about your views, beliefs, and how you feel and think about life  This information can help others choose the care that you would choose  Why are advance directives important? An advance directive helps you control your care  Although spoken wishes may be used, it is better to have your wishes written down  Spoken wishes can be misunderstood, or not followed  Treatments may be given even if you do not want them  An advance directive may make it easier for your family to make difficult choices about your care  Weight Management   Why it is important to manage your weight:  Being overweight increases your risk of health conditions such as heart disease, high blood pressure, type 2 diabetes, and certain types of cancer  It can also increase your risk for osteoarthritis, sleep apnea, and other respiratory problems  Aim for a slow, steady weight loss  Even a small amount of weight loss can lower your risk of health problems    How to lose weight safely:  A safe and healthy way to lose weight is to eat fewer calories and get regular exercise  You can lose up about 1 pound a week by decreasing the number of calories you eat by 500 calories each day  Healthy meal plan for weight management:  A healthy meal plan includes a variety of foods, contains fewer calories, and helps you stay healthy  A healthy meal plan includes the following:  · Eat whole-grain foods more often  A healthy meal plan should contain fiber  Fiber is the part of grains, fruits, and vegetables that is not broken down by your body  Whole-grain foods are healthy and provide extra fiber in your diet  Some examples of whole-grain foods are whole-wheat breads and pastas, oatmeal, brown rice, and bulgur  · Eat a variety of vegetables every day  Include dark, leafy greens such as spinach, kale, flynn greens, and mustard greens  Eat yellow and orange vegetables such as carrots, sweet potatoes, and winter squash  · Eat a variety of fruits every day  Choose fresh or canned fruit (canned in its own juice or light syrup) instead of juice  Fruit juice has very little or no fiber  · Eat low-fat dairy foods  Drink fat-free (skim) milk or 1% milk  Eat fat-free yogurt and low-fat cottage cheese  Try low-fat cheeses such as mozzarella and other reduced-fat cheeses  · Choose meat and other protein foods that are low in fat  Choose beans or other legumes such as split peas or lentils  Choose fish, skinless poultry (chicken or turkey), or lean cuts of red meat (beef or pork)  Before you cook meat or poultry, cut off any visible fat  · Use less fat and oil  Try baking foods instead of frying them  Add less fat, such as margarine, sour cream, regular salad dressing and mayonnaise to foods  Eat fewer high-fat foods  Some examples of high-fat foods include french fries, doughnuts, ice cream, and cakes  · Eat fewer sweets  Limit foods and drinks that are high in sugar  This includes candy, cookies, regular soda, and sweetened drinks    Exercise: Exercise at least 30 minutes per day on most days of the week  Some examples of exercise include walking, biking, dancing, and swimming  You can also fit in more physical activity by taking the stairs instead of the elevator or parking farther away from stores  Ask your healthcare provider about the best exercise plan for you  © Copyright [employer] 2018 Information is for End User's use only and may not be sold, redistributed or otherwise used for commercial purposes   All illustrations and images included in CareNotes® are the copyrighted property of [employer]  or [address] [place] [address]

## 2022-12-16 NOTE — ASSESSMENT & PLAN NOTE
Lab Results   Component Value Date    EGFR 52 06/17/2021    EGFR 51 12/02/2020    EGFR 49 05/11/2020    CREATININE 1 29 06/17/2021    CREATININE 1 31 (H) 12/02/2020    CREATININE 1 36 (H) 05/11/2020   update labs

## 2022-12-16 NOTE — ASSESSMENT & PLAN NOTE
Lab Results   Component Value Date    HGBA1C 7 1 (H) 06/17/2021   Discussed uncontrolled diabetes  Recommended diabetes education  Start Metformin 500 mg BID  Discussed common side effects    Lifestyle changes recommended  Provided hand outs about diet  Repeat A1c in 3 months

## 2022-12-16 NOTE — PROGRESS NOTES
Diabetic Foot Exam    Patient's shoes and socks removed  Right Foot/Ankle   Right Foot Inspection  Skin Exam: skin normal, skin intact and dry skin  No warmth, no callus, no erythema, no maceration, no abnormal color, no pre-ulcer, no ulcer and no callus  Toe Exam:  no right toe deformity    Sensory   Vibration: intact  Monofilament testing: intact    Vascular  Capillary refills: < 3 seconds  The right DP pulse is 2+  Left Foot/Ankle  Left Foot Inspection  Skin Exam: skin normal, skin intact and dry skin  No warmth, no erythema, no maceration, normal color, no pre-ulcer, no ulcer and no callus  Toe Exam: No left toe deformity  Sensory   Vibration: intact  Monofilament testing: intact    Vascular  Capillary refills: < 3 seconds  The left DP pulse is 2+       Assign Risk Category  No deformity present  No loss of protective sensation  No weak pulses  Risk: 0

## 2022-12-17 ENCOUNTER — APPOINTMENT (OUTPATIENT)
Dept: LAB | Facility: CLINIC | Age: 82
End: 2022-12-17

## 2022-12-17 DIAGNOSIS — E11.9 TYPE 2 DIABETES MELLITUS WITHOUT COMPLICATION, WITHOUT LONG-TERM CURRENT USE OF INSULIN (HCC): ICD-10-CM

## 2022-12-17 DIAGNOSIS — N18.30 STAGE 3 CHRONIC KIDNEY DISEASE, UNSPECIFIED WHETHER STAGE 3A OR 3B CKD (HCC): ICD-10-CM

## 2022-12-17 LAB
ALBUMIN SERPL BCP-MCNC: 3.8 G/DL (ref 3.5–5)
ALP SERPL-CCNC: 55 U/L (ref 46–116)
ALT SERPL W P-5'-P-CCNC: 23 U/L (ref 12–78)
ANION GAP SERPL CALCULATED.3IONS-SCNC: 10 MMOL/L (ref 4–13)
AST SERPL W P-5'-P-CCNC: 18 U/L (ref 5–45)
BILIRUB SERPL-MCNC: 0.54 MG/DL (ref 0.2–1)
BUN SERPL-MCNC: 24 MG/DL (ref 5–25)
CALCIUM SERPL-MCNC: 9 MG/DL (ref 8.3–10.1)
CHLORIDE SERPL-SCNC: 106 MMOL/L (ref 96–108)
CHOLEST SERPL-MCNC: 102 MG/DL
CO2 SERPL-SCNC: 23 MMOL/L (ref 21–32)
CREAT SERPL-MCNC: 1.49 MG/DL (ref 0.6–1.3)
CREAT UR-MCNC: 80.7 MG/DL
GFR SERPL CREATININE-BSD FRML MDRD: 43 ML/MIN/1.73SQ M
GLUCOSE P FAST SERPL-MCNC: 201 MG/DL (ref 65–99)
HDLC SERPL-MCNC: 36 MG/DL
LDLC SERPL CALC-MCNC: 36 MG/DL (ref 0–100)
MICROALBUMIN UR-MCNC: <5 MG/L (ref 0–20)
MICROALBUMIN/CREAT 24H UR: <6 MG/G CREATININE (ref 0–30)
POTASSIUM SERPL-SCNC: 3.9 MMOL/L (ref 3.5–5.3)
PROT SERPL-MCNC: 7.4 G/DL (ref 6.4–8.4)
SODIUM SERPL-SCNC: 139 MMOL/L (ref 135–147)
TRIGL SERPL-MCNC: 151 MG/DL

## 2022-12-19 ENCOUNTER — TELEPHONE (OUTPATIENT)
Dept: ADMINISTRATIVE | Facility: OTHER | Age: 82
End: 2022-12-19

## 2022-12-19 LAB
LEFT EYE DIABETIC RETINOPATHY: NORMAL
RIGHT EYE DIABETIC RETINOPATHY: NORMAL

## 2022-12-19 NOTE — LETTER
Vaccination Request Form: Influenza      Date Requested: 22  Patient: Boaz Paul  Patient : 1940   Referring Provider: Francisco Reid MD       The above patient has informed us that they have had their   most recent Influenza administered at your facility  Please   complete this form and attach all corresponding documentation  Date of Vaccine(s) Given  ______________________________    Lot Number(s) _______________________________________    Manufacture(s) ______________________________________    Dose Amount (s) _____________________________________    Expiration Date(s) ____________________________________    Comments __________________________________________________________  ____________________________________________________________________  ____________________________________________________________________  ____________________________________________________________________    Administering Facility  ________________________________________________    Vaccine Administered By (print name) ___________________________________      Form Completed By (print name) _______________________________________      Signature ___________________________________________________________      These reports are needed for  compliance  Please fax this completed form and a copy of the Vaccine Document(s) to our office located at [place] [address] as soon as possible to [phone] alanna Richard: Phone [phone]    We thank you for your assistance in treating our mutual patient

## 2022-12-19 NOTE — TELEPHONE ENCOUNTER
----- Message from Mariann Yi MD sent at 12/16/2022 12:12 PM EST -----  Regarding: flu vaccine  12/16/22 12:12 PM    Hello, our patient Luisa Pate has had Immunization(s) completed/performed  Please assist in updating the patient chart by making an External outreach to [address] [place] [address] located in [facility]  The date of service is 2022      Thank you,  Mariann Yi MD  [facility]

## 2022-12-19 NOTE — LETTER
Vaccination Request Form: Influenza      Date Requested: 22  Patient: Karyle Soulier  Patient : 1940   Referring Provider: Deborah Abrams MD       The above patient has informed us that they have had their   most recent Influenza administered at your facility  Please   complete this form and attach all corresponding documentation  Date of Vaccine(s) Given  ______________________________    Lot Number(s) _______________________________________    Manufacture(s) ______________________________________    Dose Amount (s) _____________________________________    Expiration Date(s) ____________________________________    Comments __________________________________________________________  ____________________________________________________________________  ____________________________________________________________________  ____________________________________________________________________    Administering Facility  ________________________________________________    Vaccine Administered By (print name) ___________________________________      Form Completed By (print name) _______________________________________      Signature ___________________________________________________________      These reports are needed for  compliance  Please fax this completed form and a copy of the Vaccine Document(s) to our office located at [place] [address] as soon as possible to [phone] alanna Dennis: Phone [phone]    We thank you for your assistance in treating our mutual patient

## 2022-12-21 NOTE — TELEPHONE ENCOUNTER
Upon review of the In Basket request and the patient's chart, initial outreach has been made via fax to facility  Please see Contacts section for details       Thank you  Edward Wade

## 2022-12-27 NOTE — TELEPHONE ENCOUNTER
As a follow-up, a second attempt has been made for outreach via fax to facility  Please see Contacts section for details      Thank you  Lenin Polanco

## 2022-12-28 NOTE — TELEPHONE ENCOUNTER
Upon review of the In Basket request we were able to locate, review, and update the patient chart as requested for Immunization(s) Influenza vaccine  Any additional questions or concerns should be emailed to the Practice Liaisons via the appropriate education email address, please do not reply via In Basket      Thank you  Blanche Rojas

## 2023-01-11 ENCOUNTER — OFFICE VISIT (OUTPATIENT)
Dept: FAMILY MEDICINE CLINIC | Facility: CLINIC | Age: 83
End: 2023-01-11

## 2023-01-11 ENCOUNTER — APPOINTMENT (OUTPATIENT)
Dept: LAB | Facility: CLINIC | Age: 83
End: 2023-01-11

## 2023-01-11 ENCOUNTER — APPOINTMENT (OUTPATIENT)
Dept: RADIOLOGY | Facility: CLINIC | Age: 83
End: 2023-01-11

## 2023-01-11 VITALS
TEMPERATURE: 98 F | HEART RATE: 73 BPM | WEIGHT: 198 LBS | BODY MASS INDEX: 30.01 KG/M2 | OXYGEN SATURATION: 98 % | SYSTOLIC BLOOD PRESSURE: 130 MMHG | HEIGHT: 68 IN | DIASTOLIC BLOOD PRESSURE: 78 MMHG

## 2023-01-11 DIAGNOSIS — R05.3 CHRONIC COUGH: ICD-10-CM

## 2023-01-11 DIAGNOSIS — E11.65 UNCONTROLLED TYPE 2 DIABETES MELLITUS WITH HYPERGLYCEMIA (HCC): ICD-10-CM

## 2023-01-11 DIAGNOSIS — R79.89 ELEVATED SERUM CREATININE: ICD-10-CM

## 2023-01-11 DIAGNOSIS — N18.30 STAGE 3 CHRONIC KIDNEY DISEASE, UNSPECIFIED WHETHER STAGE 3A OR 3B CKD (HCC): ICD-10-CM

## 2023-01-11 DIAGNOSIS — R05.3 CHRONIC COUGH: Primary | ICD-10-CM

## 2023-01-11 DIAGNOSIS — N40.1 BPH WITH OBSTRUCTION/LOWER URINARY TRACT SYMPTOMS: ICD-10-CM

## 2023-01-11 DIAGNOSIS — N13.8 BPH WITH OBSTRUCTION/LOWER URINARY TRACT SYMPTOMS: ICD-10-CM

## 2023-01-11 LAB
ANION GAP SERPL CALCULATED.3IONS-SCNC: 5 MMOL/L (ref 4–13)
BUN SERPL-MCNC: 18 MG/DL (ref 5–25)
CALCIUM SERPL-MCNC: 8.7 MG/DL (ref 8.3–10.1)
CHLORIDE SERPL-SCNC: 107 MMOL/L (ref 96–108)
CO2 SERPL-SCNC: 25 MMOL/L (ref 21–32)
CREAT SERPL-MCNC: 1.44 MG/DL (ref 0.6–1.3)
GFR SERPL CREATININE-BSD FRML MDRD: 45 ML/MIN/1.73SQ M
GLUCOSE SERPL-MCNC: 133 MG/DL (ref 65–140)
NT-PROBNP SERPL-MCNC: 334 PG/ML
POTASSIUM SERPL-SCNC: 4.3 MMOL/L (ref 3.5–5.3)
SODIUM SERPL-SCNC: 137 MMOL/L (ref 135–147)

## 2023-01-11 RX ORDER — TAMSULOSIN HYDROCHLORIDE 0.4 MG/1
0.4 CAPSULE ORAL
Qty: 30 CAPSULE | Refills: 1 | Status: SHIPPED | OUTPATIENT
Start: 2023-01-11

## 2023-01-11 NOTE — PROGRESS NOTES
Name: Estephanie Willis      : 1940      MRN: [id number]  Encounter Provider: Mike Paul MD  Encounter Date: 2023   Encounter department: [address] [place] [address]     1  Chronic cough  -     Complete PFT with post bronchodilator; Future  -     XR chest pa & lateral; Future; Expected date: 2023  -     Ambulatory referral to Gastroenterology; Future  -     NT-BNP PRO; Future    2  Elevated serum creatinine  Advise to keep well hydrated and avoid NSAIDs    3  BPH with obstruction/lower urinary tract symptoms  -     tamsulosin (FLOMAX) 0 4 mg; Take 1 capsule (0 4 mg total) by mouth daily with dinner    4  Uncontrolled type 2 diabetes mellitus with hyperglycemia (HCC)    5  Stage 3 chronic kidney disease, unspecified whether stage 3a or 3b CKD ([place] [address] )  -     Basic metabolic panel; Future       Follow up in 3-6 months    Subjective     Patient is here to follow up  He has been having a chronic cough for over 1 year  He denies any SOB  He has been taking protonix daily along with pepcid  His a former smoker quit 40 years ago  No Hx of asthma  Also has elevated kidney function and increased nocturia with hesitance  Has elevated CKD stable for the past 2-3 years  Review of Systems   Constitutional: Negative for activity change, appetite change, fatigue and fever  HENT: Negative for congestion and ear discharge  Respiratory: Positive for cough  Negative for shortness of breath  Cardiovascular: Negative for chest pain and palpitations  Gastrointestinal: Negative for diarrhea and nausea  Genitourinary: Positive for dysuria, frequency and urgency  Musculoskeletal: Negative for arthralgias and back pain  Skin: Negative for color change and rash  Neurological: Negative for dizziness and headaches  Psychiatric/Behavioral: Negative for agitation and behavioral problems         Past Medical History:   Diagnosis Date   • Diabetes mellitus ([facility] )     diet control • Heart disease 05/09/2020    Heart attack   • Hyperlipidemia    • Myocardial infarction [facility])    • Penile lesion    • Prediabetes      Past Surgical History:   Procedure Laterality Date   • CARDIAC SURGERY  05/2020    stent placement   • COLONOSCOPY     • HERNIA REPAIR     • LARYNGOSCOPY  1978    with vocal cord polyp removal   • LARYNGOSCOPY N/A 8/3/2021    Procedure: MICRODIRECT LARYNGOSCOPY WITH  EXCISION OF VOCAL CORD LESION;  Surgeon: Levi Gordon DO;  Location: [facility];  Service: ENT     Family History   Problem Relation Age of Onset   • Heart disease Mother         Cardiac Disorder   • No Known Problems Father    • No Known Problems Sister      Social History     Socioeconomic History   • Marital status: Single     Spouse name: Not on file   • Number of children: Not on file   • Years of education: Not on file   • Highest education level: Not on file   Occupational History   • Not on file   Tobacco Use   • Smoking status: Former     Packs/day: 1 00     Years: 25 00     Pack years: 25 00     Types: Cigarettes   • Smokeless tobacco: Never   Vaping Use   • Vaping Use: Never used   Substance and Sexual Activity   • Alcohol use: Yes     Comment: Social twice a month   • Drug use: Never   • Sexual activity: Not on file   Other Topics Concern   • Not on file   Social History Narrative   • Not on file     Social Determinants of Health     Financial Resource Strain: Unknown   • Difficulty of Paying Living Expenses: Patient refused   Food Insecurity: Not on file   Transportation Needs: Unknown   • Lack of Transportation (Medical): Patient refused   • Lack of Transportation (Non-Medical): Patient refused   Physical Activity: Not on file   Stress: Not on file   Social Connections: Not on file   Intimate Partner Violence: Not on file   Housing Stability: Not on file     Current Outpatient Medications on File Prior to Visit   Medication Sig   • Ascorbic Acid (VITAMIN C) 1000 MG tablet Take 2,000 mg by mouth 2 (two) times a day    • atorvastatin (LIPITOR) 40 mg tablet TAKE 1 TABLET BY MOUTH EVERY DAY   • Blood Glucose Monitoring Suppl (FREESTYLE FREEDOM LITE) w/Device KIT Check glucose levels once daily   • Diclofenac Sodium (VOLTAREN) 1 % APPLY 2 G TOPICALLY IN THE MORNING AND 2 G AT NOON AND 2 G IN THE EVENING AND 2 G BEFORE BEDTIME  • famotidine (PEPCID) 40 MG tablet TAKE 1 TABLET (40 MG TOTAL) BY MOUTH DAILY TAKE BEFORE BED   • glucose blood (FREESTYLE LITE) test strip Use 1 each 3 (three) times a day   • Lancets (FREESTYLE) lancets TEST ONCE DAILY FASTING DXE11 9   • metFORMIN (GLUCOPHAGE) 500 mg tablet Take 1 tablet (500 mg total) by mouth 2 (two) times a day with meals   • pantoprazole (PROTONIX) 40 mg tablet TAKE 1 TABLET (40 MG TOTAL) BY MOUTH DAILY TAKE IN MORNING   • pantoprazole (PROTONIX) 40 mg tablet Take 1 tablet (40 mg total) by mouth daily Take in morning   • aspirin (ECOTRIN LOW STRENGTH) 81 mg EC tablet Take 1 tablet (81 mg total) by mouth daily   • metoprolol tartrate (LOPRESSOR) 25 mg tablet TAKE 1 TABLET (25 MG TOTAL) BY MOUTH EVERY 12 (TWELVE) HOURS     Allergies   Allergen Reactions   • Celecoxib      Other reaction(s): itchy  Other reaction(s): itchy     Immunization History   Administered Date(s) Administered   • COVID-19 MODERNA VACC 0 5 ML IM 02/08/2021, 03/06/2021, 10/13/2021   • H1N1, All Formulations 02/01/2010   • INFLUENZA 10/18/2018, 10/19/2020, 11/14/2022   • Influenza, high dose seasonal 0 7 mL 10/19/2020, 11/23/2021   • Influenza, injectable, quadrivalent, preservative free 0 5 mL 10/16/2019   • Pneumococcal Conjugate 13-Valent 06/25/2018   • Pneumococcal Polysaccharide PPV23 10/21/2020       Objective     /78 (BP Location: Left arm, Patient Position: Sitting, Cuff Size: Large)   Pulse 73   Temp 98 °F (36 7 °C)   Ht 5' 8" (1 727 m)   Wt 89 8 kg (198 lb)   SpO2 98%   BMI 30 11 kg/m²     Physical Exam  Constitutional:       General: He is not in acute distress       Appearance: He is well-developed  He is not diaphoretic  Eyes:      General: No scleral icterus  Pupils: Pupils are equal, round, and reactive to light  Cardiovascular:      Rate and Rhythm: Normal rate and regular rhythm  Heart sounds: Normal heart sounds  No murmur heard  Pulmonary:      Effort: Pulmonary effort is normal  No respiratory distress  Breath sounds: Normal breath sounds  No wheezing  Abdominal:      General: Bowel sounds are normal  There is no distension  Palpations: Abdomen is soft  Tenderness: There is no abdominal tenderness  Skin:     General: Skin is warm and dry  Findings: No rash  Neurological:      Mental Status: He is alert and oriented to person, place, and time         Anshul Dunlap MD

## 2023-01-16 DIAGNOSIS — M79.641 RIGHT HAND PAIN: ICD-10-CM

## 2023-02-01 ENCOUNTER — CONSULT (OUTPATIENT)
Dept: GASTROENTEROLOGY | Facility: CLINIC | Age: 83
End: 2023-02-01

## 2023-02-01 VITALS
WEIGHT: 197 LBS | BODY MASS INDEX: 29.86 KG/M2 | DIASTOLIC BLOOD PRESSURE: 84 MMHG | HEIGHT: 68 IN | HEART RATE: 85 BPM | SYSTOLIC BLOOD PRESSURE: 124 MMHG

## 2023-02-01 DIAGNOSIS — K21.9 LARYNGOPHARYNGEAL REFLUX (LPR): ICD-10-CM

## 2023-02-01 DIAGNOSIS — R05.3 CHRONIC COUGH: Primary | ICD-10-CM

## 2023-02-01 NOTE — PROGRESS NOTES
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  Patient reports lately, his "nagging" cough has been better  He has reflux seldomly  He denies abdominal pain, nausea, vomiting, or unintentional weight loss  He is very active, and in fact going on a cruise next week with his significant other  Patient reports he may have had an EGD in the past, but it has been over 6-10 years at least      Review of Systems:    CONSTITUTIONAL: Denies any fever, chills, or rigors  Good appetite, and no recent weight loss  HEENT: No earache or tinnitus  Denies hearing loss or visual disturbances  CARDIOVASCULAR: No chest pain or palpitations  RESPIRATORY: Denies any cough, hemoptysis, shortness of breath or dyspnea on exertion  GASTROINTESTINAL: As noted in the History of Present Illness  GENITOURINARY: No problems with urination  Denies any hematuria or dysuria  NEUROLOGIC: No dizziness or vertigo, denies headaches  MUSCULOSKELETAL: Denies any muscle or joint pain  SKIN: Denies skin rashes or itching  ENDOCRINE: Denies excessive thirst  Denies intolerance to heat or cold  PSYCHOSOCIAL: Denies depression or anxiety  Denies any recent memory loss         Current Outpatient Medications   Medication Sig Dispense Refill   • Ascorbic Acid (VITAMIN C) 1000 MG tablet Take 2,000 mg by mouth 2 (two) times a day      • atorvastatin (LIPITOR) 40 mg tablet TAKE 1 TABLET BY MOUTH EVERY DAY 90 tablet 3   • Blood Glucose Monitoring Suppl (FREESTYLE FREEDOM LITE) w/Device KIT Check glucose levels once daily 1 each 0   • Diclofenac Sodium (VOLTAREN) 1 % Apply 2 g topically 4 (four) times a day 100 g 1   • famotidine (PEPCID) 40 MG tablet TAKE 1 TABLET (40 MG TOTAL) BY MOUTH DAILY TAKE BEFORE BED 30 tablet 3   • glucose blood (FREESTYLE LITE) test strip Use 1 each 3 (three) times a day 300 strip 1   • Lancets (FREESTYLE) lancets TEST ONCE DAILY FASTING DXE11 9 100 each 3   • metFORMIN (GLUCOPHAGE) 500 mg tablet Take 1 tablet (500 mg total) by mouth 2 (two) times a day with meals 60 tablet 5   • pantoprazole (PROTONIX) 40 mg tablet TAKE 1 TABLET (40 MG TOTAL) BY MOUTH DAILY TAKE IN MORNING 30 tablet 3   • pantoprazole (PROTONIX) 40 mg tablet Take 1 tablet (40 mg total) by mouth daily Take in morning 60 tablet 1   • tamsulosin (FLOMAX) 0 4 mg Take 1 capsule (0 4 mg total) by mouth daily with dinner 30 capsule 1   • aspirin (ECOTRIN LOW STRENGTH) 81 mg EC tablet Take 1 tablet (81 mg total) by mouth daily 60 tablet 0   • metoprolol tartrate (LOPRESSOR) 25 mg tablet TAKE 1 TABLET (25 MG TOTAL) BY MOUTH EVERY 12 (TWELVE) HOURS 180 tablet 3     No current facility-administered medications for this visit       Past Medical History:   Diagnosis Date   • Diabetes mellitus ([address] )     diet control   • Heart disease 05/09/2020    Heart attack   • Hyperlipidemia    • Myocardial infarction [facility])    • Penile lesion    • Prediabetes      Past Surgical History:   Procedure Laterality Date   • CARDIAC SURGERY  05/2020    stent placement   • COLONOSCOPY     • HERNIA REPAIR     • LARYNGOSCOPY  1978    with vocal cord polyp removal   • LARYNGOSCOPY N/A 8/3/2021    Procedure: MICRODIRECT LARYNGOSCOPY WITH  EXCISION OF VOCAL CORD LESION;  Surgeon: Paola Iqbal DO;  Location: [facility];  Service: ENT     Social History     Socioeconomic History   • Marital status: Single     Spouse name: None   • Number of children: None   • Years of education: None   • Highest education level: None   Occupational History   • None Tobacco Use   • Smoking status: Former     Packs/day: 1 00     Years: 25 00     Pack years: 25 00     Types: Cigarettes   • Smokeless tobacco: Never   Vaping Use   • Vaping Use: Never used   Substance and Sexual Activity   • Alcohol use: Yes     Comment: Social twice a month   • Drug use: Never   • Sexual activity: None   Other Topics Concern   • None   Social History Narrative   • None     Social Determinants of Health     Financial Resource Strain: Unknown   • Difficulty of Paying Living Expenses: Patient refused   Food Insecurity: Not on file   Transportation Needs: Unknown   • Lack of Transportation (Medical): Patient refused   • Lack of Transportation (Non-Medical): Patient refused   Physical Activity: Not on file   Stress: Not on file   Social Connections: Not on file   Intimate Partner Violence: Not on file   Housing Stability: Not on file     Family History   Problem Relation Age of Onset   • Heart disease Mother         Cardiac Disorder   • No Known Problems Father    • No Known Problems Sister             PHYSICAL EXAM:    Vitals:    02/01/23 1455   BP: 124/84   Pulse: 85   Weight: 89 4 kg (197 lb)   Height: 5' 8" (1 727 m)     General Appearance:   Alert and oriented x 3   Cooperative, and in no respiratory distress   HEENT:   Normocephalic, atraumatic, anicteric      Neck:  Supple, symmetrical, trachea midline   Lungs:   Clear to auscultation bilaterally    Heart[de-identified]   Regular rate and rhythm   Abdomen:   Soft, non-tender, non-distended; normal bowel sounds; no masses, no organomegaly    Genitalia:   Deferred    Rectal:   Deferred    Extremities:  No cyanosis, clubbing or edema    Pulses:  2+ and symmetric all extremities    Skin:  Skin color, texture, turgor normal, no rashes or lesions    Lymph nodes:  No palpable cervical or supraclavicular lymphadenopathy        Lab Results:       Results from last 6 Months   Lab Units 01/11/23  1428 12/17/22  0707   POTASSIUM mmol/L 4 3 3 9   CHLORIDE mmol/L 107 106 CO2 mmol/L 25 23   BUN mg/dL 18 24   CREATININE mg/dL 1 44* 1 49*   CALCIUM mg/dL 8 7 9 0   ALK PHOS U/L  --  55   ALT U/L  --  23   AST U/L  --  18               Imaging Studies: I have personally reviewed pertinent imaging studies  No results found  ASSESSMENT and PLAN:      1) Chronic cough, history of LPR and previous vocal cord surgery - Following with ENT who determined he has LPR via layngoscopy  He feels his cough has been better recently, and his significant other reports the same  BNP was WNL  - Plan for upper GI series   - Continue LPR regimen as established by ENT, we appreciate their recommendations    - Protonix in the morning, Pepcid before bedtime   - Can plan for EGD if there is an obvious abnormality on upper GI series or his cough continues   - Patient agrees with above plan

## 2023-02-02 ENCOUNTER — HOSPITAL ENCOUNTER (OUTPATIENT)
Dept: PULMONOLOGY | Facility: HOSPITAL | Age: 83
End: 2023-02-02
Attending: FAMILY MEDICINE

## 2023-02-02 DIAGNOSIS — R05.3 CHRONIC COUGH: ICD-10-CM

## 2023-02-02 RX ORDER — ALBUTEROL SULFATE 2.5 MG/3ML
2.5 SOLUTION RESPIRATORY (INHALATION) ONCE
Status: COMPLETED | OUTPATIENT
Start: 2023-02-02 | End: 2023-02-02

## 2023-02-02 RX ADMIN — ALBUTEROL SULFATE 2.5 MG: 2.5 SOLUTION RESPIRATORY (INHALATION) at 10:00

## 2023-02-06 DIAGNOSIS — J98.4 RESTRICTIVE LUNG DISEASE: ICD-10-CM

## 2023-02-06 DIAGNOSIS — R05.3 CHRONIC COUGH: Primary | ICD-10-CM

## 2023-02-07 ENCOUNTER — TELEPHONE (OUTPATIENT)
Dept: PULMONOLOGY | Facility: CLINIC | Age: 83
End: 2023-02-07

## 2023-02-07 NOTE — TELEPHONE ENCOUNTER
Left message for pt to call office to schedule appt for consult for rld and chronic cough from referral list

## 2023-02-09 ENCOUNTER — OFFICE VISIT (OUTPATIENT)
Dept: CARDIOLOGY CLINIC | Facility: CLINIC | Age: 83
End: 2023-02-09

## 2023-02-09 ENCOUNTER — TELEPHONE (OUTPATIENT)
Dept: PULMONOLOGY | Facility: CLINIC | Age: 83
End: 2023-02-09

## 2023-02-09 VITALS
OXYGEN SATURATION: 99 % | WEIGHT: 198 LBS | BODY MASS INDEX: 30.01 KG/M2 | HEART RATE: 71 BPM | RESPIRATION RATE: 16 BRPM | HEIGHT: 68 IN

## 2023-02-09 DIAGNOSIS — I25.10 CORONARY ARTERY DISEASE INVOLVING NATIVE HEART WITHOUT ANGINA PECTORIS, UNSPECIFIED VESSEL OR LESION TYPE: ICD-10-CM

## 2023-02-09 DIAGNOSIS — E11.9 TYPE 2 DIABETES MELLITUS WITHOUT COMPLICATION, WITHOUT LONG-TERM CURRENT USE OF INSULIN (HCC): ICD-10-CM

## 2023-02-09 DIAGNOSIS — I21.21 ST ELEVATION MYOCARDIAL INFARCTION INVOLVING LEFT CIRCUMFLEX CORONARY ARTERY (HCC): Primary | ICD-10-CM

## 2023-02-09 NOTE — PROGRESS NOTES
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 crunches, "cardio", and weightlifting  He has a history of a circumflex related inferior wall MI in May 2020  He is a diabetic type II and his hemoglobin A1c has been reasonably well controlled  He is on Lipitor and has an LDL of 36      Patient Active Problem List   Diagnosis   • Medicare annual wellness visit, subsequent   • Chronic cough   • ST elevation myocardial infarction involving left circumflex coronary artery ([facility] )   • Coronary artery disease involving native heart without angina pectoris   • Hypertension   • Mixed hyperlipidemia   • Primary osteoarthritis of both knees   • Trigger thumb, right thumb   • Stage 3 chronic kidney disease, unspecified whether stage 3a or 3b CKD ([facility])   • Hordeolum externum of right upper eyelid   • Viral URI with cough   • Type 2 diabetes mellitus without complication, without long-term current use of insulin ([facility])   • Uncontrolled type 2 diabetes mellitus with hyperglycemia ([facility])   • BPH with obstruction/lower urinary tract symptoms     Past Medical History:   Diagnosis Date   • Diabetes mellitus ([facility] )     diet control   • Heart disease 05/09/2020    Heart attack   • Hyperlipidemia    • Myocardial infarction [facility])    • Penile lesion    • Prediabetes      Social History     Socioeconomic History   • Marital status: Single     Spouse name: Not on file   • Number of children: Not on file   • Years of education: Not on file   • Highest education level: Not on file   Occupational History   • Not on file   Tobacco Use   • Smoking status: Former     Packs/day: 1 00     Years: 25 00     Pack years: 25 00     Types: Cigarettes   • Smokeless tobacco: Never   Vaping Use   • Vaping Use: Never used   Substance and Sexual Activity   • Alcohol use: Yes     Comment: Social twice a month   • Drug use: Never   • Sexual activity: Not on file   Other Topics Concern   • Not on file   Social History Narrative   • Not on file     Social Determinants of Health     Financial Resource Strain: Unknown   • Difficulty of Paying Living Expenses: Patient refused   Food Insecurity: Not on file   Transportation Needs: Unknown   • Lack of Transportation (Medical): Patient refused   • Lack of Transportation (Non-Medical): Patient refused   Physical Activity: Not on file   Stress: Not on file   Social Connections: Not on file   Intimate Partner Violence: Not on file   Housing Stability: Not on file      Family History   Problem Relation Age of Onset   • Heart disease Mother         Cardiac Disorder   • No Known Problems Father    • No Known Problems Sister      Past Surgical History:   Procedure Laterality Date   • CARDIAC SURGERY  05/2020    stent placement   • COLONOSCOPY     • HERNIA REPAIR     • LARYNGOSCOPY  1978    with vocal cord polyp removal   • LARYNGOSCOPY N/A 8/3/2021    Procedure: MICRODIRECT LARYNGOSCOPY WITH  EXCISION OF VOCAL CORD LESION;  Surgeon: Deann Hummel DO;  Location: [facility];  Service: ENT       Current Outpatient Medications:   •  Ascorbic Acid (VITAMIN C) 1000 MG tablet, Take 2,000 mg by mouth 2 (two) times a day , Disp: , Rfl:   •  aspirin (ECOTRIN LOW STRENGTH) 81 mg EC tablet, Take 1 tablet (81 mg total) by mouth daily, Disp: 60 tablet, Rfl: 0  •  atorvastatin (LIPITOR) 40 mg tablet, TAKE 1 TABLET BY MOUTH EVERY DAY, Disp: 90 tablet, Rfl: 3  •  Blood Glucose Monitoring Suppl (FREESTYLE FREEDOM LITE) w/Device KIT, Check glucose levels once daily, Disp: 1 each, Rfl: 0  •  Diclofenac Sodium (VOLTAREN) 1 %, Apply 2 g topically 4 (four) times a day, Disp: 100 g, Rfl: 1  •  famotidine (PEPCID) 40 MG tablet, TAKE 1 TABLET (40 MG TOTAL) BY MOUTH DAILY TAKE BEFORE BED, Disp: 30 tablet, Rfl: 3  •  glucose blood (FREESTYLE LITE) test strip, Use 1 each 3 (three) times a day, Disp: 300 strip, Rfl: 1  •  Lancets (FREESTYLE) lancets, TEST ONCE DAILY FASTING DXE11 9, Disp: 100 each, Rfl: 3  •  metFORMIN (GLUCOPHAGE) 500 mg tablet, Take 1 tablet (500 mg total) by mouth 2 (two) times a day with meals, Disp: 60 tablet, Rfl: 5  •  metoprolol tartrate (LOPRESSOR) 25 mg tablet, TAKE 1 TABLET (25 MG TOTAL) BY MOUTH EVERY 12 (TWELVE) HOURS, Disp: 180 tablet, Rfl: 3  •  pantoprazole (PROTONIX) 40 mg tablet, TAKE 1 TABLET (40 MG TOTAL) BY MOUTH DAILY TAKE IN MORNING, Disp: 30 tablet, Rfl: 3  •  tamsulosin (FLOMAX) 0 4 mg, Take 1 capsule (0 4 mg total) by mouth daily with dinner, Disp: 30 capsule, Rfl: 1  •  pantoprazole (PROTONIX) 40 mg tablet, Take 1 tablet (40 mg total) by mouth daily Take in morning (Patient not taking: Reported on 2/9/2023), Disp: 60 tablet, Rfl: 1  Allergies   Allergen Reactions   • Celecoxib      Other reaction(s): itchy  Other reaction(s): itchy       Labs:  Appointment on 01/11/2023   Component Date Value   • NT-proBNP 01/11/2023 334    • Sodium 01/11/2023 137    • Potassium 01/11/2023 4 3    • Chloride 01/11/2023 107    • CO2 01/11/2023 25    • ANION GAP 01/11/2023 5    • BUN 01/11/2023 18    • Creatinine 01/11/2023 1 44 (H)    • Glucose 01/11/2023 133    • Calcium 01/11/2023 8 7    • eGFR 01/11/2023 45    Appointment on 12/17/2022   Component Date Value   • Cholesterol 12/17/2022 102    • Triglycerides 12/17/2022 151 (H)    • HDL, Direct 12/17/2022 36 (L)    • LDL Calculated 12/17/2022 36    • Sodium 12/17/2022 139    • Potassium 12/17/2022 3 9    • Chloride 12/17/2022 106    • CO2 12/17/2022 23 • ANION GAP 2022 10    • BUN 2022 24    • Creatinine 2022 1 49 (H)    • Glucose, Fasting 2022 201 (H)    • Calcium 2022 9 0    • AST 2022 18    • ALT 2022 23    • Alkaline Phosphatase 2022 55    • Total Protein 2022 7 4    • Albumin 2022 3 8    • Total Bilirubin 2022 0 54    • eGFR 2022 43    Office Visit on 2022   Component Date Value   • POCT SARS-CoV-2 Ag 2022 Negative    • VALID CONTROL 2022 Valid    • Severity 2022 ALERT (A)    • Right Eye Diabetic Retin* 2022 None    • Right Eye Macular Edema 2022 None    • Right Eye Other Retinopa* 2022 Suspected Dry AMD (A)    • Right Eye Image Quality 2022 Gradable Image    • Left Eye Diabetic Retino* 2022 None    • Left Eye Macular Edema 2022 None    • Left Eye Other Retinopat* 2022 Suspected Dry AMD (A)    • Left Eye Image Quality 2022 Gradable Image    • Result 2022 Retinal Study Result for [facility]    • Result 2022      • Result 2022  [facility]SE, a 80 y/o, M (: 1940, MRN: [id number])    • Result 2022  presented to [facility] on 2022 for a retinal imaging study of the left and right eyes  • Result 2022      • Result 2022  Based on the findings of the study, the following is recommended for [facility]    • Result 2022  Dry Age Related Macular Degeneration Suspected: Schedule an appointment with an ophthalmologist for further evaluation within the next 3 months      • Result 2022      • Result 2022  Interpreting Provider's Comments:  No comments provided    • Result 2022  Diagnoses Present: E119 - Type 2 diabetes mellitus without complications    • Result 2022      • Result 2022  Right eye findings: Negative for Diabetic Retinopathy    • Result 2022 Negative for Macular Edema    • Result 12/17/2022 Suspected Dry AMD    • Result 12/17/2022      • Result 12/17/2022  Left eye findings: Negative for Diabetic Retinopathy    • Result 12/17/2022 Negative for Macular Edema    • Result 12/17/2022 Suspected Dry AMD    • Result 12/17/2022      • Result 12/17/2022  This result was electronically signed by María Villegas MD, NPI: [id number], Taxonomy: [id number] on 12- 19:18 [facility]  • Result 12/17/2022 NOTE:  Any pathology noted on this diabetic retinal evaluation should be confirmed by an appropriate ophthalmic examination  • Creatinine, Ur 12/17/2022 80 7    • Microalbum  ,U,Random 12/17/2022 <5 0    • Microalb Creat Ratio 12/17/2022 <6    • Hemoglobin A1C 12/16/2022 8 8 (A)      Imaging: XR chest pa & lateral    Result Date: 1/13/2023  Narrative: CHEST INDICATION:   R05 3: Chronic cough  COMPARISON:  CXR 7/28/2021  EXAM PERFORMED/VIEWS:  XR CHEST PA & LATERAL FINDINGS: Cardiomediastinal silhouette appears unremarkable  The lungs are clear  No pneumothorax or pleural effusion  Osseous structures appear within normal limits for patient age  Impression: No acute cardiopulmonary disease  Workstation performed: [id number]     Complete PFT with post bronchodilator    Result Date: 2/3/2023  Narrative: Diagnosis: Chronic cough Requesting provider: Dr Charlene Salas Results: Patient gave good effort and cooperation  The testing met ATS Standards for Acceptability and Repeatability  Resting room air saturation 100%  Heart rate 78  Hemoglobin 12 0 Spirometry: FEV1/FVC Ratio is 82%  FEV1 is 2 17 liters, 81% predicted  FVC is 2 64 L, 73% predicted  No significant improvement after the administration of bronchodilator Flow volume loop: Mildly restricted appearance Lung volumes: Total lung capacity 77% predicted by nitrogen washout  Diffusing capacity: Corrected DLCO 35% predicted     Impression: 1   Mild restrictive ventilatory flow limitation on spirometry without significant improvement after the administration of bronchodilator 2  Restricted appearing flow-volume loop 3  Very mild reduction in total lung capacity at 77% predicted 4  Severe reduction in corrected DLCO for which clinical correlation is advised Helga Aguilar MD Portions of the record may have been created with voice recognition software  Occasional wrong word or "sound a like" substitutions may have occurred due to the inherent limitations of voice recognition software  Read the chart carefully and recognize, using context, where substitutions have occurred  Review of Systems:  Review of Systems    Physical Exam:  110/70  Heart rate 71 and regular  Lungs clear  Rhythm regular  No carotid bruits  No murmurs or gallops  Discussion/Summary:    #1    Coronary artery disease without angina pectoris    Recommendations:    Stay active  Report any symptoms of exertional related discomfort  Follow-up 1 year      Mitch Barbour MD

## 2023-02-09 NOTE — TELEPHONE ENCOUNTER
Spoke with pt   Pt states no longer having any symptoms   Specifically no cough   Pt feels he does not need appt and would like referral canceled for now

## 2023-02-14 PROBLEM — J06.9 VIRAL URI WITH COUGH: Status: RESOLVED | Noted: 2022-12-16 | Resolved: 2023-02-14

## 2023-03-04 DIAGNOSIS — N13.8 BPH WITH OBSTRUCTION/LOWER URINARY TRACT SYMPTOMS: ICD-10-CM

## 2023-03-04 DIAGNOSIS — N40.1 BPH WITH OBSTRUCTION/LOWER URINARY TRACT SYMPTOMS: ICD-10-CM

## 2023-03-06 RX ORDER — TAMSULOSIN HYDROCHLORIDE 0.4 MG/1
CAPSULE ORAL
Qty: 30 CAPSULE | Refills: 1 | Status: SHIPPED | OUTPATIENT
Start: 2023-03-06

## 2023-03-24 ENCOUNTER — RA CDI HCC (OUTPATIENT)
Dept: OTHER | Facility: HOSPITAL | Age: 83
End: 2023-03-24

## 2023-03-31 ENCOUNTER — OFFICE VISIT (OUTPATIENT)
Dept: FAMILY MEDICINE CLINIC | Facility: CLINIC | Age: 83
End: 2023-03-31

## 2023-03-31 VITALS
BODY MASS INDEX: 30.01 KG/M2 | SYSTOLIC BLOOD PRESSURE: 124 MMHG | HEIGHT: 68 IN | HEART RATE: 48 BPM | DIASTOLIC BLOOD PRESSURE: 87 MMHG | TEMPERATURE: 97.5 F | OXYGEN SATURATION: 98 % | WEIGHT: 198 LBS

## 2023-03-31 DIAGNOSIS — T50.905A DRUG-INDUCED BRADYCARDIA: ICD-10-CM

## 2023-03-31 DIAGNOSIS — R00.1 DRUG-INDUCED BRADYCARDIA: ICD-10-CM

## 2023-03-31 DIAGNOSIS — E11.9 TYPE 2 DIABETES MELLITUS WITHOUT COMPLICATION, WITHOUT LONG-TERM CURRENT USE OF INSULIN (HCC): Primary | ICD-10-CM

## 2023-03-31 PROBLEM — H00.011 HORDEOLUM EXTERNUM OF RIGHT UPPER EYELID: Status: RESOLVED | Noted: 2022-12-16 | Resolved: 2023-03-31

## 2023-03-31 PROBLEM — E11.65 UNCONTROLLED TYPE 2 DIABETES MELLITUS WITH HYPERGLYCEMIA (HCC): Status: RESOLVED | Noted: 2022-12-16 | Resolved: 2023-03-31

## 2023-03-31 LAB — SL AMB POCT HEMOGLOBIN AIC: 6.6 (ref ?–6.5)

## 2023-03-31 NOTE — PROGRESS NOTES
Assessment/Plan:         Problem List Items Addressed This Visit        Endocrine    Type 2 diabetes mellitus without complication, without long-term current use of insulin ([facility] ) - Primary       Lab Results   Component Value Date    HGBA1C 6 6 (A) 03/31/2023   A1c is improving - continue Metformin  Continue diet and exercise  Recheck A1c 3 months         Relevant Orders    POCT hemoglobin A1c (Completed)    Hemoglobin K0C    Basic metabolic panel    Microalbumin / creatinine urine ratio    Lipid Panel with Direct LDL reflex       Cardiovascular and Mediastinum    Drug-induced bradycardia     Due to Metoprolol  Asymptomatic at this time              Subjective:      Patient ID: Maria C Dunham is a 81 y o  male  81-year-old male here for diabetic checkup  His A1c has improved from 8 1 to 6 6  He is on metformin 500 mg BID  Tolerating well  Fasting glucoses are 120s  Exercising regularly at the gym  HR is low- is on Metoprolol - no symptoms such as dizziness or lightheadedness, feels great  Working on regularly  The following portions of the patient's history were reviewed and updated as appropriate:   Past Medical History:  He has a past medical history of Diabetes mellitus ([facility] ), Heart disease (05/09/2020), Hyperlipidemia, Myocardial infarction ([facility] ), Penile lesion, and Prediabetes  ,  _______________________________________________________________________  Medical Problems:  does not have any pertinent problems on file ,  _______________________________________________________________________  Past Surgical History:   has a past surgical history that includes Hernia repair; Cardiac surgery (05/2020); Laryngoscopy (1978); Colonoscopy; and LARYNGOSCOPY (N/A, 8/3/2021)  ,  _______________________________________________________________________  Family History:  family history includes Heart disease in his mother; No Known Problems in his father and sister  ,  _______________________________________________________________________  Social History:   reports that he has quit smoking  His smoking use included cigarettes  He has a 25 00 pack-year smoking history  He has never used smokeless tobacco  He reports current alcohol use  He reports that he does not use drugs  ,  _______________________________________________________________________  Allergies:  is allergic to celecoxib     _______________________________________________________________________  Current Outpatient Medications   Medication Sig Dispense Refill   • Ascorbic Acid (VITAMIN C) 1000 MG tablet Take 2,000 mg by mouth 2 (two) times a day      • aspirin (ECOTRIN LOW STRENGTH) 81 mg EC tablet Take 1 tablet (81 mg total) by mouth daily 60 tablet 0   • atorvastatin (LIPITOR) 40 mg tablet TAKE 1 TABLET BY MOUTH EVERY DAY 90 tablet 3   • Blood Glucose Monitoring Suppl (FREESTYLE FREEDOM LITE) w/Device KIT Check glucose levels once daily 1 each 0   • Diclofenac Sodium (VOLTAREN) 1 % Apply 2 g topically 4 (four) times a day 100 g 1   • famotidine (PEPCID) 40 MG tablet TAKE 1 TABLET (40 MG TOTAL) BY MOUTH DAILY TAKE BEFORE BED 30 tablet 3   • glucose blood (FREESTYLE LITE) test strip Use 1 each 3 (three) times a day 300 strip 1   • Lancets (FREESTYLE) lancets TEST ONCE DAILY FASTING DXE11 9 100 each 3   • metFORMIN (GLUCOPHAGE) 500 mg tablet Take 1 tablet (500 mg total) by mouth 2 (two) times a day with meals 60 tablet 5   • metoprolol tartrate (LOPRESSOR) 25 mg tablet TAKE 1 TABLET (25 MG TOTAL) BY MOUTH EVERY 12 (TWELVE) HOURS 180 tablet 3   • pantoprazole (PROTONIX) 40 mg tablet TAKE 1 TABLET (40 MG TOTAL) BY MOUTH DAILY TAKE IN MORNING 30 tablet 3   • tamsulosin (FLOMAX) 0 4 mg TAKE 1 CAPSULE BY MOUTH EVERY DAY WITH DINNER 30 capsule 1     No current facility-administered medications for this visit      _______________________________________________________________________  Review of Systems "  Respiratory: Negative for shortness of breath  Cardiovascular: Negative for chest pain, palpitations and leg swelling  Neurological: Negative for dizziness and light-headedness  Objective:  Vitals:    03/31/23 1309   BP: 124/87   Pulse: (!) 48   Temp: 97 5 °F (36 4 °C)   SpO2: 98%   Weight: 89 8 kg (198 lb)   Height: 5' 8\" (1 727 m)     Body mass index is 30 11 kg/m²  Physical Exam  Vitals and nursing note reviewed  Constitutional:       General: He is not in acute distress  Appearance: Normal appearance  He is well-developed  He is not diaphoretic  HENT:      Head: Normocephalic and atraumatic  Cardiovascular:      Rate and Rhythm: Regular rhythm  Bradycardia present  Heart sounds: Normal heart sounds  No murmur heard  No friction rub  No gallop  Pulmonary:      Effort: Pulmonary effort is normal  No respiratory distress  Breath sounds: Normal breath sounds  No wheezing or rales  Chest:      Chest wall: No tenderness  Musculoskeletal:         General: No swelling  Right lower leg: No edema  Left lower leg: No edema  Neurological:      Mental Status: He is alert and oriented to person, place, and time  Psychiatric:         Mood and Affect: Mood normal          Behavior: Behavior normal          Thought Content:  Thought content normal          Judgment: Judgment normal          "

## 2023-03-31 NOTE — ASSESSMENT & PLAN NOTE
Lab Results   Component Value Date    HGBA1C 6 6 (A) 03/31/2023   A1c is improving - continue Metformin  Continue diet and exercise    Recheck A1c 3 months

## 2023-05-05 DIAGNOSIS — M79.641 RIGHT HAND PAIN: ICD-10-CM

## 2023-05-09 DIAGNOSIS — N13.8 BPH WITH OBSTRUCTION/LOWER URINARY TRACT SYMPTOMS: ICD-10-CM

## 2023-05-09 DIAGNOSIS — N40.1 BPH WITH OBSTRUCTION/LOWER URINARY TRACT SYMPTOMS: ICD-10-CM

## 2023-05-09 RX ORDER — TAMSULOSIN HYDROCHLORIDE 0.4 MG/1
CAPSULE ORAL
Qty: 30 CAPSULE | Refills: 1 | Status: SHIPPED | OUTPATIENT
Start: 2023-05-09

## 2023-06-07 DIAGNOSIS — I21.21 ST ELEVATION MYOCARDIAL INFARCTION INVOLVING LEFT CIRCUMFLEX CORONARY ARTERY (HCC): ICD-10-CM

## 2023-06-07 NOTE — TELEPHONE ENCOUNTER
Name from pharmacy: METOPROLOL TARTRATE 25 MG TAB         Will file in chart as: metoprolol tartrate (LOPRESSOR) 25 mg tablet    Sig: TAKE 1 TABLET (25 MG TOTAL) BY MOUTH EVERY 12 (TWELVE) HOURS    Disp:  180 tablet    Refills:  3    Start: 6/7/2023    Class: Normal    Non-formulary For: ST elevation myocardial infarction involving left circumflex coronary artery ([facility] [address] )    Last ordered: 1 year ago (6/6/2022) by Debbie Lugo MD    Last refill: 3/9/2023    Rx #: [id number]    Cardiovascular:  Beta Blockers Failed 06/07/2023 03:33 AM   Protocol Details  Last Heart Rate in normal range and within 180 days    BP completed in the last 6 months    Valid encounter within last 6 months      To be filled at: [id number] [address], [address]   Please review and refill if possible   Thanks!

## 2023-06-08 DIAGNOSIS — E11.9 TYPE 2 DIABETES MELLITUS WITHOUT COMPLICATION, WITHOUT LONG-TERM CURRENT USE OF INSULIN (HCC): ICD-10-CM

## 2023-06-30 ENCOUNTER — TELEPHONE (OUTPATIENT)
Dept: FAMILY MEDICINE CLINIC | Facility: CLINIC | Age: 83
End: 2023-06-30

## 2023-06-30 ENCOUNTER — OFFICE VISIT (OUTPATIENT)
Dept: URGENT CARE | Facility: CLINIC | Age: 83
End: 2023-06-30
Payer: MEDICARE

## 2023-06-30 VITALS
HEART RATE: 77 BPM | TEMPERATURE: 97.8 F | BODY MASS INDEX: 29.4 KG/M2 | OXYGEN SATURATION: 95 % | DIASTOLIC BLOOD PRESSURE: 82 MMHG | WEIGHT: 193.38 LBS | SYSTOLIC BLOOD PRESSURE: 140 MMHG | RESPIRATION RATE: 16 BRPM

## 2023-06-30 DIAGNOSIS — J06.9 ACUTE URI: Primary | ICD-10-CM

## 2023-06-30 PROCEDURE — G0463 HOSPITAL OUTPT CLINIC VISIT: HCPCS | Performed by: PHYSICIAN ASSISTANT

## 2023-06-30 PROCEDURE — 99213 OFFICE O/P EST LOW 20 MIN: CPT | Performed by: PHYSICIAN ASSISTANT

## 2023-06-30 RX ORDER — AMOXICILLIN AND CLAVULANATE POTASSIUM 875; 125 MG/1; MG/1
1 TABLET, FILM COATED ORAL EVERY 12 HOURS SCHEDULED
Qty: 14 TABLET | Refills: 0 | Status: SHIPPED | OUTPATIENT
Start: 2023-06-30 | End: 2023-07-07 | Stop reason: ALTCHOICE

## 2023-06-30 NOTE — PATIENT INSTRUCTIONS
Upper Respiratory Infection     Over-the-counter medications to help with symptoms    - Plain Robitussin or plain Mucinex as directed on the packaging for mucus relief   - Sudafed (for those without history of high blood pressure) or Coricidin HBP (for those with history of high blood pressure) for nasal/sinus congestion   - Ibuprofen or Acetaminophen for pain, fever, or sore throat    - Afrin nasal spray (do not use more than 5 days!) or saline nasal spray for nasal congestion   - Vitamin C supplements may help shorten duration of colds   Other measures to help with illness     - Get plenty of rest    - Increase your fluid intake while you feel ill (especially drinks with electrolytes such as Gatorade or Pedialyte)   When to return to your healthcare provider    - You develop a fever after being fever-free (>100 degrees F)   - You have chest tightness or shortness of breath    - Symptoms worsen after initially getting better    - Symptoms persist more than 10 days

## 2023-06-30 NOTE — PROGRESS NOTES
[address]        NAME: Aylin Newman is a 81 y o  male  : 1940    MRN: [id number]  DATE: 2023  TIME: 2:34 PM      Assessment and Plan     Acute URI [J06 9]  1  Acute URI  amoxicillin-clavulanate (AUGMENTIN) 875-125 mg per tablet            Patient Instructions     Patient Instructions   Upper Respiratory Infection     Over-the-counter medications to help with symptoms    - Plain Robitussin or plain Mucinex as directed on the packaging for mucus relief   - Sudafed (for those without history of high blood pressure) or Coricidin HBP (for those with history of high blood pressure) for nasal/sinus congestion   - Ibuprofen or Acetaminophen for pain, fever, or sore throat    - Afrin nasal spray (do not use more than 5 days!) or saline nasal spray for nasal congestion   - Vitamin C supplements may help shorten duration of colds   Other measures to help with illness     - Get plenty of rest    - Increase your fluid intake while you feel ill (especially drinks with electrolytes such as Gatorade or Pedialyte)   When to return to your healthcare provider    - You develop a fever after being fever-free (>100 degrees F)   - You have chest tightness or shortness of breath    - Symptoms worsen after initially getting better    - Symptoms persist more than 10 days          Follow up with PCP in 3-5 days  Go to ER if symptoms worsen  Chief Complaint     Chief Complaint   Patient presents with   • Headache     3 days Sinus pressure and body aches, coughing  Runny/stuffy nose  Using flonase  Taking sudafed  Pressure in ears  Decreased appetite  No fever  History of Present Illness     Patient presents with headache, sinus pressure, runny nose, sore throat, and cough x 3 days  He states he usually gets bronchitis twice per year  Headache      Review of Systems     Review of Systems   Constitutional: Negative for chills, fatigue and fever     HENT: Positive for congestion, rhinorrhea, sinus pressure and sore throat  Negative for ear pain, postnasal drip, sinus pain and sneezing  Eyes: Negative for pain and visual disturbance  Respiratory: Positive for cough  Negative for shortness of breath  Cardiovascular: Negative for chest pain and palpitations  Gastrointestinal: Negative for abdominal pain, diarrhea, nausea and vomiting  Genitourinary: Negative for dysuria and hematuria  Musculoskeletal: Negative for arthralgias, back pain and myalgias  Skin: Negative for rash  Neurological: Positive for headaches  Negative for dizziness, seizures, syncope and numbness  All other systems reviewed and are negative          Current Medications       Current Outpatient Medications:   •  amoxicillin-clavulanate (AUGMENTIN) 875-125 mg per tablet, Take 1 tablet by mouth every 12 (twelve) hours for 7 days, Disp: 14 tablet, Rfl: 0  •  Ascorbic Acid (VITAMIN C) 1000 MG tablet, Take 2,000 mg by mouth 2 (two) times a day , Disp: , Rfl:   •  aspirin (ECOTRIN LOW STRENGTH) 81 mg EC tablet, Take 1 tablet (81 mg total) by mouth daily, Disp: 60 tablet, Rfl: 0  •  atorvastatin (LIPITOR) 40 mg tablet, TAKE 1 TABLET BY MOUTH EVERY DAY, Disp: 90 tablet, Rfl: 3  •  Blood Glucose Monitoring Suppl (FREESTYLE FREEDOM LITE) w/Device KIT, Check glucose levels once daily, Disp: 1 each, Rfl: 0  •  Diclofenac Sodium (VOLTAREN) 1 %, Apply 2 g topically 4 (four) times a day, Disp: 100 g, Rfl: 1  •  famotidine (PEPCID) 40 MG tablet, TAKE 1 TABLET (40 MG TOTAL) BY MOUTH DAILY TAKE BEFORE BED, Disp: 30 tablet, Rfl: 3  •  glucose blood (FREESTYLE LITE) test strip, Use 1 each 3 (three) times a day, Disp: 300 strip, Rfl: 1  •  Lancets (FREESTYLE) lancets, TEST ONCE DAILY FASTING DXE11 9, Disp: 100 each, Rfl: 3  •  metFORMIN (GLUCOPHAGE) 500 mg tablet, TAKE 1 TABLET BY MOUTH TWICE A DAY WITH MEALS, Disp: 60 tablet, Rfl: 5  •  metoprolol tartrate (LOPRESSOR) 25 mg tablet, TAKE 1 TABLET (25 MG TOTAL) BY MOUTH EVERY 12 (TWELVE) HOURS, Disp: 180 tablet, Rfl: 3  •  pantoprazole (PROTONIX) 40 mg tablet, Take 1 tablet (40 mg total) by mouth daily Take in morning, Disp: 30 tablet, Rfl: 3  •  tamsulosin (FLOMAX) 0 4 mg, TAKE 1 CAPSULE BY MOUTH EVERY DAY WITH DINNER, Disp: 30 capsule, Rfl: 1    Current Allergies     Allergies as of 06/30/2023 - Reviewed 06/30/2023   Allergen Reaction Noted   • Celecoxib  06/17/2017              The following portions of the patient's history were reviewed and updated as appropriate: allergies, current medications, past family history, past medical history, past social history, past surgical history, and problem list      Past Medical History:   Diagnosis Date   • Diabetes mellitus ([facility] [address] )     diet control   • Heart disease 05/09/2020    Heart attack   • Hyperlipidemia    • Myocardial infarction [facility])    • Penile lesion    • Prediabetes        Past Surgical History:   Procedure Laterality Date   • CARDIAC SURGERY  05/2020    stent placement   • COLONOSCOPY     • HERNIA REPAIR     • LARYNGOSCOPY  1978    with vocal cord polyp removal   • LARYNGOSCOPY N/A 8/3/2021    Procedure: MICRODIRECT LARYNGOSCOPY WITH  EXCISION OF VOCAL CORD LESION;  Surgeon: Natasha Witt DO;  Location: [facility];  Service: ENT       Family History   Problem Relation Age of Onset   • Heart disease Mother         Cardiac Disorder   • No Known Problems Father    • No Known Problems Sister          Medications have been verified  Objective     /82   Pulse 77   Temp 97 8 °F (36 6 °C)   Resp 16   Wt 87 7 kg (193 lb 6 oz)   SpO2 95%   BMI 29 40 kg/m²   No LMP for male patient  Physical Exam     Physical Exam  Vitals and nursing note reviewed  Constitutional:       Appearance: Normal appearance  He is normal weight  HENT:      Head: Normocephalic  Right Ear: Tympanic membrane, ear canal and external ear normal       Left Ear: Tympanic membrane, ear canal and external ear normal       Nose: Congestion present  Mouth/Throat:      Mouth: Mucous membranes are moist       Pharynx: Posterior oropharyngeal erythema present  Cardiovascular:      Rate and Rhythm: Normal rate and regular rhythm  Pulses: Normal pulses  Heart sounds: Normal heart sounds  Pulmonary:      Effort: Pulmonary effort is normal       Breath sounds: Wheezing present  Comments: Very mild wheezes on expiration in all lung fields  Lymphadenopathy:      Cervical: No cervical adenopathy  Skin:     General: Skin is dry  Neurological:      General: No focal deficit present  Mental Status: He is alert and oriented to person, place, and time     Psychiatric:         Mood and Affect: Mood normal          Behavior: Behavior normal

## 2023-06-30 NOTE — TELEPHONE ENCOUNTER
Pt has sinus infection    now down in his throat, he can't talk   & has nasal congestion    He took  sudafed  24 hr's ago and is asking if he should take another ? ? Also asking if  can prescribe med for him ? ??

## 2023-07-07 ENCOUNTER — OFFICE VISIT (OUTPATIENT)
Dept: FAMILY MEDICINE CLINIC | Facility: CLINIC | Age: 83
End: 2023-07-07
Payer: MEDICARE

## 2023-07-07 VITALS
HEIGHT: 68 IN | WEIGHT: 192.8 LBS | TEMPERATURE: 97.9 F | BODY MASS INDEX: 29.22 KG/M2 | DIASTOLIC BLOOD PRESSURE: 80 MMHG | SYSTOLIC BLOOD PRESSURE: 122 MMHG | OXYGEN SATURATION: 94 % | HEART RATE: 82 BPM

## 2023-07-07 DIAGNOSIS — R09.81 NASAL SINUS CONGESTION: Primary | ICD-10-CM

## 2023-07-07 PROCEDURE — 99213 OFFICE O/P EST LOW 20 MIN: CPT | Performed by: NURSE PRACTITIONER

## 2023-07-07 NOTE — PROGRESS NOTES
Name: Paulo Rogers      : 1940      MRN: [id number]  Encounter Provider: LUCA Thomas  Encounter Date: 2023   Encounter department: [address] [place] [address]. Nasal sinus congestion  Assessment & Plan:  Patient has recovered from his sinus infection and is doing well completed the Augmentin         BMI Counseling: Body mass index is 29.32 kg/m². The BMI is above normal. Nutrition recommendations include decreasing portion sizes, encouraging healthy choices of fruits and vegetables, decreasing fast food intake, consuming healthier snacks, limiting drinks that contain sugar and moderation in carbohydrate intake. Exercise recommendations include moderate physical activity 150 minutes/week. No pharmacotherapy was ordered. Patient referred to PCP. Rationale for BMI follow-up plan is due to patient being overweight or obese. Subjective      Patient here today and reports that he was seen at the walk in center last week and was treated for bronchitis and was given amoxicillin and was treated for a week and seems to have resolved having a dry cough at night. Patient woke up this morning and rep(orts that he was somewhat fatigue and sleeping this morning. At night having a dry cough     Review of Systems   Constitutional: Negative for activity change, appetite change, chills, diaphoresis, fatigue, fever and unexpected weight change. HENT: Positive for postnasal drip. Negative for congestion, ear pain, hearing loss, sinus pressure, sinus pain, sneezing and sore throat. Eyes: Negative for pain, redness and visual disturbance. Respiratory: Positive for cough. Negative for shortness of breath. Cardiovascular: Negative for chest pain and leg swelling. Gastrointestinal: Negative for abdominal pain, diarrhea, nausea and vomiting. Endocrine: Negative. Genitourinary: Negative. Musculoskeletal: Negative for arthralgias.    Allergic/Immunologic: Negative. Neurological: Negative for dizziness and light-headedness. Hematological: Negative. Psychiatric/Behavioral: Negative for behavioral problems and dysphoric mood. Current Outpatient Medications on File Prior to Visit   Medication Sig   • aspirin (ECOTRIN LOW STRENGTH) 81 mg EC tablet Take 1 tablet (81 mg total) by mouth daily   • atorvastatin (LIPITOR) 40 mg tablet TAKE 1 TABLET BY MOUTH EVERY DAY   • Blood Glucose Monitoring Suppl (FREESTYLE FREEDOM LITE) w/Device KIT Check glucose levels once daily   • Diclofenac Sodium (VOLTAREN) 1 % Apply 2 g topically 4 (four) times a day   • glucose blood (FREESTYLE LITE) test strip Use 1 each 3 (three) times a day   • Lancets (FREESTYLE) lancets TEST ONCE DAILY FASTING DXE11.9   • metFORMIN (GLUCOPHAGE) 500 mg tablet TAKE 1 TABLET BY MOUTH TWICE A DAY WITH MEALS   • metoprolol tartrate (LOPRESSOR) 25 mg tablet TAKE 1 TABLET (25 MG TOTAL) BY MOUTH EVERY 12 (TWELVE) HOURS   • pantoprazole (PROTONIX) 40 mg tablet Take 1 tablet (40 mg total) by mouth daily Take in morning   • tamsulosin (FLOMAX) 0.4 mg TAKE 1 CAPSULE BY MOUTH EVERY DAY WITH DINNER   • [DISCONTINUED] amoxicillin-clavulanate (AUGMENTIN) 875-125 mg per tablet Take 1 tablet by mouth every 12 (twelve) hours for 7 days (Patient not taking: Reported on 7/7/2023)   • [DISCONTINUED] Ascorbic Acid (VITAMIN C) 1000 MG tablet Take 2,000 mg by mouth 2 (two) times a day  (Patient not taking: Reported on 7/7/2023)   • [DISCONTINUED] famotidine (PEPCID) 40 MG tablet TAKE 1 TABLET (40 MG TOTAL) BY MOUTH DAILY TAKE BEFORE BED (Patient not taking: Reported on 7/7/2023)       Objective     /80 (BP Location: Left arm, Patient Position: Sitting)   Pulse 82   Temp 97.9 °F (36.6 °C) (Temporal)   Ht 5' 8" (1.727 m)   Wt 87.5 kg (192 lb 12.8 oz)   SpO2 94%   BMI 29.32 kg/m²     Physical Exam  Constitutional:       General: He is not in acute distress. Appearance: He is well-developed.    HENT: Head: Normocephalic and atraumatic. Right Ear: Tympanic membrane normal.      Left Ear: Tympanic membrane normal.      Nose:      Right Sinus: Maxillary sinus tenderness present. Left Sinus: Maxillary sinus tenderness present. Mouth/Throat:      Mouth: Mucous membranes are moist.   Eyes:      Pupils: Pupils are equal, round, and reactive to light. Neck:      Thyroid: No thyromegaly. Cardiovascular:      Rate and Rhythm: Normal rate and regular rhythm. Heart sounds: Normal heart sounds. No murmur heard. Pulmonary:      Effort: Pulmonary effort is normal. No respiratory distress. Breath sounds: Normal breath sounds. No wheezing. Abdominal:      General: Bowel sounds are normal.      Palpations: Abdomen is soft. Musculoskeletal:         General: Normal range of motion. Cervical back: Normal range of motion. Skin:     General: Skin is warm and dry. Neurological:      General: No focal deficit present. Mental Status: He is alert and oriented to person, place, and time.    Psychiatric:         Mood and Affect: Mood normal.       LUCA Hernández

## 2023-07-08 DIAGNOSIS — N40.1 BPH WITH OBSTRUCTION/LOWER URINARY TRACT SYMPTOMS: ICD-10-CM

## 2023-07-08 DIAGNOSIS — N13.8 BPH WITH OBSTRUCTION/LOWER URINARY TRACT SYMPTOMS: ICD-10-CM

## 2023-07-10 RX ORDER — TAMSULOSIN HYDROCHLORIDE 0.4 MG/1
CAPSULE ORAL
Qty: 30 CAPSULE | Refills: 1 | Status: SHIPPED | OUTPATIENT
Start: 2023-07-10

## 2023-08-08 DIAGNOSIS — K21.9 LARYNGOPHARYNGEAL REFLUX (LPR): ICD-10-CM

## 2023-08-08 DIAGNOSIS — E11.9 TYPE 2 DIABETES MELLITUS WITHOUT COMPLICATION, WITHOUT LONG-TERM CURRENT USE OF INSULIN (HCC): ICD-10-CM

## 2023-08-08 RX ORDER — PANTOPRAZOLE SODIUM 40 MG/1
40 TABLET, DELAYED RELEASE ORAL DAILY
Qty: 30 TABLET | Refills: 3 | Status: SHIPPED | OUTPATIENT
Start: 2023-08-08 | End: 2023-08-10 | Stop reason: SDUPTHER

## 2023-08-10 DIAGNOSIS — K21.9 LARYNGOPHARYNGEAL REFLUX (LPR): ICD-10-CM

## 2023-08-10 RX ORDER — PANTOPRAZOLE SODIUM 40 MG/1
40 TABLET, DELAYED RELEASE ORAL DAILY
Qty: 30 TABLET | Refills: 3 | Status: SHIPPED | OUTPATIENT
Start: 2023-08-10

## 2023-09-04 DIAGNOSIS — N40.1 BPH WITH OBSTRUCTION/LOWER URINARY TRACT SYMPTOMS: ICD-10-CM

## 2023-09-04 DIAGNOSIS — N13.8 BPH WITH OBSTRUCTION/LOWER URINARY TRACT SYMPTOMS: ICD-10-CM

## 2023-09-05 DIAGNOSIS — M79.641 RIGHT HAND PAIN: ICD-10-CM

## 2023-09-05 RX ORDER — TAMSULOSIN HYDROCHLORIDE 0.4 MG/1
CAPSULE ORAL
Qty: 90 CAPSULE | Refills: 1 | Status: SHIPPED | OUTPATIENT
Start: 2023-09-05

## 2023-09-06 DIAGNOSIS — I25.10 ATHEROSCLEROSIS OF NATIVE CORONARY ARTERY OF NATIVE HEART WITHOUT ANGINA PECTORIS: ICD-10-CM

## 2023-09-06 RX ORDER — ATORVASTATIN CALCIUM 40 MG/1
TABLET, FILM COATED ORAL
Qty: 90 TABLET | Refills: 3 | Status: SHIPPED | OUTPATIENT
Start: 2023-09-06

## 2023-10-01 DIAGNOSIS — K21.9 LARYNGOPHARYNGEAL REFLUX (LPR): ICD-10-CM

## 2023-10-02 RX ORDER — PANTOPRAZOLE SODIUM 40 MG/1
40 TABLET, DELAYED RELEASE ORAL DAILY
Qty: 90 TABLET | Refills: 2 | Status: SHIPPED | OUTPATIENT
Start: 2023-10-02

## 2023-10-23 ENCOUNTER — OFFICE VISIT (OUTPATIENT)
Dept: FAMILY MEDICINE CLINIC | Facility: CLINIC | Age: 83
End: 2023-10-23
Payer: MEDICARE

## 2023-10-23 ENCOUNTER — TELEPHONE (OUTPATIENT)
Dept: FAMILY MEDICINE CLINIC | Facility: CLINIC | Age: 83
End: 2023-10-23

## 2023-10-23 VITALS
HEART RATE: 78 BPM | HEIGHT: 68 IN | DIASTOLIC BLOOD PRESSURE: 82 MMHG | TEMPERATURE: 98.5 F | WEIGHT: 190 LBS | OXYGEN SATURATION: 93 % | BODY MASS INDEX: 28.79 KG/M2 | SYSTOLIC BLOOD PRESSURE: 126 MMHG

## 2023-10-23 DIAGNOSIS — E11.9 TYPE 2 DIABETES MELLITUS WITHOUT COMPLICATION, WITHOUT LONG-TERM CURRENT USE OF INSULIN (HCC): ICD-10-CM

## 2023-10-23 DIAGNOSIS — U07.1 COVID-19: Primary | ICD-10-CM

## 2023-10-23 LAB
SARS-COV-2 AG UPPER RESP QL IA: POSITIVE
SL AMB POCT HEMOGLOBIN AIC: 6.4 (ref ?–6.5)
VALID CONTROL: ABNORMAL

## 2023-10-23 PROCEDURE — 99214 OFFICE O/P EST MOD 30 MIN: CPT | Performed by: FAMILY MEDICINE

## 2023-10-23 PROCEDURE — 87811 SARS-COV-2 COVID19 W/OPTIC: CPT | Performed by: FAMILY MEDICINE

## 2023-10-23 PROCEDURE — 83036 HEMOGLOBIN GLYCOSYLATED A1C: CPT | Performed by: FAMILY MEDICINE

## 2023-10-23 RX ORDER — NIRMATRELVIR AND RITONAVIR 150-100 MG
2 KIT ORAL 2 TIMES DAILY
Qty: 20 TABLET | Refills: 0 | Status: SHIPPED | OUTPATIENT
Start: 2023-10-23 | End: 2023-10-28

## 2023-10-23 NOTE — PROGRESS NOTES
COVID-19 Outpatient Progress Note    Assessment/Plan:    Problem List Items Addressed This Visit        Endocrine    Type 2 diabetes mellitus without complication, without long-term current use of insulin ([address])       Lab Results   Component Value Date    HGBA1C 6.4 10/23/2023   A1c improved - continue Metformin          Relevant Orders    POCT hemoglobin A1c (Completed)       Other    COVID-19 - Primary     Patient agreeable to Paxlovid  Discussed common side effects. Supportive care  Call if worsening  Masking for 5 days         Relevant Medications    nirmatrelvir & ritonavir (Paxlovid, 150/100,) tablet therapy pack    Other Relevant Orders    POCT Rapid Covid Ag (Completed)      Disposition:     Rapid antigen testing was performed and the result is POSITIVE for COVID-19. Patient with asymptomatic/mild COVID-19: They were recommended to isolate for at least 5 days (day 0 is the day symptoms appeared or the date the specimen was collected for the positive test for people who are asymptomatic). If they are asymptomatic or symptoms are improving with no fevers in the past 24 hours, isolation may be ended followed by 5 days of wearing a high quality mask when around others to minimize risk of infecting others. They should wear a mask through day 10 and a test-based strategy may be used to remove a mask sooner. Discussed symptom directed medication options with patient. Patient meets criteria for Paxlovid and they have been counseled appropriately regarding risks, benefits, side effects, and alternative treatment options. After discussion, patient agrees to treatment. Possible side effects of Paxlovid? Possible side effects of Paxlovid are:  - Liver Problems. Notify us right away if you start to experience loss of appetite, yellowing of your skin and the whites of eyes (jaundice), dark-colored urine, pale colored stools and itchy skin, stomach area (abdominal) pain. - Resistance to HIV Medicines.  If you have untreated HIV infection, Paxlovid may lead to some HIV medicines not working as well in the future. - Other possible side effects include: altered sense of taste, diarrhea, high blood pressure, or muscle aches. I have spent a total time of 15 minutes on the day of the encounter for this patient including risks and benefits of treatment options. Encounter provider: Pepe Warner MD     Provider located at: [address] [place] [phone]     Recent Visits  No visits were found meeting these conditions. Showing recent visits within past 7 days and meeting all other requirements  Today's Visits  Date Type Provider Dept   10/23/23 Office Visit Pepe Warner MD [facility]   Showing today's visits and meeting all other requirements  Future Appointments  No visits were found meeting these conditions. Showing future appointments within next 150 days and meeting all other requirements     Subjective:   Marylene Atta is a 81 y.o. male who is concerned about COVID-19. Patient's symptoms include malaise, nasal congestion, sore throat, cough, diarrhea and myalgias. Patient denies shortness of breath, chest tightness, nausea and vomiting.     - Date of symptom onset: 10/20/2023      COVID-19 vaccination status: Fully vaccinated with booster    Exposure:   Contact with a person who is under investigation (PUI) for or who is positive for COVID-19 within the last 14 days?: No    Hospitalized recently for fever and/or lower respiratory symptoms?: No      Currently a healthcare worker that is involved in direct patient care?: No      Taking Tylenol with some relief     He is diabetic - A1c improved is 6.4% down from 8.8%      Lab Results   Component Value Date    SARSCOVAG Positive (A) 10/23/2023       Review of Systems   HENT:  Positive for congestion and sore throat. Respiratory:  Positive for cough.  Negative for chest tightness and shortness of breath. Gastrointestinal:  Positive for diarrhea. Negative for nausea and vomiting. Musculoskeletal:  Positive for myalgias. Current Outpatient Medications on File Prior to Visit   Medication Sig   • aspirin (ECOTRIN LOW STRENGTH) 81 mg EC tablet Take 1 tablet (81 mg total) by mouth daily   • atorvastatin (LIPITOR) 40 mg tablet TAKE 1 TABLET BY MOUTH EVERY DAY   • Blood Glucose Monitoring Suppl (FREESTYLE FREEDOM LITE) w/Device KIT Check glucose levels once daily   • Diclofenac Sodium (VOLTAREN) 1 % Apply 2 g topically 4 (four) times a day   • glucose blood (FREESTYLE LITE) test strip Use 1 each 3 (three) times a day   • Lancets (FREESTYLE) lancets TEST ONCE DAILY FASTING DXE11.9   • metFORMIN (GLUCOPHAGE) 500 mg tablet TAKE 1 TABLET BY MOUTH TWICE A DAY WITH FOOD   • metoprolol tartrate (LOPRESSOR) 25 mg tablet TAKE 1 TABLET (25 MG TOTAL) BY MOUTH EVERY 12 (TWELVE) HOURS   • pantoprazole (PROTONIX) 40 mg tablet TAKE 1 TABLET (40 MG TOTAL) BY MOUTH DAILY TAKE IN MORNING   • tamsulosin (FLOMAX) 0.4 mg TAKE 1 CAPSULE BY MOUTH EVERY DAY WITH DINNER       Objective:    /82   Pulse 78   Temp 98.5 °F (36.9 °C)   Ht 5' 8" (1.727 m)   Wt 86.2 kg (190 lb)   SpO2 93%   BMI 28.89 kg/m²        Physical Exam  Vitals and nursing note reviewed. Constitutional:       General: He is not in acute distress. Appearance: He is well-developed. He is not diaphoretic. HENT:      Head: Normocephalic and atraumatic. Right Ear: External ear normal.      Left Ear: External ear normal.   Cardiovascular:      Rate and Rhythm: Normal rate and regular rhythm. Heart sounds: Normal heart sounds. No murmur heard. No friction rub. No gallop. Pulmonary:      Effort: Pulmonary effort is normal. No respiratory distress. Breath sounds: No stridor. Wheezing (slight wheeze) present. No rales. Chest:      Chest wall: No tenderness. Skin:     Findings: No rash.    Neurological: General: No focal deficit present. Mental Status: He is alert. Psychiatric:         Mood and Affect: Mood normal.         Behavior: Behavior normal.         Thought Content:  Thought content normal.         Judgment: Judgment normal.       Harrison Silverio MD

## 2023-10-23 NOTE — ASSESSMENT & PLAN NOTE
Patient agreeable to Paxlovid  Discussed common side effects.   Supportive care  Call if worsening  Masking for 5 days

## 2023-11-04 ENCOUNTER — OFFICE VISIT (OUTPATIENT)
Dept: URGENT CARE | Facility: CLINIC | Age: 83
End: 2023-11-04
Payer: MEDICARE

## 2023-11-04 VITALS
HEART RATE: 64 BPM | SYSTOLIC BLOOD PRESSURE: 126 MMHG | TEMPERATURE: 97.7 F | DIASTOLIC BLOOD PRESSURE: 80 MMHG | OXYGEN SATURATION: 98 % | RESPIRATION RATE: 18 BRPM

## 2023-11-04 DIAGNOSIS — J02.9 SORE THROAT: Primary | ICD-10-CM

## 2023-11-04 DIAGNOSIS — H66.91 RIGHT OTITIS MEDIA, UNSPECIFIED OTITIS MEDIA TYPE: ICD-10-CM

## 2023-11-04 LAB — S PYO AG THROAT QL: NEGATIVE

## 2023-11-04 PROCEDURE — 99213 OFFICE O/P EST LOW 20 MIN: CPT | Performed by: PHYSICIAN ASSISTANT

## 2023-11-04 PROCEDURE — G0463 HOSPITAL OUTPT CLINIC VISIT: HCPCS | Performed by: PHYSICIAN ASSISTANT

## 2023-11-04 PROCEDURE — 87880 STREP A ASSAY W/OPTIC: CPT | Performed by: PHYSICIAN ASSISTANT

## 2023-11-04 RX ORDER — AMOXICILLIN 875 MG/1
875 TABLET, COATED ORAL 2 TIMES DAILY
Qty: 14 TABLET | Refills: 0 | Status: SHIPPED | OUTPATIENT
Start: 2023-11-04 | End: 2023-11-11

## 2023-11-04 RX ORDER — CHLORHEXIDINE GLUCONATE ORAL RINSE 1.2 MG/ML
SOLUTION DENTAL
COMMUNITY
Start: 2023-09-02

## 2023-11-04 NOTE — PROGRESS NOTES
[facility]        NAME: Esequiel Mckoy is a 81 y.o. male  : 1940    MRN: [id number]  DATE: 2023  TIME: 12:15 PM    Assessment and Plan   Sore throat [J02.9]  1. Sore throat  POCT rapid strepA      2. Right otitis media, unspecified otitis media type  amoxicillin (AMOXIL) 875 mg tablet            Patient Instructions   There are no Patient Instructions on file for this visit. Follow up with PCP in 3-5 days. Proceed to  ER if symptoms worsen. Chief Complaint     Chief Complaint   Patient presents with    Swollen Glands     Swollen lymph node. X 2day and ear pain. Only to right side. History of Present Illness       The patient is a 81-year-old male presenting today for pain in his right ear and a sore throat. Reports symptoms for the last 2 days. Did test positive for COVID on 10/23/2023 but is feeling better. Admits to possible swollen lymph nodes in his neck. Review of Systems   Review of Systems   Constitutional:  Negative for activity change, appetite change, chills, diaphoresis and fever. HENT:  Positive for ear pain and sore throat. Negative for congestion and rhinorrhea. Respiratory:  Negative for cough, chest tightness and shortness of breath. Cardiovascular:  Negative for chest pain and palpitations. Gastrointestinal:  Negative for abdominal pain, diarrhea, nausea and vomiting. Musculoskeletal:  Negative for arthralgias and myalgias. Skin:  Negative for color change and pallor. Neurological:  Negative for headaches.          Current Medications       Current Outpatient Medications:     amoxicillin (AMOXIL) 875 mg tablet, Take 1 tablet (875 mg total) by mouth 2 (two) times a day for 7 days, Disp: 14 tablet, Rfl: 0    aspirin (ECOTRIN LOW STRENGTH) 81 mg EC tablet, Take 1 tablet (81 mg total) by mouth daily, Disp: 60 tablet, Rfl: 0    atorvastatin (LIPITOR) 40 mg tablet, TAKE 1 TABLET BY MOUTH EVERY DAY, Disp: 90 tablet, Rfl: 3 chlorhexidine (PERIDEX) 0.12 % solution, SWISH AND SPIT 15 ML TWICE A DAY, Disp: , Rfl:     Diclofenac Sodium (VOLTAREN) 1 %, Apply 2 g topically 4 (four) times a day, Disp: 100 g, Rfl: 1    glucose blood (FREESTYLE LITE) test strip, Use 1 each 3 (three) times a day, Disp: 300 strip, Rfl: 1    Lancets (FREESTYLE) lancets, TEST ONCE DAILY FASTING DXE11.9, Disp: 100 each, Rfl: 3    metFORMIN (GLUCOPHAGE) 500 mg tablet, TAKE 1 TABLET BY MOUTH TWICE A DAY WITH FOOD, Disp: 180 tablet, Rfl: 2    metoprolol tartrate (LOPRESSOR) 25 mg tablet, TAKE 1 TABLET (25 MG TOTAL) BY MOUTH EVERY 12 (TWELVE) HOURS, Disp: 180 tablet, Rfl: 3    pantoprazole (PROTONIX) 40 mg tablet, TAKE 1 TABLET (40 MG TOTAL) BY MOUTH DAILY TAKE IN MORNING, Disp: 90 tablet, Rfl: 2    tamsulosin (FLOMAX) 0.4 mg, TAKE 1 CAPSULE BY MOUTH EVERY DAY WITH DINNER, Disp: 90 capsule, Rfl: 1    Blood Glucose Monitoring Suppl (FREESTYLE FREEDOM LITE) w/Device KIT, Check glucose levels once daily, Disp: 1 each, Rfl: 0    Current Allergies     Allergies as of 11/04/2023 - Reviewed 11/04/2023   Allergen Reaction Noted    Celecoxib  06/17/2017            The following portions of the patient's history were reviewed and updated as appropriate: allergies, current medications, past family history, past medical history, past social history, past surgical history and problem list.     Past Medical History:   Diagnosis Date    Diabetes mellitus ([address] [facility])     diet control    Heart disease 05/09/2020    Heart attack    Hyperlipidemia     Myocardial infarction [facility])     Penile lesion     Prediabetes        Past Surgical History:   Procedure Laterality Date    CARDIAC SURGERY  05/2020    stent placement    [address]    with vocal cord polyp removal    LARYNGOSCOPY N/A 8/3/2021    Procedure: MICRODIRECT LARYNGOSCOPY WITH  EXCISION OF VOCAL CORD LESION;  Surgeon: Johan Alfonso DO;  Location: [facility];  Service: ENT       Family History Problem Relation Age of Onset    Heart disease Mother         Cardiac Disorder    No Known Problems Father     No Known Problems Sister          Medications have been verified. Objective   /80   Pulse 64   Temp 97.7 °F (36.5 °C)   Resp 18   SpO2 98%        Physical Exam     Physical Exam  Vitals and nursing note reviewed. Constitutional:       General: He is not in acute distress. Appearance: Normal appearance. He is normal weight. He is not ill-appearing, toxic-appearing or diaphoretic. HENT:      Head: Normocephalic and atraumatic. Ears:      Comments: Erythema of R TM       Nose: Nose normal. No congestion or rhinorrhea. Mouth/Throat:      Mouth: Mucous membranes are moist.      Pharynx: Oropharynx is clear. No oropharyngeal exudate or posterior oropharyngeal erythema. Eyes:      Extraocular Movements: Extraocular movements intact. Conjunctiva/sclera: Conjunctivae normal.      Pupils: Pupils are equal, round, and reactive to light. Cardiovascular:      Rate and Rhythm: Normal rate and regular rhythm. Heart sounds: Normal heart sounds. No murmur heard. No friction rub. No gallop. Pulmonary:      Effort: Pulmonary effort is normal. No respiratory distress. Breath sounds: Normal breath sounds. No stridor. No wheezing, rhonchi or rales. Chest:      Chest wall: No tenderness. Abdominal:      General: Abdomen is flat. Bowel sounds are normal. There is no distension. Palpations: Abdomen is soft. There is no mass. Tenderness: There is no abdominal tenderness. There is no guarding or rebound. Hernia: No hernia is present. Musculoskeletal:         General: Normal range of motion. Cervical back: Normal range of motion. Lymphadenopathy:      Cervical: No cervical adenopathy. Skin:     General: Skin is warm and dry. Capillary Refill: Capillary refill takes less than 2 seconds. Neurological:      Mental Status: He is alert.

## 2023-11-06 ENCOUNTER — APPOINTMENT (EMERGENCY)
Dept: CT IMAGING | Facility: HOSPITAL | Age: 83
End: 2023-11-06
Payer: MEDICARE

## 2023-11-06 ENCOUNTER — HOSPITAL ENCOUNTER (EMERGENCY)
Facility: HOSPITAL | Age: 83
Discharge: HOME | End: 2023-11-06
Attending: STUDENT IN AN ORGANIZED HEALTH CARE EDUCATION/TRAINING PROGRAM
Payer: MEDICARE

## 2023-11-06 VITALS
TEMPERATURE: 98.1 F | RESPIRATION RATE: 17 BRPM | HEART RATE: 73 BPM | SYSTOLIC BLOOD PRESSURE: 163 MMHG | DIASTOLIC BLOOD PRESSURE: 79 MMHG | OXYGEN SATURATION: 100 %

## 2023-11-06 DIAGNOSIS — J03.90 TONSILLITIS: Primary | ICD-10-CM

## 2023-11-06 DIAGNOSIS — J35.8 TONSILLOLITH: ICD-10-CM

## 2023-11-06 LAB
ANION GAP SERPL CALCULATED.3IONS-SCNC: 7 MMOL/L
BASOPHILS # BLD AUTO: 0.03 THOUSANDS/ÂΜL (ref 0–0.1)
BASOPHILS NFR BLD AUTO: 0 % (ref 0–1)
BUN SERPL-MCNC: 19 MG/DL (ref 5–25)
CALCIUM SERPL-MCNC: 9.6 MG/DL (ref 8.4–10.2)
CHLORIDE SERPL-SCNC: 105 MMOL/L (ref 96–108)
CO2 SERPL-SCNC: 27 MMOL/L (ref 21–32)
CREAT SERPL-MCNC: 1.56 MG/DL (ref 0.6–1.3)
EOSINOPHIL # BLD AUTO: 0.12 THOUSAND/ÂΜL (ref 0–0.61)
EOSINOPHIL NFR BLD AUTO: 2 % (ref 0–6)
ERYTHROCYTE [DISTWIDTH] IN BLOOD BY AUTOMATED COUNT: 11.9 % (ref 11.6–15.1)
GFR SERPL CREATININE-BSD FRML MDRD: 40 ML/MIN/1.73SQ M
GLUCOSE SERPL-MCNC: 151 MG/DL (ref 65–140)
HCT VFR BLD AUTO: 40.2 % (ref 36.5–49.3)
HGB BLD-MCNC: 13.2 G/DL (ref 12–17)
IMM GRANULOCYTES # BLD AUTO: 0.01 THOUSAND/UL (ref 0–0.2)
IMM GRANULOCYTES NFR BLD AUTO: 0 % (ref 0–2)
LYMPHOCYTES # BLD AUTO: 2.08 THOUSANDS/ÂΜL (ref 0.6–4.47)
LYMPHOCYTES NFR BLD AUTO: 28 % (ref 14–44)
MCH RBC QN AUTO: 32.6 PG (ref 26.8–34.3)
MCHC RBC AUTO-ENTMCNC: 32.8 G/DL (ref 31.4–37.4)
MCV RBC AUTO: 99 FL (ref 82–98)
MONOCYTES # BLD AUTO: 0.71 THOUSAND/ÂΜL (ref 0.17–1.22)
MONOCYTES NFR BLD AUTO: 10 % (ref 4–12)
NEUTROPHILS # BLD AUTO: 4.43 THOUSANDS/ÂΜL (ref 1.85–7.62)
NEUTS SEG NFR BLD AUTO: 60 % (ref 43–75)
NRBC BLD AUTO-RTO: 0 /100 WBCS
PLATELET # BLD AUTO: 212 THOUSANDS/UL (ref 149–390)
PMV BLD AUTO: 10 FL (ref 8.9–12.7)
POTASSIUM SERPL-SCNC: 4.7 MMOL/L (ref 3.5–5.3)
RBC # BLD AUTO: 4.05 MILLION/UL (ref 3.88–5.62)
S PYO DNA THROAT QL NAA+PROBE: NOT DETECTED
SODIUM SERPL-SCNC: 139 MMOL/L (ref 135–147)
WBC # BLD AUTO: 7.38 THOUSAND/UL (ref 4.31–10.16)

## 2023-11-06 PROCEDURE — 99284 EMERGENCY DEPT VISIT MOD MDM: CPT

## 2023-11-06 PROCEDURE — 87651 STREP A DNA AMP PROBE: CPT | Performed by: PHYSICIAN ASSISTANT

## 2023-11-06 PROCEDURE — 70491 CT SOFT TISSUE NECK W/DYE: CPT

## 2023-11-06 PROCEDURE — 36415 COLL VENOUS BLD VENIPUNCTURE: CPT | Performed by: PHYSICIAN ASSISTANT

## 2023-11-06 PROCEDURE — 85025 COMPLETE CBC W/AUTO DIFF WBC: CPT | Performed by: PHYSICIAN ASSISTANT

## 2023-11-06 PROCEDURE — 99284 EMERGENCY DEPT VISIT MOD MDM: CPT | Performed by: PHYSICIAN ASSISTANT

## 2023-11-06 PROCEDURE — 80048 BASIC METABOLIC PNL TOTAL CA: CPT | Performed by: PHYSICIAN ASSISTANT

## 2023-11-06 RX ORDER — LIDOCAINE HYDROCHLORIDE 20 MG/ML
15 SOLUTION OROPHARYNGEAL ONCE
Status: COMPLETED | OUTPATIENT
Start: 2023-11-06 | End: 2023-11-06

## 2023-11-06 RX ADMIN — LIDOCAINE HYDROCHLORIDE 15 ML: 20 SOLUTION ORAL; TOPICAL at 09:13

## 2023-11-06 RX ADMIN — IOHEXOL 85 ML: 350 INJECTION, SOLUTION INTRAVENOUS at 10:10

## 2023-11-06 RX ADMIN — DEXAMETHASONE SODIUM PHOSPHATE 10 MG: 10 INJECTION, SOLUTION INTRAMUSCULAR; INTRAVENOUS at 11:34

## 2023-11-06 NOTE — DISCHARGE INSTRUCTIONS
Continue amoxicillin. Do salt water gargles several times daily. Please follow-up with your family doctor and ENT. Return to the ER with any worsening symptoms or inability to swallow.

## 2023-11-06 NOTE — ED PROVIDER NOTES
History  Chief Complaint   Patient presents with    Difficulty Swallowing     Difficulty swallowing and R sided mouth pain, started on amox at urgent care      82yo male with a history of coronary artery disease, hyperlipidemia, and type 2 diabetes presenting with his wife for evaluation of a sore throat x 4-5 days. He reports right sided throat pain and difficulty swallowing. He states it feels like the right side of his throat is swollen. He was seen at urgent care 2 days ago and was started on amoxicillin. He has taken a few doses thus far without any improvement. He denies any fevers. Of note, he had COVID 2 weeks ago. History provided by:  Patient and medical records   used: No        Prior to Admission Medications   Prescriptions Last Dose Informant Patient Reported? Taking?    Blood Glucose Monitoring Suppl (FREESTYLE FREEDOM LITE) w/Device KIT  Self No No   Sig: Check glucose levels once daily   Diclofenac Sodium (VOLTAREN) 1 %   No No   Sig: Apply 2 g topically 4 (four) times a day   Lancets (FREESTYLE) lancets  Self No No   Sig: TEST ONCE DAILY FASTING DXE11.9   amoxicillin (AMOXIL) 875 mg tablet   No No   Sig: Take 1 tablet (875 mg total) by mouth 2 (two) times a day for 7 days   aspirin (ECOTRIN LOW STRENGTH) 81 mg EC tablet  Self No No   Sig: Take 1 tablet (81 mg total) by mouth daily   atorvastatin (LIPITOR) 40 mg tablet   No No   Sig: TAKE 1 TABLET BY MOUTH EVERY DAY   chlorhexidine (PERIDEX) 0.12 % solution   Yes No   Sig: SWISH AND SPIT 15 ML TWICE A DAY   glucose blood (FREESTYLE LITE) test strip  Self No No   Sig: Use 1 each 3 (three) times a day   metFORMIN (GLUCOPHAGE) 500 mg tablet   No No   Sig: TAKE 1 TABLET BY MOUTH TWICE A DAY WITH FOOD   metoprolol tartrate (LOPRESSOR) 25 mg tablet   No No   Sig: TAKE 1 TABLET (25 MG TOTAL) BY MOUTH EVERY 12 (TWELVE) HOURS   pantoprazole (PROTONIX) 40 mg tablet   No No   Sig: TAKE 1 TABLET (40 MG TOTAL) BY MOUTH DAILY TAKE IN MORNING   tamsulosin (FLOMAX) 0.4 mg   No No   Sig: TAKE 1 CAPSULE BY MOUTH EVERY DAY WITH DINNER      Facility-Administered Medications: None       Past Medical History:   Diagnosis Date    Diabetes mellitus ([address] )     diet control    Heart disease 05/09/2020    Heart attack    Hyperlipidemia     Myocardial infarction [facility])     Penile lesion     Prediabetes        Past Surgical History:   Procedure Laterality Date    CARDIAC SURGERY  05/2020    stent placement    COLONOSCOPY      HERNIA REPAIR      LARYNGOSCOPY  1978    with vocal cord polyp removal    LARYNGOSCOPY N/A 8/3/2021    Procedure: MICRODIRECT LARYNGOSCOPY WITH  EXCISION OF VOCAL CORD LESION;  Surgeon: Anita Laguerre DO;  Location: [facility];  Service: ENT       Family History   Problem Relation Age of Onset    Heart disease Mother         Cardiac Disorder    No Known Problems Father     No Known Problems Sister      I have reviewed and agree with the history as documented. E-Cigarette/Vaping    E-Cigarette Use Never User      E-Cigarette/Vaping Substances    Nicotine No     THC No     CBD No     Flavoring No     Other No     Unknown No      Social History     Tobacco Use    Smoking status: Former     Packs/day: 1.00     Years: 25.00     Total pack years: 25.00     Types: Cigarettes    Smokeless tobacco: Never   Vaping Use    Vaping Use: Never used   Substance Use Topics    Alcohol use: Yes     Comment: Social twice a month    Drug use: Never       Review of Systems   Constitutional:  Negative for chills and fever. HENT:  Positive for sore throat and trouble swallowing. Negative for drooling and voice change. Eyes:  Negative for discharge and redness. Respiratory:  Negative for shortness of breath and stridor. Cardiovascular:  Negative for chest pain and leg swelling. Gastrointestinal:  Negative for abdominal pain and vomiting. Musculoskeletal:  Negative for neck pain and neck stiffness. Skin:  Negative for color change and rash. Neurological:  Negative for seizures and syncope. Psychiatric/Behavioral:  Negative for confusion. The patient is not nervous/anxious. All other systems reviewed and are negative. Physical Exam  Physical Exam  Vitals and nursing note reviewed. Constitutional:       General: He is not in acute distress. Appearance: He is well-developed. He is not diaphoretic. HENT:      Head: Normocephalic and atraumatic. Right Ear: Tympanic membrane, ear canal and external ear normal.      Left Ear: Tympanic membrane, ear canal and external ear normal.      Mouth/Throat:      Mouth: Mucous membranes are moist.      Pharynx: Oropharynx is clear. Comments: Difficult to visualize posterior oropharynx due to large tongue and gag reflex. No fullness noted in peritonsillar regions. Uvula midline. Aphthous ulcer noted on lateral R tongue. No trismus. Normal phonation. Tolerating oral secretions without difficulty. Eyes:      General: No scleral icterus. Right eye: No discharge. Left eye: No discharge. Conjunctiva/sclera: Conjunctivae normal.   Cardiovascular:      Rate and Rhythm: Normal rate and regular rhythm. Heart sounds: Normal heart sounds. No murmur heard. Pulmonary:      Effort: Pulmonary effort is normal. No respiratory distress. Breath sounds: Normal breath sounds. No stridor. No wheezing or rales. Musculoskeletal:         General: No deformity. Normal range of motion. Cervical back: Normal range of motion and neck supple. No rigidity. Lymphadenopathy:      Cervical: No cervical adenopathy. Skin:     General: Skin is warm and dry. Neurological:      Mental Status: He is alert. He is not disoriented. GCS: GCS eye subscore is 4. GCS verbal subscore is 5. GCS motor subscore is 6.    Psychiatric:         Behavior: Behavior normal.         Vital Signs  ED Triage Vitals   Temperature Pulse Respirations Blood Pressure SpO2   11/06/23 0816 11/06/23 0816 11/06/23 0816 11/06/23 0816 11/06/23 0816   98.1 °F (36.7 °C) 69 18 126/61 99 %      Temp src Heart Rate Source Patient Position - Orthostatic VS BP Location FiO2 (%)   -- 11/06/23 1137 11/06/23 1137 11/06/23 1137 --    Monitor Sitting Right arm       Pain Score       --                  Vitals:    11/06/23 0816 11/06/23 1137   BP: 126/61 163/79   Pulse: 69 73   Patient Position - Orthostatic VS:  Sitting         Visual Acuity      ED Medications  Medications   Lidocaine Viscous HCl (XYLOCAINE) 2 % mucosal solution 15 mL (15 mL Swish & Spit Given 11/6/23 0913)   iohexol (OMNIPAQUE) 350 MG/ML injection (MULTI-DOSE) 85 mL (85 mL Intravenous Given 11/6/23 1010)   dexamethasone oral liquid 10 mg 1 mL (10 mg Oral Given 11/6/23 1134)       Diagnostic Studies  Results Reviewed       Procedure Component Value Units Date/Time    Strep A PCR [[id number]]  (Normal) Collected: 11/06/23 0859    Lab Status: Final result Specimen: Throat Updated: 11/06/23 0929     STREP A PCR Not Detected    Basic metabolic panel [[id number]]  (Abnormal) Collected: 11/06/23 0858    Lab Status: Final result Specimen: Blood from Arm, Right Updated: 11/06/23 0925     Sodium 139 mmol/L      Potassium 4.7 mmol/L      Chloride 105 mmol/L      CO2 27 mmol/L      ANION GAP 7 mmol/L      BUN 19 mg/dL      Creatinine 1.56 mg/dL      Glucose 151 mg/dL      Calcium 9.6 mg/dL      eGFR 40 ml/min/1.73sq m     Narrative:      [place] guidelines for Chronic Kidney Disease (CKD):     Stage 1 with normal or high GFR (GFR > 90 mL/min/1.73 square meters)    Stage 2 Mild CKD (GFR = 60-89 mL/min/1.73 square meters)    Stage 3A Moderate CKD (GFR = 45-59 mL/min/1.73 square meters)    Stage 3B Moderate CKD (GFR = 30-44 mL/min/1.73 square meters)    Stage 4 Severe CKD (GFR = 15-29 mL/min/1.73 square meters)    Stage 5 End Stage CKD (GFR <15 mL/min/1.73 square meters)  Note: GFR calculation is accurate only with a steady state creatinine    CBC and differential [[id number]]  (Abnormal) Collected: 11/06/23 0858    Lab Status: Final result Specimen: Blood from Arm, Right Updated: 11/06/23 0908     WBC 7.38 Thousand/uL      RBC 4.05 Million/uL      Hemoglobin 13.2 g/dL      Hematocrit 40.2 %      MCV 99 fL      MCH 32.6 pg      MCHC 32.8 g/dL      RDW 11.9 %      MPV 10.0 fL      Platelets 079 Thousands/uL      nRBC 0 /100 WBCs      Neutrophils Relative 60 %      Immat GRANS % 0 %      Lymphocytes Relative 28 %      Monocytes Relative 10 %      Eosinophils Relative 2 %      Basophils Relative 0 %      Neutrophils Absolute 4.43 Thousands/µL      Immature Grans Absolute 0.01 Thousand/uL      Lymphocytes Absolute 2.08 Thousands/µL      Monocytes Absolute 0.71 Thousand/µL      Eosinophils Absolute 0.12 Thousand/µL      Basophils Absolute 0.03 Thousands/µL                    CT soft tissue neck with contrast   Final Result by Zorita Seip, MD (11/06 1055)      No acute neck abnormality. Slight asymmetric enlargement of right tonsil with bilateral tiny calcified palatine tonsilloliths (right worse than left), likely due to chronic tonsillitis. Recommend direct visualization of tonsils for further evaluation. No suspicious cervical lymphadenopathy. Additional chronic/incidental findings as detailed above. The study was marked in [facility] for immediate notification. Workstation performed: [id number]                    Procedures  Procedures         ED Course  ED Course as of 11/06/23 1856 Mon Nov 06, 2023   6579 Creatinine(!): 1.56  1.44 nine months ago. SBIRT 20yo+      Flowsheet Row Most Recent Value   Initial Alcohol Screen: US AUDIT-C     1. How often do you have a drink containing alcohol? 0 Filed at: 11/06/2023 0816   2. How many drinks containing alcohol do you have on a typical day you are drinking? 0 Filed at: 11/06/2023 0816   3a. Male UNDER 65:  How often do you have five or more drinks on one occasion? 0 Filed at: 11/06/2023 0816   3b. FEMALE Any Age, or MALE 65+: How often do you have 4 or more drinks on one occassion? 0 Filed at: 11/06/2023 0816   Audit-C Score 0 Filed at: 11/06/2023 1890   GERMAN: How many times in the past year have you. .. Used an illegal drug or used a prescription medication for non-medical reasons? Never Filed at: 11/06/2023 [address] Making  81yoM presenting for R sided sore throat and dysphagia. On amoxicillin for 2 days without improvement. No fevers. He is afebrile and hemodynamically stable. He is well appearing in no distress. Posterior oropharynx difficult to visualize on exam. No obvious PTA appreciated. Phonation is normal and he is tolerating oral secretions. Initial ED plan: Check CBC, BMP, strep PCR, and CT soft tissue neck. Final assessment: Labs unremarkable including normal white count. Strep PCR is negative. CT shows slight asymmetric enlargement of the right tonsil with bilateral tiny calcified tonsilloliths likely 2/2 chronic tonsillitis. No indication for admission. Dose of Decadron given. Advised salt water gargles. Advised close PCP and ENT follow-up. ED return precautions discussed. Patient expressed understanding and is agreeable to plan. Patient discharged in stable condition. Problems Addressed: Tonsillitis: acute illness or injury    Amount and/or Complexity of Data Reviewed  Labs: ordered. Decision-making details documented in ED Course. Radiology: ordered. Risk  Prescription drug management. Disposition  Final diagnoses:    Tonsillitis   Tonsillolith     Time reflects when diagnosis was documented in both MDM as applicable and the Disposition within this note       Time User Action Codes Description Comment    11/6/2023 11:31 AM Erum Hartmann [F59.81] Tonsillitis     11/6/2023 11:31 AM [address] [J35.8] [facility]           ED Disposition       ED Disposition Discharge    Condition   Stable    Date/Time   Mon Nov 6, 2023 1131    Comment   [facility] [place] discharge to home/self care. Follow-up Information       Follow up With Specialties Details Why Contact Info Additional Perla Thompson MD Family Medicine Schedule an appointment as soon as possible for a visit   21   [address] Throat Otolaryngology Schedule an appointment as soon as possible for a visit   [address], [address]15 Critical access hospital.   [address] [facility], [address] Emergency Department Emergency Medicine  If symptoms worsen [address] [facility] Emergency Department, [place], [place], [address]            Discharge Medication List as of 11/6/2023 11:32 AM        CONTINUE these medications which have NOT CHANGED    Details   amoxicillin (AMOXIL) 875 mg tablet Take 1 tablet (875 mg total) by mouth 2 (two) times a day for 7 days, Starting Sat 11/4/2023, Until Sat 11/11/2023, Normal      aspirin (ECOTRIN LOW STRENGTH) 81 mg EC tablet Take 1 tablet (81 mg total) by mouth daily, Starting Tue 5/12/2020, Until Sat 11/4/2023, Normal      atorvastatin (LIPITOR) 40 mg tablet TAKE 1 TABLET BY MOUTH EVERY DAY, Normal      Blood Glucose Monitoring Suppl (FREESTYLE FREEDOM LITE) w/Device KIT Check glucose levels once daily, Normal      chlorhexidine (PERIDEX) 0.12 % solution SWISH AND SPIT 15 ML TWICE A DAY, Historical Med      Diclofenac Sodium (VOLTAREN) 1 % Apply 2 g topically 4 (four) times a day, Starting Tue 9/5/2023, Normal      glucose blood (FREESTYLE LITE) test strip Use 1 each 3 (three) times a day, Starting Fri 2/18/2022, Normal      Lancets (FREESTYLE) lancets TEST ONCE DAILY FASTING DXE11.9, Normal      metFORMIN (GLUCOPHAGE) 500 mg tablet TAKE 1 TABLET BY MOUTH TWICE A DAY WITH FOOD, Starting Tue 8/8/2023, Normal      metoprolol tartrate (LOPRESSOR) 25 mg tablet TAKE 1 TABLET (25 MG TOTAL) BY MOUTH EVERY 12 (TWELVE) HOURS, Starting Wed 6/7/2023, Until Sat 11/4/2023, Normal      pantoprazole (PROTONIX) 40 mg tablet TAKE 1 TABLET (40 MG TOTAL) BY MOUTH DAILY TAKE IN MORNING, Starting Mon 10/2/2023, Normal      tamsulosin (FLOMAX) 0.4 mg TAKE 1 CAPSULE BY MOUTH EVERY DAY WITH DINNER, Normal             No discharge procedures on file.     PDMP Review         Value Time User    PDMP Reviewed  Yes 7/28/2021 10:14 AM Leticia Peters DO            ED Provider  Electronically Signed by             Kathy Santo PA-C  11/06/23 7984

## 2023-11-07 ENCOUNTER — VBI (OUTPATIENT)
Dept: FAMILY MEDICINE CLINIC | Facility: CLINIC | Age: 83
End: 2023-11-07

## 2023-11-07 NOTE — TELEPHONE ENCOUNTER
11/07/23 1:58 PM    Patient contacted post ED visit, first outreach attempt made. Message was left for patient to return a call to the VBI Department at [facility]: Phone [phone]. Thank you.   Sona Moore  [facility]

## 2023-11-08 NOTE — TELEPHONE ENCOUNTER
11/08/23 12:54 PM    Patient contacted post ED visit, VBI department spoke with patient/caregiver and outreach was successful. Thank you.   Sona Moore  [facility]

## 2023-12-19 ENCOUNTER — TELEPHONE (OUTPATIENT)
Dept: ADMINISTRATIVE | Facility: OTHER | Age: 83
End: 2023-12-19

## 2023-12-19 ENCOUNTER — OFFICE VISIT (OUTPATIENT)
Dept: FAMILY MEDICINE CLINIC | Facility: CLINIC | Age: 83
End: 2023-12-19
Payer: MEDICARE

## 2023-12-19 VITALS
OXYGEN SATURATION: 98 % | BODY MASS INDEX: 29.61 KG/M2 | HEIGHT: 68 IN | HEART RATE: 70 BPM | TEMPERATURE: 97.9 F | SYSTOLIC BLOOD PRESSURE: 132 MMHG | WEIGHT: 195.4 LBS | DIASTOLIC BLOOD PRESSURE: 84 MMHG

## 2023-12-19 DIAGNOSIS — Z00.00 MEDICARE ANNUAL WELLNESS VISIT, SUBSEQUENT: ICD-10-CM

## 2023-12-19 DIAGNOSIS — J35.01 CHRONIC TONSILLITIS: Primary | ICD-10-CM

## 2023-12-19 DIAGNOSIS — E11.9 TYPE 2 DIABETES MELLITUS WITHOUT COMPLICATION, WITHOUT LONG-TERM CURRENT USE OF INSULIN (HCC): ICD-10-CM

## 2023-12-19 PROBLEM — U07.1 COVID-19: Status: RESOLVED | Noted: 2023-10-23 | Resolved: 2023-12-19

## 2023-12-19 PROCEDURE — 99213 OFFICE O/P EST LOW 20 MIN: CPT | Performed by: FAMILY MEDICINE

## 2023-12-19 PROCEDURE — G0439 PPPS, SUBSEQ VISIT: HCPCS | Performed by: FAMILY MEDICINE

## 2023-12-19 RX ORDER — DOXYCYCLINE 100 MG/1
100 TABLET ORAL 2 TIMES DAILY
Qty: 14 TABLET | Refills: 0 | Status: SHIPPED | OUTPATIENT
Start: 2023-12-19 | End: 2023-12-19

## 2023-12-19 RX ORDER — AMOXICILLIN AND CLAVULANATE POTASSIUM 875; 125 MG/1; MG/1
1 TABLET, FILM COATED ORAL EVERY 12 HOURS SCHEDULED
Qty: 14 TABLET | Refills: 0 | Status: SHIPPED | OUTPATIENT
Start: 2023-12-19 | End: 2023-12-26

## 2023-12-19 NOTE — TELEPHONE ENCOUNTER
----- Message from Chantell Selby sent at 12/19/2023  8:27 AM EST -----  Regarding: Diabetic Eye  12/19/23 8:28 AM    Hello, our patient Jay Stout has had Diabetic Eye Exam completed/performed. Please assist in updating the patient chart by [facility]. The date of service is per pt earlier this year.    Thank you,  Chantell ARMIJO

## 2023-12-19 NOTE — PROGRESS NOTES
Name: Jay Stout      : 1940      MRN: [id number]  Encounter Provider: Bandar Doll MD  Encounter Date: 2023   Encounter department: [facility]    Assessment & Plan     1. Chronic tonsillitis  Recommend flonase and allergy medication daily along with augmentin  -     amoxicillin-clavulanate (AUGMENTIN) 875-125 mg per tablet; Take 1 tablet by mouth every 12 (twelve) hours for 7 days    2. Medicare annual wellness visit, subsequent  See Medicare wellness note    3. Type 2 diabetes mellitus without complication, without long-term current use of insulin ([facility])  HgbA1c- 6.4  Stable controlled    Follow up in 6 months      Depression Screening and Follow-up Plan: Patient was screened for depression during today's encounter. They screened negative with a PHQ-2 score of 0.      Subjective     Patient is here due to voice hoarseness. Denies any cough does have nasal drainage. Diagnosed with acid reflux by ENT however Protonix is not helping. Former smoker quit over 40 years ago. Has nasal drainage as well.  Also has Type 2 DM denies any symptoms related to this takes metformin daily.     Review of Systems   Constitutional:  Negative for activity change, appetite change, fatigue and fever.   HENT:  Positive for postnasal drip, sinus pressure and sinus pain. Negative for congestion and ear discharge.    Respiratory:  Negative for cough and shortness of breath.    Cardiovascular:  Negative for chest pain and palpitations.   Gastrointestinal:  Negative for diarrhea and nausea.   Musculoskeletal:  Negative for arthralgias and back pain.   Skin:  Negative for color change and rash.   Neurological:  Negative for dizziness and headaches.   Psychiatric/Behavioral:  Negative for agitation and behavioral problems.        Past Medical History:   Diagnosis Date   • Diabetes mellitus (HCC)     diet control   • Heart disease 2020    Heart attack   • Hyperlipidemia    • Myocardial infarction  (HCC)    • Penile lesion    • Prediabetes      Past Surgical History:   Procedure Laterality Date   • CARDIAC SURGERY  05/2020    stent placement   • COLONOSCOPY     • HERNIA REPAIR     • LARYNGOSCOPY  1978    with vocal cord polyp removal   • LARYNGOSCOPY N/A 8/3/2021    Procedure: MICRODIRECT LARYNGOSCOPY WITH  EXCISION OF VOCAL CORD LESION;  Surgeon: Pito Tomlin DO;  Location: [facility];  Service: ENT     Family History   Problem Relation Age of Onset   • Heart disease Mother         Cardiac Disorder   • No Known Problems Father    • No Known Problems Sister      Social History     Socioeconomic History   • Marital status: Single     Spouse name: None   • Number of children: None   • Years of education: None   • Highest education level: None   Occupational History   • None   Tobacco Use   • Smoking status: Former     Current packs/day: 1.00     Average packs/day: 1 pack/day for 25.0 years (25.0 ttl pk-yrs)     Types: Cigarettes   • Smokeless tobacco: Never   Vaping Use   • Vaping status: Never Used   Substance and Sexual Activity   • Alcohol use: Yes     Comment: Social twice a month   • Drug use: Never   • Sexual activity: None   Other Topics Concern   • None   Social History Narrative   • None     Social Determinants of Health     Financial Resource Strain: Low Risk  (12/19/2023)    Overall Financial Resource Strain (CARDIA)    • Difficulty of Paying Living Expenses: Not hard at all   Food Insecurity: Not on file   Transportation Needs: No Transportation Needs (12/19/2023)    PRAPARE - Transportation    • Lack of Transportation (Medical): No    • Lack of Transportation (Non-Medical): No   Physical Activity: Not on file   Stress: Not on file   Social Connections: Not on file   Intimate Partner Violence: Not on file   Housing Stability: Not on file     Current Outpatient Medications on File Prior to Visit   Medication Sig   • aspirin (ECOTRIN LOW STRENGTH) 81 mg EC tablet Take 1 tablet (81 mg total) by mouth  "daily   • atorvastatin (LIPITOR) 40 mg tablet TAKE 1 TABLET BY MOUTH EVERY DAY   • Blood Glucose Monitoring Suppl (FREESTYLE FREEDOM LITE) w/Device KIT Check glucose levels once daily   • chlorhexidine (PERIDEX) 0.12 % solution SWISH AND SPIT 15 ML TWICE A DAY   • Diclofenac Sodium (VOLTAREN) 1 % Apply 2 g topically 4 (four) times a day   • glucose blood (FREESTYLE LITE) test strip Use 1 each 3 (three) times a day   • Lancets (FREESTYLE) lancets TEST ONCE DAILY FASTING DXE11.9   • metFORMIN (GLUCOPHAGE) 500 mg tablet TAKE 1 TABLET BY MOUTH TWICE A DAY WITH FOOD   • metoprolol tartrate (LOPRESSOR) 25 mg tablet TAKE 1 TABLET (25 MG TOTAL) BY MOUTH EVERY 12 (TWELVE) HOURS   • pantoprazole (PROTONIX) 40 mg tablet TAKE 1 TABLET (40 MG TOTAL) BY MOUTH DAILY TAKE IN MORNING   • tamsulosin (FLOMAX) 0.4 mg TAKE 1 CAPSULE BY MOUTH EVERY DAY WITH DINNER     Allergies   Allergen Reactions   • Celecoxib      Other reaction(s): itchy  Other reaction(s): itchy     Immunization History   Administered Date(s) Administered   • COVID-19 MODERNA VACC 0.5 ML IM 02/08/2021, 03/06/2021, 10/13/2021   • H1N1, All Formulations 02/01/2010   • INFLUENZA 10/18/2018, 10/19/2020, 11/14/2022   • Influenza, high dose seasonal 0.7 mL 10/19/2020, 11/23/2021, 10/19/2023   • Influenza, injectable, quadrivalent, preservative free 0.5 mL 10/16/2019   • Pneumococcal Conjugate 13-Valent 06/25/2018   • Pneumococcal Polysaccharide PPV23 10/21/2020       Objective     /84 (BP Location: Left arm, Patient Position: Standing)   Pulse 70   Temp 97.9 °F (36.6 °C) (Temporal)   Ht 5' 8\" (1.727 m)   Wt 88.6 kg (195 lb 6.4 oz)   SpO2 98%   BMI 29.71 kg/m²     Physical Exam  Constitutional:       General: He is not in acute distress.     Appearance: He is well-developed. He is not diaphoretic.   Eyes:      General: No scleral icterus.     Pupils: Pupils are equal, round, and reactive to light.   Cardiovascular:      Rate and Rhythm: Normal rate and regular " rhythm.      Pulses: no weak pulses          Dorsalis pedis pulses are 2+ on the right side and 2+ on the left side.        Posterior tibial pulses are 2+ on the right side and 2+ on the left side.      Heart sounds: Normal heart sounds. No murmur heard.  Pulmonary:      Effort: Pulmonary effort is normal. No respiratory distress.      Breath sounds: Normal breath sounds. No wheezing.   Abdominal:      General: Bowel sounds are normal. There is no distension.      Palpations: Abdomen is soft.      Tenderness: There is no abdominal tenderness.   Feet:      Right foot:      Skin integrity: No ulcer, skin breakdown, erythema, warmth, callus or dry skin.      Left foot:      Skin integrity: No ulcer, skin breakdown, erythema, warmth, callus or dry skin.   Skin:     General: Skin is warm and dry.      Findings: No rash.   Neurological:      Mental Status: He is alert and oriented to person, place, and time.     Bandar Doll MD    Diabetic Foot Exam    Patient's shoes and socks removed.    Right Foot/Ankle   Right Foot Inspection  Skin Exam: skin normal and skin intact. No dry skin, no warmth, no callus, no erythema, no maceration, no abnormal color, no pre-ulcer, no ulcer and no callus.     Toe Exam: ROM and strength within normal limits.     Sensory   Vibration: intact  Proprioception: intact  Monofilament testing: intact    Vascular  The right DP pulse is 2+. The right PT pulse is 2+.     Left Foot/Ankle  Left Foot Inspection  Skin Exam: skin normal and skin intact. No dry skin, no warmth, no erythema, no maceration, normal color, no pre-ulcer, no ulcer and no callus.     Toe Exam: ROM and strength within normal limits.     Sensory   Vibration: intact  Proprioception: intact  Monofilament testing: intact    Vascular  The left DP pulse is 2+. The left PT pulse is 2+.     Assign Risk Category  No deformity present  No loss of protective sensation  No weak pulses  Risk: 0

## 2023-12-19 NOTE — LETTER
Diabetic Eye Exam Form    Date Requested: 23  Patient: Jay Stout  Patient : 1940   Referring Provider: Adrienne Richard MD      DIABETIC Eye Exam Date _______________________________      Type of Exam MUST be documented for Diabetic Eye Exams. Please CHECK ONE.     Retinal Exam       Dilated Retinal Exam       OCT       Optomap-Iris Exam      Fundus Photography       Left Eye - Please check Retinopathy or No Retinopathy        Exam did show retinopathy    Exam did not show retinopathy       Right Eye - Please check Retinopathy or No Retinopathy       Exam did show retinopathy    Exam did not show retinopathy       Comments __________________________________________________________    Practice Providing Exam ______________________________________________    Exam Performed By (print name) _______________________________________      Provider Signature ___________________________________________________      These reports are needed for  compliance.  Please fax this completed form and a copy of the Diabetic Eye Exam report to our office located at [address] [place] [address] as soon as possible via Fax [phone] alanna Campuzanoele: Phone [phone]  We thank you for your assistance in treating our mutual patient.

## 2023-12-19 NOTE — PATIENT INSTRUCTIONS
Medicare Preventive Visit Patient Instructions  Thank you for completing your Welcome to Medicare Visit or Medicare Annual Wellness Visit today. Your next wellness visit will be due in one year (12/19/2024).  The screening/preventive services that you may require over the next 5-10 years are detailed below. Some tests may not apply to you based off risk factors and/or age. Screening tests ordered at today's visit but not completed yet may show as past due. Also, please note that scanned in results may not display below.  Preventive Screenings:  Service Recommendations Previous Testing/Comments   Colorectal Cancer Screening  Colonoscopy    Fecal Occult Blood Test (FOBT)/Fecal Immunochemical Test (FIT)  Fecal DNA/Cologuard Test  Flexible Sigmoidoscopy Age: 45-75 years old   Colonoscopy: every 10 years (May be performed more frequently if at higher risk)  OR  FOBT/FIT: every 1 year  OR  Cologuard: every 3 years  OR  Sigmoidoscopy: every 5 years  Screening may be recommended earlier than age 45 if at higher risk for colorectal cancer. Also, an individualized decision between you and your healthcare provider will decide whether screening between the ages of 76-85 would be appropriate. Colonoscopy: Not on file  FOBT/FIT: Not on file  Cologuard: Not on file  Sigmoidoscopy: Not on file          Prostate Cancer Screening Individualized decision between patient and health care provider in men between ages of 55-69   Medicare will cover every 12 months beginning on the day after your 50th birthday PSA: 1.8 ng/mL     Screening Not Indicated     Hepatitis C Screening Once for adults born between 1945 and 1965  More frequently in patients at high risk for Hepatitis C Hep C Antibody: Not on file        Diabetes Screening 1-2 times per year if you're at risk for diabetes or have pre-diabetes Fasting glucose: 201 mg/dL (12/17/2022)  A1C: 6.4 (10/23/2023)  Screening Not Indicated  History Diabetes   Cholesterol Screening Once every 5  years if you don't have a lipid disorder. May order more often based on risk factors. Lipid panel: 12/17/2022  Screening Not Indicated  History Lipid Disorder      Other Preventive Screenings Covered by Medicare:  Abdominal Aortic Aneurysm (AAA) Screening: covered once if your at risk. You're considered to be at risk if you have a family history of AAA or a male between the age of 65-75 who smoking at least 100 cigarettes in your lifetime.  Lung Cancer Screening: covers low dose CT scan once per year if you meet all of the following conditions: (1) Age 55-77; (2) No signs or symptoms of lung cancer; (3) Current smoker or have quit smoking within the last 15 years; (4) You have a tobacco smoking history of at least 20 pack years (packs per day x number of years you smoked); (5) You get a written order from a healthcare provider.  Glaucoma Screening: covered annually if you're considered high risk: (1) You have diabetes OR (2) Family history of glaucoma OR (3)  aged 50 and older OR (4)  American aged 65 and older  Osteoporosis Screening: covered every 2 years if you meet one of the following conditions: (1) Have a vertebral abnormality; (2) On glucocorticoid therapy for more than 3 months; (3) Have primary hyperparathyroidism; (4) On osteoporosis medications and need to assess response to drug therapy.  HIV Screening: covered annually if you're between the age of 15-65. Also covered annually if you are younger than 15 and older than 65 with risk factors for HIV infection. For pregnant patients, it is covered up to 3 times per pregnancy.    Immunizations:  Immunization Recommendations   Influenza Vaccine Annual influenza vaccination during flu season is recommended for all persons aged >= 6 months who do not have contraindications   Pneumococcal Vaccine   * Pneumococcal conjugate vaccine = PCV13 (Prevnar 13), PCV15 (Vaxneuvance), PCV20 (Prevnar 20)  * Pneumococcal polysaccharide vaccine = PPSV23  (Pneumovax) Adults 19-65 yo with certain risk factors or if 65+ yo  If never received any pneumonia vaccine: recommend Prevnar 20 (PCV20)  Give PCV20 if previously received 1 dose of PCV13 or PPSV23   Hepatitis B Vaccine 3 dose series if at intermediate or high risk (ex: diabetes, end stage renal disease, liver disease)   Respiratory syncytial virus (RSV) Vaccine - COVERED BY MEDICARE PART D  * RSVPreF3 (Arexvy) CDC recommends that adults 60 years of age and older may receive a single dose of RSV vaccine using shared clinical decision-making (SCDM)   Tetanus (Td) Vaccine - COST NOT COVERED BY MEDICARE PART B Following completion of primary series, a booster dose should be given every 10 years to maintain immunity against tetanus. Td may also be given as tetanus wound prophylaxis.   Tdap Vaccine - COST NOT COVERED BY MEDICARE PART B Recommended at least once for all adults. For pregnant patients, recommended with each pregnancy.   Shingles Vaccine (Shingrix) - COST NOT COVERED BY MEDICARE PART B  2 shot series recommended in those 19 years and older who have or will have weakened immune systems or those 50 years and older     Health Maintenance Due:  There are no preventive care reminders to display for this patient.  Immunizations Due:      Topic Date Due   • COVID-19 Vaccine (4 - 2023-24 season) 09/01/2023     Advance Directives   What are advance directives?  Advance directives are legal documents that state your wishes and plans for medical care. These plans are made ahead of time in case you lose your ability to make decisions for yourself. Advance directives can apply to any medical decision, such as the treatments you want, and if you want to donate organs.   What are the types of advance directives?  There are many types of advance directives, and each state has rules about how to use them. You may choose a combination of any of the following:  Living will:  This is a written record of the treatment you want.  You can also choose which treatments you do not want, which to limit, and which to stop at a certain time. This includes surgery, medicine, IV fluid, and tube feedings.   Durable power of  for healthcare (DPAHC):  This is a written record that states who you want to make healthcare choices for you when you are unable to make them for yourself. This person, called a proxy, is usually a family member or a friend. You may choose more than 1 proxy.  Do not resuscitate (DNR) order:  A DNR order is used in case your heart stops beating or you stop breathing. It is a request not to have certain forms of treatment, such as CPR. A DNR order may be included in other types of advance directives.  Medical directive:  This covers the care that you want if you are in a coma, near death, or unable to make decisions for yourself. You can list the treatments you want for each condition. Treatment may include pain medicine, surgery, blood transfusions, dialysis, IV or tube feedings, and a ventilator (breathing machine).  Values history:  This document has questions about your views, beliefs, and how you feel and think about life. This information can help others choose the care that you would choose.  Why are advance directives important?  An advance directive helps you control your care. Although spoken wishes may be used, it is better to have your wishes written down. Spoken wishes can be misunderstood, or not followed. Treatments may be given even if you do not want them. An advance directive may make it easier for your family to make difficult choices about your care.   Weight Management   Why it is important to manage your weight:  Being overweight increases your risk of health conditions such as heart disease, high blood pressure, type 2 diabetes, and certain types of cancer. It can also increase your risk for osteoarthritis, sleep apnea, and other respiratory problems. Aim for a slow, steady weight loss. Even a small  amount of weight loss can lower your risk of health problems.  How to lose weight safely:  A safe and healthy way to lose weight is to eat fewer calories and get regular exercise. You can lose up about 1 pound a week by decreasing the number of calories you eat by 500 calories each day.   Healthy meal plan for weight management:  A healthy meal plan includes a variety of foods, contains fewer calories, and helps you stay healthy. A healthy meal plan includes the following:  Eat whole-grain foods more often.  A healthy meal plan should contain fiber. Fiber is the part of grains, fruits, and vegetables that is not broken down by your body. Whole-grain foods are healthy and provide extra fiber in your diet. Some examples of whole-grain foods are whole-wheat breads and pastas, oatmeal, brown rice, and bulgur.  Eat a variety of vegetables every day.  Include dark, leafy greens such as spinach, kale, flynn greens, and mustard greens. Eat yellow and orange vegetables such as carrots, sweet potatoes, and winter squash.   Eat a variety of fruits every day.  Choose fresh or canned fruit (canned in its own juice or light syrup) instead of juice. Fruit juice has very little or no fiber.  Eat low-fat dairy foods.  Drink fat-free (skim) milk or 1% milk. Eat fat-free yogurt and low-fat cottage cheese. Try low-fat cheeses such as mozzarella and other reduced-fat cheeses.  Choose meat and other protein foods that are low in fat.  Choose beans or other legumes such as split peas or lentils. Choose fish, skinless poultry (chicken or turkey), or lean cuts of red meat (beef or pork). Before you cook meat or poultry, cut off any visible fat.   Use less fat and oil.  Try baking foods instead of frying them. Add less fat, such as margarine, sour cream, regular salad dressing and mayonnaise to foods. Eat fewer high-fat foods. Some examples of high-fat foods include french fries, doughnuts, ice cream, and cakes.  Eat fewer sweets.  Limit  foods and drinks that are high in sugar. This includes candy, cookies, regular soda, and sweetened drinks.  Exercise:  Exercise at least 30 minutes per day on most days of the week. Some examples of exercise include walking, biking, dancing, and swimming. You can also fit in more physical activity by taking the stairs instead of the elevator or parking farther away from stores. Ask your healthcare provider about the best exercise plan for you.      © Copyright [employer] 2018 Information is for End User's use only and may not be sold, redistributed or otherwise used for commercial purposes. All illustrations and images included in CareNotes® are the copyrighted property of [employer] or [employer]

## 2023-12-19 NOTE — PROGRESS NOTES
Assessment and Plan:     Problem List Items Addressed This Visit    None      Depression Screening and Follow-up Plan: Patient was screened for depression during today's encounter. They screened negative with a PHQ-2 score of 0.      Preventive health issues were discussed with patient, and age appropriate screening tests were ordered as noted in patient's After Visit Summary.  Personalized health advice and appropriate referrals for health education or preventive services given if needed, as noted in patient's After Visit Summary.     History of Present Illness:     Patient presents for a Medicare Wellness Visit    Patient is here due to voice hoarseness. Denies any cough does have nasal drainage. Diagnosed with acid reflux by ENT however Protonix is not helping.       Patient Care Team:  Adrienne Richard MD as PCP - General     Review of Systems:     Review of Systems     Problem List:     Patient Active Problem List   Diagnosis    Medicare annual wellness visit, subsequent    Chronic cough    ST elevation myocardial infarction involving left circumflex coronary artery (HCC)    Coronary artery disease involving native heart without angina pectoris    Hypertension    Mixed hyperlipidemia    Primary osteoarthritis of both knees    Trigger thumb, right thumb    Stage 3 chronic kidney disease, unspecified whether stage 3a or 3b CKD (HCC)    Type 2 diabetes mellitus without complication, without long-term current use of insulin (HCC)    BPH with obstruction/lower urinary tract symptoms    Drug-induced bradycardia    Nasal sinus congestion    COVID-19      Past Medical and Surgical History:     Past Medical History:   Diagnosis Date    Diabetes mellitus (HCC)     diet control    Heart disease 05/09/2020    Heart attack    Hyperlipidemia     Myocardial infarction (HCC)     Penile lesion     Prediabetes      Past Surgical History:   Procedure Laterality Date    CARDIAC SURGERY  05/2020    stent placement    COLONOSCOPY       HERNIA REPAIR      LARYNGOSCOPY  1978    with vocal cord polyp removal    LARYNGOSCOPY N/A 8/3/2021    Procedure: MICRODIRECT LARYNGOSCOPY WITH  EXCISION OF VOCAL CORD LESION;  Surgeon: Pito Tomlin DO;  Location: [facility];  Service: ENT      Family History:     Family History   Problem Relation Age of Onset    Heart disease Mother         Cardiac Disorder    No Known Problems Father     No Known Problems Sister       Social History:     Social History     Socioeconomic History    Marital status: Single     Spouse name: None    Number of children: None    Years of education: None    Highest education level: None   Occupational History    None   Tobacco Use    Smoking status: Former     Current packs/day: 1.00     Average packs/day: 1 pack/day for 25.0 years (25.0 ttl pk-yrs)     Types: Cigarettes    Smokeless tobacco: Never   Vaping Use    Vaping status: Never Used   Substance and Sexual Activity    Alcohol use: Yes     Comment: Social twice a month    Drug use: Never    Sexual activity: None   Other Topics Concern    None   Social History Narrative    None     Social Determinants of Health     Financial Resource Strain: Unknown (12/16/2022)    Overall Financial Resource Strain (CARDIA)     Difficulty of Paying Living Expenses: Patient declined   Food Insecurity: Not on file   Transportation Needs: Unknown (12/16/2022)    PRAPARE - Transportation     Lack of Transportation (Medical): Patient declined     Lack of Transportation (Non-Medical): Patient declined   Physical Activity: Not on file   Stress: Not on file   Social Connections: Not on file   Intimate Partner Violence: Not on file   Housing Stability: Not on file      Medications and Allergies:     Current Outpatient Medications   Medication Sig Dispense Refill    aspirin (ECOTRIN LOW STRENGTH) 81 mg EC tablet Take 1 tablet (81 mg total) by mouth daily 60 tablet 0    atorvastatin (LIPITOR) 40 mg tablet TAKE 1 TABLET BY MOUTH EVERY DAY 90 tablet 3     Blood Glucose Monitoring Suppl (FREESTYLE FREEDOM LITE) w/Device KIT Check glucose levels once daily 1 each 0    chlorhexidine (PERIDEX) 0.12 % solution SWISH AND SPIT 15 ML TWICE A DAY      Diclofenac Sodium (VOLTAREN) 1 % Apply 2 g topically 4 (four) times a day 100 g 1    glucose blood (FREESTYLE LITE) test strip Use 1 each 3 (three) times a day 300 strip 1    Lancets (FREESTYLE) lancets TEST ONCE DAILY FASTING DXE11.9 100 each 3    metFORMIN (GLUCOPHAGE) 500 mg tablet TAKE 1 TABLET BY MOUTH TWICE A DAY WITH FOOD 180 tablet 2    metoprolol tartrate (LOPRESSOR) 25 mg tablet TAKE 1 TABLET (25 MG TOTAL) BY MOUTH EVERY 12 (TWELVE) HOURS 180 tablet 3    pantoprazole (PROTONIX) 40 mg tablet TAKE 1 TABLET (40 MG TOTAL) BY MOUTH DAILY TAKE IN MORNING 90 tablet 2    tamsulosin (FLOMAX) 0.4 mg TAKE 1 CAPSULE BY MOUTH EVERY DAY WITH DINNER 90 capsule 1     No current facility-administered medications for this visit.     Allergies   Allergen Reactions    Celecoxib      Other reaction(s): itchy  Other reaction(s): itchy      Immunizations:     Immunization History   Administered Date(s) Administered    COVID-19 MODERNA VACC 0.5 ML IM 02/08/2021, 03/06/2021, 10/13/2021    H1N1, All Formulations 02/01/2010    INFLUENZA 10/18/2018, 10/19/2020, 11/14/2022    Influenza, high dose seasonal 0.7 mL 10/19/2020, 11/23/2021, 10/19/2023    Influenza, injectable, quadrivalent, preservative free 0.5 mL 10/16/2019    Pneumococcal Conjugate 13-Valent 06/25/2018    Pneumococcal Polysaccharide PPV23 10/21/2020      Health Maintenance:     There are no preventive care reminders to display for this patient.      Topic Date Due    COVID-19 Vaccine (4 - 2023-24 season) 09/01/2023      Medicare Screening Tests and Risk Assessments:     Jay is here for his Subsequent Wellness visit.     Health Risk Assessment:   Patient rates overall health as excellent. Patient feels that their physical health rating is same. Patient is very satisfied with  their life. Eyesight was rated as same. Hearing was rated as same. Patient feels that their emotional and mental health rating is same. Patients states they are never, rarely angry. Patient states they are never, rarely unusually tired/fatigued. Pain experienced in the last 7 days has been some. Patient's pain rating has been 3/10. Patient states that he has experienced no weight loss or gain in last 6 months.     Depression Screening:   PHQ-2 Score: 0      Fall Risk Screening:   In the past year, patient has experienced: no history of falling in past year      Home Safety:  Patient does not have trouble with stairs inside or outside of their home. Patient has working smoke alarms and has working carbon monoxide detector. Home safety hazards include: none.     Nutrition:   Current diet is Regular.     Medications:   Patient is not currently taking any over-the-counter supplements. Patient is able to manage medications.     Activities of Daily Living (ADLs)/Instrumental Activities of Daily Living (IADLs):   Walk and transfer into and out of bed and chair?: Yes  Dress and groom yourself?: Yes    Bathe or shower yourself?: Yes    Do your laundry/housekeeping?: Yes  Manage your money, pay your bills and track your expenses?: Yes  Make your own meals?: Yes    Do your own shopping?: Yes    Previous Hospitalizations:   Any hospitalizations or ED visits within the last 12 months?: Yes    How many hospitalizations have you had in the last year?: 1-2    PREVENTIVE SCREENINGS      Cardiovascular Screening:    General: History Lipid Disorder    Due for: Lipid Panel      Diabetes Screening:     General: Screening Not Indicated, History Diabetes and Screening Current      Colorectal Cancer Screening:     General: Screening Not Indicated      Prostate Cancer Screening:    General: Screening Not Indicated      Osteoporosis Screening:    General: Screening Not Indicated      Abdominal Aortic Aneurysm (AAA) Screening:    Risk factors  "include: tobacco use        Lung Cancer Screening:     General: Screening Not Indicated      Hepatitis C Screening:    General: Screening Not Indicated    Screening, Brief Intervention, and Referral to Treatment (SBIRT)    Screening  Typical number of drinks in a day: 0    No results found.     Physical Exam:     /84 (BP Location: Left arm, Patient Position: Standing)   Pulse 70   Temp 97.9 °F (36.6 °C) (Temporal)   Ht 5' 8\" (1.727 m)   Wt 88.6 kg (195 lb 6.4 oz)   SpO2 98%   BMI 29.71 kg/m²     Physical Exam  Vitals and nursing note reviewed.   Constitutional:       General: He is not in acute distress.     Appearance: He is well-developed.   HENT:      Head: Normocephalic and atraumatic.   Eyes:      Conjunctiva/sclera: Conjunctivae normal.   Cardiovascular:      Rate and Rhythm: Normal rate and regular rhythm.      Heart sounds: No murmur heard.  Pulmonary:      Effort: Pulmonary effort is normal. No respiratory distress.      Breath sounds: Normal breath sounds.   Abdominal:      Palpations: Abdomen is soft.      Tenderness: There is no abdominal tenderness.   Musculoskeletal:         General: No swelling.      Cervical back: Neck supple.   Skin:     General: Skin is warm and dry.      Capillary Refill: Capillary refill takes less than 2 seconds.   Neurological:      Mental Status: He is alert.   Psychiatric:         Mood and Affect: Mood normal.          Bandar Doll MD  "

## 2023-12-20 NOTE — TELEPHONE ENCOUNTER
Upon review of the In Basket request and the patient's chart, initial outreach has been made via fax to facility. Please see Contacts section for details.     Thank you  Petr Mckeon MA

## 2023-12-21 NOTE — TELEPHONE ENCOUNTER
Upon review of the In Basket request we were able to locate, review, and update the patient chart as requested for Diabetic Eye Exam.    Any additional questions or concerns should be emailed to the Practice Liaisons via the appropriate education email address, please do not reply via In Basket.    Thank you  Petr Mckeon MA

## 2023-12-27 ENCOUNTER — HOSPITAL ENCOUNTER (EMERGENCY)
Facility: HOSPITAL | Age: 83
Discharge: HOME | End: 2023-12-27
Attending: EMERGENCY MEDICINE
Payer: MEDICARE

## 2023-12-27 ENCOUNTER — APPOINTMENT (EMERGENCY)
Dept: CT IMAGING | Facility: HOSPITAL | Age: 83
End: 2023-12-27
Payer: MEDICARE

## 2023-12-27 VITALS
DIASTOLIC BLOOD PRESSURE: 69 MMHG | TEMPERATURE: 99.4 F | OXYGEN SATURATION: 95 % | RESPIRATION RATE: 19 BRPM | HEART RATE: 84 BPM | SYSTOLIC BLOOD PRESSURE: 142 MMHG

## 2023-12-27 DIAGNOSIS — R10.30 LOWER ABDOMINAL PAIN: ICD-10-CM

## 2023-12-27 DIAGNOSIS — K57.92 ACUTE DIVERTICULITIS: Primary | ICD-10-CM

## 2023-12-27 DIAGNOSIS — J10.1 INFLUENZA A: ICD-10-CM

## 2023-12-27 LAB
ALBUMIN SERPL BCP-MCNC: 4.2 G/DL (ref 3.5–5)
ALP SERPL-CCNC: 37 U/L (ref 34–104)
ALT SERPL W P-5'-P-CCNC: 16 U/L (ref 7–52)
ANION GAP SERPL CALCULATED.3IONS-SCNC: 8 MMOL/L
AST SERPL W P-5'-P-CCNC: 19 U/L (ref 13–39)
ATRIAL RATE: 94 BPM
BACTERIA UR QL AUTO: NORMAL /HPF
BASOPHILS # BLD AUTO: 0.03 THOUSANDS/ÂΜL (ref 0–0.1)
BASOPHILS NFR BLD AUTO: 1 % (ref 0–1)
BILIRUB SERPL-MCNC: 0.82 MG/DL (ref 0.2–1)
BILIRUB UR QL STRIP: NEGATIVE
BUN SERPL-MCNC: 19 MG/DL (ref 5–25)
CALCIUM SERPL-MCNC: 8.9 MG/DL (ref 8.4–10.2)
CHLORIDE SERPL-SCNC: 105 MMOL/L (ref 96–108)
CLARITY UR: CLEAR
CO2 SERPL-SCNC: 23 MMOL/L (ref 21–32)
COLOR UR: ABNORMAL
CREAT SERPL-MCNC: 1.38 MG/DL (ref 0.6–1.3)
EOSINOPHIL # BLD AUTO: 0.06 THOUSAND/ÂΜL (ref 0–0.61)
EOSINOPHIL NFR BLD AUTO: 1 % (ref 0–6)
ERYTHROCYTE [DISTWIDTH] IN BLOOD BY AUTOMATED COUNT: 12.6 % (ref 11.6–15.1)
FLUAV RNA RESP QL NAA+PROBE: POSITIVE
FLUBV RNA RESP QL NAA+PROBE: NEGATIVE
GFR SERPL CREATININE-BSD FRML MDRD: 47 ML/MIN/1.73SQ M
GLUCOSE SERPL-MCNC: 176 MG/DL (ref 65–140)
GLUCOSE UR STRIP-MCNC: NEGATIVE MG/DL
HCT VFR BLD AUTO: 37.4 % (ref 36.5–49.3)
HGB BLD-MCNC: 12.5 G/DL (ref 12–17)
HGB UR QL STRIP.AUTO: NEGATIVE
IMM GRANULOCYTES # BLD AUTO: 0.02 THOUSAND/UL (ref 0–0.2)
IMM GRANULOCYTES NFR BLD AUTO: 0 % (ref 0–2)
KETONES UR STRIP-MCNC: NEGATIVE MG/DL
LACTATE SERPL-SCNC: 1 MMOL/L (ref 0.5–2)
LEUKOCYTE ESTERASE UR QL STRIP: ABNORMAL
LIPASE SERPL-CCNC: 15 U/L (ref 11–82)
LYMPHOCYTES # BLD AUTO: 0.85 THOUSANDS/ÂΜL (ref 0.6–4.47)
LYMPHOCYTES NFR BLD AUTO: 17 % (ref 14–44)
MCH RBC QN AUTO: 33.3 PG (ref 26.8–34.3)
MCHC RBC AUTO-ENTMCNC: 33.4 G/DL (ref 31.4–37.4)
MCV RBC AUTO: 100 FL (ref 82–98)
MONOCYTES # BLD AUTO: 0.79 THOUSAND/ÂΜL (ref 0.17–1.22)
MONOCYTES NFR BLD AUTO: 16 % (ref 4–12)
NEUTROPHILS # BLD AUTO: 3.28 THOUSANDS/ÂΜL (ref 1.85–7.62)
NEUTS SEG NFR BLD AUTO: 65 % (ref 43–75)
NITRITE UR QL STRIP: NEGATIVE
NON-SQ EPI CELLS URNS QL MICRO: NORMAL /HPF
NRBC BLD AUTO-RTO: 0 /100 WBCS
P AXIS: 35 DEGREES
PH UR STRIP.AUTO: 5.5 [PH]
PLATELET # BLD AUTO: 147 THOUSANDS/UL (ref 149–390)
PMV BLD AUTO: 10 FL (ref 8.9–12.7)
POTASSIUM SERPL-SCNC: 4.1 MMOL/L (ref 3.5–5.3)
PR INTERVAL: 150 MS
PROT SERPL-MCNC: 7.1 G/DL (ref 6.4–8.4)
PROT UR STRIP-MCNC: NEGATIVE MG/DL
QRS AXIS: 20 DEGREES
QRSD INTERVAL: 134 MS
QT INTERVAL: 386 MS
QTC INTERVAL: 482 MS
RBC # BLD AUTO: 3.75 MILLION/UL (ref 3.88–5.62)
RBC #/AREA URNS AUTO: NORMAL /HPF
RSV RNA RESP QL NAA+PROBE: NEGATIVE
SARS-COV-2 RNA RESP QL NAA+PROBE: NEGATIVE
SODIUM SERPL-SCNC: 136 MMOL/L (ref 135–147)
SP GR UR STRIP.AUTO: 1.02 (ref 1–1.03)
T WAVE AXIS: 72 DEGREES
UROBILINOGEN UR STRIP-ACNC: <2 MG/DL
VENTRICULAR RATE: 94 BPM
WBC # BLD AUTO: 5.03 THOUSAND/UL (ref 4.31–10.16)
WBC #/AREA URNS AUTO: NORMAL /HPF

## 2023-12-27 PROCEDURE — 80053 COMPREHEN METABOLIC PANEL: CPT | Performed by: PHYSICIAN ASSISTANT

## 2023-12-27 PROCEDURE — 96361 HYDRATE IV INFUSION ADD-ON: CPT

## 2023-12-27 PROCEDURE — 99285 EMERGENCY DEPT VISIT HI MDM: CPT | Performed by: PHYSICIAN ASSISTANT

## 2023-12-27 PROCEDURE — 96374 THER/PROPH/DIAG INJ IV PUSH: CPT

## 2023-12-27 PROCEDURE — 74177 CT ABD & PELVIS W/CONTRAST: CPT

## 2023-12-27 PROCEDURE — 99284 EMERGENCY DEPT VISIT MOD MDM: CPT

## 2023-12-27 PROCEDURE — 0241U HB NFCT DS VIR RESP RNA 4 TRGT: CPT | Performed by: PHYSICIAN ASSISTANT

## 2023-12-27 PROCEDURE — 36415 COLL VENOUS BLD VENIPUNCTURE: CPT | Performed by: PHYSICIAN ASSISTANT

## 2023-12-27 PROCEDURE — 87040 BLOOD CULTURE FOR BACTERIA: CPT | Performed by: PHYSICIAN ASSISTANT

## 2023-12-27 PROCEDURE — 85025 COMPLETE CBC W/AUTO DIFF WBC: CPT | Performed by: PHYSICIAN ASSISTANT

## 2023-12-27 PROCEDURE — G1004 CDSM NDSC: HCPCS

## 2023-12-27 PROCEDURE — 93005 ELECTROCARDIOGRAM TRACING: CPT

## 2023-12-27 PROCEDURE — 83605 ASSAY OF LACTIC ACID: CPT | Performed by: PHYSICIAN ASSISTANT

## 2023-12-27 PROCEDURE — 83690 ASSAY OF LIPASE: CPT | Performed by: PHYSICIAN ASSISTANT

## 2023-12-27 PROCEDURE — 81001 URINALYSIS AUTO W/SCOPE: CPT | Performed by: EMERGENCY MEDICINE

## 2023-12-27 PROCEDURE — 96375 TX/PRO/DX INJ NEW DRUG ADDON: CPT

## 2023-12-27 RX ORDER — FENTANYL CITRATE 50 UG/ML
50 INJECTION, SOLUTION INTRAMUSCULAR; INTRAVENOUS ONCE
Status: COMPLETED | OUTPATIENT
Start: 2023-12-27 | End: 2023-12-27

## 2023-12-27 RX ORDER — OXYCODONE HYDROCHLORIDE AND ACETAMINOPHEN 5; 325 MG/1; MG/1
1 TABLET ORAL EVERY 6 HOURS PRN
Qty: 16 TABLET | Refills: 0 | Status: SHIPPED | OUTPATIENT
Start: 2023-12-27

## 2023-12-27 RX ORDER — AMOXICILLIN AND CLAVULANATE POTASSIUM 875; 125 MG/1; MG/1
1 TABLET, FILM COATED ORAL EVERY 12 HOURS
Qty: 20 TABLET | Refills: 0 | Status: SHIPPED | OUTPATIENT
Start: 2023-12-27 | End: 2024-01-06

## 2023-12-27 RX ORDER — ONDANSETRON 2 MG/ML
4 INJECTION INTRAMUSCULAR; INTRAVENOUS ONCE
Status: COMPLETED | OUTPATIENT
Start: 2023-12-27 | End: 2023-12-27

## 2023-12-27 RX ORDER — ONDANSETRON 4 MG/1
4 TABLET, ORALLY DISINTEGRATING ORAL EVERY 6 HOURS PRN
Qty: 20 TABLET | Refills: 0 | Status: SHIPPED | OUTPATIENT
Start: 2023-12-27

## 2023-12-27 RX ADMIN — FENTANYL CITRATE 50 MCG: 50 INJECTION INTRAMUSCULAR; INTRAVENOUS at 07:45

## 2023-12-27 RX ADMIN — IOHEXOL 100 ML: 350 INJECTION, SOLUTION INTRAVENOUS at 08:21

## 2023-12-27 RX ADMIN — ONDANSETRON 4 MG: 2 INJECTION INTRAMUSCULAR; INTRAVENOUS at 07:46

## 2023-12-27 RX ADMIN — SODIUM CHLORIDE 1000 ML: 0.9 INJECTION, SOLUTION INTRAVENOUS at 07:46

## 2023-12-27 NOTE — ED PROVIDER NOTES
History  Chief Complaint   Patient presents with    Abdominal Pain     Pt came in ambulatory c/o lower abdominal pain that radiates in his back beginning a couple of days ago. Denies N/V/D.      83-year-old male with past medical history significant for diabetes, coronary artery disease status post MI with stent placement and hyperlipidemia presents to the emergency department with 2 days of progressively worsening lower abdominal pain that radiates to the back.  Patient reports pain is sharp, sudden onset and has become constant over the past 2 days.  He states it radiates from his lower abdomen to his back.  He denies fevers or chills, vomiting or diarrhea.  He endorses nausea.  He denies UTI symptoms or hematuria.  Denies recent fall injury or trauma.  Past surgical history reviewed remarkable for hernia repair.  Social history reviewed.  Patient is a 1 pack/day cigarette smoker..   Patient reports that moving and bending exacerbate pain.        History provided by:  Patient and significant other (SO = Evelyne)   used: No    Abdominal Pain  Associated symptoms: nausea    Associated symptoms: no chest pain, no cough, no fever, no hematuria and no shortness of breath        Prior to Admission Medications   Prescriptions Last Dose Informant Patient Reported? Taking?   Blood Glucose Monitoring Suppl (FREESTYLE FREEDOM LITE) w/Device KIT  Self No No   Sig: Check glucose levels once daily   Diclofenac Sodium (VOLTAREN) 1 %   No No   Sig: Apply 2 g topically 4 (four) times a day   Lancets (FREESTYLE) lancets  Self No No   Sig: TEST ONCE DAILY FASTING DXE11.9   amoxicillin-clavulanate (AUGMENTIN) 875-125 mg per tablet   No No   Sig: Take 1 tablet by mouth every 12 (twelve) hours for 7 days   aspirin (ECOTRIN LOW STRENGTH) 81 mg EC tablet  Self No No   Sig: Take 1 tablet (81 mg total) by mouth daily   atorvastatin (LIPITOR) 40 mg tablet   No No   Sig: TAKE 1 TABLET BY MOUTH EVERY DAY   chlorhexidine  (PERIDEX) 0.12 % solution   Yes No   Sig: SWISH AND SPIT 15 ML TWICE A DAY   glucose blood (FREESTYLE LITE) test strip  Self No No   Sig: Use 1 each 3 (three) times a day   metFORMIN (GLUCOPHAGE) 500 mg tablet   No No   Sig: TAKE 1 TABLET BY MOUTH TWICE A DAY WITH FOOD   metoprolol tartrate (LOPRESSOR) 25 mg tablet   No No   Sig: TAKE 1 TABLET (25 MG TOTAL) BY MOUTH EVERY 12 (TWELVE) HOURS   pantoprazole (PROTONIX) 40 mg tablet   No No   Sig: TAKE 1 TABLET (40 MG TOTAL) BY MOUTH DAILY TAKE IN MORNING   tamsulosin (FLOMAX) 0.4 mg   No No   Sig: TAKE 1 CAPSULE BY MOUTH EVERY DAY WITH DINNER      Facility-Administered Medications: None       Past Medical History:   Diagnosis Date    Diabetes mellitus (HCC)     diet control    Heart disease 05/09/2020    Heart attack    Hyperlipidemia     Myocardial infarction (HCC)     Penile lesion     Prediabetes        Past Surgical History:   Procedure Laterality Date    CARDIAC SURGERY  05/2020    stent placement    COLONOSCOPY      HERNIA REPAIR      LARYNGOSCOPY  1978    with vocal cord polyp removal    LARYNGOSCOPY N/A 8/3/2021    Procedure: MICRODIRECT LARYNGOSCOPY WITH  EXCISION OF VOCAL CORD LESION;  Surgeon: Pito Tomlin DO;  Location: [facility];  Service: ENT       Family History   Problem Relation Age of Onset    Heart disease Mother         Cardiac Disorder    No Known Problems Father     No Known Problems Sister      I have reviewed and agree with the history as documented.    E-Cigarette/Vaping    E-Cigarette Use Never User      E-Cigarette/Vaping Substances    Nicotine No     THC No     CBD No     Flavoring No     Other No     Unknown No      Social History     Tobacco Use    Smoking status: Former     Current packs/day: 1.00     Average packs/day: 1 pack/day for 25.0 years (25.0 ttl pk-yrs)     Types: Cigarettes    Smokeless tobacco: Never   Vaping Use    Vaping status: Never Used   Substance Use Topics    Alcohol use: Yes     Comment: Social twice a month    Drug  use: Never       Review of Systems   Constitutional:  Positive for appetite change. Negative for diaphoresis and fever.   Respiratory:  Negative for cough, chest tightness, shortness of breath and stridor.    Cardiovascular:  Negative for chest pain.   Gastrointestinal:  Positive for abdominal pain and nausea.   Genitourinary:  Negative for difficulty urinating and hematuria.   Musculoskeletal:  Negative for gait problem.   Skin:  Negative for rash.   Neurological:  Negative for seizures, syncope, facial asymmetry, weakness and numbness.   All other systems reviewed and are negative.      Physical Exam  Physical Exam  Vitals and nursing note reviewed.   Constitutional:       General: He is not in acute distress.     Appearance: Normal appearance.   HENT:      Head: Normocephalic and atraumatic.      Right Ear: External ear normal.      Left Ear: External ear normal.      Nose: Nose normal.   Eyes:      General: No scleral icterus.        Right eye: No discharge.         Left eye: No discharge.   Cardiovascular:      Rate and Rhythm: Tachycardia present.      Pulses: Normal pulses.   Pulmonary:      Effort: Pulmonary effort is normal.      Breath sounds: Normal breath sounds.   Abdominal:      Palpations: There is no mass.      Tenderness: There is abdominal tenderness. There is no right CVA tenderness or left CVA tenderness.   Musculoskeletal:         General: No tenderness, deformity or signs of injury.      Cervical back: Normal range of motion and neck supple.   Skin:     General: Skin is dry.      Coloration: Skin is not jaundiced.      Findings: No erythema or rash.   Neurological:      General: No focal deficit present.      Mental Status: He is alert and oriented to person, place, and time. Mental status is at baseline.      Motor: No weakness.   Psychiatric:         Mood and Affect: Mood normal.         Behavior: Behavior normal.         Thought Content: Thought content normal.         Vital Signs  ED Triage  Vitals   Temperature Pulse Respirations Blood Pressure SpO2   12/27/23 0551 12/27/23 0551 12/27/23 0551 12/27/23 0551 12/27/23 0551   99.4 °F (37.4 °C) (!) 109 20 113/57 94 %      Temp Source Heart Rate Source Patient Position - Orthostatic VS BP Location FiO2 (%)   12/27/23 0551 12/27/23 0551 12/27/23 0551 12/27/23 0551 --   Temporal Monitor Sitting Left arm       Pain Score       12/27/23 0745       8           Vitals:    12/27/23 0730 12/27/23 0800 12/27/23 1000 12/27/23 1030   BP: 133/63 120/64 130/65 142/69   Pulse: 100 92 87 84   Patient Position - Orthostatic VS:             Visual Acuity      ED Medications  Medications   ondansetron (ZOFRAN) injection 4 mg (4 mg Intravenous Given 12/27/23 0746)   sodium chloride 0.9 % bolus 1,000 mL (0 mL Intravenous Stopped 12/27/23 1052)   fentanyl citrate (PF) 100 MCG/2ML 50 mcg (50 mcg Intravenous Given 12/27/23 0745)   iohexol (OMNIPAQUE) 350 MG/ML injection (MULTI-DOSE) 100 mL (100 mL Intravenous Given 12/27/23 0821)       Diagnostic Studies  Results Reviewed       Procedure Component Value Units Date/Time    FLU/RSV/COVID - if FLU/RSV clinically relevant [[id number]]  (Abnormal) Collected: 12/27/23 0736    Lab Status: Final result Specimen: Nares from Nose Updated: 12/27/23 0831     SARS-CoV-2 Negative     INFLUENZA A PCR Positive     INFLUENZA B PCR Negative     RSV PCR Negative    Narrative:      FOR PEDIATRIC PATIENTS - copy/paste COVID Guidelines URL to browser: [email or web address]    SARS-CoV-2 assay is a Nucleic Acid Amplification assay intended for the  qualitative detection of nucleic acid from SARS-CoV-2 in nasopharyngeal  swabs. Results are for the presumptive identification of SARS-CoV-2 RNA.    Positive results are indicative of infection with SARS-CoV-2, the virus  causing COVID-19, but do not rule out bacterial infection or co-infection  with other viruses. Laboratories within the [place] and  its  territories are required to report all positive results to the appropriate  public health authorities. Negative results do not preclude SARS-CoV-2  infection and should not be used as the sole basis for treatment or other  patient management decisions. Negative results must be combined with  clinical observations, patient history, and epidemiological information.  This test has not been FDA cleared or approved.    This test has been authorized by FDA under an Emergency Use Authorization  (EUA). This test is only authorized for the duration of time the  declaration that circumstances exist justifying the authorization of the  emergency use of an in vitro diagnostic tests for detection of SARS-CoV-2  virus and/or diagnosis of COVID-19 infection under section 564(b)(1) of  the Act, 21 U.S.C. 360bbb-3(b)(1), unless the authorization is terminated  or revoked sooner. The test has been validated but independent review by FDA  and CLIA is pending.    Test performed using HIGH MOBILITY GeneXpert: This RT-PCR assay targets N2,  a region unique to SARS-CoV-2. A conserved region in the E-gene was chosen  for pan-Sarbecovirus detection which includes SARS-CoV-2.    According to CMS-2020-01-R, this platform meets the definition of high-throughput technology.    Lipase [[id number]]  (Normal) Collected: 12/27/23 0736    Lab Status: Final result Specimen: Blood from Arm, Right Updated: 12/27/23 0810     Lipase 15 u/L     Lactic acid, plasma (w/reflex if result > 2.0) [[id number]]  (Normal) Collected: 12/27/23 0736    Lab Status: Final result Specimen: Blood from Arm, Right Updated: 12/27/23 0810     LACTIC ACID 1.0 mmol/L     Narrative:      Result may be elevated if tourniquet was used during collection.    Comprehensive metabolic panel [[id number]]  (Abnormal) Collected: 12/27/23 0736    Lab Status: Final result Specimen: Blood from Arm, Right Updated: 12/27/23 0810     Sodium 136 mmol/L      Potassium 4.1 mmol/L      Chloride 105  mmol/L      CO2 23 mmol/L      ANION GAP 8 mmol/L      BUN 19 mg/dL      Creatinine 1.38 mg/dL      Glucose 176 mg/dL      Calcium 8.9 mg/dL      AST 19 U/L      ALT 16 U/L      Alkaline Phosphatase 37 U/L      Total Protein 7.1 g/dL      Albumin 4.2 g/dL      Total Bilirubin 0.82 mg/dL      eGFR 47 ml/min/1.73sq m     Narrative:      National Kidney Disease Foundation guidelines for Chronic Kidney Disease (CKD):     Stage 1 with normal or high GFR (GFR > 90 mL/min/1.73 square meters)    Stage 2 Mild CKD (GFR = 60-89 mL/min/1.73 square meters)    Stage 3A Moderate CKD (GFR = 45-59 mL/min/1.73 square meters)    Stage 3B Moderate CKD (GFR = 30-44 mL/min/1.73 square meters)    Stage 4 Severe CKD (GFR = 15-29 mL/min/1.73 square meters)    Stage 5 End Stage CKD (GFR <15 mL/min/1.73 square meters)  Note: GFR calculation is accurate only with a steady state creatinine    CBC and differential [[id number]]  (Abnormal) Collected: 12/27/23 0736    Lab Status: Final result Specimen: Blood from Arm, Right Updated: 12/27/23 0752     WBC 5.03 Thousand/uL      RBC 3.75 Million/uL      Hemoglobin 12.5 g/dL      Hematocrit 37.4 %       fL      MCH 33.3 pg      MCHC 33.4 g/dL      RDW 12.6 %      MPV 10.0 fL      Platelets 147 Thousands/uL      nRBC 0 /100 WBCs      Neutrophils Relative 65 %      Immat GRANS % 0 %      Lymphocytes Relative 17 %      Monocytes Relative 16 %      Eosinophils Relative 1 %      Basophils Relative 1 %      Neutrophils Absolute 3.28 Thousands/µL      Immature Grans Absolute 0.02 Thousand/uL      Lymphocytes Absolute 0.85 Thousands/µL      Monocytes Absolute 0.79 Thousand/µL      Eosinophils Absolute 0.06 Thousand/µL      Basophils Absolute 0.03 Thousands/µL     Blood culture #2 [[id number]] Collected: 12/27/23 0736    Lab Status: In process Specimen: Blood from Arm, Right Updated: 12/27/23 0749    Blood culture #1 [[id number]] Collected: 12/27/23 0745    Lab Status: In process Specimen: Blood from Arm,  Left Updated: 12/27/23 0749    Urine Microscopic [[id number]]  (Normal) Collected: 12/27/23 0650    Lab Status: Final result Specimen: Urine, Other Updated: 12/27/23 0701     RBC, UA 1-2 /hpf      WBC, UA 1-2 /hpf      Epithelial Cells Occasional /hpf      Bacteria, UA Occasional /hpf     UA w Reflex to Microscopic w Reflex to Culture [[id number]]  (Abnormal) Collected: 12/27/23 0650    Lab Status: Final result Specimen: Urine, Other Updated: 12/27/23 0700     Color, UA Light Yellow     Clarity, UA Clear     Specific Gravity, UA 1.017     pH, UA 5.5     Leukocytes, UA Small     Nitrite, UA Negative     Protein, UA Negative mg/dl      Glucose, UA Negative mg/dl      Ketones, UA Negative mg/dl      Urobilinogen, UA <2.0 mg/dl      Bilirubin, UA Negative     Occult Blood, UA Negative                   CT abdomen pelvis with contrast   Final Result by Caio Trevino MD (12/27 1012)         1. Colonic diverticulosis with mild focal mesenteric fatty stranding about the mid sigmoid colon consistent with mild uncomplicated diverticulitis.   2. Incidental findings as noted.      The study was marked in EPIC for immediate notification.            Workstation performed: [id number]                    Procedures  Procedures         ED Course  ED Course as of 12/27/23 1054   Wed Dec 27, 2023   0821 Re-evaluation - HR improved from 109-->92 with iv fluids.  Continue to monitor   0821 Lactic acid, plasma (w/reflex if result > 2.0)  Initial lactic acid 1.0 normal.    0821 Lipase  Lipase 15, no pancreatitis.    0822 Comprehensive metabolic panel(!)  Bun 19, creatinine 1.38, mildly improved from baseline 1.5 value 1 month ago.    0822 CBC and differential(!)  No anemia or leukocytosis   0822 UA positive small leukocytes, negative for nitrites and blood.    0833 Influenza A positive.     0837 Re-evaluation.  Patient reports pain improved after fentanyl.   CT a/p still pending.                                 SBIRT 20yo+       Flowsheet Row Most Recent Value   Initial Alcohol Screen: US AUDIT-C     1. How often do you have a drink containing alcohol? 0 Filed at: 12/27/2023 0554   2. How many drinks containing alcohol do you have on a typical day you are drinking?  0 Filed at: 12/27/2023 0554   3b. FEMALE Any Age, or MALE 65+: How often do you have 4 or more drinks on one occassion? 0 Filed at: 12/27/2023 0554   Audit-C Score 0 Filed at: 12/27/2023 0554   GERMAN: How many times in the past year have you...    Used an illegal drug or used a prescription medication for non-medical reasons? Never Filed at: 12/27/2023 0554                      Medical Decision Making  MDM    DDX: Includes ruptured leaking AAA, mesenteric ischemia, diverticulitis, appendicitis, colitis, pyelonephritis, UTI, kidney stone, consider compression fracture lumbar spine, cancer.       Initial ED Plan: CT abdomen and pelvis necessary to evaluate for leaking AAA or mesenteric ischemia.  CBC necessary for anemia.  CMP necessary to evaluate hepatorenal function.   UA necessary for infection/blood in urine.  IV analgesics for pain control with iv antiemetics for nausea.       MDM:  I have reviewed the patient's vital signs, nursing notes, and other relevant ancillary testing/information. I have had a detailed discussion with the patient regarding the historical points, examination findings, and any diagnostic results    Results:  Reviewed at bedside.  Ct a/p positive for uncomplicated diverticulitis.  Viral screening positive for influenza A. Patient notified of results at bedside.       ED Final Assessment:   1) Acute abdominal pain secondary to acute diverticulitis.   2) Influenza A     initially tachycardic on arrival - likley secondary to pain.  Improved with iv fluids and IV fentanyl.  Pain resolved.   Normal lactic acid and renal function at baseline.  Afebrile and no peritoneal signs on clinical exam.  Influenza A infection - subacute, patient without significant  flu symptoms.  Unclear onset - no indication for Tamiflu.   Discussed supportive care including tylenol for fevers, rest, encourage fluids.  Discussed diverticular diet.  Uncomplicated diverticulitis on CT scan. Will treat with Augmentin necessary for Diverticulitis coverage.  Add percocet for pain control. Standard narcotic precautions given.  Encourage fluids.  Add Zofran for nausea/vomiting as needed.   I discussed diagnosis and treatment plan with patient at bedside.  Extended discussion with patient regarding the diagnosis, pathophysiology, expectant coarse and treatment plan.  Instructed to follow up with pcp in 3-5 days.  Reviewed reasons to return to ed.  Patient verbalized understanding of diagnosis and agreement with discharge plan of care as well as understanding of reasons to return to ed.              Amount and/or Complexity of Data Reviewed  Labs: ordered. Decision-making details documented in ED Course.  Radiology: ordered.    Risk  Prescription drug management.             Disposition  Final diagnoses:   Acute diverticulitis   Lower abdominal pain   Influenza A     Time reflects when diagnosis was documented in both MDM as applicable and the Disposition within this note       Time User Action Codes Description Comment    12/27/2023 10:42 AM Ran Rojas Add [K57.92] Acute diverticulitis     12/27/2023 10:42 AM Ran Rojas Add [R10.30] Lower abdominal pain     12/27/2023 10:42 AM Ran Rojas Add [J10.1] Influenza A           ED Disposition       ED Disposition   Discharge    Condition   Stable    Date/Time   Wed Dec 27, 2023 1042    Comment   Jay Stout discharge to home/self care.                   Follow-up Information       Follow up With Specialties Details Why Contact Info Additional Information    Adrienne Richard MD Family Medicine Call in 1 week for further evaluation of symptoms [address]   [address]  [facility] [id number]  [phone]       [facility]  Emergency Department Emergency Medicine Go to  If symptoms worsen [address] [facility] [id number] [phone] [facility] Emergency Department, [address], [place], [address]            Patient's Medications   Discharge Prescriptions    AMOXICILLIN-CLAVULANATE (AUGMENTIN) 875-125 MG PER TABLET    Take 1 tablet by mouth every 12 (twelve) hours for 10 days       Start Date: 12/27/2023End Date: 1/6/2024       Order Dose: 1 tablet       Quantity: 20 tablet    Refills: 0    ONDANSETRON (ZOFRAN-ODT) 4 MG DISINTEGRATING TABLET    Take 1 tablet (4 mg total) by mouth every 6 (six) hours as needed for nausea or vomiting for up to 20 doses       Start Date: 12/27/2023End Date: --       Order Dose: 4 mg       Quantity: 20 tablet    Refills: 0    OXYCODONE-ACETAMINOPHEN (PERCOCET) 5-325 MG PER TABLET    Take 1 tablet by mouth every 6 (six) hours as needed for severe pain (dx diverticulitis/ongoing rx.) Label no driving no etoh. Initial rx.  Dx: Max Daily Amount: 4 tablets       Start Date: 12/27/2023End Date: --       Order Dose: 1 tablet       Quantity: 16 tablet    Refills: 0       No discharge procedures on file.    PDMP Review         Value Time User    PDMP Reviewed  Yes 7/28/2021 10:14 AM Pito Tomlin DO            ED Provider  Electronically Signed by             Ran Rojas PA-C  12/27/23 5617

## 2023-12-28 ENCOUNTER — VBI (OUTPATIENT)
Dept: FAMILY MEDICINE CLINIC | Facility: CLINIC | Age: 83
End: 2023-12-28

## 2023-12-28 NOTE — TELEPHONE ENCOUNTER
12/28/23 11:24 AM    Patient contacted post ED visit, [facility] department spoke with patient/caregiver and outreach was successful.    Thank you.  Frederick Moser MA  [facility]

## 2023-12-31 LAB
BACTERIA BLD CULT: NORMAL
BACTERIA BLD CULT: NORMAL

## 2024-01-01 LAB
BACTERIA BLD CULT: NORMAL
BACTERIA BLD CULT: NORMAL

## 2024-01-03 ENCOUNTER — TRANSITIONAL CARE MANAGEMENT (OUTPATIENT)
Dept: FAMILY MEDICINE CLINIC | Facility: CLINIC | Age: 84
End: 2024-01-03

## 2024-01-12 ENCOUNTER — OFFICE VISIT (OUTPATIENT)
Dept: FAMILY MEDICINE CLINIC | Facility: CLINIC | Age: 84
End: 2024-01-12
Payer: MEDICARE

## 2024-01-12 VITALS
WEIGHT: 194 LBS | HEART RATE: 102 BPM | BODY MASS INDEX: 29.4 KG/M2 | DIASTOLIC BLOOD PRESSURE: 72 MMHG | HEIGHT: 68 IN | OXYGEN SATURATION: 98 % | TEMPERATURE: 97.8 F | SYSTOLIC BLOOD PRESSURE: 118 MMHG

## 2024-01-12 DIAGNOSIS — N18.30 STAGE 3 CHRONIC KIDNEY DISEASE, UNSPECIFIED WHETHER STAGE 3A OR 3B CKD (HCC): ICD-10-CM

## 2024-01-12 DIAGNOSIS — J10.1 INFLUENZA A: ICD-10-CM

## 2024-01-12 DIAGNOSIS — K57.92 DIVERTICULITIS: Primary | ICD-10-CM

## 2024-01-12 DIAGNOSIS — E11.9 TYPE 2 DIABETES MELLITUS WITHOUT COMPLICATION, WITHOUT LONG-TERM CURRENT USE OF INSULIN (HCC): ICD-10-CM

## 2024-01-12 DIAGNOSIS — D69.6 PLATELETS DECREASED (HCC): ICD-10-CM

## 2024-01-12 PROCEDURE — 99214 OFFICE O/P EST MOD 30 MIN: CPT | Performed by: FAMILY MEDICINE

## 2024-01-12 NOTE — PROGRESS NOTES
Assessment/Plan:         Problem List Items Addressed This Visit        Endocrine    Type 2 diabetes mellitus without complication, without long-term current use of insulin (Formerly [facility])       Lab Results   Component Value Date    HGBA1C 6.4 10/23/2023   Continue Metformin and will check A1c in March, monitor glucoses         Relevant Orders    Hemoglobin A1C    Lipid Panel with Direct LDL reflex    Basic metabolic panel    Albumin / creatinine urine ratio       Respiratory    Influenza A     resolved            Genitourinary    Stage 3 chronic kidney disease, unspecified whether stage 3a or 3b CKD (Formerly [facility])     Lab Results   Component Value Date    EGFR 47 12/27/2023    EGFR 40 11/06/2023    EGFR 45 01/11/2023    CREATININE 1.38 (H) 12/27/2023    CREATININE 1.56 (H) 11/06/2023    CREATININE 1.44 (H) 01/11/2023   Discussed diagnosis of CKD. Advised to avoid nephrotoxins.  Stay well hydrated              Other    Platelets decreased (Formerly [facility])     Will check CBC with next labs         Relevant Orders    CBC and differential    Diverticulitis - Primary     Completed Augmentin and symptoms resolved              Subjective:      Patient ID: Jay Stout is a 83 y.o. male.    83 year old here for ER follow up   Was seen for diverticulitis on 12/27/23. Treated with Augmentin.feeling better. Denies abd pain or changes in bowels. He was also diagnosed with Flu A. Symptoms resolved.     DM - Fasting glucoses 110s. He is not due for A1c yet. Taking Metformin.  Lab Results       Component                Value               Date                       HGBA1C                   6.4                 10/23/2023                    The following portions of the patient's history were reviewed and updated as appropriate:   Past Medical History:  He has a past medical history of Diabetes mellitus (Formerly [facility]), Heart disease (05/09/2020), Hyperlipidemia, Myocardial infarction (HCC), Penile lesion, and  Prediabetes.,  _______________________________________________________________________  Medical Problems:  does not have any pertinent problems on file.,  _______________________________________________________________________  Past Surgical History:   has a past surgical history that includes Hernia repair; Cardiac surgery (05/2020); Laryngoscopy (1978); Colonoscopy; and LARYNGOSCOPY (N/A, 8/3/2021).,  _______________________________________________________________________  Family History:  family history includes Heart disease in his mother; No Known Problems in his father and sister.,  _______________________________________________________________________  Social History:   reports that he has quit smoking. His smoking use included cigarettes. He has a 25.0 pack-year smoking history. He has never used smokeless tobacco. He reports current alcohol use. He reports that he does not use drugs.,  _______________________________________________________________________  Allergies:  is allergic to celecoxib..  _______________________________________________________________________  Current Outpatient Medications   Medication Sig Dispense Refill   • aspirin (ECOTRIN LOW STRENGTH) 81 mg EC tablet Take 1 tablet (81 mg total) by mouth daily 60 tablet 0   • atorvastatin (LIPITOR) 40 mg tablet TAKE 1 TABLET BY MOUTH EVERY DAY 90 tablet 3   • Blood Glucose Monitoring Suppl (FREESTYLE FREEDOM LITE) w/Device KIT Check glucose levels once daily 1 each 0   • chlorhexidine (PERIDEX) 0.12 % solution SWISH AND SPIT 15 ML TWICE A DAY     • Diclofenac Sodium (VOLTAREN) 1 % Apply 2 g topically 4 (four) times a day 100 g 1   • glucose blood (FREESTYLE LITE) test strip Use 1 each 3 (three) times a day 300 strip 1   • Lancets (FREESTYLE) lancets TEST ONCE DAILY FASTING DXE11.9 100 each 3   • metFORMIN (GLUCOPHAGE) 500 mg tablet TAKE 1 TABLET BY MOUTH TWICE A DAY WITH FOOD 180 tablet 2   • metoprolol tartrate (LOPRESSOR) 25 mg tablet TAKE 1  "TABLET (25 MG TOTAL) BY MOUTH EVERY 12 (TWELVE) HOURS 180 tablet 3   • ondansetron (ZOFRAN-ODT) 4 mg disintegrating tablet Take 1 tablet (4 mg total) by mouth every 6 (six) hours as needed for nausea or vomiting for up to 20 doses 20 tablet 0   • oxyCODONE-acetaminophen (PERCOCET) 5-325 mg per tablet Take 1 tablet by mouth every 6 (six) hours as needed for severe pain (dx diverticulitis/ongoing rx.) Label no driving no etoh. Initial rx.  Dx: Max Daily Amount: 4 tablets 16 tablet 0   • pantoprazole (PROTONIX) 40 mg tablet TAKE 1 TABLET (40 MG TOTAL) BY MOUTH DAILY TAKE IN MORNING 90 tablet 2   • tamsulosin (FLOMAX) 0.4 mg TAKE 1 CAPSULE BY MOUTH EVERY DAY WITH DINNER 90 capsule 1     No current facility-administered medications for this visit.     _______________________________________________________________________  Review of Systems   Constitutional:  Negative for activity change, appetite change, fatigue and unexpected weight change.   Respiratory:  Negative for chest tightness and shortness of breath.    Cardiovascular:  Negative for chest pain and leg swelling.   Gastrointestinal:  Negative for abdominal pain.   Neurological:  Negative for headaches.         Objective:  Vitals:    01/12/24 1355   BP: 118/72   Pulse: 102   Temp: 97.8 °F (36.6 °C)   SpO2: 98%   Weight: 88 kg (194 lb)   Height: 5' 8\" (1.727 m)     Body mass index is 29.5 kg/m².     Physical Exam  Vitals and nursing note reviewed.   Constitutional:       General: He is not in acute distress.     Appearance: Normal appearance. He is well-developed. He is not diaphoretic.   HENT:      Head: Normocephalic and atraumatic.   Cardiovascular:      Rate and Rhythm: Normal rate and regular rhythm.      Heart sounds: Normal heart sounds. No murmur heard.     No friction rub. No gallop.   Pulmonary:      Effort: Pulmonary effort is normal. No respiratory distress.      Breath sounds: Normal breath sounds. No wheezing or rales.   Chest:      Chest wall: No " tenderness.   Abdominal:      General: There is no distension.      Palpations: Abdomen is soft.      Tenderness: There is no abdominal tenderness.   Musculoskeletal:         General: No swelling.      Right lower leg: No edema.      Left lower leg: No edema.   Neurological:      General: No focal deficit present.      Mental Status: He is alert and oriented to person, place, and time.   Psychiatric:         Mood and Affect: Mood normal.         Behavior: Behavior normal.

## 2024-01-12 NOTE — PROGRESS NOTES
Assessment & Plan     {There are no diagnoses linked to this encounter. (Refresh or delete this SmartLink)}     Subjective     Transitional Care Management Review:   Jay Stout is a 83 y.o. male here for TCM follow up.     During the TCM phone call patient stated:  TCM Call     Date and time call was made  5/14/2020  8:54 AM    Hospital care reviewed  Records reviewed    Patient was hospitialized at  [facility]    Date of Admission  05/09/20    Date of discharge  05/11/20    Diagnosis  ST elevation myocardial infarction involving left circumflex coronary artery     Disposition  Home    Current Symptoms  None      TCM Call     Referrals needed  appt 05/18/2020    I have advised the patient to call PCP with any new or worsening symptoms  khalida drew    Living Arrangements  Family members        HPI  Review of Systems    Objective     There were no vitals taken for this visit.     Physical Exam  { Medications have NOT been reviewed by provider in current encounter. Once medications have been reviewed, please refresh your progress note so that you can sign the visit }    Adrienne Richard MD

## 2024-01-12 NOTE — ASSESSMENT & PLAN NOTE
Lab Results   Component Value Date    EGFR 47 12/27/2023    EGFR 40 11/06/2023    EGFR 45 01/11/2023    CREATININE 1.38 (H) 12/27/2023    CREATININE 1.56 (H) 11/06/2023    CREATININE 1.44 (H) 01/11/2023   Discussed diagnosis of CKD. Advised to avoid nephrotoxins.  Stay well hydrated

## 2024-01-12 NOTE — ASSESSMENT & PLAN NOTE
Lab Results   Component Value Date    HGBA1C 6.4 10/23/2023   Continue Metformin and will check A1c in March, monitor glucoses

## 2024-01-17 ENCOUNTER — OFFICE VISIT (OUTPATIENT)
Dept: CARDIOLOGY CLINIC | Facility: CLINIC | Age: 84
End: 2024-01-17
Payer: MEDICARE

## 2024-01-17 VITALS
HEART RATE: 66 BPM | RESPIRATION RATE: 16 BRPM | HEIGHT: 68 IN | BODY MASS INDEX: 29.25 KG/M2 | SYSTOLIC BLOOD PRESSURE: 138 MMHG | OXYGEN SATURATION: 98 % | DIASTOLIC BLOOD PRESSURE: 76 MMHG | WEIGHT: 193 LBS

## 2024-01-17 DIAGNOSIS — I25.10 CORONARY ARTERY DISEASE INVOLVING NATIVE HEART WITHOUT ANGINA PECTORIS, UNSPECIFIED VESSEL OR LESION TYPE: Primary | ICD-10-CM

## 2024-01-17 PROCEDURE — 99213 OFFICE O/P EST LOW 20 MIN: CPT | Performed by: INTERNAL MEDICINE

## 2024-01-17 NOTE — PROGRESS NOTES
"                                           Cardiology Follow Up    Jay Stout  1940  [id number]  [facility] [place]  [address]  [place] [address] [phone]    1. 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  He had myocardial infarction at which time he had a tightness in his chest and knew that \"something was wrong.\"  He had an inferior MI and had stenting of the circumflex.  He had a borderline lesion of the right PDA.    Since then he has had no exertional discomfort despite the fact that he is  physically active including treadmill, biking, weights.    He did smoke many years ago but stopped.  He has never been screened for abdominal aneurysm.    LDL cholesterol is well-controlled.    Patient Active Problem List   Diagnosis    Medicare annual wellness visit, subsequent    Chronic cough    ST elevation myocardial infarction involving left circumflex coronary artery (HCC)    Coronary artery disease involving native heart without angina pectoris    Hypertension    Mixed hyperlipidemia    Primary osteoarthritis of both knees    Trigger thumb, right thumb    Stage 3 chronic kidney disease, unspecified whether stage 3a or 3b CKD (HCC)    Type 2 diabetes mellitus without complication, without long-term current use of insulin (HCC)    BPH with obstruction/lower urinary tract symptoms    Drug-induced bradycardia    Nasal sinus congestion    Chronic tonsillitis    Platelets decreased (HCC)    Diverticulitis    Influenza A     Past Medical History:   Diagnosis Date    Diabetes mellitus (HCC)     diet control    Heart disease 05/09/2020    Heart attack    Hyperlipidemia     Myocardial infarction (HCC)     Penile lesion     " Prediabetes      Social History     Socioeconomic History    Marital status: Single     Spouse name: Not on file    Number of children: Not on file    Years of education: Not on file    Highest education level: Not on file   Occupational History    Not on file   Tobacco Use    Smoking status: Former     Current packs/day: 1.00     Average packs/day: 1 pack/day for 25.0 years (25.0 ttl pk-yrs)     Types: Cigarettes    Smokeless tobacco: Never   Vaping Use    Vaping status: Never Used   Substance and Sexual Activity    Alcohol use: Yes     Comment: Social twice a month    Drug use: Never    Sexual activity: Not on file   Other Topics Concern    Not on file   Social History Narrative    Not on file     Social Determinants of Health     Financial Resource Strain: Low Risk  (12/19/2023)    Overall Financial Resource Strain (CARDIA)     Difficulty of Paying Living Expenses: Not hard at all   Food Insecurity: Not on file   Transportation Needs: No Transportation Needs (12/19/2023)    PRAPARE - Transportation     Lack of Transportation (Medical): No     Lack of Transportation (Non-Medical): No   Physical Activity: Not on file   Stress: Not on file   Social Connections: Not on file   Intimate Partner Violence: Not on file   Housing Stability: Not on file      Family History   Problem Relation Age of Onset    Heart disease Mother         Cardiac Disorder    No Known Problems Father     No Known Problems Sister      Past Surgical History:   Procedure Laterality Date    CARDIAC SURGERY  05/2020    stent placement    COLONOSCOPY      HERNIA REPAIR      LARYNGOSCOPY  1978    with vocal cord polyp removal    LARYNGOSCOPY N/A 8/3/2021    Procedure: MICRODIRECT LARYNGOSCOPY WITH  EXCISION OF VOCAL CORD LESION;  Surgeon: Pito Tomlin DO;  Location: [facility];  Service: ENT       Current Outpatient Medications:     aspirin (ECOTRIN LOW STRENGTH) 81 mg EC tablet, Take 1 tablet (81 mg total) by mouth daily, Disp: 60 tablet, Rfl: 0     atorvastatin (LIPITOR) 40 mg tablet, TAKE 1 TABLET BY MOUTH EVERY DAY, Disp: 90 tablet, Rfl: 3    Blood Glucose Monitoring Suppl (FREESTYLE FREEDOM LITE) w/Device KIT, Check glucose levels once daily, Disp: 1 each, Rfl: 0    chlorhexidine (PERIDEX) 0.12 % solution, SWISH AND SPIT 15 ML TWICE A DAY, Disp: , Rfl:     Diclofenac Sodium (VOLTAREN) 1 %, Apply 2 g topically 4 (four) times a day, Disp: 100 g, Rfl: 1    glucose blood (FREESTYLE LITE) test strip, Use 1 each 3 (three) times a day, Disp: 300 strip, Rfl: 1    Lancets (FREESTYLE) lancets, TEST ONCE DAILY FASTING DXE11.9, Disp: 100 each, Rfl: 3    metFORMIN (GLUCOPHAGE) 500 mg tablet, TAKE 1 TABLET BY MOUTH TWICE A DAY WITH FOOD, Disp: 180 tablet, Rfl: 2    metoprolol tartrate (LOPRESSOR) 25 mg tablet, TAKE 1 TABLET (25 MG TOTAL) BY MOUTH EVERY 12 (TWELVE) HOURS, Disp: 180 tablet, Rfl: 3    ondansetron (ZOFRAN-ODT) 4 mg disintegrating tablet, Take 1 tablet (4 mg total) by mouth every 6 (six) hours as needed for nausea or vomiting for up to 20 doses, Disp: 20 tablet, Rfl: 0    pantoprazole (PROTONIX) 40 mg tablet, TAKE 1 TABLET (40 MG TOTAL) BY MOUTH DAILY TAKE IN MORNING, Disp: 90 tablet, Rfl: 2    tamsulosin (FLOMAX) 0.4 mg, TAKE 1 CAPSULE BY MOUTH EVERY DAY WITH DINNER, Disp: 90 capsule, Rfl: 1    oxyCODONE-acetaminophen (PERCOCET) 5-325 mg per tablet, Take 1 tablet by mouth every 6 (six) hours as needed for severe pain (dx diverticulitis/ongoing rx.) Label no driving no etoh. Initial rx.  Dx: Max Daily Amount: 4 tablets (Patient not taking: Reported on 1/17/2024), Disp: 16 tablet, Rfl: 0  Allergies   Allergen Reactions    Celecoxib      Other reaction(s): itchy  Other reaction(s): itchy       Labs:  Admission on 12/27/2023, Discharged on 12/27/2023   Component Date Value    Color, UA 12/27/2023 Light Yellow     Clarity, UA 12/27/2023 Clear     Specific Gravity, UA 12/27/2023 1.017     pH, UA 12/27/2023 5.5     Leukocytes, UA 12/27/2023 Small (A)     Nitrite,  UA 12/27/2023 Negative     Protein, UA 12/27/2023 Negative     Glucose, UA 12/27/2023 Negative     Ketones, UA 12/27/2023 Negative     Urobilinogen, UA 12/27/2023 <2.0     Bilirubin, UA 12/27/2023 Negative     Occult Blood, UA 12/27/2023 Negative     RBC, UA 12/27/2023 1-2     WBC, UA 12/27/2023 1-2     Epithelial Cells 12/27/2023 Occasional     Bacteria, UA 12/27/2023 Occasional     WBC 12/27/2023 5.03     RBC 12/27/2023 3.75 (L)     Hemoglobin 12/27/2023 12.5     Hematocrit 12/27/2023 37.4     MCV 12/27/2023 100 (H)     MCH 12/27/2023 33.3     MCHC 12/27/2023 33.4     RDW 12/27/2023 12.6     MPV 12/27/2023 10.0     Platelets 12/27/2023 147 (L)     nRBC 12/27/2023 0     Neutrophils Relative 12/27/2023 65     Immat GRANS % 12/27/2023 0     Lymphocytes Relative 12/27/2023 17     Monocytes Relative 12/27/2023 16 (H)     Eosinophils Relative 12/27/2023 1     Basophils Relative 12/27/2023 1     Neutrophils Absolute 12/27/2023 3.28     Immature Grans Absolute 12/27/2023 0.02     Lymphocytes Absolute 12/27/2023 0.85     Monocytes Absolute 12/27/2023 0.79     Eosinophils Absolute 12/27/2023 0.06     Basophils Absolute 12/27/2023 0.03     Sodium 12/27/2023 136     Potassium 12/27/2023 4.1     Chloride 12/27/2023 105     CO2 12/27/2023 23     ANION GAP 12/27/2023 8     BUN 12/27/2023 19     Creatinine 12/27/2023 1.38 (H)     Glucose 12/27/2023 176 (H)     Calcium 12/27/2023 8.9     AST 12/27/2023 19     ALT 12/27/2023 16     Alkaline Phosphatase 12/27/2023 37     Total Protein 12/27/2023 7.1     Albumin 12/27/2023 4.2     Total Bilirubin 12/27/2023 0.82     eGFR 12/27/2023 47     Lipase 12/27/2023 15     LACTIC ACID 12/27/2023 1.0     Blood Culture 12/27/2023 No Growth After 5 Days.     Blood Culture 12/27/2023 No Growth After 5 Days.     SARS-CoV-2 12/27/2023 Negative     INFLUENZA A PCR 12/27/2023 Positive (A)     INFLUENZA B PCR 12/27/2023 Negative     RSV PCR 12/27/2023 Negative     Ventricular Rate 12/27/2023 94      Atrial Rate 12/27/2023 94     ME Interval 12/27/2023 150     QRSD Interval 12/27/2023 134     QT Interval 12/27/2023 386     QTC Interval 12/27/2023 482     P Axis 12/27/2023 35     QRS [place] 12/27/2023 20     T Wave [place] 12/27/2023 72    Telephone on 12/19/2023   Component Date Value    Right Eye Diabetic Retin* 12/19/2022 None     Left Eye Diabetic Retino* 12/19/2022 None      Imaging: CT abdomen pelvis with contrast    Result Date: 12/27/2023  Narrative: CT ABDOMEN AND PELVIS WITH IV CONTRAST INDICATION:   LLQ abdominal pain lower abdominal pain radiates to back,  evaluate for AAA,  diverticulitis. COMPARISON:  None. TECHNIQUE:  CT examination of the abdomen and pelvis was performed. Multiplanar 2D reformatted images were created from the source data. This examination, like all CT scans performed in the [facility], was performed utilizing techniques to minimize radiation dose exposure, including the use of iterative reconstruction and automated exposure control. Radiation dose length product (DLP) for this visit:  684 mGy-cm IV Contrast:  100 mL of iohexol (OMNIPAQUE) Enteric Contrast:  Enteric contrast was not administered. FINDINGS: ABDOMEN LOWER CHEST: Mild bibasilar subsegmental atelectasis noted. The heart is top normal in size. Mild thickening of the distal esophagus may be related to esophagitis. LIVER/BILIARY TREE: Diffuse hepatic steatosis without focal abnormality. GALLBLADDER:  No calcified gallstones. No pericholecystic inflammatory change. SPLEEN:  Unremarkable. PANCREAS:  Unremarkable. ADRENAL GLANDS:  Unremarkable. KIDNEYS/URETERS: Mild bilateral renal parenchymal scarring likely due to previous ischemic and/or inflammatory insult. No urolithiasis or obstructive uropathy. No renal mass. STOMACH AND BOWEL: No bowel wall thickening or intestinal obstruction. Colonic diverticulosis is present with mild stranding in the mid sigmoid colon extending in the mesenteric fat to the left  external iliac region, which may reflect a mild diverticulitis. No pneumoperitoneum or abscess identified. Moderate retained fecal material throughout the colon.. APPENDIX: A normal appendix was visualized. ABDOMINOPELVIC CAVITY:  No ascites.  No pneumoperitoneum.  No lymphadenopathy. VESSELS: Atherosclerotic changes are present.  No evidence of aneurysm. PELVIS REPRODUCTIVE ORGANS: Mild prostatomegaly. URINARY BLADDER:  Unremarkable. ABDOMINAL WALL/INGUINAL REGIONS: There is a small fat-containing umbilical hernia. Small fat-containing indirect left inguinal hernia is present. No bowel involvement. OSSEOUS STRUCTURES:  No acute fracture or destructive osseous lesion.     Impression: 1. Colonic diverticulosis with mild focal mesenteric fatty stranding about the mid sigmoid colon consistent with mild uncomplicated diverticulitis. 2. Incidental findings as noted. The study was marked in EPIC for immediate notification. Workstation performed: [id number]       Review of Systems:  Review of Systems    Physical Exam:  138/76.  Heart rate 66 and regular.  Lungs clear.  Regular rhythm.  No murmurs or gallops.  No edema.    Discussion/Summary:    1.  Coronary disease without angina pectoris    Recommendations:    1.  I do not see that he has ever been screened for abdominal aortic aneurysm and we will do this.  Otherwise will return in 1 year      Nick Corona MD

## 2024-02-17 PROBLEM — J35.01 CHRONIC TONSILLITIS: Status: RESOLVED | Noted: 2023-12-19 | Resolved: 2024-02-17

## 2024-02-21 PROBLEM — Z00.00 MEDICARE ANNUAL WELLNESS VISIT, SUBSEQUENT: Status: RESOLVED | Noted: 2018-06-25 | Resolved: 2024-02-21

## 2024-02-21 PROBLEM — J10.1 INFLUENZA A: Status: RESOLVED | Noted: 2024-01-12 | Resolved: 2024-02-21

## 2024-03-04 ENCOUNTER — RA CDI HCC (OUTPATIENT)
Dept: OTHER | Facility: HOSPITAL | Age: 84
End: 2024-03-04

## 2024-03-04 NOTE — PROGRESS NOTES
HCC coding opportunities          Chart Reviewed number of suggestions sent to Provider: 1  E11.22    Patients Insurance     Medicare Insurance: Medicare

## 2024-03-09 ENCOUNTER — APPOINTMENT (OUTPATIENT)
Dept: LAB | Facility: CLINIC | Age: 84
End: 2024-03-09
Payer: MEDICARE

## 2024-03-09 DIAGNOSIS — N13.8 BPH WITH OBSTRUCTION/LOWER URINARY TRACT SYMPTOMS: ICD-10-CM

## 2024-03-09 DIAGNOSIS — N40.1 BPH WITH OBSTRUCTION/LOWER URINARY TRACT SYMPTOMS: ICD-10-CM

## 2024-03-11 ENCOUNTER — OFFICE VISIT (OUTPATIENT)
Dept: FAMILY MEDICINE CLINIC | Facility: CLINIC | Age: 84
End: 2024-03-11
Payer: MEDICARE

## 2024-03-11 VITALS
BODY MASS INDEX: 29.86 KG/M2 | HEIGHT: 68 IN | OXYGEN SATURATION: 98 % | HEART RATE: 68 BPM | TEMPERATURE: 96.9 F | SYSTOLIC BLOOD PRESSURE: 134 MMHG | WEIGHT: 197 LBS | DIASTOLIC BLOOD PRESSURE: 70 MMHG

## 2024-03-11 DIAGNOSIS — N18.30 STAGE 3 CHRONIC KIDNEY DISEASE, UNSPECIFIED WHETHER STAGE 3A OR 3B CKD (HCC): Primary | ICD-10-CM

## 2024-03-11 DIAGNOSIS — M25.562 CHRONIC PAIN OF BOTH KNEES: ICD-10-CM

## 2024-03-11 DIAGNOSIS — G89.29 CHRONIC PAIN OF BOTH KNEES: ICD-10-CM

## 2024-03-11 DIAGNOSIS — E78.2 MIXED HYPERLIPIDEMIA: ICD-10-CM

## 2024-03-11 DIAGNOSIS — E11.9 TYPE 2 DIABETES MELLITUS WITHOUT COMPLICATION, WITHOUT LONG-TERM CURRENT USE OF INSULIN (HCC): ICD-10-CM

## 2024-03-11 DIAGNOSIS — M25.561 CHRONIC PAIN OF BOTH KNEES: ICD-10-CM

## 2024-03-11 PROBLEM — D69.6 PLATELETS DECREASED (HCC): Status: RESOLVED | Noted: 2024-01-12 | Resolved: 2024-03-11

## 2024-03-11 PROBLEM — K57.92 DIVERTICULITIS: Status: RESOLVED | Noted: 2024-01-12 | Resolved: 2024-03-11

## 2024-03-11 PROCEDURE — G2211 COMPLEX E/M VISIT ADD ON: HCPCS | Performed by: FAMILY MEDICINE

## 2024-03-11 PROCEDURE — 99214 OFFICE O/P EST MOD 30 MIN: CPT | Performed by: FAMILY MEDICINE

## 2024-03-11 RX ORDER — TAMSULOSIN HYDROCHLORIDE 0.4 MG/1
CAPSULE ORAL
Qty: 90 CAPSULE | Refills: 1 | Status: SHIPPED | OUTPATIENT
Start: 2024-03-11

## 2024-03-11 NOTE — PROGRESS NOTES
Assessment/Plan:         Problem List Items Addressed This Visit        Endocrine    Type 2 diabetes mellitus without complication, without long-term current use of insulin (HCC)       Lab Results   Component Value Date    HGBA1C 7.1 (H) 03/09/2024   Continue Metformin  Reduce carbs in diet  Increased exercise         Relevant Orders    Hemoglobin A1C       Genitourinary    Stage 3 chronic kidney disease, unspecified whether stage 3a or 3b CKD (HCC) - Primary     Lab Results   Component Value Date    EGFR 42 03/09/2024    EGFR 47 12/27/2023    EGFR 40 11/06/2023    CREATININE 1.51 (H) 03/09/2024    CREATININE 1.38 (H) 12/27/2023    CREATININE 1.56 (H) 11/06/2023   Discussed diagnosis of CKD. Advised to avoid nephrotoxins.  Stay well hydrated           Relevant Orders    Basic metabolic panel       Other    Mixed hyperlipidemia     Well controlled on statin therapy          Relevant Orders    Lipid Panel with Direct LDL reflex    Chronic pain of both knees     Referral to ortho         Relevant Orders    Ambulatory referral to Sports Medicine         Subjective:      Patient ID: Jay Stout is a 83 y.o. male.    Here for lab review  CKD - stable GFR 42  DM - A1c 7.1% up from 6.4%. has been traveling more lately so diet has been unhealthier.  Has been walking daily. He is on Metformin 500 mg BID.    Lipids - on statin and well controlled    Having b/l knee pain which is chronic - he had Synvisc injections in the past.  Last saw Dr. Lopez in 2021.  Would like to see Dr. Duarte.     Diabetes  Pertinent negatives for diabetes include no chest pain.       The following portions of the patient's history were reviewed and updated as appropriate:   Past Medical History:  He has a past medical history of Diabetes mellitus (HCC), Heart disease (05/09/2020), Hyperlipidemia, Myocardial infarction (HCC), Penile lesion, and Prediabetes.,  _______________________________________________________________________  Medical  Problems:  does not have any pertinent problems on file.,  _______________________________________________________________________  Past Surgical History:   has a past surgical history that includes Hernia repair; Cardiac surgery (05/2020); Laryngoscopy (1978); Colonoscopy; and LARYNGOSCOPY (N/A, 8/3/2021).,  _______________________________________________________________________  Family History:  family history includes Heart disease in his mother; No Known Problems in his father and sister.,  _______________________________________________________________________  Social History:   reports that he has quit smoking. His smoking use included cigarettes. He has a 25 pack-year smoking history. He has never used smokeless tobacco. He reports current alcohol use. He reports that he does not use drugs.,  _______________________________________________________________________  Allergies:  is allergic to celecoxib..  _______________________________________________________________________  Current Outpatient Medications   Medication Sig Dispense Refill   • aspirin (ECOTRIN LOW STRENGTH) 81 mg EC tablet Take 1 tablet (81 mg total) by mouth daily 60 tablet 0   • atorvastatin (LIPITOR) 40 mg tablet TAKE 1 TABLET BY MOUTH EVERY DAY 90 tablet 3   • Blood Glucose Monitoring Suppl (FREESTYLE FREEDOM LITE) w/Device KIT Check glucose levels once daily 1 each 0   • chlorhexidine (PERIDEX) 0.12 % solution SWISH AND SPIT 15 ML TWICE A DAY     • Diclofenac Sodium (VOLTAREN) 1 % Apply 2 g topically 4 (four) times a day 100 g 1   • glucose blood (FREESTYLE LITE) test strip Use 1 each 3 (three) times a day 300 strip 1   • Lancets (FREESTYLE) lancets TEST ONCE DAILY FASTING DXE11.9 100 each 3   • metFORMIN (GLUCOPHAGE) 500 mg tablet TAKE 1 TABLET BY MOUTH TWICE A DAY WITH FOOD 180 tablet 2   • metoprolol tartrate (LOPRESSOR) 25 mg tablet TAKE 1 TABLET (25 MG TOTAL) BY MOUTH EVERY 12 (TWELVE) HOURS 180 tablet 3   • pantoprazole (PROTONIX) 40  "mg tablet TAKE 1 TABLET (40 MG TOTAL) BY MOUTH DAILY TAKE IN MORNING 90 tablet 2   • tamsulosin (FLOMAX) 0.4 mg TAKE 1 CAPSULE BY MOUTH EVERY DAY WITH DINNER 90 capsule 1     No current facility-administered medications for this visit.     _______________________________________________________________________  Review of Systems   Respiratory:  Negative for cough and shortness of breath.    Cardiovascular:  Negative for chest pain, palpitations and leg swelling.   Musculoskeletal:  Positive for arthralgias. Negative for joint swelling.         Objective:  Vitals:    03/11/24 1008   BP: 134/70   Pulse: 68   Temp: (!) 96.9 °F (36.1 °C)   SpO2: 98%   Weight: 89.4 kg (197 lb)   Height: 5' 8\" (1.727 m)     Body mass index is 29.95 kg/m².     Physical Exam  Vitals and nursing note reviewed.   Constitutional:       General: He is not in acute distress.     Appearance: Normal appearance. He is well-developed. He is not diaphoretic.   HENT:      Head: Normocephalic and atraumatic.   Cardiovascular:      Rate and Rhythm: Normal rate and regular rhythm.      Heart sounds: Normal heart sounds. No murmur heard.     No friction rub. No gallop.   Pulmonary:      Effort: Pulmonary effort is normal. No respiratory distress.      Breath sounds: Normal breath sounds. No wheezing or rales.   Chest:      Chest wall: No tenderness.   Musculoskeletal:         General: No swelling.      Right knee: No swelling.      Left knee: No swelling.      Right lower leg: No edema.      Left lower leg: No edema.   Neurological:      Mental Status: He is alert and oriented to person, place, and time.   Psychiatric:         Mood and Affect: Mood normal.         Behavior: Behavior normal.         Thought Content: Thought content normal.         Judgment: Judgment normal.         "

## 2024-03-11 NOTE — ASSESSMENT & PLAN NOTE
Lab Results   Component Value Date    HGBA1C 7.1 (H) 03/09/2024   Continue Metformin  Reduce carbs in diet  Increased exercise

## 2024-03-11 NOTE — ASSESSMENT & PLAN NOTE
Lab Results   Component Value Date    EGFR 42 03/09/2024    EGFR 47 12/27/2023    EGFR 40 11/06/2023    CREATININE 1.51 (H) 03/09/2024    CREATININE 1.38 (H) 12/27/2023    CREATININE 1.56 (H) 11/06/2023   Discussed diagnosis of CKD. Advised to avoid nephrotoxins.  Stay well hydrated

## 2024-03-13 ENCOUNTER — OFFICE VISIT (OUTPATIENT)
Dept: OBGYN CLINIC | Facility: CLINIC | Age: 84
End: 2024-03-13
Payer: MEDICARE

## 2024-03-13 ENCOUNTER — APPOINTMENT (OUTPATIENT)
Dept: RADIOLOGY | Facility: CLINIC | Age: 84
End: 2024-03-13
Payer: MEDICARE

## 2024-03-13 VITALS
HEART RATE: 64 BPM | BODY MASS INDEX: 29.86 KG/M2 | WEIGHT: 197 LBS | SYSTOLIC BLOOD PRESSURE: 128 MMHG | DIASTOLIC BLOOD PRESSURE: 80 MMHG | HEIGHT: 68 IN

## 2024-03-13 DIAGNOSIS — M25.561 RIGHT KNEE PAIN, UNSPECIFIED CHRONICITY: ICD-10-CM

## 2024-03-13 DIAGNOSIS — M25.562 LEFT KNEE PAIN, UNSPECIFIED CHRONICITY: ICD-10-CM

## 2024-03-13 DIAGNOSIS — M17.0 PRIMARY OSTEOARTHRITIS OF BOTH KNEES: Primary | ICD-10-CM

## 2024-03-13 PROCEDURE — 20610 DRAIN/INJ JOINT/BURSA W/O US: CPT | Performed by: FAMILY MEDICINE

## 2024-03-13 PROCEDURE — 99204 OFFICE O/P NEW MOD 45 MIN: CPT | Performed by: FAMILY MEDICINE

## 2024-03-13 PROCEDURE — 73564 X-RAY EXAM KNEE 4 OR MORE: CPT

## 2024-03-13 RX ORDER — METHYLPREDNISOLONE ACETATE 40 MG/ML
1 INJECTION, SUSPENSION INTRA-ARTICULAR; INTRALESIONAL; INTRAMUSCULAR; SOFT TISSUE
Status: COMPLETED | OUTPATIENT
Start: 2024-03-13 | End: 2024-03-13

## 2024-03-13 RX ORDER — BUPIVACAINE HYDROCHLORIDE 2.5 MG/ML
4 INJECTION, SOLUTION INFILTRATION; PERINEURAL
Status: COMPLETED | OUTPATIENT
Start: 2024-03-13 | End: 2024-03-13

## 2024-03-13 RX ORDER — BUPIVACAINE HYDROCHLORIDE 2.5 MG/ML
1 INJECTION, SOLUTION INFILTRATION; PERINEURAL
Status: COMPLETED | OUTPATIENT
Start: 2024-03-13 | End: 2024-03-13

## 2024-03-13 RX ADMIN — BUPIVACAINE HYDROCHLORIDE 1 ML: 2.5 INJECTION, SOLUTION INFILTRATION; PERINEURAL at 14:30

## 2024-03-13 RX ADMIN — BUPIVACAINE HYDROCHLORIDE 4 ML: 2.5 INJECTION, SOLUTION INFILTRATION; PERINEURAL at 14:30

## 2024-03-13 RX ADMIN — METHYLPREDNISOLONE ACETATE 1 ML: 40 INJECTION, SUSPENSION INTRA-ARTICULAR; INTRALESIONAL; INTRAMUSCULAR; SOFT TISSUE at 14:30

## 2024-03-13 NOTE — PROGRESS NOTES
Assessment/Plan:  Assessment/Plan   Diagnoses and all orders for this visit:    Primary osteoarthritis of both knees  -     XR knee 4+ vw left injury; Future  -     XR knee 4+ vw right injury; Future  -     Ambulatory referral to Sports Medicine  -     Large joint arthrocentesis: bilateral knee  -     Injection Procedure Prior Authorization; Future      83-year-old male with osteoarthritis of both knees with pain both knees many years duration.  Discussed the patient physical exam, radiographs, impression, and plan.  X-rays both knees noted for severe mid joint space degenerative changes with bone-on-bone and genu varum.  Physical exam both knees noted for genu varum and generalized swelling.  There is no bony or soft tissue tenderness about the knees.  He has full extension and flexion to 110 degrees both knees.  There is laxity with valgus stress both knees.  Clinical impression is that he has symptoms from worsening degenerative changes. I advised a surgery is not warranted. He had great improvement following previous visco injection so I offered repeat visco injection to which he agreed.  We will request for 1 shot Visco injection both knees.    In the interim I offered steroid injection for more immediate relief.  I advised steroid injection may cause elevation in blood sugar that may last for 1 week.  I administered mixtures of 3 cc 0.25% bupivacaine and 1 cc Depo-Medrol to each knee without complication.  He will return for Visco injections once approved.        Subjective:   Patient ID: Jay Stout is a 83 y.o. male.  Chief Complaint   Patient presents with    Left Knee - Pain    Right Knee - Pain        83-year-old male with osteoarthritis of both knees presents for worsening pain both knees over the past 6 months.  He was last seen by Dr. Lopez more than 3 years ago at which point he completed visco injection series.  He reports that following visco injections he has significant improvement and was  "doing well until about 6 months ago.  He has been increasing pain described as generalized to the knees, achy and sore, rated as 5 -6 out of 10, worse with activity, associated with swelling, and improved with resting.  He applies topical diclofenac intermittently to help with symptoms.  He was seen by primary care physician and referred to sports medicine.    Knee Pain  This is a chronic problem. The current episode started more than 1 year ago. The problem occurs daily. The problem has been gradually worsening. Associated symptoms include arthralgias and joint swelling. Pertinent negatives include no numbness or weakness. The symptoms are aggravated by standing and walking. He has tried rest, position changes and acetaminophen (Topical diclofenac) for the symptoms. The treatment provided mild relief.           The following portions of the patient's history were reviewed and updated as appropriate: He  has a past medical history of Diabetes mellitus ([facility]), Heart disease (05/09/2020), Hyperlipidemia, Myocardial infarction ([facility]), Penile lesion, and Prediabetes.  He is allergic to celecoxib..    Review of Systems   Musculoskeletal:  Positive for arthralgias and joint swelling.   Neurological:  Negative for weakness and numbness.       Objective:  Vitals:    03/13/24 1428   BP: 128/80   Pulse: 64   Weight: 89.4 kg (197 lb)   Height: 5' 8\" (1.727 m)      Right Knee Exam     Muscle Strength   The patient has normal right knee strength.    Tenderness   The patient is experiencing no tenderness.     Range of Motion   Extension:  normal   Flexion:  110     Tests    Valgus: positive    Other   Swelling: mild      Left Knee Exam     Muscle Strength   The patient has normal left knee strength.    Tenderness   The patient is experiencing no tenderness.     Range of Motion   Extension:  normal   Flexion:  110     Tests    Valgus: positive    Other   Swelling: mild            Physical Exam  Vitals and nursing note reviewed. " "  Constitutional:       Appearance: Normal appearance. He is well-developed. He is not ill-appearing.   HENT:      Head: Normocephalic and atraumatic.      Right Ear: External ear normal.      Left Ear: External ear normal.   Eyes:      Conjunctiva/sclera: Conjunctivae normal.   Neck:      Trachea: No tracheal deviation.   Cardiovascular:      Rate and Rhythm: Normal rate.   Pulmonary:      Effort: Pulmonary effort is normal. No respiratory distress.   Abdominal:      General: There is no distension.   Musculoskeletal:         General: Swelling and tenderness present. No deformity or signs of injury.   Skin:     General: Skin is warm and dry.      Coloration: Skin is not jaundiced or pale.   Neurological:      Mental Status: He is alert and oriented to person, place, and time.   Psychiatric:         Mood and Affect: Mood normal.         Behavior: Behavior normal.         Thought Content: Thought content normal.         Judgment: Judgment normal.         I have personally reviewed pertinent films in PACS and my interpretation is  .   X-rays both knees noted for severe mid joint space degenerative changes with bone-on-bone and genu varum    Large joint arthrocentesis: bilateral knee  Universal Protocol:  Consent: Verbal consent obtained.  Risks and benefits: risks, benefits and alternatives were discussed  Consent given by: patient  Time out: Immediately prior to procedure a \"time out\" was called to verify the correct patient, procedure, equipment, support staff and site/side marked as required.  Patient understanding: patient states understanding of the procedure being performed  Patient consent: the patient's understanding of the procedure matches consent given  Procedure consent: procedure consent matches procedure scheduled  Relevant documents: relevant documents present and verified  Test results: test results available and properly labeled  Site marked: the operative site was marked  Radiology Images displayed " "and confirmed. If images not available, report reviewed: imaging studies available  Required items: required blood products, implants, devices, and special equipment available  Patient identity confirmed: verbally with patient  Supporting Documentation  Indications: pain   Procedure Details  Location: knee - bilateral knee  Preparation: Patient was prepped and draped in the usual sterile fashion  Needle gauge: 21G 2\"  Approach: anteromedial    Medications (Right): 1 mL bupivacaine 0.25 %; 4 mL bupivacaine 0.25 %; 1 mL methylPREDNISolone acetate 40 mg/mLMedications (Left): 1 mL bupivacaine 0.25 %; 4 mL bupivacaine 0.25 %; 1 mL methylPREDNISolone acetate 40 mg/mL   Patient tolerance: patient tolerated the procedure well with no immediate complications  Dressing:  Sterile dressing applied            "

## 2024-03-13 NOTE — LETTER
March 13, 2024     Adrienne Richard MD  [address]  [address]  [facility] [address]    Patient: Jay Stout   YOB: 1940   Date of Visit: 3/13/2024       Dear Dr. Richard:    Thank you for referring Jay Stout to me for evaluation. Below are my notes for this consultation.    If you have questions, please do not hesitate to call me. I look forward to following your patient along with you.         Sincerely,        Iron Duarte DO        CC: No Recipients    Iron Duarte DO  3/13/2024  4:52 PM  Sign when Signing Visit  Assessment/Plan:  Assessment/Plan  Diagnoses and all orders for this visit:    Primary osteoarthritis of both knees  -     XR knee 4+ vw left injury; Future  -     XR knee 4+ vw right injury; Future  -     Ambulatory referral to Sports Medicine  -     Large joint arthrocentesis: bilateral knee  -     Injection Procedure Prior Authorization; Future      83-year-old male with osteoarthritis of both knees with pain both knees many years duration.  Discussed the patient physical exam, radiographs, impression, and plan.  X-rays both knees noted for severe mid joint space degenerative changes with bone-on-bone and genu varum.  Physical exam both knees noted for genu varum and generalized swelling.  There is no bony or soft tissue tenderness about the knees.  He has full extension and flexion to 110 degrees both knees.  There is laxity with valgus stress both knees.  Clinical impression is that he has symptoms from worsening degenerative changes. I advised a surgery is not warranted. He had great improvement following previous visco injection so I offered repeat visco injection to which he agreed.  We will request for 1 shot Visco injection both knees.    In the interim I offered steroid injection for more immediate relief.  I advised steroid injection may cause elevation in blood sugar that may last for 1 week.  I administered mixtures of 3 cc  "0.25% bupivacaine and 1 cc Depo-Medrol to each knee without complication.  He will return for Visco injections once approved.        Subjective:   Patient ID: Jay Stout is a 83 y.o. male.  Chief Complaint   Patient presents with   • Left Knee - Pain   • Right Knee - Pain        83-year-old male with osteoarthritis of both knees presents for worsening pain both knees over the past 6 months.  He was last seen by Dr. Lopez more than 3 years ago at which point he completed visco injection series.  He reports that following visco injections he has significant improvement and was doing well until about 6 months ago.  He has been increasing pain described as generalized to the knees, achy and sore, rated as 5 -6 out of 10, worse with activity, associated with swelling, and improved with resting.  He applies topical diclofenac intermittently to help with symptoms.  He was seen by primary care physician and referred to sports medicine.    Knee Pain  This is a chronic problem. The current episode started more than 1 year ago. The problem occurs daily. The problem has been gradually worsening. Associated symptoms include arthralgias and joint swelling. Pertinent negatives include no numbness or weakness. The symptoms are aggravated by standing and walking. He has tried rest, position changes and acetaminophen (Topical diclofenac) for the symptoms. The treatment provided mild relief.           The following portions of the patient's history were reviewed and updated as appropriate: He  has a past medical history of Diabetes mellitus (HCC), Heart disease (05/09/2020), Hyperlipidemia, Myocardial infarction (HCC), Penile lesion, and Prediabetes.  He is allergic to celecoxib..    Review of Systems   Musculoskeletal:  Positive for arthralgias and joint swelling.   Neurological:  Negative for weakness and numbness.       Objective:  Vitals:    03/13/24 1428   BP: 128/80   Pulse: 64   Weight: 89.4 kg (197 lb)   Height: 5' 8\" " (1.727 m)      Right Knee Exam     Muscle Strength   The patient has normal right knee strength.    Tenderness   The patient is experiencing no tenderness.     Range of Motion   Extension:  normal   Flexion:  110     Tests    Valgus: positive    Other   Swelling: mild      Left Knee Exam     Muscle Strength   The patient has normal left knee strength.    Tenderness   The patient is experiencing no tenderness.     Range of Motion   Extension:  normal   Flexion:  110     Tests    Valgus: positive    Other   Swelling: mild            Physical Exam  Vitals and nursing note reviewed.   Constitutional:       Appearance: Normal appearance. He is well-developed. He is not ill-appearing.   HENT:      Head: Normocephalic and atraumatic.      Right Ear: External ear normal.      Left Ear: External ear normal.   Eyes:      Conjunctiva/sclera: Conjunctivae normal.   Neck:      Trachea: No tracheal deviation.   Cardiovascular:      Rate and Rhythm: Normal rate.   Pulmonary:      Effort: Pulmonary effort is normal. No respiratory distress.   Abdominal:      General: There is no distension.   Musculoskeletal:         General: Swelling and tenderness present. No deformity or signs of injury.   Skin:     General: Skin is warm and dry.      Coloration: Skin is not jaundiced or pale.   Neurological:      Mental Status: He is alert and oriented to person, place, and time.   Psychiatric:         Mood and Affect: Mood normal.         Behavior: Behavior normal.         Thought Content: Thought content normal.         Judgment: Judgment normal.         I have personally reviewed pertinent films in PACS and my interpretation is  .   X-rays both knees noted for severe mid joint space degenerative changes with bone-on-bone and genu varum    Large joint arthrocentesis: bilateral knee  Universal Protocol:  Consent: Verbal consent obtained.  Risks and benefits: risks, benefits and alternatives were discussed  Consent given by: patient  Time out:  "Immediately prior to procedure a \"time out\" was called to verify the correct patient, procedure, equipment, support staff and site/side marked as required.  Patient understanding: patient states understanding of the procedure being performed  Patient consent: the patient's understanding of the procedure matches consent given  Procedure consent: procedure consent matches procedure scheduled  Relevant documents: relevant documents present and verified  Test results: test results available and properly labeled  Site marked: the operative site was marked  Radiology Images displayed and confirmed. If images not available, report reviewed: imaging studies available  Required items: required blood products, implants, devices, and special equipment available  Patient identity confirmed: verbally with patient  Supporting Documentation  Indications: pain   Procedure Details  Location: knee - bilateral knee  Preparation: Patient was prepped and draped in the usual sterile fashion  Needle gauge: 21G 2\"  Approach: anteromedial    Medications (Right): 1 mL bupivacaine 0.25 %; 4 mL bupivacaine 0.25 %; 1 mL methylPREDNISolone acetate 40 mg/mLMedications (Left): 1 mL bupivacaine 0.25 %; 4 mL bupivacaine 0.25 %; 1 mL methylPREDNISolone acetate 40 mg/mL   Patient tolerance: patient tolerated the procedure well with no immediate complications  Dressing:  Sterile dressing applied            "

## 2024-03-14 DIAGNOSIS — I21.21 ST ELEVATION MYOCARDIAL INFARCTION INVOLVING LEFT CIRCUMFLEX CORONARY ARTERY (HCC): ICD-10-CM

## 2024-03-14 NOTE — TELEPHONE ENCOUNTER
Pt called for refill of his metoprolol tartrate 25 mg. BID to [facility]. He is not a pt of our practice

## 2024-04-03 ENCOUNTER — PROCEDURE VISIT (OUTPATIENT)
Dept: OBGYN CLINIC | Facility: CLINIC | Age: 84
End: 2024-04-03
Payer: MEDICARE

## 2024-04-03 VITALS
WEIGHT: 197 LBS | HEART RATE: 74 BPM | BODY MASS INDEX: 29.86 KG/M2 | HEIGHT: 68 IN | SYSTOLIC BLOOD PRESSURE: 129 MMHG | DIASTOLIC BLOOD PRESSURE: 79 MMHG

## 2024-04-03 DIAGNOSIS — M17.0 PRIMARY OSTEOARTHRITIS OF BOTH KNEES: Primary | ICD-10-CM

## 2024-04-03 PROCEDURE — 20610 DRAIN/INJ JOINT/BURSA W/O US: CPT | Performed by: FAMILY MEDICINE

## 2024-04-03 RX ORDER — BUPIVACAINE HYDROCHLORIDE 2.5 MG/ML
1 INJECTION, SOLUTION INFILTRATION; PERINEURAL
Status: COMPLETED | OUTPATIENT
Start: 2024-04-03 | End: 2024-04-03

## 2024-04-03 RX ORDER — BUPIVACAINE HYDROCHLORIDE 2.5 MG/ML
2 INJECTION, SOLUTION INFILTRATION; PERINEURAL
Status: COMPLETED | OUTPATIENT
Start: 2024-04-03 | End: 2024-04-03

## 2024-04-03 RX ADMIN — BUPIVACAINE HYDROCHLORIDE 1 ML: 2.5 INJECTION, SOLUTION INFILTRATION; PERINEURAL at 10:15

## 2024-04-03 RX ADMIN — BUPIVACAINE HYDROCHLORIDE 2 ML: 2.5 INJECTION, SOLUTION INFILTRATION; PERINEURAL at 10:15

## 2024-04-03 NOTE — PROGRESS NOTES
"Assessment/Plan:  Assessment/Plan   Diagnoses and all orders for this visit:    Primary osteoarthritis of both knees  -     Large joint arthrocentesis: bilateral knee        83-year-old male with osteoarthritis of both knees with pain both knees many years duration.  Clinical impression is that he has symptoms from degenerative changes.  He agreed to proceed with Visco injection.  Patient received Synvisc 1 to both knees without complication.  He will follow-up as needed.          Subjective:   Patient ID: Jay Stout is a 83 y.o. male.  Chief Complaint   Patient presents with    Left Knee - Follow-up    Right Knee - Follow-up        83-year-old male with osteoarthritis of both knees following up for pain both knees many years duration.  He was last seen by me 3 weeks ago at which point he was given steroid injection to both knees and we requested for visco injection.  He presents today for Visco injection both knees.  Reports send injection helped with intense pain.  He has pain described as to the knees, achy and sore, rated as 3-4 out of 10, worse with activity, and improved with resting.    Knee Pain  This is a chronic problem. The current episode started more than 1 year ago. The problem occurs daily. The problem has been waxing and waning. Associated symptoms include arthralgias and joint swelling. Pertinent negatives include no numbness or weakness. The symptoms are aggravated by standing and walking. He has tried rest (Steroid injection) for the symptoms. The treatment provided moderate relief.               Review of Systems   Musculoskeletal:  Positive for arthralgias and joint swelling.   Neurological:  Negative for weakness and numbness.       Objective:  Vitals:    04/03/24 1009   BP: 129/79   Pulse: 74   Weight: 89.4 kg (197 lb)   Height: 5' 8\" (1.727 m)      Right Knee Exam     Other   Swelling: mild      Left Knee Exam     Other   Swelling: mild            Physical Exam  Vitals and nursing note " "reviewed.   Constitutional:       Appearance: Normal appearance. He is well-developed. He is not ill-appearing or diaphoretic.   HENT:      Head: Normocephalic and atraumatic.      Right Ear: External ear normal.      Left Ear: External ear normal.   Eyes:      Conjunctiva/sclera: Conjunctivae normal.   Neck:      Trachea: No tracheal deviation.   Cardiovascular:      Rate and Rhythm: Normal rate.   Pulmonary:      Effort: Pulmonary effort is normal. No respiratory distress.   Abdominal:      General: There is no distension.   Musculoskeletal:         General: Swelling present.   Skin:     General: Skin is warm and dry.      Coloration: Skin is not jaundiced or pale.   Neurological:      Mental Status: He is alert and oriented to person, place, and time.   Psychiatric:         Mood and Affect: Mood normal.         Behavior: Behavior normal.         Thought Content: Thought content normal.         Judgment: Judgment normal.               Large joint arthrocentesis: bilateral knee  Universal Protocol:  Procedure performed by: (Dallas Vega MD)  Consent: Verbal consent obtained.  Risks and benefits: risks, benefits and alternatives were discussed  Consent given by: patient  Time out: Immediately prior to procedure a \"time out\" was called to verify the correct patient, procedure, equipment, support staff and site/side marked as required.  Patient understanding: patient states understanding of the procedure being performed  Patient consent: the patient's understanding of the procedure matches consent given  Procedure consent: procedure consent matches procedure scheduled  Relevant documents: relevant documents present and verified  Test results: test results available and properly labeled  Site marked: the operative site was marked  Radiology Images displayed and confirmed. If images not available, report reviewed: imaging studies available  Required items: required blood products, implants, devices, and special " "equipment available  Patient identity confirmed: verbally with patient  Supporting Documentation  Indications: pain   Procedure Details  Location: knee - bilateral knee  Preparation: Patient was prepped and draped in the usual sterile fashion  Needle gauge: 21G 2\"  Approach: anteromedial    Medications (Right): 1 mL bupivacaine 0.25 %; 2 mL bupivacaine 0.25 %; 48 mg hylan 48 MG/6MLMedications (Left): 1 mL bupivacaine 0.25 %; 2 mL bupivacaine 0.25 %; 48 mg hylan 48 MG/6ML   Patient tolerance: patient tolerated the procedure well with no immediate complications  Dressing:  Sterile dressing applied            "

## 2024-04-08 DIAGNOSIS — E11.9 TYPE 2 DIABETES MELLITUS WITHOUT COMPLICATION, WITHOUT LONG-TERM CURRENT USE OF INSULIN (HCC): ICD-10-CM

## 2024-05-06 ENCOUNTER — TELEPHONE (OUTPATIENT)
Age: 84
End: 2024-05-06

## 2024-05-06 DIAGNOSIS — J30.9 ALLERGIC RHINITIS, UNSPECIFIED SEASONALITY, UNSPECIFIED TRIGGER: Primary | ICD-10-CM

## 2024-05-06 RX ORDER — FLUTICASONE PROPIONATE 50 MCG
1 SPRAY, SUSPENSION (ML) NASAL DAILY
Qty: 16 G | Refills: 0 | Status: SHIPPED | OUTPATIENT
Start: 2024-05-06 | End: 2024-05-13 | Stop reason: SDUPTHER

## 2024-05-06 NOTE — TELEPHONE ENCOUNTER
Asking for a refill of flonase to please be sent to [facility] in [place].  Not able to find as an active prescription.

## 2024-05-13 DIAGNOSIS — J30.9 ALLERGIC RHINITIS, UNSPECIFIED SEASONALITY, UNSPECIFIED TRIGGER: ICD-10-CM

## 2024-05-13 DIAGNOSIS — I21.21 ST ELEVATION MYOCARDIAL INFARCTION INVOLVING LEFT CIRCUMFLEX CORONARY ARTERY (HCC): ICD-10-CM

## 2024-05-13 NOTE — TELEPHONE ENCOUNTER
Flonase was denied by patient at the pharmacy as they thought he was to get more then one bottle at a time. Pharmacy needs new script for Flonase to fill as it was denied by patient.

## 2024-05-14 RX ORDER — FLUTICASONE PROPIONATE 50 MCG
1 SPRAY, SUSPENSION (ML) NASAL DAILY
Qty: 16 G | Refills: 2 | Status: SHIPPED | OUTPATIENT
Start: 2024-05-14

## 2024-05-17 ENCOUNTER — TELEPHONE (OUTPATIENT)
Age: 84
End: 2024-05-17

## 2024-05-17 NOTE — TELEPHONE ENCOUNTER
Pt called stating pharmacy told him that taking the metformin and metoprolol together may cause breathing issues, muscle pain, confusion and abdominal pain.    Please advise

## 2024-05-17 NOTE — TELEPHONE ENCOUNTER
There are zero interactions between the 2 medications.    I guess individually these meds could cause the side effects mentioned, but a lot of other things could as well.  If he is having symptoms please schedule a visit to discuss.

## 2024-06-10 DIAGNOSIS — N40.1 BPH WITH OBSTRUCTION/LOWER URINARY TRACT SYMPTOMS: ICD-10-CM

## 2024-06-10 DIAGNOSIS — N13.8 BPH WITH OBSTRUCTION/LOWER URINARY TRACT SYMPTOMS: ICD-10-CM

## 2024-06-10 RX ORDER — TAMSULOSIN HYDROCHLORIDE 0.4 MG/1
0.4 CAPSULE ORAL
Qty: 90 CAPSULE | Refills: 1 | Status: SHIPPED | OUTPATIENT
Start: 2024-06-10

## 2024-06-11 ENCOUNTER — OFFICE VISIT (OUTPATIENT)
Dept: FAMILY MEDICINE CLINIC | Facility: CLINIC | Age: 84
End: 2024-06-11
Payer: MEDICARE

## 2024-06-11 VITALS
OXYGEN SATURATION: 98 % | DIASTOLIC BLOOD PRESSURE: 76 MMHG | SYSTOLIC BLOOD PRESSURE: 120 MMHG | HEART RATE: 74 BPM | HEIGHT: 68 IN | BODY MASS INDEX: 29.86 KG/M2 | WEIGHT: 197 LBS

## 2024-06-11 DIAGNOSIS — R09.81 NASAL SINUS CONGESTION: ICD-10-CM

## 2024-06-11 DIAGNOSIS — R09.82 PND (POST-NASAL DRIP): Primary | ICD-10-CM

## 2024-06-11 DIAGNOSIS — R05.3 CHRONIC COUGH: ICD-10-CM

## 2024-06-11 PROCEDURE — G2211 COMPLEX E/M VISIT ADD ON: HCPCS | Performed by: FAMILY MEDICINE

## 2024-06-11 PROCEDURE — 99214 OFFICE O/P EST MOD 30 MIN: CPT | Performed by: FAMILY MEDICINE

## 2024-06-11 RX ORDER — LEVOCETIRIZINE DIHYDROCHLORIDE 5 MG/1
5 TABLET, FILM COATED ORAL EVERY EVENING
Qty: 10 TABLET | Refills: 1 | Status: SHIPPED | OUTPATIENT
Start: 2024-06-11

## 2024-06-11 NOTE — PROGRESS NOTES
Ambulatory Visit  Name: Jay Stout      : 1940      MRN: [id number]  Encounter Provider: Bandar Doll MD  Encounter Date: 2024   Encounter department: [facility]    Assessment & Plan   1. PND (post-nasal drip)  Recommend for him to use flonase daily along with Xyzal  -     levocetirizine (XYZAL) 5 MG tablet; Take 1 tablet (5 mg total) by mouth every evening  2. Chronic cough  If symptoms continue recommend to F/U with Pulmonologist    3. Nasal sinus congestion  See above    Follow up as needed       History of Present Illness     Patient is here because he has been having nasal drainage and congestion for the past several weeks. He has a Hx of hoarse voice and chronic cough. Former smoker however quit over 20 years ago. Seeing ENT in the past Dr. 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 Also saw Dr. Sinha who recommended patient to take Protonix along with famotidine which made him better at that time. Also he had a PFT which showed evidence of severe abnormal DLCO and suggestive of restrictive lung disease.      Review of Systems   Constitutional:  Negative for activity change, appetite change, fatigue and fever.   HENT:  Positive for postnasal drip and rhinorrhea. Negative for congestion and ear discharge.    Respiratory:  Positive for cough. Negative for shortness of breath.    Cardiovascular:  Negative for chest pain and palpitations.   Gastrointestinal:  Negative for diarrhea and nausea.   Musculoskeletal:  Negative for arthralgias and back pain.   Skin:  Negative for color change and rash.   Neurological:  Negative for dizziness and headaches.   Psychiatric/Behavioral:  Negative for agitation and behavioral problems.      Past Medical History:   Diagnosis Date   • Diabetes mellitus (HCC)     diet control   • Heart disease 2020    Heart attack   • Hyperlipidemia    • Myocardial infarction (HCC)    • Penile lesion    • Prediabetes       Past Surgical History:   Procedure Laterality Date   • CARDIAC SURGERY  05/2020    stent placement   • COLONOSCOPY     • HERNIA REPAIR     • LARYNGOSCOPY  1978    with vocal cord polyp removal   • LARYNGOSCOPY N/A 8/3/2021    Procedure: MICRODIRECT LARYNGOSCOPY WITH  EXCISION OF VOCAL CORD LESION;  Surgeon: Pito Tomlin DO;  Location: [facility];  Service: ENT     Family History   Problem Relation Age of Onset   • Heart disease Mother         Cardiac Disorder   • No Known Problems Father    • No Known Problems Sister      Social History     Tobacco Use   • Smoking status: Former     Current packs/day: 1.00     Average packs/day: 1 pack/day for 25.0 years (25.0 ttl pk-yrs)     Types: Cigarettes   • Smokeless tobacco: Never   Vaping Use   • Vaping status: Never Used   Substance and Sexual Activity   • Alcohol use: Yes     Comment: Social twice a month   • Drug use: Never   • Sexual activity: Not on file     Current Outpatient Medications on File Prior to Visit   Medication Sig   • aspirin (ECOTRIN LOW STRENGTH) 81 mg EC tablet Take 1 tablet (81 mg total) by mouth daily   • atorvastatin (LIPITOR) 40 mg tablet TAKE 1 TABLET BY MOUTH EVERY DAY   • Blood Glucose Monitoring Suppl (FREESTYLE FREEDOM LITE) w/Device KIT Check glucose levels once daily   • chlorhexidine (PERIDEX) 0.12 % solution SWISH AND SPIT 15 ML TWICE A DAY   • Diclofenac Sodium (VOLTAREN) 1 % Apply 2 g topically 4 (four) times a day   • fluticasone (FLONASE) 50 mcg/act nasal spray 1 spray into each nostril daily   • glucose blood (FREESTYLE LITE) test strip Use 1 each 3 (three) times a day   • Lancets (FREESTYLE) lancets TEST ONCE DAILY FASTING DXE11.9   • metFORMIN (GLUCOPHAGE) 500 mg tablet TAKE 1 TABLET BY MOUTH TWICE A DAY WITH FOOD   • metoprolol tartrate (LOPRESSOR) 25 mg tablet Take 1 tablet (25 mg total) by mouth every 12 (twelve) hours   • pantoprazole (PROTONIX) 40 mg tablet TAKE 1 TABLET (40 MG TOTAL) BY MOUTH DAILY TAKE IN MORNING   •  "tamsulosin (FLOMAX) 0.4 mg Take 1 capsule (0.4 mg total) by mouth daily with dinner     Allergies   Allergen Reactions   • Celecoxib      Other reaction(s): itchy  Other reaction(s): itchy     Immunization History   Administered Date(s) Administered   • COVID-19 MODERNA VACC 0.5 ML IM 02/08/2021, 03/06/2021, 10/13/2021, 04/12/2022   • COVID-19 Moderna Vac BIVALENT 12 Yr+ IM 0.5 ML 11/14/2022   • H1N1, All Formulations 02/01/2010   • INFLUENZA 10/18/2018, 10/19/2020, 11/14/2022   • Influenza, high dose seasonal 0.7 mL 10/19/2020, 11/23/2021, 10/19/2023   • Influenza, injectable, quadrivalent, preservative free 0.5 mL 10/16/2019   • Pneumococcal Conjugate 13-Valent 06/25/2018   • Pneumococcal Polysaccharide PPV23 10/21/2020     Objective     /76 (BP Location: Left arm, Patient Position: Sitting, Cuff Size: Large)   Pulse 74   Ht 5' 8\" (1.727 m)   Wt 89.4 kg (197 lb)   SpO2 98%   BMI 29.95 kg/m²     Physical Exam  Constitutional:       General: He is not in acute distress.     Appearance: He is well-developed. He is not diaphoretic.   Eyes:      General: No scleral icterus.     Pupils: Pupils are equal, round, and reactive to light.   Cardiovascular:      Rate and Rhythm: Normal rate and regular rhythm.      Heart sounds: Normal heart sounds. No murmur heard.  Pulmonary:      Effort: Pulmonary effort is normal. No respiratory distress.      Breath sounds: Normal breath sounds. No wheezing.   Abdominal:      General: Bowel sounds are normal. There is no distension.      Palpations: Abdomen is soft.      Tenderness: There is no abdominal tenderness.   Skin:     General: Skin is warm and dry.      Findings: No rash.   Neurological:      Mental Status: He is alert and oriented to person, place, and time.       Administrative Statements   I have spent a total time of 20 minutes on 06/11/24 In caring for this patient including Diagnostic results.      "

## 2024-07-01 ENCOUNTER — TELEPHONE (OUTPATIENT)
Dept: FAMILY MEDICINE CLINIC | Facility: CLINIC | Age: 84
End: 2024-07-01

## 2024-07-01 NOTE — TELEPHONE ENCOUNTER
Spoke to the patient, he states that his last gel injection was on 6/17 and he is still experiencing some pain. He would like to pursue surgery as an option

## 2024-07-01 NOTE — TELEPHONE ENCOUNTER
Patient stopped in, he asked if I could send this message to both  and .    He was not sure if he needs his knees checked again.  He is still having some pain even after seeing . He was asking if you both could review his knee Xrays and advise on the next steps for him

## 2024-07-01 NOTE — TELEPHONE ENCOUNTER
Looks like he had Synvisc (Gel shots) with Dr. Duarte in April. Not sure how many of these he has had.  Usually they will do a series of these, and if that doesn't work then he may need surgery. Would defer further recommendations to Ortho/Dr. Duarte

## 2024-07-02 NOTE — TELEPHONE ENCOUNTER
CLM on  w/Dr Duarte's msg to schedule NP appt w/Dr Delgado to pursue surgery as an option.  Please schedule when pt returns call. Thank you.

## 2024-07-03 DIAGNOSIS — K21.9 LARYNGOPHARYNGEAL REFLUX (LPR): ICD-10-CM

## 2024-07-03 RX ORDER — PANTOPRAZOLE SODIUM 40 MG/1
40 TABLET, DELAYED RELEASE ORAL DAILY
Qty: 100 TABLET | Refills: 1 | Status: SHIPPED | OUTPATIENT
Start: 2024-07-03

## 2024-07-08 DIAGNOSIS — I21.21 ST ELEVATION MYOCARDIAL INFARCTION INVOLVING LEFT CIRCUMFLEX CORONARY ARTERY (HCC): ICD-10-CM

## 2024-07-10 ENCOUNTER — RA CDI HCC (OUTPATIENT)
Dept: OTHER | Facility: HOSPITAL | Age: 84
End: 2024-07-10

## 2024-07-10 NOTE — PROGRESS NOTES
HCC coding opportunities          Chart Reviewed number of suggestions sent to Provider: 2     Patients Insurance     Medicare Insurance: Medicare        E11.22  E11.65

## 2024-07-17 ENCOUNTER — OFFICE VISIT (OUTPATIENT)
Dept: FAMILY MEDICINE CLINIC | Facility: CLINIC | Age: 84
End: 2024-07-17
Payer: MEDICARE

## 2024-07-17 VITALS
WEIGHT: 195 LBS | TEMPERATURE: 97.8 F | OXYGEN SATURATION: 97 % | DIASTOLIC BLOOD PRESSURE: 84 MMHG | HEART RATE: 79 BPM | HEIGHT: 68 IN | SYSTOLIC BLOOD PRESSURE: 122 MMHG | BODY MASS INDEX: 29.55 KG/M2

## 2024-07-17 DIAGNOSIS — E11.9 TYPE 2 DIABETES MELLITUS WITHOUT COMPLICATION, WITHOUT LONG-TERM CURRENT USE OF INSULIN (HCC): Primary | ICD-10-CM

## 2024-07-17 PROBLEM — E11.65 TYPE 2 DIABETES MELLITUS WITH HYPERGLYCEMIA, WITHOUT LONG-TERM CURRENT USE OF INSULIN (HCC): Status: ACTIVE | Noted: 2024-07-17

## 2024-07-17 PROBLEM — E11.65 TYPE 2 DIABETES MELLITUS WITH HYPERGLYCEMIA, WITHOUT LONG-TERM CURRENT USE OF INSULIN (HCC): Status: RESOLVED | Noted: 2024-07-17 | Resolved: 2024-07-17

## 2024-07-17 LAB — SL AMB POCT HEMOGLOBIN AIC: 6.6 (ref ?–6.5)

## 2024-07-17 PROCEDURE — 83036 HEMOGLOBIN GLYCOSYLATED A1C: CPT | Performed by: FAMILY MEDICINE

## 2024-07-17 PROCEDURE — 99214 OFFICE O/P EST MOD 30 MIN: CPT | Performed by: FAMILY MEDICINE

## 2024-07-17 NOTE — PROGRESS NOTES
"Ambulatory Visit  Name: Jay Stout      : 1940      MRN: [id number]  Encounter Provider: Adrienne Richard MD  Encounter Date: 2024   Encounter department: [facility]    Assessment & Plan   1. Type 2 diabetes mellitus without complication, without long-term current use of insulin ([facility])  Assessment & Plan:    Lab Results   Component Value Date    HGBA1C 6.6 (A) 2024   A1c improving. Will continue Metformin and work on diet, exericse  Orders:  -     POCT hemoglobin A1c  -     Comprehensive metabolic panel; Future; Expected date: 2024  -     CBC and differential; Future; Expected date: 2024  -     Lipid Panel with Direct LDL reflex; Future; Expected date: 2024  -     Albumin / creatinine urine ratio; Future; Expected date: 2024  -     Hemoglobin A1C; Future; Expected date: 2024    Follow-up in December with labs and for AWV     History of Present Illness     Here for a diabetic check up. A1c is 6.6%.  He is on Metformin. Fasting glucoses are 140s at times, sometimes lower. He thought his A1c would be higher because diet is high in carb.     Diabetes  Pertinent negatives for hypoglycemia include no headaches. Pertinent negatives for diabetes include no chest pain and no fatigue.       Review of Systems   Constitutional:  Negative for activity change, appetite change, fatigue and unexpected weight change.   Respiratory:  Negative for chest tightness and shortness of breath.    Cardiovascular:  Negative for chest pain and leg swelling.   Gastrointestinal:  Negative for abdominal pain.   Musculoskeletal:  Positive for arthralgias (knees).   Neurological:  Negative for headaches.       Objective     /84 (BP Location: Left arm, Patient Position: Sitting)   Pulse 79   Temp 97.8 °F (36.6 °C) (Temporal)   Ht 5' 8\" (1.727 m)   Wt 88.5 kg (195 lb)   SpO2 97%   BMI 29.65 kg/m²     Physical Exam  Vitals and nursing note reviewed. "   Constitutional:       Appearance: He is well-developed.   HENT:      Head: Normocephalic and atraumatic.   Cardiovascular:      Rate and Rhythm: Normal rate and regular rhythm.      Heart sounds: Normal heart sounds.   Pulmonary:      Effort: Pulmonary effort is normal. No respiratory distress.      Breath sounds: Normal breath sounds.   Neurological:      General: No focal deficit present.      Mental Status: He is alert. Mental status is at baseline.   Psychiatric:         Behavior: Behavior normal.         Thought Content: Thought content normal.         Judgment: Judgment normal.       Administrative Statements

## 2024-07-17 NOTE — ASSESSMENT & PLAN NOTE
Lab Results   Component Value Date    HGBA1C 6.6 (A) 07/17/2024   A1c improving. Will continue Metformin and work on diet, exericse

## 2024-07-29 DIAGNOSIS — E11.9 TYPE 2 DIABETES MELLITUS WITHOUT COMPLICATION, WITHOUT LONG-TERM CURRENT USE OF INSULIN (HCC): ICD-10-CM

## 2024-07-29 RX ORDER — BLOOD-GLUCOSE METER
1 KIT MISCELLANEOUS 3 TIMES DAILY
Qty: 300 STRIP | Refills: 1 | Status: SHIPPED | OUTPATIENT
Start: 2024-07-29

## 2024-07-29 NOTE — TELEPHONE ENCOUNTER
Medication: glucose blood (FREESTYLE LITE) test strip     Dose/Frequency: use 1 each 2 (two) times a day    Quantity: 180    Pharmacy: [facility]    Office:   [x] PCP/Provider -   [] Speciality/Provider -     Does the patient have enough for 3 days?   [] Yes   [x] No - Send as HP to POD

## 2024-08-06 ENCOUNTER — OFFICE VISIT (OUTPATIENT)
Dept: OBGYN CLINIC | Facility: CLINIC | Age: 84
End: 2024-08-06
Payer: MEDICARE

## 2024-08-06 ENCOUNTER — TELEPHONE (OUTPATIENT)
Dept: FAMILY MEDICINE CLINIC | Facility: CLINIC | Age: 84
End: 2024-08-06

## 2024-08-06 VITALS
HEART RATE: 69 BPM | WEIGHT: 195 LBS | BODY MASS INDEX: 29.55 KG/M2 | DIASTOLIC BLOOD PRESSURE: 77 MMHG | HEIGHT: 68 IN | SYSTOLIC BLOOD PRESSURE: 121 MMHG

## 2024-08-06 DIAGNOSIS — M25.562 CHRONIC PAIN OF LEFT KNEE: ICD-10-CM

## 2024-08-06 DIAGNOSIS — M25.561 CHRONIC PAIN OF RIGHT KNEE: ICD-10-CM

## 2024-08-06 DIAGNOSIS — G89.29 CHRONIC PAIN OF RIGHT KNEE: ICD-10-CM

## 2024-08-06 DIAGNOSIS — M17.11 PRIMARY OSTEOARTHRITIS OF RIGHT KNEE: ICD-10-CM

## 2024-08-06 DIAGNOSIS — G89.29 CHRONIC PAIN OF LEFT KNEE: ICD-10-CM

## 2024-08-06 DIAGNOSIS — M17.12 PRIMARY OSTEOARTHRITIS OF LEFT KNEE: Primary | ICD-10-CM

## 2024-08-06 DIAGNOSIS — E11.9 TYPE 2 DIABETES MELLITUS WITHOUT COMPLICATION, WITHOUT LONG-TERM CURRENT USE OF INSULIN (HCC): ICD-10-CM

## 2024-08-06 PROCEDURE — 20610 DRAIN/INJ JOINT/BURSA W/O US: CPT | Performed by: ORTHOPAEDIC SURGERY

## 2024-08-06 PROCEDURE — 99214 OFFICE O/P EST MOD 30 MIN: CPT | Performed by: ORTHOPAEDIC SURGERY

## 2024-08-06 RX ORDER — LIDOCAINE HYDROCHLORIDE 10 MG/ML
2 INJECTION, SOLUTION INFILTRATION; PERINEURAL
Status: COMPLETED | OUTPATIENT
Start: 2024-08-06 | End: 2024-08-06

## 2024-08-06 RX ORDER — METHYLPREDNISOLONE ACETATE 40 MG/ML
2 INJECTION, SUSPENSION INTRA-ARTICULAR; INTRALESIONAL; INTRAMUSCULAR; SOFT TISSUE
Status: COMPLETED | OUTPATIENT
Start: 2024-08-06 | End: 2024-08-06

## 2024-08-06 RX ORDER — BUPIVACAINE HYDROCHLORIDE 2.5 MG/ML
2 INJECTION, SOLUTION INFILTRATION; PERINEURAL
Status: COMPLETED | OUTPATIENT
Start: 2024-08-06 | End: 2024-08-06

## 2024-08-06 RX ORDER — BLOOD-GLUCOSE METER
1 KIT MISCELLANEOUS DAILY
Qty: 100 STRIP | Refills: 11 | Status: SHIPPED | OUTPATIENT
Start: 2024-08-06

## 2024-08-06 RX ADMIN — BUPIVACAINE HYDROCHLORIDE 2 ML: 2.5 INJECTION, SOLUTION INFILTRATION; PERINEURAL at 09:30

## 2024-08-06 RX ADMIN — LIDOCAINE HYDROCHLORIDE 2 ML: 10 INJECTION, SOLUTION INFILTRATION; PERINEURAL at 09:30

## 2024-08-06 RX ADMIN — METHYLPREDNISOLONE ACETATE 2 ML: 40 INJECTION, SUSPENSION INTRA-ARTICULAR; INTRALESIONAL; INTRAMUSCULAR; SOFT TISSUE at 09:30

## 2024-08-06 NOTE — PROGRESS NOTES
Patient Name:  Jay Stout  MRN:  [id number]    Assessment & Plan     1. Primary osteoarthritis of left knee  -     Durable Medical Equipment  -     Ambulatory Referral to Physical Therapy; Future  -     Large joint arthrocentesis: L knee  2. Primary osteoarthritis of right knee  -     Durable Medical Equipment  -     Ambulatory Referral to Physical Therapy; Future  -     Large joint arthrocentesis: R knee  3. Chronic pain of right knee  -     Large joint arthrocentesis: R knee  4. Chronic pain of left knee  -     Large joint arthrocentesis: L knee    Bilateral knee osteoarthritis  X-rays reviewed in office today with patient  Continued non operative treatment was discussed in the form of oral analgesics, activity modification, formal physical therapy, injection therapy, and bracing.   Discussed surgical management by means of total knee arthroplasty.  Risks of surgery, including but not limited to, infection, periprosthetic fracture, aseptic loosening, persistent pain, wound complications, gait dysfunction, nerve injury, blood vessel injury, DVT/PE, postoperative stiffness, need for subsequent surgery including revision surgery, and anesthesia complications.  Discussed procedure, overnight hospital stay with PT, outpatient PT upon discharge, pain management, DVT prophylaxis, return to unrestricted activity  At this time, patient does not want to move forward with surgery. Offered and accepted bilateral knee CSI. Risks discussed. Tolerated well.  Placed order for bilateral medial  braces   Follow up 3 months     Chief Complaint     Bilateral knee pain    History of the Present Illness     Jay Stout is a 83 y.o. male with Bilateral knee pain ongoing for years. [place] was previously seeing Dr Lopez and Dr Duarte for treatment and most recent visco injections 04/03/2024 and CSI 03/13/2024. Today, patient reports the Right is more painful than the Left. He does admit to improvement of pain with  "visco and CSI. Patient's wife states his pain is not relieved by the injections. Denies previous surgery. Patient has history of diabetes with most recent HGBA1c 6.6 performed 07/17/2024. Patient is active with walking daily, but still goes to the gym 3-4 times weekly. He does use Voltaren gel.     Review of Systems     Review of Systems   Constitutional:  Negative for chills and fever.   HENT:  Negative for ear pain and sore throat.    Eyes:  Negative for pain and visual disturbance.   Respiratory:  Negative for cough and shortness of breath.    Cardiovascular:  Negative for chest pain and palpitations.   Gastrointestinal:  Negative for abdominal pain and vomiting.   Genitourinary:  Negative for dysuria and hematuria.   Musculoskeletal:  Negative for arthralgias and back pain.   Skin:  Negative for color change and rash.   Neurological:  Negative for seizures and syncope.   All other systems reviewed and are negative.      Physical Exam     /77   Pulse 69   Ht 5' 8\" (1.727 m)   Wt 88.5 kg (195 lb)   BMI 29.65 kg/m²     Right Knee  Varus alignment  Range of motion from 0 to 105 degrees without pain at terminal flexion.    There is mild patellofemoral crepitus with range of motion.   There is no effusion.    There is no tenderness over the knee.    There is 5/5 quadriceps strength and preserved tone.    The patient is able to perform a straight leg raise.   negative patellar grind test.  Varus stress testing reveals no pain or instability at 0 and 30 degrees   Valgus stress testing reveals mild correction of varus at 0 and 30 degrees  The patient is neurovascular intact distally.      Left Knee  Varus alignment  Range of motion from 0 to 105 degrees without pain at terminal flexion.    There is mild patellofemoral crepitus with range of motion.   There is no effusion.    There is no tenderness over the knee.    There is 5/5 quadriceps strength and preserved tone.    The patient is able to perform a straight " leg raise.   negative patellar grind test.  Varus stress testing reveals no pain or instability at 0 and 30 degrees   Valgus stress testing reveals mild correction of varus at 0 and 30 degrees  The patient is neurovascular intact distally.        Eyes:  Anicteric sclerae.  Neck:  Supple.  Lungs:  Normal respiratory effort.  Cardiovascular:  Capillary refill is less than 2 seconds.  Skin:  Intact without erythema.  Neurologic:  Sensation grossly intact to light touch.  Psychiatric:  Mood and affect are appropriate.    Data Review     I have personally reviewed pertinent films in PACS, and my interpretation follows:    X-rays taken 03/13/2024 of Right knee demonstrate severe medial compartment joint space narrowing and small osteophytes. No fracture. Moderate patellofemoral compartment degenerative changes.     X-rays taken 03/13/2024 of Left knee demonstrate severe medial compartment joint space narrowing and small osteophytes. No fracture. Moderate patellofemoral compartment degenerative changes and mild lateral tilt.    Past Medical History:   Diagnosis Date    Diabetes mellitus (HCC)     diet control    Heart disease 05/09/2020    Heart attack    Hyperlipidemia     Myocardial infarction (HCC)     Penile lesion     Prediabetes        Past Surgical History:   Procedure Laterality Date    CARDIAC SURGERY  05/2020    stent placement    COLONOSCOPY      HERNIA REPAIR      LARYNGOSCOPY  1978    with vocal cord polyp removal    LARYNGOSCOPY N/A 8/3/2021    Procedure: MICRODIRECT LARYNGOSCOPY WITH  EXCISION OF VOCAL CORD LESION;  Surgeon: Pito Tomlin DO;  Location: [facility];  Service: ENT       Allergies   Allergen Reactions    Celecoxib      Other reaction(s): itchy  Other reaction(s): itchy       Current Outpatient Medications on File Prior to Visit   Medication Sig Dispense Refill    atorvastatin (LIPITOR) 40 mg tablet TAKE 1 TABLET BY MOUTH EVERY DAY 90 tablet 3    Blood Glucose Monitoring Suppl (FREESTYLE  FREEDOM LITE) w/Device KIT Check glucose levels once daily 1 each 0    chlorhexidine (PERIDEX) 0.12 % solution SWISH AND SPIT 15 ML TWICE A DAY      Diclofenac Sodium (VOLTAREN) 1 % Apply 2 g topically 4 (four) times a day 100 g 1    fluticasone (FLONASE) 50 mcg/act nasal spray 1 spray into each nostril daily 16 g 2    glucose blood (FREESTYLE LITE) test strip Use 1 each 3 (three) times a day 300 strip 1    Lancets (FREESTYLE) lancets TEST ONCE DAILY FASTING DXE11.9 100 each 3    levocetirizine (XYZAL) 5 MG tablet Take 1 tablet (5 mg total) by mouth every evening 10 tablet 1    metFORMIN (GLUCOPHAGE) 500 mg tablet TAKE 1 TABLET BY MOUTH TWICE A DAY WITH FOOD 180 tablet 2    metoprolol tartrate (LOPRESSOR) 25 mg tablet Take 1 tablet (25 mg total) by mouth every 12 (twelve) hours 180 tablet 1    pantoprazole (PROTONIX) 40 mg tablet TAKE 1 TABLET (40 MG TOTAL) BY MOUTH DAILY TAKE IN MORNING 100 tablet 1    tamsulosin (FLOMAX) 0.4 mg Take 1 capsule (0.4 mg total) by mouth daily with dinner 90 capsule 1    aspirin (ECOTRIN LOW STRENGTH) 81 mg EC tablet Take 1 tablet (81 mg total) by mouth daily 60 tablet 0     No current facility-administered medications on file prior to visit.       Social History     Tobacco Use    Smoking status: Former     Current packs/day: 1.00     Average packs/day: 1 pack/day for 25.0 years (25.0 ttl pk-yrs)     Types: Cigarettes    Smokeless tobacco: Never   Vaping Use    Vaping status: Never Used   Substance Use Topics    Alcohol use: Yes     Comment: Social twice a month    Drug use: Never       Family History   Problem Relation Age of Onset    Heart disease Mother         Cardiac Disorder    No Known Problems Father     No Known Problems Sister              Procedures Performed     Large joint arthrocentesis: R knee  Universal Protocol:  Risks and benefits: risks, benefits and alternatives were discussed  Consent given by: patient  Patient identity confirmed: verbally with patient  Procedure  Details  Location: knee - R knee  Needle size: 22 G  Approach: lateral  Medications administered: 2 mL bupivacaine 0.25 %; 2 mL lidocaine 1 %; 2 mL methylPREDNISolone acetate 40 mg/mL    Patient tolerance: patient tolerated the procedure well with no immediate complications  Dressing:  Sterile dressing applied      Large joint arthrocentesis: L knee  Universal Protocol:  Risks and benefits: risks, benefits and alternatives were discussed  Consent given by: patient  Patient identity confirmed: verbally with patient  Procedure Details  Location: knee - L knee  Needle size: 22 G  Approach: lateral  Medications administered: 2 mL bupivacaine 0.25 %; 2 mL lidocaine 1 %; 2 mL methylPREDNISolone acetate 40 mg/mL    Patient tolerance: patient tolerated the procedure well with no immediate complications  Dressing:  Sterile dressing applied              Alex Delgado DO

## 2024-08-06 NOTE — TELEPHONE ENCOUNTER
Patient stopped into the office, insurance will not cover his test strips 3 times per day. It will cost him $500. Can you change the script to only 1 time per day?

## 2024-08-07 ENCOUNTER — EVALUATION (OUTPATIENT)
Dept: PHYSICAL THERAPY | Facility: CLINIC | Age: 84
End: 2024-08-07
Payer: MEDICARE

## 2024-08-07 DIAGNOSIS — M17.11 PRIMARY OSTEOARTHRITIS OF RIGHT KNEE: ICD-10-CM

## 2024-08-07 DIAGNOSIS — M17.12 PRIMARY OSTEOARTHRITIS OF LEFT KNEE: Primary | ICD-10-CM

## 2024-08-07 PROCEDURE — 97110 THERAPEUTIC EXERCISES: CPT | Performed by: PHYSICAL THERAPIST

## 2024-08-07 PROCEDURE — 97162 PT EVAL MOD COMPLEX 30 MIN: CPT | Performed by: PHYSICAL THERAPIST

## 2024-08-07 NOTE — PROGRESS NOTES
PT Evaluation     Today's date: 2024  Patient name: Jay Stout  : 1940  MRN: [id number]  Referring provider: Olivia Guerra PA-C  Dx:   Encounter Diagnosis     ICD-10-CM    1. Primary osteoarthritis of left knee  M17.12 Ambulatory Referral to Physical Therapy      2. Primary osteoarthritis of right knee  M17.11 Ambulatory Referral to Physical Therapy          Start Time: 1148          Assessment  Impairments: abnormal or restricted ROM, activity intolerance, impaired physical strength, lacks appropriate home exercise program, pain with function and weight-bearing intolerance    Assessment details: Jay Stout is a 83 y.o. male who presents with chronic bilateral knee pain, which limits his functional mobility and activity tolerance. Examination findings demonstrate limited knee flexion and extension, limited patellofemoral joint mobility and mild weakness. Patient's clinical presentation is consistent with their referring diagnosis of bilateral knee osteoarthritis. Patient would benefit from skilled physical therapy to address their aforementioned impairments, improve their level of function and to improve their overall quality of life.  Understanding of Dx/Px/POC: excellent     Prognosis: excellent    Goals  Short Term Goals: to be achieved in 4 weeks  1) Patient to be independent with basic HEP.  2) Decrease pain at it's worst to 4/10 on VAS.  3) Increase LE strength by 1/2 MMT grade in all deficient planes.  4) Patient to report decreased sleep interruption secondary to pain.    Long Term Goals: to be achieved by discharge  1) FOTO equal to or greater than 70.  2) Patient to be independent with comprehensive HEP.  3) Abolish pain for improved quality of life.  4) Increase LE strength to 5/5 MMT grade in all planes to improve A/IADLS.  5) Patient to negotiate steps with a reciprocal pattern without use of Hr.  6) Increase ambulatory tolerance to 60 minutes.  7) Patient to report no sleep  "interruption secondary to pain.    Plan  Patient would benefit from: skilled PT  Planned modality interventions: biofeedback, cryotherapy, hydrotherapy, TENS, unattended electrical stimulation and low level laser therapy    Planned therapy interventions: activity modification, ADL retraining, balance, balance/weight bearing training, behavior modification, body mechanics training, functional ROM exercises, gait training, home exercise program, IADL retraining, joint mobilization, manual therapy, massage, neuromuscular re-education, patient education, strengthening, stretching, therapeutic activities, therapeutic exercise and transfer training    Frequency: 2-3x week.  Duration in weeks: 12  Plan of Care beginning date: 2024  Plan of Care expiration date: 10/30/2024  Treatment plan discussed with: patient and family        Subjective Evaluation    History of Present Illness  Mechanism of injury: Patient presents with c/o chronic b/l knee pain. Patient has received several steroid and viscosupplementation iinjections, which provided partial, temporary relief. Patient manages his pain with Voltaren. Patient prescribed  braces, but has yet to receive them. Patient has intermittent crepitus in his knees.     Left knee XR: \"Severe medial compartment degenerative changes as above with minimal varus angulation. Partial narrowing of the lateral patellofemoral space. No fracture.\"    Right knee XR: \"Severe medial compartment degenerative changes as above. Minimal varus angulation. No fracture.  Patient Goals  Patient goal: decrease pain, avoid future knee replacement  Pain  Current pain ratin  At best pain ratin  At worst pain ratin  Location: bilateral knees: diffuse          Objective     Tenderness   Left Knee   Tenderness in the lateral patella and medial patella. No tenderness in the lateral joint line and medial joint line.     Right Knee   No tenderness in the lateral joint line, lateral " "patella, medial joint line and medial patella.     Active Range of Motion   Left Knee   Flexion: 108 degrees with pain  Extension: 5 degrees with pain    Right Knee   Flexion: 113 degrees   Extension: 5 degrees     Passive Range of Motion   Left Knee   Flexion: 110 degrees with pain  Extension: 5 degrees     Right Knee   Flexion: 115 degrees   Extension: 5 degrees     Mobility   Patellar Mobility:   Left Knee   Hypomobile: left medial, left lateral, left superior and left inferior    Right Knee   Hypomobile: medial, lateral, superior and inferior     Patellar Mobility Comments   Left medial tendon comments: (+) pain. Left lateral tendon comments: (+) pain. Left superior tendon comments: (+) pain. Left inferior tendon comments: (+) pain.     Strength/Myotome Testing     Left Hip   Planes of Motion   Flexion: 5  Abduction: 4+    Right Hip   Planes of Motion   Flexion: 5  Abduction: 4+    Left Knee   Flexion: 5  Extension: 5    Right Knee   Flexion: 5  Extension: 5    Left Ankle/Foot   Dorsiflexion: 5    Right Ankle/Foot   Dorsiflexion: 5    Ambulation     Ambulation: Level Surfaces   Ambulation without assistive device: independent    Observational Gait   Gait: within functional limits     Functional Assessment      Squat    Left within functional limits and right within functional limits.     Forward Step Up 8\"   Left Leg  Within functional limits.     Right Leg  Within functional limits.     Forward Step Down 8\"   Left Leg  Within functional limits.     Right Leg  Within functional limits.                POC expires Unit limit Auth  expiration date PT/OT + Visit Limit?   10/30 N/A 10/30 BOMN                 Visit/Unit Tracking  AUTH Status:  Date 8/7              N/A Used 1               Remaining                  Precautions: DM, HTN, cardiac      Manuals 8/7                                                                Neuro Re-Ed             [place]: TKE             Supine SLR                                     "                                          Ther Ex             Patient education: pathophysiology, conservative vs surgical intervention, activity modification GR            Cardiovascular warm-up             Supine hip flexor str.             Prostretch             Hip abduction - side lying             Standing hip abd, ext with TB             Matrix: knee flexion             Matrix: knee extension             Heel slides             Ther Activity             Total gym: squats             KB squats             LSD             Gait Training                                       Modalities

## 2024-08-12 NOTE — PROGRESS NOTES
Daily Note     Today's date: 2024  Patient name: Jay Stout  : 1940  MRN: [id number]  Referring provider: Olivia Guerra PA-C  Dx:   Encounter Diagnosis     ICD-10-CM    1. Primary osteoarthritis of left knee  M17.12       2. Primary osteoarthritis of right knee  M17.11                      Subjective: No new complaints.      Objective: See treatment diary below      Assessment: Tolerated treatment well. Patient demonstrated fatigue post treatment and would benefit from continued PT.  Provided RTB for home use.  Right LE      Plan: Continue per plan of care.  Progress treatment as tolerated.       POC expires Unit limit Auth  expiration date PT/OT + Visit Limit?   10/30 N/A 10/30 BOMN                 Visit/Unit Tracking  AUTH Status:  Date              N/A Used 1 2              Remaining                  Precautions: DM, HTN, cardiac      Manuals                                                                Neuro Re-Ed             [place]: TKE             Supine SLR  2x10 ea                                                                             Ther Ex             Patient education: pathophysiology, conservative vs surgical intervention, activity modification GR  brace use.           Cardiovascular warm-up  Nustep L5x10' L6          Supine hip flexor str.             Prostretch             Hip abduction - side lying             Standing hip abd, ext with TB  YTB x20 ea catarino RTB          Matrix: knee flexion  50# 3x10           Matrix: knee extension  30# 3x10           Heel slides             Ther Activity             Total gym: squats  L22 x30           KB squats             LSD             Gait Training                                       Modalities

## 2024-08-13 ENCOUNTER — OFFICE VISIT (OUTPATIENT)
Dept: PHYSICAL THERAPY | Facility: CLINIC | Age: 84
End: 2024-08-13
Payer: MEDICARE

## 2024-08-13 DIAGNOSIS — M17.12 PRIMARY OSTEOARTHRITIS OF LEFT KNEE: Primary | ICD-10-CM

## 2024-08-13 DIAGNOSIS — M17.11 PRIMARY OSTEOARTHRITIS OF RIGHT KNEE: ICD-10-CM

## 2024-08-13 PROCEDURE — 97530 THERAPEUTIC ACTIVITIES: CPT | Performed by: PHYSICAL THERAPIST

## 2024-08-13 PROCEDURE — 97110 THERAPEUTIC EXERCISES: CPT | Performed by: PHYSICAL THERAPIST

## 2024-08-15 DIAGNOSIS — J30.9 ALLERGIC RHINITIS, UNSPECIFIED SEASONALITY, UNSPECIFIED TRIGGER: ICD-10-CM

## 2024-08-15 RX ORDER — FLUTICASONE PROPIONATE 50 MCG
1 SPRAY, SUSPENSION (ML) NASAL DAILY
Qty: 48 G | Refills: 1 | Status: SHIPPED | OUTPATIENT
Start: 2024-08-15

## 2024-08-16 ENCOUNTER — OFFICE VISIT (OUTPATIENT)
Dept: PHYSICAL THERAPY | Facility: CLINIC | Age: 84
End: 2024-08-16
Payer: MEDICARE

## 2024-08-16 DIAGNOSIS — M17.11 PRIMARY OSTEOARTHRITIS OF RIGHT KNEE: ICD-10-CM

## 2024-08-16 DIAGNOSIS — M17.12 PRIMARY OSTEOARTHRITIS OF LEFT KNEE: Primary | ICD-10-CM

## 2024-08-16 PROCEDURE — 97530 THERAPEUTIC ACTIVITIES: CPT

## 2024-08-16 PROCEDURE — 97110 THERAPEUTIC EXERCISES: CPT

## 2024-08-16 NOTE — PROGRESS NOTES
Daily Note    Today's date: 24  Patient name: Jay Stout  : 1940  MRN: [id number]  Referring provider: Olivia Guerra PA-C  Dx:   Encounter Diagnosis     ICD-10-CM    1. Primary osteoarthritis of left knee  M17.12       2. Primary osteoarthritis of right knee  M17.11           Start Time: 0900  Stop Time: 0945  Total time in clinic (min): 45 minutes      Subjective: Octavio reports some soreness in hte thighs today. He notes adherence to HEP and is going to the gym.    Objective: See treatment diary below.    Assessment: Octavio tolerated treatment well with consistent cuing throughout. TE's were performed with increased reps and increased resistance. New TE's were demonstrated with proper technique, and tolerated well. Following treatment, the patient demonstrated fatigue and would benefit from continued physical therapy.    Plan: Continue per plan of care.  Progress treatment as tolerated.         POC expires Unit limit Auth  expiration date PT/OT + Visit Limit?   10/30 N/A 10/30 BOMN                 Visit/Unit Tracking  AUTH Status:  Date             N/A Used 1 2 3             Remaining                  Precautions: DM, HTN, cardiac      Manuals                                                               Neuro Re-Ed             [place]: TKE             Supine SLR  2x10 ea  2x10 ea                                                                           Ther Ex             Patient education: pathophysiology, conservative vs surgical intervention, activity modification GR  brace use.           Cardiovascular warm-up  Nustep L5x10' 10' L6           Supine hip flexor str.             Prostretch             Hip abduction - side lying             Standing hip abd, ext with TB  YTB x20 ea catarino RTB 3x10           Matrix: knee flexion  50# 3x10 50# 3x12          Matrix: knee extension  30# 3x10 30# 3x12          Heel slides             Ther Activity             Total gym:  "squats  L22 x30 L22 3x12          KB squats             LSD   4\" 2x10           Gait Training                                       Modalities                                              Mickey Oneil, PT  8/16/2024,9:08 AM  "

## 2024-08-20 ENCOUNTER — OFFICE VISIT (OUTPATIENT)
Dept: PHYSICAL THERAPY | Facility: CLINIC | Age: 84
End: 2024-08-20
Payer: MEDICARE

## 2024-08-20 DIAGNOSIS — M17.12 PRIMARY OSTEOARTHRITIS OF LEFT KNEE: Primary | ICD-10-CM

## 2024-08-20 DIAGNOSIS — M17.11 PRIMARY OSTEOARTHRITIS OF RIGHT KNEE: ICD-10-CM

## 2024-08-20 PROCEDURE — 97110 THERAPEUTIC EXERCISES: CPT | Performed by: PHYSICAL THERAPIST

## 2024-08-20 PROCEDURE — 97112 NEUROMUSCULAR REEDUCATION: CPT | Performed by: PHYSICAL THERAPIST

## 2024-08-20 NOTE — PROGRESS NOTES
"Daily Note     Today's date: 2024  Patient name: Jay Stout  : 1940  MRN: [id number]  Referring provider: Olivia Guerra PA-C  Dx:   Encounter Diagnosis     ICD-10-CM    1. Primary osteoarthritis of left knee  M17.12       2. Primary osteoarthritis of right knee  M17.11           Start Time: 1000  Stop Time: 1115  Total time in clinic (min): 75 minutes    Subjective: Patient reports that his knee pain has been gradually improving and he is able to put his pants on his left leg with greater ease.      Objective: See treatment diary below      Assessment: Tolerated treatment well. Patient demonstrated fatigue post treatment and would benefit from continued PT. Patient reported increased bilateral knee pain with LSD.      Plan: Continue per plan of care.  Progress treatment as tolerated.       POC expires Unit limit Auth  expiration date PT/OT + Visit Limit?   10/30 N/A 10/30 BOMN                 Visit/Unit Tracking  AUTH Status:  Date            N/A Used 1 2 3 4            Remaining                  Precautions: DM, HTN, cardiac      Manuals                                                              Neuro Re-Ed             [place]: TKE    10# 20x5\" b/l         Supine SLR  2x10 ea  2x10 ea                                                                           Ther Ex             Patient education: pathophysiology, conservative vs surgical intervention, activity modification GR  brace use.           Cardiovascular warm-up  Nustep L5x10' 10' L6  10' L6         Supine hip flexor str.    3x30\" b/l         Prostretch    3x30\" b/l         Hip abduction - side lying    2x10 2# b/l         Standing hip abd, ext with TB  YTB x20 ea catarino RTB 3x10           Matrix: knee flexion  50# 3x10 50# 3x12 50# 3x12         Matrix: knee extension  30# 3x10 30# 3x12 50# 2x12         Heel slides             Ther Activity             Total gym: squats  L22 x30 L22 3x12 L22 " "3x12         KB squats             LSD   4\" 2x10  P! L > R         Gait Training                                       Modalities                                                " Non-Graft Cartilage Fenestration Text: The cartilage was fenestrated with a 2mm punch biopsy to help facilitate healing.

## 2024-08-23 ENCOUNTER — OFFICE VISIT (OUTPATIENT)
Dept: PHYSICAL THERAPY | Facility: CLINIC | Age: 84
End: 2024-08-23
Payer: MEDICARE

## 2024-08-23 DIAGNOSIS — M17.11 PRIMARY OSTEOARTHRITIS OF RIGHT KNEE: ICD-10-CM

## 2024-08-23 DIAGNOSIS — M17.12 PRIMARY OSTEOARTHRITIS OF LEFT KNEE: Primary | ICD-10-CM

## 2024-08-23 PROCEDURE — 97110 THERAPEUTIC EXERCISES: CPT

## 2024-08-23 PROCEDURE — 97530 THERAPEUTIC ACTIVITIES: CPT

## 2024-08-23 NOTE — PROGRESS NOTES
"Daily Note     Today's date: 2024  Patient name: Jay Stout  : 1940  MRN: [id number]  Referring provider: Olivia Guerra PA-C  Dx:   Encounter Diagnosis     ICD-10-CM    1. Primary osteoarthritis of left knee  M17.12       2. Primary osteoarthritis of right knee  M17.11                      Subjective: Pt reports left knee is more painful today than the right.      Objective: See treatment diary below      Assessment: Tolerated treatment well. Continued with strengthening and functional ROM exercises today, able to re-implement lateral step downs with emphasis on eccentric control, pt able to complete without significant exacerbations of pain.  Pt will continue to implement and progress exercises as appropriate.  Patient would benefit from continued PT      Plan: Continue per plan of care.      POC expires Unit limit Auth  expiration date PT/OT + Visit Limit?   10/30 N/A 10/30 BOMN                 Visit/Unit Tracking  AUTH Status:  Date           N/A Used 1 2 3 4 5           Remaining                  Precautions: DM, HTN, cardiac      Manuals                                                             Neuro Re-Ed             [place]: TKE    10# 20x5\" b/l         Supine SLR  2x10 ea  2x10 ea                                                                           Ther Ex             Patient education: pathophysiology, conservative vs surgical intervention, activity modification GR  brace use.           Cardiovascular warm-up  Nustep L5x10' 10' L6  10' L6 10' L6        Supine hip flexor str.    3x30\" b/l 3x30\" b/l        Prostretch    3x30\" b/l 3x30\" b/l        Hip abduction - side lying    2x10 2# b/l 2x10 2# b/l        Standing hip abd, ext with TB  YTB x20 ea catarino RTB 3x10           Matrix: knee flexion  50# 3x10 50# 3x12 50# 3x12 50# 3x12        Matrix: knee extension  30# 3x10 30# 3x12 50# 2x12 50# 2x12        Heel slides             Ther " "Activity             Total gym: squats  L22 x30 L22 3x12 L22 3x12 L22 3x12        KB squats             LSD   4\" 2x10  P! L > R X10   B/L  ecc step down        Gait Training                                       Modalities                                                  "

## 2024-08-27 ENCOUNTER — OFFICE VISIT (OUTPATIENT)
Dept: PHYSICAL THERAPY | Facility: CLINIC | Age: 84
End: 2024-08-27
Payer: MEDICARE

## 2024-08-27 DIAGNOSIS — M17.12 PRIMARY OSTEOARTHRITIS OF LEFT KNEE: Primary | ICD-10-CM

## 2024-08-27 DIAGNOSIS — M17.11 PRIMARY OSTEOARTHRITIS OF RIGHT KNEE: ICD-10-CM

## 2024-08-27 PROCEDURE — 97110 THERAPEUTIC EXERCISES: CPT | Performed by: PHYSICAL THERAPIST

## 2024-08-27 PROCEDURE — 97530 THERAPEUTIC ACTIVITIES: CPT | Performed by: PHYSICAL THERAPIST

## 2024-08-27 NOTE — PROGRESS NOTES
"Daily Note     Today's date: 2024  Patient name: Jay Stout  : 1940  MRN: [id number]  Referring provider: Olivia Guerra PA-C  Dx:   Encounter Diagnosis     ICD-10-CM    1. Primary osteoarthritis of left knee  M17.12       2. Primary osteoarthritis of right knee  M17.11           Start Time: 955  Stop Time: 1055  Total time in clinic (min): 60 minutes    Subjective: Patients reports feeling well, since last session, but mentioned having a \"twinge in their knee\" departing for PT this morning.       Objective: See treatment diary below      Assessment: Tolerated treatment well. Patient demonstrated fatigue post treatment and would benefit from continued PT. Patient demonstrated fatigue and visible discomfort with LSD and knee extensions. Patient was able to complete exercises w/o any lasting adverse symptoms.        Plan: Continue per plan of care.  Progress treatment as tolerated.       POC expires Unit limit Auth  expiration date PT/OT + Visit Limit?   10/30 N/A 10/30 BOMN                 Visit/Unit Tracking  AUTH Status:  Date          N/A Used 1 2 3 4 5 6          Remaining                  Precautions: DM, HTN, cardiac      Manuals                                                            Neuro Re-Ed             [place]: TKE    10# 20x5\" b/l  10#  20x5\"  B/l       Supine SLR  2x10 ea  2x10 ea                                                                           Ther Ex             Patient education: pathophysiology, conservative vs surgical intervention, activity modification GR  brace use.           Cardiovascular warm-up  Nustep L5x10' 10' L6  10' L6 10' L6 Nustep  L6 10'       Supine hip flexor str.    3x30\" b/l 3x30\" b/l 3x30\"  B/l       Prostretch    3x30\" b/l 3x30\" b/l 3x30\"  B/l       Hip abduction - side lying    2x10 2# b/l 2x10 2# b/l 2x10  2# b/l       Standing hip abd, ext with TB  YTB x20 ea catarino RTB 3x10         " "  Matrix: knee flexion  50# 3x10 50# 3x12 50# 3x12 50# 3x12 50#  3x12       Matrix: knee extension  30# 3x10 30# 3x12 50# 2x12 50# 2x12 50#   3x12       Heel slides             Ther Activity             Total gym: squats  L22 x30 L22 3x12 L22 3x12 L22 3x12 L22   3x12       KB squats             LSD   4\" 2x10  P! L > R X10   B/L  ecc step down X10 B/l  Ecc step down       Gait Training                                       Modalities                                                    "

## 2024-08-30 ENCOUNTER — OFFICE VISIT (OUTPATIENT)
Dept: PHYSICAL THERAPY | Facility: CLINIC | Age: 84
End: 2024-08-30
Payer: MEDICARE

## 2024-08-30 DIAGNOSIS — M17.11 PRIMARY OSTEOARTHRITIS OF RIGHT KNEE: ICD-10-CM

## 2024-08-30 DIAGNOSIS — M17.12 PRIMARY OSTEOARTHRITIS OF LEFT KNEE: Primary | ICD-10-CM

## 2024-08-30 PROCEDURE — 97110 THERAPEUTIC EXERCISES: CPT | Performed by: PHYSICAL THERAPIST

## 2024-08-30 PROCEDURE — 97530 THERAPEUTIC ACTIVITIES: CPT | Performed by: PHYSICAL THERAPIST

## 2024-08-30 NOTE — PROGRESS NOTES
"Daily Note     Today's date: 2024  Patient name: Jay Stout  : 1940  MRN: [id number]  Referring provider: Olivia Guerra PA-C  Dx:   Encounter Diagnosis     ICD-10-CM    1. Primary osteoarthritis of left knee  M17.12       2. Primary osteoarthritis of right knee  M17.11           Start Time: 0959  Stop Time: 1100  Total time in clinic (min): 61 minutes    Subjective: Patient reports that he's had a significant reduction in pain and has returned to the gym. Patient has not increased his walking mileage yet.       Objective: See treatment diary below      Assessment: Tolerated treatment well. Patient demonstrated fatigue post treatment and would benefit from continued PT. Patient overall progressing well per POC and able to complete functional activities without pain.      Plan: Continue per plan of care.  Progress treatment as tolerated.       POC expires Unit limit Auth  expiration date PT/OT + Visit Limit?   10/30 N/A 10/30 BOMN                 Visit/Unit Tracking  AUTH Status:  Date         N/A Used 1 2 3 4 5 6 7         Remaining                  Precautions: DM, HTN, cardiac      Manuals                                                           Neuro Re-Ed             [place]: TKE    10# 20x5\" b/l  10#  20x5\"  B/l       Supine SLR  2x10 ea  2x10 ea                                                                           Ther Ex             Patient education: pathophysiology, conservative vs surgical intervention, activity modification GR  brace use.           Cardiovascular warm-up  Nustep L5x10' 10' L6  10' L6 10' L6 Nustep  L6 10' Nustep L6 10 min      Supine hip flexor str.    3x30\" b/l 3x30\" b/l 3x30\"  B/l       Prostretch    3x30\" b/l 3x30\" b/l 3x30\"  B/l       Hip abduction - side lying    2x10 2# b/l 2x10 2# b/l 2x10  2# b/l       Standing hip abd, ext with TB  YTB x20 ea catarino RTB 3x10           Matrix: knee " "flexion  50# 3x10 50# 3x12 50# 3x12 50# 3x12 50#  3x12       Matrix: knee extension  30# 3x10 30# 3x12 50# 2x12 50# 2x12 50#   3x12       Heel slides             Ther Activity             Total gym: squats  L22 x30 L22 3x12 L22 3x12 L22 3x12 L22   3x12 Unilateral 2x10 ea.      KB squats       15# 3x10      LSD   4\" 2x10  P! L > R X10   B/L  ecc step down X10 B/l  Ecc step down       Stationary lunges       X20 ea.      Gait Training                                       Modalities                                                      "

## 2024-09-03 ENCOUNTER — OFFICE VISIT (OUTPATIENT)
Dept: PHYSICAL THERAPY | Facility: CLINIC | Age: 84
End: 2024-09-03
Payer: MEDICARE

## 2024-09-03 DIAGNOSIS — M17.12 PRIMARY OSTEOARTHRITIS OF LEFT KNEE: Primary | ICD-10-CM

## 2024-09-03 DIAGNOSIS — M17.11 PRIMARY OSTEOARTHRITIS OF RIGHT KNEE: ICD-10-CM

## 2024-09-03 PROCEDURE — 97110 THERAPEUTIC EXERCISES: CPT | Performed by: PHYSICAL THERAPIST

## 2024-09-03 PROCEDURE — 97530 THERAPEUTIC ACTIVITIES: CPT | Performed by: PHYSICAL THERAPIST

## 2024-09-03 NOTE — PROGRESS NOTES
"Daily Note     Today's date: 9/3/2024  Patient name: Jay Stout  : 1940  MRN: [id number]  Referring provider: Olivia Guerra PA-C  Dx:   Encounter Diagnosis     ICD-10-CM    1. Primary osteoarthritis of left knee  M17.12       2. Primary osteoarthritis of right knee  M17.11           Start Time: 09  Stop Time: 1044  Total time in clinic (min): 60 minutes    Subjective: Patient offers no new complaints. Patient is able to don/doff his pants with greater ease and is having less pain overall.      Objective: See treatment diary below      Assessment: Tolerated treatment well. Patient demonstrated fatigue post treatment and would benefit from continued PT. Patient challenged with LSD, however, is able to complete with greater ability compared to prior visits.      Plan: Continue per plan of care.  Progress treatment as tolerated.       POC expires Unit limit Auth  expiration date PT/OT + Visit Limit?   10/30 N/A 10/30 BOMN                 Visit/Unit Tracking  AUTH Status:  Date 8/7 8/13 8/16 8/20 8/23 8/27 8/30 9/3       N/A Used 1 2 3 4 5 6 7 8        Remaining                  Precautions: DM, HTN, cardiac      Manuals 8/7 8/13 8/16 8/20 8/23 8/27 8/30 9/3                                                         Neuro Re-Ed             [place]: TKE    10# 20x5\" b/l  10#  20x5\"  B/l       Supine SLR  2x10 ea  2x10 ea                                                                           Ther Ex             Patient education: pathophysiology, conservative vs surgical intervention, activity modification GR  brace use.           Cardiovascular warm-up  Nustep L5x10' 10' L6  10' L6 10' L6 Nustep  L6 10' Nustep L6 10 min Nustep UE/LE L7 10 min     Supine hip flexor str.    3x30\" b/l 3x30\" b/l 3x30\"  B/l       Prostretch    3x30\" b/l 3x30\" b/l 3x30\"  B/l       Hip abduction - side lying    2x10 2# b/l 2x10 2# b/l 2x10  2# b/l       Standing hip abd, ext with TB  YTB x20 ea catarino RTB 3x10      In SLS " "off 2\" step x20 ea. B/l     Matrix: knee flexion  50# 3x10 50# 3x12 50# 3x12 50# 3x12 50#  3x12       Matrix: knee extension  30# 3x10 30# 3x12 50# 2x12 50# 2x12 50#   3x12       Heel slides             Side stepping with TB             Ther Activity             Total gym: squats  L22 x30 L22 3x12 L22 3x12 L22 3x12 L22   3x12 Unilateral 2x10 ea. Unilateral 3x10 ea.     KB squats       15# 3x10 15# 3x10     LSD   4\" 2x10  P! L > R X10   B/L  ecc step down X10 B/l  Ecc step down  6\" 1x10  4\" 1x10     Stationary lunges       X20 ea. x30     Gait Training                                       Modalities                                                        "

## 2024-09-05 DIAGNOSIS — I25.10 ATHEROSCLEROSIS OF NATIVE CORONARY ARTERY OF NATIVE HEART WITHOUT ANGINA PECTORIS: ICD-10-CM

## 2024-09-05 RX ORDER — ATORVASTATIN CALCIUM 40 MG/1
TABLET, FILM COATED ORAL
Qty: 90 TABLET | Refills: 0 | Status: SHIPPED | OUTPATIENT
Start: 2024-09-05

## 2024-09-05 NOTE — TELEPHONE ENCOUNTER
Per Dr. Corona's notes from January 2024, patient is not due to be seen until January 2025. Patient will call back to schedule his appt.

## 2024-09-06 ENCOUNTER — EVALUATION (OUTPATIENT)
Dept: PHYSICAL THERAPY | Facility: CLINIC | Age: 84
End: 2024-09-06
Payer: MEDICARE

## 2024-09-06 DIAGNOSIS — M17.12 PRIMARY OSTEOARTHRITIS OF LEFT KNEE: Primary | ICD-10-CM

## 2024-09-06 DIAGNOSIS — M17.11 PRIMARY OSTEOARTHRITIS OF RIGHT KNEE: ICD-10-CM

## 2024-09-06 PROCEDURE — 97110 THERAPEUTIC EXERCISES: CPT | Performed by: PHYSICAL THERAPIST

## 2024-09-06 NOTE — PROGRESS NOTES
PT Re-Evaluation     Today's date: 2024  Patient name: Jay Stout  : 1940  MRN: [id number]  Referring provider: Olivia Guerra PA-C  Dx:   Encounter Diagnosis     ICD-10-CM    1. Primary osteoarthritis of left knee  M17.12       2. Primary osteoarthritis of right knee  M17.11           Start Time: 0945  Stop Time: 1045  Total time in clinic (min): 60 minutes    Assessment  Impairments: abnormal or restricted ROM, activity intolerance, impaired physical strength, lacks appropriate home exercise program, pain with function and weight-bearing intolerance    Assessment details: Since beginning physical therapy, Jay has attended a total number of 9 visits and has maintained excellent compliance with established POC. Patient has made significant improvements in all areas, including decreased pain, increased strength, increased ROM, and improved overall level of function. Patient is reporting improved ability to perform a/iadls and engaging in social activities. Despite these improvements, Patient continues to present with bilateral knee pain, patellofemoral crepitus, limited terminal knee flexion and extension ROM, and painful functional activity (ie. Lateral step down). Patient would continue to benefit from skilled physical therapy to address his aforementioned impairments, improve their level of function and to improve their overall quality of life.    Understanding of Dx/Px/POC: excellent     Prognosis: excellent    Goals  Short Term Goals: to be achieved in 4 weeks ALL GOALS MET  1) Patient to be independent with basic HEP.  2) Decrease pain at it's worst to 4/10 on VAS.  3) Increase LE strength by 1/2 MMT grade in all deficient planes.  4) Patient to report decreased sleep interruption secondary to pain.    Long Term Goals: to be achieved by discharge ALL GOALS PROGRESSING  1) FOTO equal to or greater than 70.  2) Patient to be independent with comprehensive HEP.  3) Abolish pain for improved  "quality of life.  4) Increase LE strength to 5/5 MMT grade in all planes to improve A/IADLS.  5) Patient to negotiate steps with a reciprocal pattern without use of Hr.  6) Increase ambulatory tolerance to 60 minutes.  7) Patient to report no sleep interruption secondary to pain.    Plan  Patient would benefit from: skilled PT  Planned modality interventions: biofeedback, cryotherapy, hydrotherapy, TENS, unattended electrical stimulation and low level laser therapy    Planned therapy interventions: activity modification, ADL retraining, balance, balance/weight bearing training, behavior modification, body mechanics training, functional ROM exercises, gait training, home exercise program, IADL retraining, joint mobilization, manual therapy, massage, neuromuscular re-education, patient education, strengthening, stretching, therapeutic activities, therapeutic exercise and transfer training    Frequency: 2-3x week.  Duration in weeks: 8  Plan of Care beginning date: 2024  Plan of Care expiration date: 2024  Treatment plan discussed with: patient        Subjective Evaluation    History of Present Illness  Mechanism of injury: Patient reports significant reduction in right knee pain. Patient is able to don/doff his pants with greater ease secondary to improved weightbearing tolerance and strength. Patient is able to walk greater distances and do bar with less limitation. Patient has received his off  braces, but has not been using them.    Left knee XR: \"Severe medial compartment degenerative changes as above with minimal varus angulation. Partial narrowing of the lateral patellofemoral space. No fracture.\"    Right knee XR: \"Severe medial compartment degenerative changes as above. Minimal varus angulation. No fracture.  Patient Goals  Patient goal: decrease pain, avoid future knee replacement  Pain  Current pain ratin  At best pain ratin  At worst pain ratin  Location: bilateral knees: " "diffuse  Quality: sharp and dull ache          Objective     Tenderness   Left Knee   Tenderness in the lateral patella and medial patella. No tenderness in the lateral joint line and medial joint line.     Right Knee   No tenderness in the lateral joint line, lateral patella, medial joint line and medial patella.     Active Range of Motion   Left Knee   Flexion: 112 degrees with pain  Extension: 5 degrees with pain    Right Knee   Flexion: 113 degrees   Extension: 5 degrees     Passive Range of Motion   Left Knee   Flexion: 114 degrees with pain  Extension: 5 degrees     Right Knee   Flexion: 115 degrees   Extension: 5 degrees     Mobility   Patellar Mobility:   Left Knee   Hypomobile: left medial, left lateral, left superior and left inferior    Right Knee   Hypomobile: medial, lateral, superior and inferior     Patellar Mobility Comments   Left medial tendon comments: (+) pain. Left lateral tendon comments: (+) pain. Left superior tendon comments: (+) pain. Left inferior tendon comments: (+) pain.     Strength/Myotome Testing     Left Hip   Planes of Motion   Flexion: 5  Abduction: 4+    Right Hip   Planes of Motion   Flexion: 5  Abduction: 4+    Left Knee   Flexion: 5  Extension: 5    Right Knee   Flexion: 5  Extension: 5    Left Ankle/Foot   Dorsiflexion: 5    Right Ankle/Foot   Dorsiflexion: 5    Ambulation     Ambulation: Level Surfaces   Ambulation without assistive device: independent    Observational Gait   Gait: within functional limits     Functional Assessment      Squat    Left within functional limits and right within functional limits.     Forward Step Up 8\"   Left Leg  Within functional limits.     Right Leg  Within functional limits.     Forward Step Down 8\"   Left Leg  Within functional limits.     Right Leg  Within functional limits.     Comments  Lateral step down: 6\" step, (+) pain bilaterally with fair eccentric control               POC expires Unit limit Auth  expiration date PT/OT + Visit " "Limit?   10/30 N/A 10/30 BOMN                 Visit/Unit Tracking  AUTH Status:  Date 8/7 8/13 8/16 8/20 8/23 8/27 8/30 9/3 9/6      N/A Used 1 2 3 4 5 6 7 8 9       Remaining                  Precautions: DM, HTN, cardiac      Manuals 8/7 8/13 8/16 8/20 8/23 8/27 8/30 9/3 9/6    Reassessment         GR                                           Neuro Re-Ed             [place]: TKE    10# 20x5\" b/l  10#  20x5\"  B/l       Supine SLR  2x10 ea  2x10 ea                                                                           Ther Ex             Patient education: pathophysiology, conservative vs surgical intervention, activity modification, bracing GR  brace use.       GR    Cardiovascular warm-up  Nustep L5x10' 10' L6  10' L6 10' L6 Nustep  L6 10' Nustep L6 10 min Nustep UE/LE L7 10 min Nustep UE/LE L7 10 min    Supine hip flexor str.    3x30\" b/l 3x30\" b/l 3x30\"  B/l       Prostretch    3x30\" b/l 3x30\" b/l 3x30\"  B/l       Hip abduction - side lying    2x10 2# b/l 2x10 2# b/l 2x10  2# b/l       Standing hip abd, ext with TB  YTB x20 ea catarino RTB 3x10      In SLS off 2\" step x20 ea. B/l     Matrix: knee flexion  50# 3x10 50# 3x12 50# 3x12 50# 3x12 50#  3x12       Matrix: knee extension  30# 3x10 30# 3x12 50# 2x12 50# 2x12 50#   3x12       Heel slides             Side stepping with TB             Ther Activity             Total gym: squats  L22 x30 L22 3x12 L22 3x12 L22 3x12 L22   3x12 Unilateral 2x10 ea. Unilateral 3x10 ea.     KB squats       15# 3x10 15# 3x10     LSD   4\" 2x10  P! L > R X10   B/L  ecc step down X10 B/l  Ecc step down  6\" 1x10  4\" 1x10     Stationary lunges       X20 ea. x30     Gait Training                                       Modalities                                            "

## 2024-09-09 ENCOUNTER — OFFICE VISIT (OUTPATIENT)
Dept: PHYSICAL THERAPY | Facility: CLINIC | Age: 84
End: 2024-09-09
Payer: MEDICARE

## 2024-09-09 DIAGNOSIS — M17.12 PRIMARY OSTEOARTHRITIS OF LEFT KNEE: Primary | ICD-10-CM

## 2024-09-09 DIAGNOSIS — M17.11 PRIMARY OSTEOARTHRITIS OF RIGHT KNEE: ICD-10-CM

## 2024-09-09 PROCEDURE — 97110 THERAPEUTIC EXERCISES: CPT | Performed by: PHYSICAL THERAPIST

## 2024-09-09 NOTE — PROGRESS NOTES
"Daily Note     Today's date: 2024  Patient name: Jay Stout  : 1940  MRN: [id number]  Referring provider: Olivia Guerra PA-C  Dx:   Encounter Diagnosis     ICD-10-CM    1. Primary osteoarthritis of left knee  M17.12       2. Primary osteoarthritis of right knee  M17.11           Start Time: 1400  Stop Time: 1505  Total time in clinic (min): 65 minutes    Subjective: Patient reports that he has been going to the gym. Patient has had mild pain while exercising.      Objective: See treatment diary below      Assessment: Tolerated treatment well. Patient demonstrated fatigue post treatment and would benefit from continued PT. No significant changes to overall status this visit.      Plan: Continue per plan of care.  Progress treatment as tolerated.       POC expires Unit limit Auth  expiration date PT/OT + Visit Limit?   10/30 N/A 10/30 BOMN                 Visit/Unit Tracking  AUTH Status:  Date 8/7 8/13 8/16 8/20 8/23 8/27 8/30 9/3 9/6 9/9     N/A Used 1 2 3 4 5 6 7 8 9 10      Remaining                  Precautions: DM, HTN, cardiac      Manuals 8/7 8/13 8/16 8/20 8/23 8/27 8/30 9/3 9/6 9/9   Reassessment         GR                                           Neuro Re-Ed             [place]: TKE    10# 20x5\" b/l  10#  20x5\"  B/l       Supine SLR  2x10 ea  2x10 ea       2# 3x10 b/l                                                                    Ther Ex             Patient education: pathophysiology, conservative vs surgical intervention, activity modification, bracing GR  brace use.       GR    Cardiovascular warm-up  Nustep L5x10' 10' L6  10' L6 10' L6 Nustep  L6 10' Nustep L6 10 min Nustep UE/LE L7 10 min Nustep UE/LE L7 10 min Nustep UE/LE L6 10 min   Supine hip flexor str.    3x30\" b/l 3x30\" b/l 3x30\"  B/l       Prostretch    3x30\" b/l 3x30\" b/l 3x30\"  B/l    3x30\" b/l   Hip abduction - side lying    2x10 2# b/l 2x10 2# b/l 2x10  2# b/l    2# 2x10 b/l   Standing hip abd, ext with TB  " "YTB x20 ea catarino RTB 3x10      In SLS off 2\" step x20 ea. B/l     Matrix: knee flexion  50# 3x10 50# 3x12 50# 3x12 50# 3x12 50#  3x12       Matrix: knee extension  30# 3x10 30# 3x12 50# 2x12 50# 2x12 50#   3x12       Heel slides             Side stepping with TB             Prone quad str.          3x30\" b/l   Ther Activity             Total gym: squats  L22 x30 L22 3x12 L22 3x12 L22 3x12 L22   3x12 Unilateral 2x10 ea. Unilateral 3x10 ea.  Unilateral L22 3x10   KB squats       15# 3x10 15# 3x10     LSD   4\" 2x10  P! L > R X10   B/L  ecc step down X10 B/l  Ecc step down  6\" 1x10  4\" 1x10     Stationary lunges       X20 ea. x30     Gait Training                                       Modalities                                            "

## 2024-09-11 ENCOUNTER — OFFICE VISIT (OUTPATIENT)
Dept: PHYSICAL THERAPY | Facility: CLINIC | Age: 84
End: 2024-09-11
Payer: MEDICARE

## 2024-09-11 DIAGNOSIS — M17.11 PRIMARY OSTEOARTHRITIS OF RIGHT KNEE: ICD-10-CM

## 2024-09-11 DIAGNOSIS — M17.12 PRIMARY OSTEOARTHRITIS OF LEFT KNEE: Primary | ICD-10-CM

## 2024-09-11 PROCEDURE — 97530 THERAPEUTIC ACTIVITIES: CPT | Performed by: PHYSICAL THERAPIST

## 2024-09-11 PROCEDURE — 97110 THERAPEUTIC EXERCISES: CPT | Performed by: PHYSICAL THERAPIST

## 2024-09-11 NOTE — PROGRESS NOTES
"Daily Note     Today's date: 2024  Patient name: Jay Stout  : 1940  MRN: [id number]  Referring provider: Olivia Guerra PA-C  Dx:   Encounter Diagnosis     ICD-10-CM    1. Primary osteoarthritis of left knee  M17.12       2. Primary osteoarthritis of right knee  M17.11           Start Time: 0845  Stop Time: 0930  Total time in clinic (min): 45 minutes    Subjective: Patient reports that his knee has been feeling much better overall and has been able to be more active.       Objective: See treatment diary below      Assessment: Tolerated treatment well. Patient demonstrated fatigue post treatment and would benefit from continued PT. Patient challenged and fatigued with progressions to hip strengthening this visit. Minor discomfort over left knee anteromedially.      Plan: Continue per plan of care.  Progress treatment as tolerated.       POC expires Unit limit Auth  expiration date PT/OT + Visit Limit?   10/30 N/A 10/30 BOMN                 Visit/Unit Tracking  AUTH Status:  Date 8/7 8/13 8/16 8/20 8/23 8/27 8/30 9/3 9/6 9/9 9/11    N/A Used 1 2 3 4 5 6 7 8 9 10 11     Remaining                  Precautions: DM, HTN, cardiac      Manuals 9/11  8/16 8/20 8/23 8/27 8/30 9/3 9/6 9/9   Reassessment         GR                                           Neuro Re-Ed             [place]: TKE    10# 20x5\" b/l  10#  20x5\"  B/l       Supine SLR   2x10 ea       2# 3x10 b/l                                                                    Ther Ex             Patient education: pathophysiology, conservative vs surgical intervention, activity modification, bracing         GR    Cardiovascular warm-up Nustep UE/LE L7 10 min  10' L6  10' L6 10' L6 Nustep  L6 10' Nustep L6 10 min Nustep UE/LE L7 10 min Nustep UE/LE L7 10 min Nustep UE/LE L6 10 min   Supine hip flexor str.    3x30\" b/l 3x30\" b/l 3x30\"  B/l       Prostretch    3x30\" b/l 3x30\" b/l 3x30\"  B/l    3x30\" b/l   Hip abduction - side lying    2x10 2# b/l " "2x10 2# b/l 2x10  2# b/l    2# 2x10 b/l   Standing hip abd, ext with TB   RTB 3x10      In SLS off 2\" step x20 ea. B/l     Matrix: knee flexion   50# 3x12 50# 3x12 50# 3x12 50#  3x12       Matrix: knee extension   30# 3x12 50# 2x12 50# 2x12 50#   3x12       Heel slides             Side stepping with TB GTB 2 laps            Prone quad str.          3x30\" b/l   Hip abduction clock GTB 2x5 b/l            Ther Activity             Total gym: squats Unilateral L22 3x10 ea.  L22 3x12 L22 3x12 L22 3x12 L22   3x12 Unilateral 2x10 ea. Unilateral 3x10 ea.  Unilateral L22 3x10   KB squats       15# 3x10 15# 3x10     LSD 6\" 2x10 b/l  4\" 2x10  P! L > R X10   B/L  ecc step down X10 B/l  Ecc step down  6\" 1x10  4\" 1x10     Stationary lunges       X20 ea. x30     Gait Training                                       Modalities                                              "

## 2024-09-17 ENCOUNTER — OFFICE VISIT (OUTPATIENT)
Dept: PHYSICAL THERAPY | Facility: CLINIC | Age: 84
End: 2024-09-17
Payer: MEDICARE

## 2024-09-17 DIAGNOSIS — M17.12 PRIMARY OSTEOARTHRITIS OF LEFT KNEE: Primary | ICD-10-CM

## 2024-09-17 DIAGNOSIS — M17.11 PRIMARY OSTEOARTHRITIS OF RIGHT KNEE: ICD-10-CM

## 2024-09-17 PROCEDURE — 97530 THERAPEUTIC ACTIVITIES: CPT

## 2024-09-17 PROCEDURE — 97110 THERAPEUTIC EXERCISES: CPT

## 2024-09-17 NOTE — PROGRESS NOTES
"Daily Note     Today's date: 2024  Patient name: Jay Stout  : 1940  MRN: [id number]  Referring provider: Olivia Guerra PA-C  Dx:   Encounter Diagnosis     ICD-10-CM    1. Primary osteoarthritis of left knee  M17.12       2. Primary osteoarthritis of right knee  M17.11                      Subjective: Pt reports no significant changes in symptoms since last visit.      Objective: See treatment diary below      Assessment: Tolerated treatment well. Continued with hip/knee strengthening interventions today.  Pt able to complete all exercises today without difficulty, did have instance of pain at posterior aspect of knee during LSD although pain was tolerable.  PT will continue to progress as appropriate.  Patient would benefit from continued PT      Plan: Continue per plan of care.      POC expires Unit limit Auth  expiration date PT/OT + Visit Limit?   10/30 N/A 10/30 BOMN                 Visit/Unit Tracking  AUTH Status:  Date 8/7 8/13 8/16 8/20 8/23 8/27 8/30 9/3 9/6 9/9 9/11 9/17   N/A Used 1 2 3 4 5 6 7 8 9 10 11 12    Remaining                  Precautions: DM, HTN, cardiac      Manuals 9/11 9/17 8/16 8/20 8/23 8/27 8/30 9/3 9/6 9/9   Reassessment         GR                                           Neuro Re-Ed             [place]: TKE    10# 20x5\" b/l  10#  20x5\"  B/l       Supine SLR  2# 3x10 b/l 2x10 ea       2# 3x10 b/l                                                                    Ther Ex             Patient education: pathophysiology, conservative vs surgical intervention, activity modification, bracing         GR    Cardiovascular warm-up Nustep UE/LE L7 10 min Nustep UE/LE L7 10 min 10' L6  10' L6 10' L6 Nustep  L6 10' Nustep L6 10 min Nustep UE/LE L7 10 min Nustep UE/LE L7 10 min Nustep UE/LE L6 10 min   Supine hip flexor str.    3x30\" b/l 3x30\" b/l 3x30\"  B/l       Prostretch  3x30\" b/l  3x30\" b/l 3x30\" b/l 3x30\"  B/l    3x30\" b/l   Hip abduction - side lying    2x10 2# b/l " "2x10 2# b/l 2x10  2# b/l    2# 2x10 b/l   Standing hip abd, ext with TB   RTB 3x10      In SLS off 2\" step x20 ea. B/l     Matrix: knee flexion   50# 3x12 50# 3x12 50# 3x12 50#  3x12       Matrix: knee extension   30# 3x12 50# 2x12 50# 2x12 50#   3x12       Heel slides             Side stepping with TB GTB 2 laps GTB 2 laps           Prone quad str.          3x30\" b/l   Hip abduction clock GTB 2x5 b/l GTB 2x10 b/l           Ther Activity             Total gym: squats Unilateral L22 3x10 ea. Unilateral L22 3x10 ea. L22 3x12 L22 3x12 L22 3x12 L22   3x12 Unilateral 2x10 ea. Unilateral 3x10 ea.  Unilateral L22 3x10   KB squats       15# 3x10 15# 3x10     LSD 6\" 2x10 b/l 6\" 2x10 b/l 4\" 2x10  P! L > R X10   B/L  ecc step down X10 B/l  Ecc step down  6\" 1x10  4\" 1x10     Stationary lunges       X20 ea. x30     Gait Training                                       Modalities                                                " unable to perform/pt not up to it at this time

## 2024-09-20 ENCOUNTER — OFFICE VISIT (OUTPATIENT)
Dept: PHYSICAL THERAPY | Facility: CLINIC | Age: 84
End: 2024-09-20
Payer: MEDICARE

## 2024-09-20 DIAGNOSIS — M17.11 PRIMARY OSTEOARTHRITIS OF RIGHT KNEE: ICD-10-CM

## 2024-09-20 DIAGNOSIS — M17.12 PRIMARY OSTEOARTHRITIS OF LEFT KNEE: Primary | ICD-10-CM

## 2024-09-20 PROCEDURE — 97530 THERAPEUTIC ACTIVITIES: CPT

## 2024-09-20 PROCEDURE — 97110 THERAPEUTIC EXERCISES: CPT

## 2024-09-20 NOTE — PROGRESS NOTES
"Daily Note     Today's date: 2024  Patient name: Jay Stout  : 1940  MRN: [id number]  Referring provider: Olivia Guerra PA-C  Dx:   Encounter Diagnosis     ICD-10-CM    1. Primary osteoarthritis of left knee  M17.12       2. Primary osteoarthritis of right knee  M17.11           Start Time: 0855  Stop Time: 0940  Total time in clinic (min): 45 minutes    Subjective: pt noted that he did have burning in his R knee this morning and yesterday. He noted the only thing he did differently was trying some other machines with weights. Noted the burning has been a little better today since taking over the counter pain meds.       Objective: See treatment diary below      Assessment:  Continued with treatment session with focus on OA of R and L knee. Noted challenge today but was able to completed exercises with proper form.  Tolerated treatment well. Patient exhibited good technique with therapeutic exercises and would benefit from continued PT  S/p treatment session reported some soreness.     Plan: Continue per plan of care.      POC expires Unit limit Auth  expiration date PT/OT + Visit Limit?    N/A 10/30 BOMN                 Visit/Unit Tracking  AUTH Status:  Date 9/20  8/16 8/20 8/23 8/27 8/30 9/3 9/6 9/9 9/11 9/17   N/A Used 13  3 4 5 6 7 8 9 10 11 12    Remaining                  Precautions: DM, HTN, cardiac      Manuals 9/11 9/17 9/20  8/23 8/27 8/30 9/3 9/6 9/9   Reassessment         GR                                           Neuro Re-Ed             [place]: TKE      10#  20x5\"  B/l       Supine SLR  2# 3x10 b/l NV       2# 3x10 b/l                                                                    Ther Ex             Patient education: pathophysiology, conservative vs surgical intervention, activity modification, bracing         GR    Cardiovascular warm-up Nustep UE/LE L7 10 min Nustep UE/LE L7 10 min Nustep 10 min L6 10 min   10' L6 Nustep  L6 10' Nustep L6 10 min Nustep UE/LE L7 10 " "min Nustep UE/LE L7 10 min Nustep UE/LE L6 10 min   Supine hip flexor str.     3x30\" b/l 3x30\"  B/l       Prostretch  3x30\" b/l 3x 30\" b/l   3x30\" b/l 3x30\"  B/l    3x30\" b/l   Hip abduction - side lying     2x10 2# b/l 2x10  2# b/l    2# 2x10 b/l   Standing hip abd, ext with TB        In SLS off 2\" step x20 ea. B/l     Matrix: knee flexion     50# 3x12 50#  3x12       Matrix: knee extension     50# 2x12 50#   3x12       Heel slides             Side stepping with TB GTB 2 laps GTB 2 laps GTB 4 laps           Prone quad str.          3x30\" b/l   Hip abduction clock GTB 2x5 b/l GTB 2x10 b/l GTB 2x 10 b/l                                                  Ther Activity             Total gym: squats Unilateral L22 3x10 ea. Unilateral L22 3x10 ea. Unilateral L22 3x10 ea.  L22 3x12 L22   3x12 Unilateral 2x10 ea. Unilateral 3x10 ea.  Unilateral L22 3x10   KB squats       15# 3x10 15# 3x10     LSD 6\" 2x10 b/l 6\" 2x10 b/l 6\" 2x 10   X10   B/L  ecc step down X10 B/l  Ecc step down  6\" 1x10  4\" 1x10     Stationary lunges       X20 ea. x30     Gait Training                                       Modalities                                                  "

## 2024-09-23 DIAGNOSIS — J30.9 ALLERGIC RHINITIS, UNSPECIFIED SEASONALITY, UNSPECIFIED TRIGGER: ICD-10-CM

## 2024-09-23 RX ORDER — FLUTICASONE PROPIONATE 50 MCG
1 SPRAY, SUSPENSION (ML) NASAL DAILY
Qty: 48 G | Refills: 1 | Status: SHIPPED | OUTPATIENT
Start: 2024-09-23

## 2024-09-24 ENCOUNTER — OFFICE VISIT (OUTPATIENT)
Dept: PHYSICAL THERAPY | Facility: CLINIC | Age: 84
End: 2024-09-24
Payer: MEDICARE

## 2024-09-24 DIAGNOSIS — M17.12 PRIMARY OSTEOARTHRITIS OF LEFT KNEE: Primary | ICD-10-CM

## 2024-09-24 DIAGNOSIS — M17.11 PRIMARY OSTEOARTHRITIS OF RIGHT KNEE: ICD-10-CM

## 2024-09-24 PROCEDURE — 97530 THERAPEUTIC ACTIVITIES: CPT

## 2024-09-24 PROCEDURE — 97110 THERAPEUTIC EXERCISES: CPT

## 2024-09-24 NOTE — PROGRESS NOTES
"Daily Note     Today's date: 2024  Patient name: Jay Stout  : 1940  MRN: [id number]  Referring provider: Olivia Guerra PA-C  Dx:   Encounter Diagnosis     ICD-10-CM    1. Primary osteoarthritis of left knee  M17.12       2. Primary osteoarthritis of right knee  M17.11                      Subjective: Patient states he was able to walk 3 1/2 miles over the weekend. He goes to the gym and his knees are feeling better.       Objective: See treatment diary below      Assessment: Tolerated treatment well. Challenged with lat tap downs on R>L. Cues for tempo to allow greater muscle recruitment. Patient demonstrated fatigue post treatment and would benefit from continued PT      Plan: Progress treatment as tolerated.       POC expires Unit limit Auth  expiration date PT/OT + Visit Limit?    N/A 10/30 BOMN                 Visit/Unit Tracking  AUTH Status:  Date 9/20 9/24  8/20 8/23 8/27 8/30 9/3 9/6 9/9 9/11 9/17   N/A Used 13 14  4 5 6 7 8 9 10 11 12    Remaining                  Precautions: DM, HTN, cardiac      Manuals 9/11 9/17 9/20 9/24  8/27 8/30 9/3 9/6 9/9   Reassessment         GR                                           Neuro Re-Ed             [place]: TKE      10#  20x5\"  B/l       Supine SLR  2# 3x10 b/l NV 2# 2x15  B/L       2# 3x10 b/l                                                                    Ther Ex             Patient education: pathophysiology, conservative vs surgical intervention, activity modification, bracing         GR    Cardiovascular warm-up Nustep UE/LE L7 10 min Nustep UE/LE L7 10 min Nustep 10 min L6 10 min  Nustep 10 min L6 min   Nustep  L6 10' Nustep L6 10 min Nustep UE/LE L7 10 min Nustep UE/LE L7 10 min Nustep UE/LE L6 10 min   Supine hip flexor str.      3x30\"  B/l       Prostretch  3x30\" b/l 3x 30\" b/l  3x30\" B/L   3x30\"  B/l    3x30\" b/l   Hip abduction - side lying      2x10  2# b/l    2# 2x10 b/l   Standing hip abd, ext with TB        In SLS off 2\" " "step x20 ea. B/l     Matrix: knee flexion      50#  3x12       Matrix: knee extension      50#   3x12       Heel slides             Side stepping with TB GTB 2 laps GTB 2 laps GTB 4 laps  GTB 4 laps          Prone quad str.          3x30\" b/l   Hip abduction clock GTB 2x5 b/l GTB 2x10 b/l GTB 2x 10 b/l  GTB 2x10 B/L                                                 Ther Activity             Total gym: squats Unilateral L22 3x10 ea. Unilateral L22 3x10 ea. Unilateral L22 3x10 ea. Unilateral   L22   3x10 ea.   L22   3x12 Unilateral 2x10 ea. Unilateral 3x10 ea.  Unilateral L22 3x10   KB squats       15# 3x10 15# 3x10     LSD 6\" 2x10 b/l 6\" 2x10 b/l 6\" 2x 10  6\" 2x10 B/L   X10 B/l  Ecc step down  6\" 1x10  4\" 1x10     Stationary lunges       X20 ea. x30     Gait Training                                       Modalities                                                    "

## 2024-09-27 ENCOUNTER — OFFICE VISIT (OUTPATIENT)
Dept: PHYSICAL THERAPY | Facility: CLINIC | Age: 84
End: 2024-09-27
Payer: MEDICARE

## 2024-09-27 DIAGNOSIS — M17.11 PRIMARY OSTEOARTHRITIS OF RIGHT KNEE: ICD-10-CM

## 2024-09-27 DIAGNOSIS — M17.12 PRIMARY OSTEOARTHRITIS OF LEFT KNEE: Primary | ICD-10-CM

## 2024-09-27 PROCEDURE — 97530 THERAPEUTIC ACTIVITIES: CPT

## 2024-09-27 PROCEDURE — 97110 THERAPEUTIC EXERCISES: CPT

## 2024-09-27 NOTE — PROGRESS NOTES
"Daily Note     Today's date: 2024  Patient name: Jay Stout  : 1940  MRN: [id number]  Referring provider: Olivia Guerra PA-C  Dx:   Encounter Diagnosis     ICD-10-CM    1. Primary osteoarthritis of left knee  M17.12       2. Primary osteoarthritis of right knee  M17.11           Start Time: 09  Stop Time: 1025  Total time in clinic (min): 38 minutes    Subjective: Pt noted that he felt good after last treatment session.       Objective: See treatment diary below      Assessment:   progressions made with re initiating KB squats. tolerated treatment well. Patient demonstrated fatigue post treatment, exhibited good technique with therapeutic exercises, and would benefit from continued PT. S/p treatment session noted no changes.       Plan: Continue per plan of care.      POC expires Unit limit Auth  expiration date PT/OT + Visit Limit?    N/A 10/30 BOMN                 Visit/Unit Tracking  AUTH Status:  Date 9/20 9/24 9/27  8/23 8/27 8/30 9/3 9/6 9/9 9/11 9/17   N/A Used 13 14 15  5 6 7 8 9 10 11 12    Remaining                  Precautions: DM, HTN, cardiac      Manuals 9/11 9/17 9/20 9/24 9/27  8/30 9/3 9/6 9/9   Reassessment         GR                                           Neuro Re-Ed             [place]: TKE             Supine SLR  2# 3x10 b/l NV 2# 2x15  B/L       2# 3x10 b/l                                                                    Ther Ex             Patient education: pathophysiology, conservative vs surgical intervention, activity modification, bracing         GR    Cardiovascular warm-up Nustep UE/LE L7 10 min Nustep UE/LE L7 10 min Nustep 10 min L6 10 min  Nustep 10 min L6 min  Nustep 10 min L7   Nustep L6 10 min Nustep UE/LE L7 10 min Nustep UE/LE L7 10 min Nustep UE/LE L6 10 min   Supine hip flexor str.             Prostretch  3x30\" b/l 3x 30\" b/l  3x30\" B/L  30\"x 3 b/l      3x30\" b/l   Hip abduction - side lying          2# 2x10 b/l   Standing hip abd, ext with TB   " "     In SLS off 2\" step x20 ea. B/l     Matrix: knee flexion             Matrix: knee extension             Heel slides             Side stepping with TB GTB 2 laps GTB 2 laps GTB 4 laps  GTB 4 laps  GTB 3 laps         Prone quad str.          3x30\" b/l   Hip abduction clock GTB 2x5 b/l GTB 2x10 b/l GTB 2x 10 b/l  GTB 2x10 B/L  GTB 2x 10 b/l                                                Ther Activity             Total gym: squats Unilateral L22 3x10 ea. Unilateral L22 3x10 ea. Unilateral L22 3x10 ea. Unilateral   L22   3x10 ea.  Unilateral   L22   3x10 ea.   Unilateral 2x10 ea. Unilateral 3x10 ea.  Unilateral L22 3x10   KB squats     15# 2x 10   15# 3x10 15# 3x10     LSD 6\" 2x10 b/l 6\" 2x10 b/l 6\" 2x 10  6\" 2x10 B/L  6\" 3x 10    6\" 1x10  4\" 1x10     Stationary lunges       X20 ea. x30     Gait Training                                       Modalities                                                      "

## 2024-10-01 ENCOUNTER — OFFICE VISIT (OUTPATIENT)
Dept: PHYSICAL THERAPY | Facility: CLINIC | Age: 84
End: 2024-10-01
Payer: MEDICARE

## 2024-10-01 DIAGNOSIS — M17.12 PRIMARY OSTEOARTHRITIS OF LEFT KNEE: Primary | ICD-10-CM

## 2024-10-01 DIAGNOSIS — M17.11 PRIMARY OSTEOARTHRITIS OF RIGHT KNEE: ICD-10-CM

## 2024-10-01 PROCEDURE — 97110 THERAPEUTIC EXERCISES: CPT | Performed by: PHYSICAL THERAPIST

## 2024-10-01 PROCEDURE — 97530 THERAPEUTIC ACTIVITIES: CPT | Performed by: PHYSICAL THERAPIST

## 2024-10-01 NOTE — PROGRESS NOTES
"Daily Note    Today's date: 10/01/24  Patient name: Jay Stout  : 1940  MRN: [id number]  Referring provider: Olivia Guerra PA-C  Dx:   Encounter Diagnosis     ICD-10-CM    1. Primary osteoarthritis of left knee  M17.12       2. Primary osteoarthritis of right knee  M17.11           Start Time: 0930  Stop Time: 1015  Total time in clinic (min): 45 minutes      Subjective: Octavio presents to PT feeling good. Patient reports feeling terrific following their previous PT session. Patient mentioned having gone to the gym and perform some of their HEP.   Objective: See treatment diary below.    Assessment: Octavio was able to tolerate his exercises well today. Patient was able to complete all of their exercises without any modification. Patient had mentioned discomfort along both knees with the LSD from a 6\" box. Patient also mentioned having some difficulty with performing the last set of LSD. Octavio demonstrated fatigue following completion of their exercises today. Patient was able to complete the remaining exercises without any lasting adverse symptoms.       Plan: Continue per plan of care.  Progress treatment as tolerated.         POC expires Unit limit Auth  expiration date PT/OT + Visit Limit?    N/A 10/30 BOMN                 Visit/Unit Tracking  AUTH Status:  Date 9/20 9/24 9/27 10/1 8/23 8/27 8/30 9/3 9/6 9/9 9/11 9/17   N/A Used 13 14 15 16 5 6 7 8 9 10 11 12    Remaining                  Precautions: DM, HTN, cardiac      Manuals 9/11 9/17 9/20 9/24 9/27 10/1  9/3 9/6 9/9   Reassessment         GR                                           Neuro Re-Ed             [place]: TKE             Supine SLR  2# 3x10 b/l NV 2# 2x15  B/L   2# 3x10 B/L    2# 3x10 b/l                                                                    Ther Ex             Patient education: pathophysiology, conservative vs surgical intervention, activity modification, bracing         GR    Cardiovascular warm-up Nustep UE/LE L7 10 " "min Nustep UE/LE L7 10 min Nustep 10 min L6 10 min  Nustep 10 min L6 min  Nustep 10 min L7  Nustep 10' L7  UE 8  Nustep UE/LE L7 10 min Nustep UE/LE L7 10 min Nustep UE/LE L6 10 min   Supine hip flexor str.             Prostretch  3x30\" b/l 3x 30\" b/l  3x30\" B/L  30\"x 3 b/l      3x30\" b/l   Hip abduction - side lying          2# 2x10 b/l   Standing hip abd, ext with TB        In SLS off 2\" step x20 ea. B/l     Matrix: knee flexion             Matrix: knee extension             Heel slides             Side stepping with TB GTB 2 laps GTB 2 laps GTB 4 laps  GTB 4 laps  GTB 3 laps  GTB 4 laps no UE support       Prone quad str.          3x30\" b/l   Hip abduction clock GTB 2x5 b/l GTB 2x10 b/l GTB 2x 10 b/l  GTB 2x10 B/L  GTB 2x 10 b/l                                                Ther Activity             Total gym: squats Unilateral L22 3x10 ea. Unilateral L22 3x10 ea. Unilateral L22 3x10 ea. Unilateral   L22   3x10 ea.  Unilateral   L22   3x10 ea.  Unilateral   L22   3x10 ea.   Unilateral 3x10 ea.  Unilateral L22 3x10   KB squats     15# 2x 10  15# 2x 10   15# 3x10     LSD 6\" 2x10 b/l 6\" 2x10 b/l 6\" 2x 10  6\" 2x10 B/L  6\" 3x 10  6\" 2x 10   6\" 1x10  4\" 1x10     Stationary lunges      2x10 ea   x30     Gait Training                                       Modalities                                                                      "

## 2024-10-04 ENCOUNTER — OFFICE VISIT (OUTPATIENT)
Dept: PHYSICAL THERAPY | Facility: CLINIC | Age: 84
End: 2024-10-04
Payer: MEDICARE

## 2024-10-04 DIAGNOSIS — M17.11 PRIMARY OSTEOARTHRITIS OF RIGHT KNEE: ICD-10-CM

## 2024-10-04 DIAGNOSIS — M17.12 PRIMARY OSTEOARTHRITIS OF LEFT KNEE: Primary | ICD-10-CM

## 2024-10-04 PROCEDURE — 97110 THERAPEUTIC EXERCISES: CPT

## 2024-10-04 PROCEDURE — 97530 THERAPEUTIC ACTIVITIES: CPT

## 2024-10-04 NOTE — PROGRESS NOTES
"Daily Note     Today's date: 10/4/2024  Patient name: Jay Stout  : 1940  MRN: [id number]  Referring provider: Olivia Guerra PA-C  Dx:   Encounter Diagnosis     ICD-10-CM    1. Primary osteoarthritis of left knee  M17.12       2. Primary osteoarthritis of right knee  M17.11           Start Time: 0858  Stop Time: 0944  Total time in clinic (min): 46 minutes    Subjective: Pt noted that he has been feeling okay a little better than last treatment session.       Objective: See treatment diary below      Assessment: Continued with treatment session progressions made with matrix this visit. Tolerated treatment well. Patient exhibited good technique with therapeutic exercises and would benefit from continued PT.   S/P treatment session, noted  no changes.       Plan: Continue per plan of care.      POC expires Unit limit Auth  expiration date PT/OT + Visit Limit?    N/A 10/30 BOMN                 Visit/Unit Tracking  AUTH Status:  Date 9/20 9/24 9/27 10/1 10/3  8/30 9/3 9/6 9/9 9/11 9/17   N/A Used 13 14 15 16 17  7 8 9 10 11 12    Remaining                  Precautions: DM, HTN, cardiac      Manuals 9/11 9/17 9/20 9/24 9/27 10/1 10/3  9/6 9/9   Reassessment         GR                                           Neuro Re-Ed             [place]: TKE             Supine SLR  2# 3x10 b/l NV 2# 2x15  B/L   2# 3x10 B/L    2# 3x10 b/l                                                                    Ther Ex             Patient education: pathophysiology, conservative vs surgical intervention, activity modification, bracing         GR    Cardiovascular warm-up Nustep UE/LE L7 10 min Nustep UE/LE L7 10 min Nustep 10 min L6 10 min  Nustep 10 min L6 min  Nustep 10 min L7  Nustep 10' L7  UE 8 Nustep 10 min L6   Nustep UE/LE L7 10 min Nustep UE/LE L6 10 min   Supine hip flexor str.             Prostretch  3x30\" b/l 3x 30\" b/l  3x30\" B/L  30\"x 3 b/l   30\"X 3    3x30\" b/l   Hip abduction - side lying          2# 2x10 " "b/l   Standing hip abd, ext with TB             Matrix: knee flexion       30# 3x 10       Matrix: knee extension       30# 3x 10       Heel slides             Side stepping with TB GTB 2 laps GTB 2 laps GTB 4 laps  GTB 4 laps  GTB 3 laps  GTB 4 laps no UE support Gtb 4 laps       Prone quad str.          3x30\" b/l   Hip abduction clock GTB 2x5 b/l GTB 2x10 b/l GTB 2x 10 b/l  GTB 2x10 B/L  GTB 2x 10 b/l   GTB 2x 10                                              Ther Activity             Total gym: squats Unilateral L22 3x10 ea. Unilateral L22 3x10 ea. Unilateral L22 3x10 ea. Unilateral   L22   3x10 ea.  Unilateral   L22   3x10 ea.  Unilateral   L22   3x10 ea.  Unilateral L22 3x 10    Unilateral L22 3x10   KB squats     15# 2x 10  15# 2x 10        LSD 6\" 2x10 b/l 6\" 2x10 b/l 6\" 2x 10  6\" 2x10 B/L  6\" 3x 10  6\" 2x 10  6\" 2x 10       Stationary lunges      2x10 ea                                               Gait Training                                       Modalities                                                          "

## 2024-10-08 ENCOUNTER — OFFICE VISIT (OUTPATIENT)
Dept: PHYSICAL THERAPY | Facility: CLINIC | Age: 84
End: 2024-10-08
Payer: MEDICARE

## 2024-10-08 DIAGNOSIS — M17.12 PRIMARY OSTEOARTHRITIS OF LEFT KNEE: Primary | ICD-10-CM

## 2024-10-08 DIAGNOSIS — N40.1 BPH WITH OBSTRUCTION/LOWER URINARY TRACT SYMPTOMS: ICD-10-CM

## 2024-10-08 DIAGNOSIS — I21.21 ST ELEVATION MYOCARDIAL INFARCTION INVOLVING LEFT CIRCUMFLEX CORONARY ARTERY (HCC): ICD-10-CM

## 2024-10-08 DIAGNOSIS — M17.11 PRIMARY OSTEOARTHRITIS OF RIGHT KNEE: ICD-10-CM

## 2024-10-08 DIAGNOSIS — K21.9 LARYNGOPHARYNGEAL REFLUX (LPR): ICD-10-CM

## 2024-10-08 DIAGNOSIS — N13.8 BPH WITH OBSTRUCTION/LOWER URINARY TRACT SYMPTOMS: ICD-10-CM

## 2024-10-08 PROCEDURE — 97110 THERAPEUTIC EXERCISES: CPT

## 2024-10-08 NOTE — PROGRESS NOTES
Daily Note     Today's date: 10/8/2024  Patient name: Jay Stout  : 1940  MRN: [id number]  Referring provider: Olivia Guerra PA-C  Dx:   Encounter Diagnosis     ICD-10-CM    1. Primary osteoarthritis of left knee  M17.12       2. Primary osteoarthritis of right knee  M17.11           Start Time: 0930  Stop Time: 1015  Total time in clinic (min): 45 minutes    Subjective: Pt noted that he has no changes in b/l knees this morning prior to treatment session.   Pt noted that he is a little sore today from going to the gym yesterday.     Objective: See treatment diary below      Assessment:  Continued with treatment session with focus on L and R knee. Pt noted seeing some improvements but noted some L knee soreness  with hip clocks today but was able to complete all reps. Tolerated treatment well. Patient demonstrated fatigue post treatment, exhibited good technique with therapeutic exercises, and would benefit from continued PT      Plan: Continue per plan of care.  RE NV with primary therapist.      POC expires Unit limit Auth  expiration date PT/OT + Visit Limit?    N/A 10/30 BOMN                 Visit/Unit Tracking  AUTH Status:  Date 9/20 9/24 9/27 10/1 10/3 10/8   9/3 9/6 9/9 9/11 9/17   N/A Used 13 14 15 16 17 18  8 9 10 11 12    Remaining                  Precautions: DM, HTN, cardiac      Manuals 9/11 9/17 9/20 9/24 9/27 10/1 10/3 10/8   9/9   Reassessment                                                    Neuro Re-Ed             [place]: TKE             Supine SLR  2# 3x10 b/l NV 2# 2x15  B/L   2# 3x10 B/L    2# 3x10 b/l                                                                    Ther Ex             Patient education: pathophysiology, conservative vs surgical intervention, activity modification, bracing             Cardiovascular warm-up Nustep UE/LE L7 10 min Nustep UE/LE L7 10 min Nustep 10 min L6 10 min  Nustep 10 min L6 min  Nustep 10 min L7  Nustep 10' L7  UE 8 Nustep 10 min L6   "Nustep L7 10 min   Nustep UE/LE L6 10 min   Supine hip flexor str.             Prostretch  3x30\" b/l 3x 30\" b/l  3x30\" B/L  30\"x 3 b/l   30\"X 3  30\" x 3   3x30\" b/l   Hip abduction - side lying          2# 2x10 b/l   Standing hip abd, ext with TB             Matrix: knee flexion       30# 3x 10  30# 3x 10      Matrix: knee extension       30# 3x 10  30# 3x 10      Heel slides             Side stepping with TB GTB 2 laps GTB 2 laps GTB 4 laps  GTB 4 laps  GTB 3 laps  GTB 4 laps no UE support Gtb 4 laps  BTB 4 laps      Prone quad str.          3x30\" b/l   Hip abduction clock GTB 2x5 b/l GTB 2x10 b/l GTB 2x 10 b/l  GTB 2x10 B/L  GTB 2x 10 b/l   GTB 2x 10  BTB 2 x10                                             Ther Activity             Total gym: squats Unilateral L22 3x10 ea. Unilateral L22 3x10 ea. Unilateral L22 3x10 ea. Unilateral   L22   3x10 ea.  Unilateral   L22   3x10 ea.  Unilateral   L22   3x10 ea.  Unilateral L22 3x 10    Unilateral L22 3x10   KB squats     15# 2x 10  15# 2x 10        LSD 6\" 2x10 b/l 6\" 2x10 b/l 6\" 2x 10  6\" 2x10 B/L  6\" 3x 10  6\" 2x 10  6\" 2x 10       Stationary lunges      2x10 ea                                               Gait Training                                       Modalities                                                           1 on 1 time for 38 minutes on 10/8/24.   "

## 2024-10-09 RX ORDER — METOPROLOL TARTRATE 25 MG/1
25 TABLET, FILM COATED ORAL EVERY 12 HOURS SCHEDULED
Qty: 180 TABLET | Refills: 1 | Status: SHIPPED | OUTPATIENT
Start: 2024-10-09 | End: 2025-04-07

## 2024-10-09 RX ORDER — TAMSULOSIN HYDROCHLORIDE 0.4 MG/1
0.4 CAPSULE ORAL
Qty: 90 CAPSULE | Refills: 1 | Status: SHIPPED | OUTPATIENT
Start: 2024-10-09

## 2024-10-09 RX ORDER — PANTOPRAZOLE SODIUM 40 MG/1
40 TABLET, DELAYED RELEASE ORAL DAILY
Qty: 90 TABLET | Refills: 1 | Status: SHIPPED | OUTPATIENT
Start: 2024-10-09

## 2024-10-11 ENCOUNTER — EVALUATION (OUTPATIENT)
Dept: PHYSICAL THERAPY | Facility: CLINIC | Age: 84
End: 2024-10-11
Payer: MEDICARE

## 2024-10-11 DIAGNOSIS — M17.12 PRIMARY OSTEOARTHRITIS OF LEFT KNEE: Primary | ICD-10-CM

## 2024-10-11 DIAGNOSIS — M17.11 PRIMARY OSTEOARTHRITIS OF RIGHT KNEE: ICD-10-CM

## 2024-10-11 PROCEDURE — 97110 THERAPEUTIC EXERCISES: CPT | Performed by: PHYSICAL THERAPIST

## 2024-10-11 NOTE — PROGRESS NOTES
PT Re-Evaluation  and PT Discharge    Today's date: 10/11/2024  Patient name: Jay Stout  : 1940  MRN: [id number]  Referring provider: Olivia Guerra PA-C  Dx:   Encounter Diagnosis     ICD-10-CM    1. Primary osteoarthritis of left knee  M17.12       2. Primary osteoarthritis of right knee  M17.11           Start Time: 0930  Stop Time: 1030  Total time in clinic (min): 60 minutes    Assessment    Assessment details: Since beginning physical therapy, Jay has attended a total number of 19 visits and has maintained excellent compliance with established POC. Patient has made significant improvements in all areas, including decreased pain, increased strength, increased ROM, and improved overall level of function. Patient is reporting improved ability to perform a/iadls and engaging in social activities. Patient is currently independent with his comprehensive HEP and is working out at the gym regularly. Patient will be discharged at this time. Patient has been instructed to contact PT if he has any questions/concerns or if he experiences a decline in function.    Understanding of Dx/Px/POC: excellent     Prognosis: excellent    Goals  Short Term Goals: to be achieved in 4 weeks ALL GOALS MET  1) Patient to be independent with basic HEP.  2) Decrease pain at it's worst to 4/10 on VAS.  3) Increase LE strength by 1/2 MMT grade in all deficient planes.  4) Patient to report decreased sleep interruption secondary to pain.    Long Term Goals: to be achieved by discharge   1) FOTO equal to or greater than 70. MET  2) Patient to be independent with comprehensive HEP. MET  3) Abolish pain for improved quality of life. PROGRESSED, BUT NOT MET  4) Increase LE strength to 5/5 MMT grade in all planes to improve A/IADLS. MET  5) Patient to negotiate steps with a reciprocal pattern without use of Hr. MET  6) Increase ambulatory tolerance to 60 minutes. MET  7) Patient to report no sleep interruption secondary to  "pain. MET    Plan    Planned therapy interventions: home exercise program    Plan of Care beginning date: 10/11/2024  Plan of Care expiration date: 10/11/2024  Treatment plan discussed with: patient        Subjective Evaluation    History of Present Illness  Mechanism of injury: Patient reports significant reduction in right knee pain. Patient is able to don/doff his pants with greater ease secondary to improved weightbearing tolerance and strength. Patient is able to walk greater distances and do bar with less limitation. Patient has received his off  braces, but has not been using them. Patient tends to have more pain in the morning, but loosens up as he moves. Patient manages his pain at times with Tylenol. Patient estimates her right knee overall level of function to be approximately 80+%.     Left knee XR: \"Severe medial compartment degenerative changes as above with minimal varus angulation. Partial narrowing of the lateral patellofemoral space. No fracture.\"    Right knee XR: \"Severe medial compartment degenerative changes as above. Minimal varus angulation. No fracture.  Patient Goals  Patient goal: decrease pain, avoid future knee replacement  Pain  Current pain ratin  At best pain ratin  At worst pain ratin  Location: bilateral knees: diffuse  Quality: sharp, dull ache and burning          Objective     Tenderness   Left Knee   Tenderness in the lateral patella and medial patella. No tenderness in the lateral joint line and medial joint line.     Right Knee   No tenderness in the lateral joint line, lateral patella, medial joint line and medial patella.     Active Range of Motion   Left Knee   Flexion: 112 degrees with pain  Extension: 5 degrees with pain    Right Knee   Flexion: 113 degrees   Extension: 5 degrees     Passive Range of Motion   Left Knee   Flexion: 114 degrees with pain  Extension: 5 degrees     Right Knee   Flexion: 115 degrees   Extension: 5 degrees     Mobility " "  Patellar Mobility:   Left Knee   Hypomobile: left medial, left lateral, left superior and left inferior    Right Knee   Hypomobile: medial, lateral, superior and inferior     Patellar Mobility Comments   Left medial tendon comments: (+) pain. Left lateral tendon comments: (+) pain. Left superior tendon comments: (+) pain. Left inferior tendon comments: (+) pain.     Strength/Myotome Testing     Left Hip   Planes of Motion   Flexion: 5  Abduction: 4+    Right Hip   Planes of Motion   Flexion: 5  Abduction: 4+    Left Knee   Flexion: 5  Extension: 5    Right Knee   Flexion: 5  Extension: 5    Left Ankle/Foot   Dorsiflexion: 5    Right Ankle/Foot   Dorsiflexion: 5    Ambulation     Ambulation: Level Surfaces   Ambulation without assistive device: independent    Observational Gait   Gait: within functional limits     Functional Assessment      Squat    Left within functional limits and right within functional limits.     Forward Step Up 8\"   Left Leg  Within functional limits.     Right Leg  Within functional limits.     Forward Step Down 8\"   Left Leg  Within functional limits.     Right Leg  Within functional limits.     Comments  Lateral step down: 6\" step, (+) pain bilaterally with fair eccentric control      Flowsheet Rows      Flowsheet Row Most Recent Value   PT/OT G-Codes    Current Score 67   Projected Score 70                 POC expires Unit limit Auth  expiration date PT/OT + Visit Limit?   11/1 N/A 10/30 BOMN                 Visit/Unit Tracking  AUTH Status:  Date 9/20 9/24 9/27 10/1 10/3 10/8  10/11  9/6 9/9 9/11 9/17   N/A Used 13 14 15 16 17 18 19  9 10 11 12    Remaining                  Precautions: DM, HTN, cardiac      Manuals 9/11 9/17 9/20 9/24 9/27 10/1 10/3 10/8  10/11    Reassessment         GR                                           Neuro Re-Ed             Eladio: TKE             Supine SLR  2# 3x10 b/l NV 2# 2x15  B/L   2# 3x10 B/L                                                            " "            Ther Ex             Patient education: pathophysiology, conservative vs surgical intervention, activity modification, bracing         GR    Cardiovascular warm-up Nustep UE/LE L7 10 min Nustep UE/LE L7 10 min Nustep 10 min L6 10 min  Nustep 10 min L6 min  Nustep 10 min L7  Nustep 10' L7  UE 8 Nustep 10 min L6  Nustep L7 10 min      Supine hip flexor str.             Prostretch  3x30\" b/l 3x 30\" b/l  3x30\" B/L  30\"x 3 b/l   30\"X 3  30\" x 3      Hip abduction - side lying             Standing hip abd, ext with TB             Matrix: knee flexion       30# 3x 10  30# 3x 10      Matrix: knee extension       30# 3x 10  30# 3x 10      Heel slides             Side stepping with TB GTB 2 laps GTB 2 laps GTB 4 laps  GTB 4 laps  GTB 3 laps  GTB 4 laps no UE support Gtb 4 laps  BTB 4 laps      Prone quad str.             Hip abduction clock GTB 2x5 b/l GTB 2x10 b/l GTB 2x 10 b/l  GTB 2x10 B/L  GTB 2x 10 b/l   GTB 2x 10  BTB 2 x10                                             Ther Activity             Total gym: squats Unilateral L22 3x10 ea. Unilateral L22 3x10 ea. Unilateral L22 3x10 ea. Unilateral   L22   3x10 ea.  Unilateral   L22   3x10 ea.  Unilateral   L22   3x10 ea.  Unilateral L22 3x 10       KB squats     15# 2x 10  15# 2x 10        LSD 6\" 2x10 b/l 6\" 2x10 b/l 6\" 2x 10  6\" 2x10 B/L  6\" 3x 10  6\" 2x 10  6\" 2x 10       Stationary lunges      2x10 ea                                               Gait Training                                       Modalities                                            "

## 2024-10-16 ENCOUNTER — TELEPHONE (OUTPATIENT)
Age: 84
End: 2024-10-16

## 2024-10-16 DIAGNOSIS — N48.9 PENILE LESION: Primary | ICD-10-CM

## 2024-10-16 NOTE — TELEPHONE ENCOUNTER
Pt is requesting a referral for Urologist, he stated its been a couple yrs since he last received a referral for this.  Pt would like one close to home if possible.  PCP please call pt back [phone].

## 2024-10-16 NOTE — TELEPHONE ENCOUNTER
Pt was seen in 2022 by urology for penile lesion. States that the lesion has returned and he would like to go back and get evaluated.

## 2024-10-23 NOTE — PROGRESS NOTES
10/24/2024      Chief Complaint   Patient presents with    Follow-up         Assessment and Plan    84 y.o. male     1. Balanitis  - Exam today showing balanitis  - Lotrisone sent to pharmacy  - Follow up 3-4 weeks for recheck. If no improvement, then OR biopsy and possible circumcision   - Call with any questions or concerns in the meantime  - All questions answered; patient understands and agrees with plan       History of Present Illness  Jay Stout is a 84 y.o. male patient with history of balanitis here for follow up.     Patient was initially seen in the office in 2022 for balanitis and was treated with Lotrisone.  States that he is doing well overall with this and has not had a recurrence until recently.  He does have return of penile rash and presents today for recheck.  Denies phimosis/paraphimosis. patient states that he may be interested in circumcision in the future should this continue.  Denies urinary symptoms.  Overall happy with urination at this time with Flomax monotherapy.    Review of Systems   Constitutional:  Negative for activity change, appetite change, chills and fever.   HENT:  Negative for congestion and trouble swallowing.    Respiratory:  Negative for cough and shortness of breath.    Cardiovascular:  Negative for chest pain, palpitations and leg swelling.   Gastrointestinal:  Negative for abdominal pain, constipation, diarrhea, nausea and vomiting.   Genitourinary:  Negative for difficulty urinating, dysuria, flank pain, frequency, hematuria and urgency.   Musculoskeletal:  Negative for back pain and gait problem.   Skin:  Negative for wound.   Allergic/Immunologic: Negative for immunocompromised state.   Neurological:  Negative for dizziness and syncope.   Hematological:  Does not bruise/bleed easily.   Psychiatric/Behavioral:  Negative for confusion.    All other systems reviewed and are negative.      Vitals  Vitals:    10/24/24 1239   BP: 124/78   BP Location: Right arm  "  Patient Position: Sitting   Cuff Size: Standard   Pulse: 87   Temp: 97.6 °F (36.4 °C)   TempSrc: Temporal   SpO2: 93%   Weight: 88.5 kg (195 lb)   Height: 5' 8\" (1.727 m)       Physical Exam  Constitutional:       General: He is not in acute distress.     Appearance: Normal appearance. He is not ill-appearing, toxic-appearing or diaphoretic.   HENT:      Head: Normocephalic.      Nose: No congestion.   Eyes:      General: No scleral icterus.        Right eye: No discharge.         Left eye: No discharge.      Conjunctiva/sclera: Conjunctivae normal.      Pupils: Pupils are equal, round, and reactive to light.   Pulmonary:      Effort: Pulmonary effort is normal.   Genitourinary:     Comments: Penile balanitis  Musculoskeletal:      Cervical back: Normal range of motion.   Skin:     General: Skin is warm and dry.      Coloration: Skin is not jaundiced or pale.      Findings: No bruising, erythema, lesion or rash.   Neurological:      General: No focal deficit present.      Mental Status: He is alert and oriented to person, place, and time. Mental status is at baseline.      Gait: Gait normal.   Psychiatric:         Mood and Affect: Mood normal.         Behavior: Behavior normal.         Thought Content: Thought content normal.         Judgment: Judgment normal.           Past History  Past Medical History:   Diagnosis Date    Diabetes mellitus (HCC)     diet control    Heart disease 05/09/2020    Heart attack    Hyperlipidemia     Myocardial infarction (HCC)     Penile lesion     Prediabetes      Social History     Socioeconomic History    Marital status: Single     Spouse name: None    Number of children: None    Years of education: None    Highest education level: None   Occupational History    None   Tobacco Use    Smoking status: Former     Current packs/day: 1.00     Average packs/day: 1 pack/day for 25.0 years (25.0 ttl pk-yrs)     Types: Cigarettes    Smokeless tobacco: Never   Vaping Use    Vaping status: " Never Used   Substance and Sexual Activity    Alcohol use: Not Currently     Comment: Social twice a month    Drug use: Never    Sexual activity: Yes   Other Topics Concern    None   Social History Narrative    None     Social Determinants of Health     Financial Resource Strain: Low Risk  (12/19/2023)    Overall Financial Resource Strain (CARDIA)     Difficulty of Paying Living Expenses: Not hard at all   Food Insecurity: Not on file   Transportation Needs: No Transportation Needs (12/19/2023)    PRAPARE - Transportation     Lack of Transportation (Medical): No     Lack of Transportation (Non-Medical): No   Physical Activity: Not on file   Stress: Not on file   Social Connections: Not on file   Intimate Partner Violence: Not on file   Housing Stability: Not on file     Social History     Tobacco Use   Smoking Status Former    Current packs/day: 1.00    Average packs/day: 1 pack/day for 25.0 years (25.0 ttl pk-yrs)    Types: Cigarettes   Smokeless Tobacco Never     Family History   Problem Relation Age of Onset    Heart disease Mother         Cardiac Disorder    No Known Problems Father     No Known Problems Sister        The following portions of the patient's history were reviewed and updated as appropriate: allergies, current medications, past medical history, past social history, past surgical history and problem list.    Results  No results found for this or any previous visit (from the past 1 hour(s)).]  Lab Results   Component Value Date    PSA 1.8 12/02/2020    PSA 1.3 06/26/2018     Lab Results   Component Value Date    CALCIUM 9.3 03/09/2024    K 5.1 03/09/2024    CO2 26 03/09/2024     03/09/2024    BUN 22 03/09/2024    CREATININE 1.51 (H) 03/09/2024     Lab Results   Component Value Date    WBC 6.20 03/09/2024    HGB 12.5 03/09/2024    HCT 39.5 03/09/2024     (H) 03/09/2024     03/09/2024       Jaz Lazo PA-C

## 2024-10-24 ENCOUNTER — OFFICE VISIT (OUTPATIENT)
Dept: UROLOGY | Facility: CLINIC | Age: 84
End: 2024-10-24
Payer: MEDICARE

## 2024-10-24 VITALS
TEMPERATURE: 97.6 F | HEIGHT: 68 IN | DIASTOLIC BLOOD PRESSURE: 78 MMHG | SYSTOLIC BLOOD PRESSURE: 124 MMHG | HEART RATE: 87 BPM | OXYGEN SATURATION: 93 % | BODY MASS INDEX: 29.55 KG/M2 | WEIGHT: 195 LBS

## 2024-10-24 DIAGNOSIS — N48.1 BALANITIS: Primary | ICD-10-CM

## 2024-10-24 DIAGNOSIS — N48.9 PENILE LESION: ICD-10-CM

## 2024-10-24 PROCEDURE — 99213 OFFICE O/P EST LOW 20 MIN: CPT | Performed by: PHYSICIAN ASSISTANT

## 2024-10-24 RX ORDER — CLOTRIMAZOLE AND BETAMETHASONE DIPROPIONATE 10; .64 MG/G; MG/G
CREAM TOPICAL 2 TIMES DAILY
Qty: 45 G | Refills: 6 | Status: SHIPPED | OUTPATIENT
Start: 2024-10-24

## 2024-11-04 DIAGNOSIS — I25.10 ATHEROSCLEROSIS OF NATIVE CORONARY ARTERY OF NATIVE HEART WITHOUT ANGINA PECTORIS: ICD-10-CM

## 2024-11-04 RX ORDER — ATORVASTATIN CALCIUM 40 MG/1
40 TABLET, FILM COATED ORAL DAILY
Qty: 90 TABLET | Refills: 0 | Status: SHIPPED | OUTPATIENT
Start: 2024-11-04

## 2024-11-04 NOTE — TELEPHONE ENCOUNTER
Reason for call:   [x] Refill   [] Prior Auth  [] Other:     Office:   [] PCP/Provider -   [x] Specialty/Provider - Cardio    Medication: atorvastatin (LIPITOR) 40 mg tablet TAKE 1 TABLET BY MOUTH EVERY DAY       Pharmacy: [facility]/pharm[facility] #[id number] - [place] [address] - [address], [address]     Does the patient have enough for 3 days?   [] Yes   [x] No - Send as HP to POD

## 2024-11-06 ENCOUNTER — OFFICE VISIT (OUTPATIENT)
Dept: OBGYN CLINIC | Facility: CLINIC | Age: 84
End: 2024-11-06
Payer: MEDICARE

## 2024-11-06 VITALS
BODY MASS INDEX: 29.55 KG/M2 | HEART RATE: 58 BPM | HEIGHT: 68 IN | DIASTOLIC BLOOD PRESSURE: 83 MMHG | SYSTOLIC BLOOD PRESSURE: 124 MMHG | WEIGHT: 195 LBS

## 2024-11-06 DIAGNOSIS — M17.12 PRIMARY OSTEOARTHRITIS OF LEFT KNEE: Primary | ICD-10-CM

## 2024-11-06 DIAGNOSIS — M17.11 PRIMARY OSTEOARTHRITIS OF RIGHT KNEE: ICD-10-CM

## 2024-11-06 DIAGNOSIS — G89.29 CHRONIC PAIN OF LEFT KNEE: ICD-10-CM

## 2024-11-06 DIAGNOSIS — G89.29 CHRONIC PAIN OF RIGHT KNEE: ICD-10-CM

## 2024-11-06 DIAGNOSIS — M25.561 CHRONIC PAIN OF RIGHT KNEE: ICD-10-CM

## 2024-11-06 DIAGNOSIS — M25.562 CHRONIC PAIN OF LEFT KNEE: ICD-10-CM

## 2024-11-06 PROCEDURE — 20610 DRAIN/INJ JOINT/BURSA W/O US: CPT | Performed by: ORTHOPAEDIC SURGERY

## 2024-11-06 PROCEDURE — 99214 OFFICE O/P EST MOD 30 MIN: CPT | Performed by: ORTHOPAEDIC SURGERY

## 2024-11-06 RX ORDER — LIDOCAINE HYDROCHLORIDE 10 MG/ML
2 INJECTION, SOLUTION INFILTRATION; PERINEURAL
Status: COMPLETED | OUTPATIENT
Start: 2024-11-06 | End: 2024-11-06

## 2024-11-06 RX ORDER — METHYLPREDNISOLONE ACETATE 40 MG/ML
2 INJECTION, SUSPENSION INTRA-ARTICULAR; INTRALESIONAL; INTRAMUSCULAR; SOFT TISSUE
Status: COMPLETED | OUTPATIENT
Start: 2024-11-06 | End: 2024-11-06

## 2024-11-06 RX ORDER — BUPIVACAINE HYDROCHLORIDE 2.5 MG/ML
2 INJECTION, SOLUTION INFILTRATION; PERINEURAL
Status: COMPLETED | OUTPATIENT
Start: 2024-11-06 | End: 2024-11-06

## 2024-11-06 RX ADMIN — BUPIVACAINE HYDROCHLORIDE 2 ML: 2.5 INJECTION, SOLUTION INFILTRATION; PERINEURAL at 09:45

## 2024-11-06 RX ADMIN — METHYLPREDNISOLONE ACETATE 2 ML: 40 INJECTION, SUSPENSION INTRA-ARTICULAR; INTRALESIONAL; INTRAMUSCULAR; SOFT TISSUE at 09:45

## 2024-11-06 RX ADMIN — LIDOCAINE HYDROCHLORIDE 2 ML: 10 INJECTION, SOLUTION INFILTRATION; PERINEURAL at 09:45

## 2024-11-06 NOTE — PROGRESS NOTES
Patient Name:  Jay Stout  MRN:  [id number]    Assessment & Plan     1. Primary osteoarthritis of left knee  -     Large joint arthrocentesis: L knee  2. Primary osteoarthritis of right knee  -     Large joint arthrocentesis: R knee  3. Chronic pain of right knee  -     Large joint arthrocentesis: R knee  4. Chronic pain of left knee  -     Large joint arthrocentesis: L knee      Bilateral knee osteoarthritis  X-rays of bilateral knees knee reviewed and discussed   Continued nonoperative treatment by means of OTC topical and oral analgesics, activity modification, outpatient physical therapy, injection therapy, and bracing.   Surgical management by means of total knee arthroplasty.  At this time, patient wished to move forward with repeat bilateral knee CSI. Risks discussed. Tolerated well. Can repeat in 3 months vs following with Dr Duarte.   Follow up as needed    History of the Present Illness   Jay Stout is a 84 y.o. male with Bilateral knee osteoarthritis s/p CSI on 08/06/2024. Today, patient reports the Right knee is more painful than the Left. He admits previous CSI provided pain relief. He has been attending outpatient PT with improvement. Patient has history of diabetes with most recent HGBA1c 6.6 performed 07/17/2024.         Review of Systems     Review of Systems   Constitutional:  Negative for chills and fever.   HENT:  Negative for ear pain and sore throat.    Eyes:  Negative for pain and visual disturbance.   Respiratory:  Negative for cough and shortness of breath.    Cardiovascular:  Negative for chest pain and palpitations.   Gastrointestinal:  Negative for abdominal pain and vomiting.   Genitourinary:  Negative for dysuria and hematuria.   Musculoskeletal:  Negative for arthralgias and back pain.   Skin:  Negative for color change and rash.   Neurological:  Negative for seizures and syncope.   All other systems reviewed and are negative.      Physical Exam     /83   Pulse 58   " Ht 5' 8\" (1.727 m)   Wt 88.5 kg (195 lb)   BMI 29.65 kg/m²     Right Knee  Range of motion from 0 to 105 degrees.    There is no effusion.    There is tenderness over the medial joint line.    The patient is able to perform a straight leg raise.    The patient is neurovascular intact distally.      Left Knee  Range of motion from 0 to 105 degrees.    There is no effusion.    There is tenderness over the medial joint line.    The patient is able to perform a straight leg raise.    The patient is neurovascular intact distally.    Data Review     I have personally reviewed pertinent films in PACS, and my interpretation follows.    X-rays taken 03/13/2024 of Right knee demonstrate severe medial compartment joint space narrowing and small osteophytes. No fracture. Moderate patellofemoral compartment degenerative changes.      X-rays taken 03/13/2024 of Left knee demonstrate severe medial compartment joint space narrowing and small osteophytes. No fracture. Moderate patellofemoral compartment degenerative changes and mild lateral tilt.    Social History     Tobacco Use    Smoking status: Former     Current packs/day: 1.00     Average packs/day: 1 pack/day for 25.0 years (25.0 ttl pk-yrs)     Types: Cigarettes    Smokeless tobacco: Never   Vaping Use    Vaping status: Never Used   Substance Use Topics    Alcohol use: Not Currently     Comment: Social twice a month    Drug use: Never           Large joint arthrocentesis: R knee  Universal Protocol:  Risks and benefits: risks, benefits and alternatives were discussed  Consent given by: patient  Patient identity confirmed: verbally with patient  Procedure Details  Location: knee - R knee  Needle size: 22 G  Approach: lateral  Medications administered: 2 mL bupivacaine 0.25 %; 2 mL lidocaine 1 %; 2 mL methylPREDNISolone acetate 40 mg/mL    Patient tolerance: patient tolerated the procedure well with no immediate complications  Dressing:  Sterile dressing applied      Large " joint arthrocentesis: L knee  Universal Protocol:  Risks and benefits: risks, benefits and alternatives were discussed  Consent given by: patient  Patient identity confirmed: verbally with patient  Procedure Details  Location: knee - L knee  Needle size: 22 G  Approach: lateral  Medications administered: 2 mL bupivacaine 0.25 %; 2 mL lidocaine 1 %; 2 mL methylPREDNISolone acetate 40 mg/mL    Patient tolerance: patient tolerated the procedure well with no immediate complications  Dressing:  Sterile dressing applied            Alex Delgado DO

## 2024-11-13 NOTE — PROGRESS NOTES
"11/14/2024      Chief Complaint   Patient presents with    Follow-up     Balanitis         Assessment and Plan    84 y.o. male     Penile lesion  Uncircumcised penis    - Exam today showing no improvement  - Recommend OR penile lesion biopsy possible circumcision. Patient is unsure if he wants circumcision. Consent signed   - 2 images in media  - Follow up for surgery  - Call with any questions or concerns in the meantime  - All questions answered; patient understands and agrees with plan       History of Present Illness  Jay Stout is a 84 y.o. male patient with history of penile lesion here for follow up.     Patient was initially seen for penile lesion. Patient uncircumcised. Patient prescribed Lotrisone and presents today for 2 week recheck. No change in penile lesion. No new symptoms.     Review of Systems   Constitutional:  Negative for activity change, appetite change, chills and fever.   HENT:  Negative for congestion and trouble swallowing.    Respiratory:  Negative for cough and shortness of breath.    Cardiovascular:  Negative for chest pain, palpitations and leg swelling.   Gastrointestinal:  Negative for abdominal pain, constipation, diarrhea, nausea and vomiting.   Genitourinary:  Positive for genital sores. Negative for difficulty urinating, dysuria, flank pain, frequency, hematuria and urgency.   Musculoskeletal:  Negative for back pain and gait problem.   Skin:  Negative for wound.   Allergic/Immunologic: Negative for immunocompromised state.   Neurological:  Negative for dizziness and syncope.   Hematological:  Does not bruise/bleed easily.   Psychiatric/Behavioral:  Negative for confusion.    All other systems reviewed and are negative.      Vitals  Vitals:    11/14/24 1209   BP: 110/70   Patient Position: Sitting   Cuff Size: Standard   Pulse: 58   Resp: 16   Temp: (!) 97.2 °F (36.2 °C)   TempSrc: Temporal   SpO2: 100%   Weight: 88.5 kg (195 lb)   Height: 5' 8\" (1.727 m)       Physical " Exam  Constitutional:       General: He is not in acute distress.     Appearance: Normal appearance. He is not ill-appearing, toxic-appearing or diaphoretic.   HENT:      Head: Normocephalic.      Nose: No congestion.   Eyes:      General: No scleral icterus.        Right eye: No discharge.         Left eye: No discharge.      Conjunctiva/sclera: Conjunctivae normal.      Pupils: Pupils are equal, round, and reactive to light.   Pulmonary:      Effort: Pulmonary effort is normal.   Genitourinary:     Comments: Flat, erythematous lesions bilateral base of glans and small area on glans. Pictures in media  Musculoskeletal:      Cervical back: Normal range of motion.   Skin:     General: Skin is warm and dry.      Coloration: Skin is not jaundiced or pale.      Findings: No bruising, erythema, lesion or rash.   Neurological:      General: No focal deficit present.      Mental Status: He is alert and oriented to person, place, and time. Mental status is at baseline.      Gait: Gait normal.   Psychiatric:         Mood and Affect: Mood normal.         Behavior: Behavior normal.         Thought Content: Thought content normal.         Judgment: Judgment normal.           Past History  Past Medical History:   Diagnosis Date    Diabetes mellitus (HCC)     diet control    Heart disease 05/09/2020    Heart attack    Hyperlipidemia     Myocardial infarction (HCC)     Penile lesion     Prediabetes      Social History     Socioeconomic History    Marital status: Single     Spouse name: None    Number of children: None    Years of education: None    Highest education level: None   Occupational History    None   Tobacco Use    Smoking status: Former     Current packs/day: 1.00     Average packs/day: 1 pack/day for 25.0 years (25.0 ttl pk-yrs)     Types: Cigarettes    Smokeless tobacco: Never   Vaping Use    Vaping status: Never Used   Substance and Sexual Activity    Alcohol use: Not Currently     Comment: Social twice a month     Drug use: Never    Sexual activity: Yes   Other Topics Concern    None   Social History Narrative    None     Social Drivers of Health     Financial Resource Strain: Low Risk  (12/19/2023)    Overall Financial Resource Strain (CARDIA)     Difficulty of Paying Living Expenses: Not hard at all   Food Insecurity: Not on file   Transportation Needs: No Transportation Needs (12/19/2023)    PRAPARE - Transportation     Lack of Transportation (Medical): No     Lack of Transportation (Non-Medical): No   Physical Activity: Not on file   Stress: Not on file   Social Connections: Not on file   Intimate Partner Violence: Not on file   Housing Stability: Not on file     Social History     Tobacco Use   Smoking Status Former    Current packs/day: 1.00    Average packs/day: 1 pack/day for 25.0 years (25.0 ttl pk-yrs)    Types: Cigarettes   Smokeless Tobacco Never     Family History   Problem Relation Age of Onset    Heart disease Mother         Cardiac Disorder    No Known Problems Father     No Known Problems Sister        The following portions of the patient's history were reviewed and updated as appropriate: allergies, current medications, past medical history, past social history, past surgical history and problem list.    Results  No results found for this or any previous visit (from the past hour).]  Lab Results   Component Value Date    PSA 1.8 12/02/2020    PSA 1.3 06/26/2018     Lab Results   Component Value Date    CALCIUM 9.3 03/09/2024    K 5.1 03/09/2024    CO2 26 03/09/2024     03/09/2024    BUN 22 03/09/2024    CREATININE 1.51 (H) 03/09/2024     Lab Results   Component Value Date    WBC 6.20 03/09/2024    HGB 12.5 03/09/2024    HCT 39.5 03/09/2024     (H) 03/09/2024     03/09/2024       Jaz Lazo PA-C

## 2024-11-14 ENCOUNTER — OFFICE VISIT (OUTPATIENT)
Dept: UROLOGY | Facility: CLINIC | Age: 84
End: 2024-11-14
Payer: MEDICARE

## 2024-11-14 VITALS
BODY MASS INDEX: 29.55 KG/M2 | DIASTOLIC BLOOD PRESSURE: 70 MMHG | OXYGEN SATURATION: 100 % | HEART RATE: 58 BPM | RESPIRATION RATE: 16 BRPM | WEIGHT: 195 LBS | HEIGHT: 68 IN | SYSTOLIC BLOOD PRESSURE: 110 MMHG | TEMPERATURE: 97.2 F

## 2024-11-14 DIAGNOSIS — N48.9 PENILE LESION: Primary | ICD-10-CM

## 2024-11-14 PROCEDURE — 99213 OFFICE O/P EST LOW 20 MIN: CPT | Performed by: PHYSICIAN ASSISTANT

## 2024-11-14 NOTE — PROGRESS NOTES
can do  should be booked like a circ  so its penile lesion biopsy possible circ    pats  does not need to hold [id number]

## 2024-11-18 NOTE — PROGRESS NOTES
Spoke with patient and confirmed surgery date of:  02/20/25  Type of surgery:Penile Lesion Bx possible circumcision   Operating physician: Dr. Gillis  Location of surgery: [facility]    Verbally went over prep with patient on: 11/18/24  NPO  Bowel prep? No  Hospital calls afternoon prior with arrival time -Calls Friday afternoon for Monday surgeries  Patient needs ride to and from surgery (outpatient/inpatient)   Pre-op testing to be done 2 weeks prior to surgery  Cbc bmp A1c uc ekg  Blood thinners:   Pat will call a week prior no need to hold asa per doctor   Clearances needed none    Mailed/emailed to patient on:  Copy of packet scanned into Media on:  Labs in packet  Soap / Bowel prep in packet  Pre-op & Post-op in packet  Dates of H&P and post-op if needed    Consent: in Media

## 2024-11-18 NOTE — PROGRESS NOTES
Patient was offered sooner date however he did not want to go to [place] he wanted to stay at [facility].  That way he knows he will have a ride

## 2024-11-20 ENCOUNTER — TELEPHONE (OUTPATIENT)
Age: 84
End: 2024-11-20

## 2024-11-20 ENCOUNTER — PREP FOR PROCEDURE (OUTPATIENT)
Dept: UROLOGY | Facility: CLINIC | Age: 84
End: 2024-11-20

## 2024-11-20 DIAGNOSIS — Z01.812 PRE-OPERATIVE LABORATORY EXAMINATION: Primary | ICD-10-CM

## 2024-11-20 DIAGNOSIS — E11.9 TYPE 2 DIABETES MELLITUS WITHOUT COMPLICATION, WITHOUT LONG-TERM CURRENT USE OF INSULIN (HCC): ICD-10-CM

## 2024-11-20 DIAGNOSIS — N48.9 PENILE LESION: ICD-10-CM

## 2024-11-20 DIAGNOSIS — Z01.810 PRE-OPERATIVE CARDIOVASCULAR EXAMINATION: ICD-10-CM

## 2024-11-20 DIAGNOSIS — R39.89 SUSPECTED UTI: ICD-10-CM

## 2024-11-20 NOTE — TELEPHONE ENCOUNTER
Spoke with patient he was made aware that he was given the soonest date at [facility] and we have no other surgeon that goes up there

## 2024-11-20 NOTE — TELEPHONE ENCOUNTER
Patient called in, he is scheduled for Excision Biopsy Lesion/Mass Penile on 2/20/25. He is concerned about how far out it is as well as he and his wife are hoping to go away around then. Patient does want procedure in [place] d/t winter/weather but is hoping for earlier date. Please advise. Open to any provider, but wants to state at [facility].

## 2024-12-03 ENCOUNTER — OFFICE VISIT (OUTPATIENT)
Dept: CARDIOLOGY CLINIC | Facility: CLINIC | Age: 84
End: 2024-12-03
Payer: MEDICARE

## 2024-12-03 VITALS
RESPIRATION RATE: 16 BRPM | WEIGHT: 195 LBS | DIASTOLIC BLOOD PRESSURE: 82 MMHG | HEIGHT: 68 IN | BODY MASS INDEX: 29.55 KG/M2 | OXYGEN SATURATION: 97 % | SYSTOLIC BLOOD PRESSURE: 124 MMHG | HEART RATE: 85 BPM

## 2024-12-03 DIAGNOSIS — I25.10 CORONARY ARTERY DISEASE INVOLVING NATIVE HEART WITHOUT ANGINA PECTORIS, UNSPECIFIED VESSEL OR LESION TYPE: Primary | ICD-10-CM

## 2024-12-03 DIAGNOSIS — I44.7 LBBB (LEFT BUNDLE BRANCH BLOCK): ICD-10-CM

## 2024-12-03 DIAGNOSIS — E78.2 COMBINED HYPERLIPIDEMIA: ICD-10-CM

## 2024-12-03 DIAGNOSIS — R06.02 SHORTNESS OF BREATH: ICD-10-CM

## 2024-12-03 PROCEDURE — 99214 OFFICE O/P EST MOD 30 MIN: CPT | Performed by: INTERNAL MEDICINE

## 2024-12-03 PROCEDURE — 93000 ELECTROCARDIOGRAM COMPLETE: CPT | Performed by: INTERNAL MEDICINE

## 2024-12-03 NOTE — PROGRESS NOTES
[facility] [place]  [address]  [place] [address]  Cardiology Follow Up    Jay Stout  1940  [id number]    Assessment & Plan  Coronary artery disease involving native heart without angina pectoris, unspecified vessel or lesion type  Patient has history of STEMI followed by circumflex stenting many years ago.  Patient has done well.  Patient denies any chest pain.  No shortness of breath out of the ordinary.  Patient is supposed to have a urological procedure in February.  No specific contraindication to proceed as moderate cardiac risk.  This has been discussed with the patient.  Combined hyperlipidemia  Continue atorvastatin 40 mg p.o. daily.  Continue diet and risk factor modification.  Shortness of breath  Patient will be scheduled for an echocardiogram to reassess ejection fraction given history of MI and wall motion abnormalities a few years ago.    LBBB (left bundle branch block)  Patient has IVCD/LBBB on EKG.  This has been noted last year also.  No new change.    Symptoms to watch out from cardiac standpoint which would indicate the need for further cardiac evaluation discussed at length with patient.    Patient had a few questions which were answered.  Follow-up in 6 months or earlier as needed.  Patient is agreeable with the plan of care.         Chief Complaint   Patient presents with    Follow-up       Interval History:   Patient presents for follow-up visit.  Patient used to see Dr. Corona in the past.  Patient does have history of CAD status post MI status post stent to circumflex.  Patient denies any chest pain.  Has some shortness of breath which is stable.  No history of leg edema orthopnea PND.  No history of presyncope syncope.  He states that he has been compliant with all his present medications.  He is also regularly followed by primary care physician.    Patient Active Problem List   Diagnosis    Chronic cough    ST elevation myocardial  infarction involving left circumflex coronary artery (HCC)    Coronary artery disease involving native heart without angina pectoris    Hypertension    Mixed hyperlipidemia    Primary osteoarthritis of both knees    Trigger thumb, right thumb    Stage 3 chronic kidney disease, unspecified whether stage 3a or 3b CKD (HCC)    Type 2 diabetes mellitus without complication, without long-term current use of insulin (HCC)    BPH with obstruction/lower urinary tract symptoms    Drug-induced bradycardia    Nasal sinus congestion    Chronic pain of both knees    PND (post-nasal drip)    Primary osteoarthritis of left knee    Primary osteoarthritis of right knee     Past Medical History:   Diagnosis Date    Diabetes mellitus (HCC)     diet control    Heart disease 05/09/2020    Heart attack    Hyperlipidemia     Myocardial infarction (HCC)     Penile lesion     Prediabetes      Social History     Socioeconomic History    Marital status: Single     Spouse name: Not on file    Number of children: Not on file    Years of education: Not on file    Highest education level: Not on file   Occupational History    Not on file   Tobacco Use    Smoking status: Former     Current packs/day: 1.00     Average packs/day: 1 pack/day for 25.0 years (25.0 ttl pk-yrs)     Types: Cigarettes    Smokeless tobacco: Never   Vaping Use    Vaping status: Never Used   Substance and Sexual Activity    Alcohol use: Not Currently     Comment: Social twice a month    Drug use: Never    Sexual activity: Yes   Other Topics Concern    Not on file   Social History Narrative    Not on file     Social Drivers of Health     Financial Resource Strain: Low Risk  (12/19/2023)    Overall Financial Resource Strain (CARDIA)     Difficulty of Paying Living Expenses: Not hard at all   Food Insecurity: Not on file   Transportation Needs: No Transportation Needs (12/19/2023)    PRAPARE - Transportation     Lack of Transportation (Medical): No     Lack of Transportation  (Non-Medical): No   Physical Activity: Not on file   Stress: Not on file   Social Connections: Not on file   Intimate Partner Violence: Not on file   Housing Stability: Not on file      Family History   Problem Relation Age of Onset    Heart disease Mother         Cardiac Disorder    No Known Problems Father     No Known Problems Sister      Past Surgical History:   Procedure Laterality Date    CARDIAC SURGERY  05/2020    stent placement    COLONOSCOPY      HERNIA REPAIR      LARYNGOSCOPY  1978    with vocal cord polyp removal    LARYNGOSCOPY N/A 8/3/2021    Procedure: MICRODIRECT LARYNGOSCOPY WITH  EXCISION OF VOCAL CORD LESION;  Surgeon: Pito Tomlin DO;  Location: [facility];  Service: ENT       Current Outpatient Medications:     aspirin (ECOTRIN LOW STRENGTH) 81 mg EC tablet, Take 1 tablet (81 mg total) by mouth daily, Disp: 60 tablet, Rfl: 0    atorvastatin (LIPITOR) 40 mg tablet, Take 1 tablet (40 mg total) by mouth daily, Disp: 90 tablet, Rfl: 0    Blood Glucose Monitoring Suppl (FREESTYLE FREEDOM LITE) w/Device KIT, Check glucose levels once daily, Disp: 1 each, Rfl: 0    chlorhexidine (PERIDEX) 0.12 % solution, SWISH AND SPIT 15 ML TWICE A DAY, Disp: , Rfl:     clotrimazole-betamethasone (LOTRISONE) 1-0.05 % cream, Apply topically 2 (two) times a day, Disp: 45 g, Rfl: 6    Diclofenac Sodium (VOLTAREN) 1 %, Apply 2 g topically 4 (four) times a day, Disp: 100 g, Rfl: 1    fluticasone (FLONASE) 50 mcg/act nasal spray, 1 spray into each nostril daily, Disp: 48 g, Rfl: 1    glucose blood (FREESTYLE LITE) test strip, Use 1 each in the morning, Disp: 100 strip, Rfl: 11    Lancets (FREESTYLE) lancets, TEST ONCE DAILY FASTING DXE11.9, Disp: 100 each, Rfl: 3    levocetirizine (XYZAL) 5 MG tablet, Take 1 tablet (5 mg total) by mouth every evening, Disp: 10 tablet, Rfl: 1    metFORMIN (GLUCOPHAGE) 500 mg tablet, TAKE 1 TABLET BY MOUTH TWICE A DAY WITH FOOD, Disp: 180 tablet, Rfl: 2    metoprolol tartrate (LOPRESSOR)  "25 mg tablet, TAKE 1 TABLET (25 MG TOTAL) BY MOUTH EVERY 12 (TWELVE) HOURS, Disp: 180 tablet, Rfl: 1    pantoprazole (PROTONIX) 40 mg tablet, TAKE 1 TABLET (40 MG TOTAL) BY MOUTH DAILY TAKE IN MORNING, Disp: 90 tablet, Rfl: 1    tamsulosin (FLOMAX) 0.4 mg, TAKE 1 CAPSULE BY MOUTH EVERY DAY WITH DINNER, Disp: 90 capsule, Rfl: 1  Allergies   Allergen Reactions    Celecoxib      Other reaction(s): itchy  Other reaction(s): itchy       Labs:  No visits with results within 2 Month(s) from this visit.   Latest known visit with results is:   Office Visit on 07/17/2024   Component Date Value    Hemoglobin A1C 07/17/2024 6.6 (A)      Imaging: No results found.    Review of Systems:  Review of Systems  REVIEW OF SYSTEMS:  Constitutional:  Denies fever or chills   Eyes:  Denies change in visual acuity   HENT:  Denies nasal congestion or sore throat   Respiratory:  shortness of breath   Cardiovascular:  Denies chest pain or edema   GI:  Denies abdominal pain, nausea, vomiting, bloody stools or diarrhea   :  Denies dysuria  Musculoskeletal:  Denies back pain or joint pain   Neurologic:  Denies headache, focal weakness or sensory changes   Endocrine:  Denies polyuria or polydipsia   Lymphatic:  Denies swollen glands   Psychiatric:  Denies depression or anxiety    Physical Exam:    /82 (BP Location: Left arm, Patient Position: Sitting, Cuff Size: Large)   Pulse 85   Resp 16   Ht 5' 8\" (1.727 m)   Wt 88.5 kg (195 lb)   SpO2 97%   BMI 29.65 kg/m²     Physical Exam  PHYSICAL EXAM:  General:  Patient is not in acute distress   Head: Normocephalic, Atraumatic.  HEENT:  Both pupils normal-size atraumatic, normocephalic, nonicteric  Neck:  JVP not raised. Trachea central. No carotid bruit  Respiratory:  normal breath sounds no crackles. no rhonchi  Cardiovascular:  Regular rate and rhythm no S3 no murmurs  GI:  Abdomen soft nontender. No organomegaly.   Lymphatic:  No cervical or inguinal lymphadenopathy  Neurologic:  " Patient is awake alert, oriented . Grossly nonfocal  Extremities no edema

## 2024-12-05 ENCOUNTER — RESULTS FOLLOW-UP (OUTPATIENT)
Dept: CARDIOLOGY CLINIC | Facility: CLINIC | Age: 84
End: 2024-12-05

## 2024-12-05 ENCOUNTER — HOSPITAL ENCOUNTER (OUTPATIENT)
Dept: NON INVASIVE DIAGNOSTICS | Facility: HOSPITAL | Age: 84
Discharge: HOME | End: 2024-12-05
Attending: INTERNAL MEDICINE
Payer: MEDICARE

## 2024-12-05 VITALS
HEIGHT: 68 IN | BODY MASS INDEX: 29.55 KG/M2 | SYSTOLIC BLOOD PRESSURE: 124 MMHG | HEART RATE: 85 BPM | WEIGHT: 195 LBS | DIASTOLIC BLOOD PRESSURE: 82 MMHG

## 2024-12-05 DIAGNOSIS — R06.02 SHORTNESS OF BREATH: ICD-10-CM

## 2024-12-05 LAB
AORTIC ROOT: 3.2 CM
APICAL FOUR CHAMBER EJECTION FRACTION: 63 %
ASCENDING AORTA: 3.3 CM
BSA FOR ECHO PROCEDURE: 2.02 M2
DOP CALC LVOT AREA: 4.15
DOP CALC LVOT DIAMETER: 2.3 CM
E WAVE DECELERATION TIME: 232 MS
E/A RATIO: 0.67
FRACTIONAL SHORTENING: 29 (ref 28–44)
INTERVENTRICULAR SEPTUM IN DIASTOLE (PARASTERNAL SHORT AXIS VIEW): 0.8 CM
INTERVENTRICULAR SEPTUM: 0.8 CM (ref 0.6–1.1)
LA/AORTA RATIO 2D: 1.19
LAAS-AP2: 17.5 CM2
LAAS-AP4: 17 CM2
LEFT ATRIUM SIZE: 3.8 CM
LEFT ATRIUM VOLUME (MOD BIPLANE): 43 ML
LEFT ATRIUM VOLUME INDEX (MOD BIPLANE): 21.3 ML/M2
LEFT INTERNAL DIMENSION IN SYSTOLE: 3.5 CM (ref 2.1–4)
LEFT VENTRICULAR INTERNAL DIMENSION IN DIASTOLE: 4.9 CM (ref 3.5–6)
LEFT VENTRICULAR POSTERIOR WALL IN END DIASTOLE: 0.6 CM
LEFT VENTRICULAR STROKE VOLUME: 64 ML
LVSV (TEICH): 64 ML
MV E'TISSUE VEL-LAT: 10 CM/S
MV E'TISSUE VEL-SEP: 6 CM/S
MV PEAK A VEL: 1.14 M/S
MV PEAK E VEL: 76 CM/S
MV STENOSIS PRESSURE HALF TIME: 68 MS
MV VALVE AREA P 1/2 METHOD: 3.24
RIGHT VENTRICLE ID DIMENSION: 3.2 CM
SL CV LV EF: 45
SL CV PED ECHO LEFT VENTRICLE DIASTOLIC VOLUME (MOD BIPLANE) 2D: 115 ML
SL CV PED ECHO LEFT VENTRICLE SYSTOLIC VOLUME (MOD BIPLANE) 2D: 50 ML
TRICUSPID ANNULAR PLANE SYSTOLIC EXCURSION: 1.9 CM

## 2024-12-05 PROCEDURE — 93306 TTE W/DOPPLER COMPLETE: CPT

## 2024-12-05 PROCEDURE — 93306 TTE W/DOPPLER COMPLETE: CPT | Performed by: INTERNAL MEDICINE

## 2024-12-20 ENCOUNTER — OFFICE VISIT (OUTPATIENT)
Dept: FAMILY MEDICINE CLINIC | Facility: CLINIC | Age: 84
End: 2024-12-20
Payer: MEDICARE

## 2024-12-20 VITALS
DIASTOLIC BLOOD PRESSURE: 80 MMHG | OXYGEN SATURATION: 97 % | BODY MASS INDEX: 29.64 KG/M2 | SYSTOLIC BLOOD PRESSURE: 128 MMHG | HEIGHT: 68 IN | WEIGHT: 195.6 LBS | HEART RATE: 71 BPM | TEMPERATURE: 97.6 F

## 2024-12-20 DIAGNOSIS — N48.9 PENILE LESION: ICD-10-CM

## 2024-12-20 DIAGNOSIS — E11.9 TYPE 2 DIABETES MELLITUS WITHOUT COMPLICATION, WITHOUT LONG-TERM CURRENT USE OF INSULIN (HCC): Primary | ICD-10-CM

## 2024-12-20 DIAGNOSIS — Z00.00 MEDICARE ANNUAL WELLNESS VISIT, SUBSEQUENT: ICD-10-CM

## 2024-12-20 LAB — SL AMB POCT HEMOGLOBIN AIC: 7.6 (ref ?–6.5)

## 2024-12-20 PROCEDURE — 83036 HEMOGLOBIN GLYCOSYLATED A1C: CPT | Performed by: FAMILY MEDICINE

## 2024-12-20 PROCEDURE — 99214 OFFICE O/P EST MOD 30 MIN: CPT | Performed by: FAMILY MEDICINE

## 2024-12-20 PROCEDURE — G0439 PPPS, SUBSEQ VISIT: HCPCS | Performed by: FAMILY MEDICINE

## 2024-12-20 NOTE — PROGRESS NOTES
"Name: Jay Stout      : 1940      MRN: [id number]  Encounter Provider: Adrienne Richard MD  Encounter Date: 2024   Encounter department: [facility]    Assessment & Plan  Type 2 diabetes mellitus without complication, without long-term current use of insulin (HCC)  Increase Metformin to 1000 mg BID  Work on diet & exercise   A1c in 3 months  Will do rest of labs when he goes for Urology labs in Feb  Lab Results   Component Value Date    HGBA1C 7.6 (A) 2024     Orders:  •  POCT hemoglobin A1c  •  IRIS Diabetic eye exam  •  metFORMIN (GLUCOPHAGE) 500 mg tablet; Take 2 tablets (1,000 mg total) by mouth 2 (two) times a day with meals    Penile lesion  Advised to reach out to Urology with his questions regarding the procedure.        Medicare annual wellness visit, subsequent            Preventive health issues were discussed with patient, and age appropriate screening tests were ordered as noted in patient's After Visit Summary. Personalized health advice and appropriate referrals for health education or preventive services given if needed, as noted in patient's After Visit Summary.    Adivsed him to get Shingrix and RSV vaccines at the pharmacy.    History of Present Illness     Here for AWV and diabetic check up.  A1c today is 7.6%.  He says he has \"been bad\".  He is on Metformin 500 mg BID.  He says he takes this pretty regularly, very rarely forgets to take it. Fasting glucoses are 130-150s. He has been slacking on exercise.     He is having surgery in February with Urology for penile lesion. He has several questions about the procedure.  He has had the lesion for years.  Denies any pain. Has tried topical creams w/out relief.     Diabetes  He presents for his follow-up diabetic visit. He has type 2 diabetes mellitus. Pertinent negatives for hypoglycemia include no headaches. Pertinent negatives for diabetes include no chest pain and no fatigue.      Patient Care " Team:  Adrienne Richard MD as PCP - General (Family Medicine)    Review of Systems   Constitutional:  Negative for activity change, appetite change, fatigue and unexpected weight change.   Respiratory:  Negative for chest tightness and shortness of breath.    Cardiovascular:  Negative for chest pain and leg swelling.   Gastrointestinal:  Negative for abdominal pain.   Skin:         Penile lesion   Neurological:  Negative for headaches.     Medical History Reviewed by provider this encounter:  [facility]       Annual Wellness Visit Questionnaire   Jay is here for his Subsequent Wellness visit. Last Medicare Wellness visit information reviewed, patient interviewed and updates made to the record today.      Health Risk Assessment:   Patient rates overall health as very good. Patient feels that their physical health rating is same. Patient is very satisfied with their life. Eyesight was rated as same. Hearing was rated as same. Patient feels that their emotional and mental health rating is same. Patients states they are never, rarely angry. Patient states they are never, rarely unusually tired/fatigued. Pain experienced in the last 7 days has been none. Patient states that he has experienced no weight loss or gain in last 6 months.     Depression Screening:   PHQ-2 Score: 0      Fall Risk Screening:   In the past year, patient has experienced: no history of falling in past year      Home Safety:  Patient does not have trouble with stairs inside or outside of their home. Patient has working smoke alarms and has working carbon monoxide detector. Home safety hazards include: none.     Nutrition:   Current diet is Regular.     Medications:   Patient is not currently taking any over-the-counter supplements. Patient is able to manage medications.     Activities of Daily Living (ADLs)/Instrumental Activities of Daily Living (IADLs):   Walk and transfer into and out of bed and chair?: Yes  Dress and groom yourself?: Yes     Bathe or shower yourself?: Yes    Feed yourself? Yes  Do your laundry/housekeeping?: Yes  Manage your money, pay your bills and track your expenses?: Yes  Make your own meals?: Yes    Do your own shopping?: Yes    Previous Hospitalizations:   Any hospitalizations or ED visits within the last 12 months?: No    How many hospitalizations have you had in the last year?: 1-2    Advance Care Planning:   Living will: Yes    Durable POA for healthcare: Yes    Advanced directive: Yes      Cognitive Screening:   Provider or family/friend/caregiver concerned regarding cognition?: No    PREVENTIVE SCREENINGS      Cardiovascular Screening:    General: History Lipid Disorder and Risks and Benefits Discussed    Due for: Lipid Panel      Diabetes Screening:     General: Screening Not Indicated, History Diabetes, Screening Current and Risks and Benefits Discussed    Due for: Blood Glucose      Colorectal Cancer Screening:     General: Screening Not Indicated      Prostate Cancer Screening:    General: Screening Not Indicated      Osteoporosis Screening:    General: Screening Not Indicated      Abdominal Aortic Aneurysm (AAA) Screening:    Risk factors include: tobacco use        Lung Cancer Screening:     General: Screening Not Indicated      Hepatitis C Screening:    General: Screening Not Indicated    Screening, Brief Intervention, and Referral to Treatment (SBIRT)    Screening  Typical number of drinks in a day: 0  Typical number of drinks in a week: 0  Interpretation: Low risk drinking behavior.    Other Counseling Topics:   Regular weightbearing exercise.     Social Drivers of Health     Financial Resource Strain: Low Risk  (12/19/2023)    Overall Financial Resource Strain (CARDIA)    • Difficulty of Paying Living Expenses: Not hard at all   Food Insecurity: No Food Insecurity (12/20/2024)    Hunger Vital Sign    • Worried About Running Out of Food in the Last Year: Never true    • Ran Out of Food in the Last Year: Never true  "  Transportation Needs: No Transportation Needs (12/20/2024)    PRAPARE - Transportation    • Lack of Transportation (Medical): No    • Lack of Transportation (Non-Medical): No   Housing Stability: Low Risk  (12/20/2024)    Housing Stability Vital Sign    • Unable to Pay for Housing in the Last Year: No    • Number of Times Moved in the Last Year: 0    • Homeless in the Last Year: No   Utilities: Not At Risk (12/20/2024)    [facility] Utilities    • Threatened with loss of utilities: No     No results found.    Objective   /80   Pulse 71   Temp 97.6 °F (36.4 °C)   Ht 5' 8\" (1.727 m)   Wt 88.7 kg (195 lb 9.6 oz)   SpO2 97%   BMI 29.74 kg/m²     Physical Exam  Vitals and nursing note reviewed.   Constitutional:       General: He is not in acute distress.     Appearance: He is well-developed. He is not diaphoretic.   HENT:      Head: Normocephalic and atraumatic.      Right Ear: Tympanic membrane, ear canal and external ear normal.      Left Ear: Tympanic membrane, ear canal and external ear normal.      Ears:      Comments: Hearing aids removed     Mouth/Throat:      Mouth: Mucous membranes are moist.      Pharynx: Oropharynx is clear.   Eyes:      Conjunctiva/sclera: Conjunctivae normal.   Cardiovascular:      Rate and Rhythm: Normal rate and regular rhythm.      Pulses: Pulses are weak.           Dorsalis pedis pulses are 2+ on the right side and 0 on the left side.        Posterior tibial pulses are 2+ on the right side and 2+ on the left side.      Heart sounds: Normal heart sounds. No murmur heard.     No friction rub. No gallop.   Pulmonary:      Effort: Pulmonary effort is normal. No respiratory distress.      Breath sounds: Normal breath sounds. No stridor. No wheezing or rales.   Abdominal:      Palpations: Abdomen is soft.      Tenderness: There is no abdominal tenderness.   Musculoskeletal:         General: No swelling.   Feet:      Right foot:      Skin integrity: No ulcer, skin breakdown, erythema, " warmth, callus or dry skin.      Left foot:      Skin integrity: No ulcer, skin breakdown, erythema, warmth, callus or dry skin.   Skin:     Findings: No rash.   Neurological:      General: No focal deficit present.      Mental Status: He is alert. Mental status is at baseline.   Psychiatric:         Mood and Affect: Mood normal.         Behavior: Behavior normal.         Thought Content: Thought content normal.         Judgment: Judgment normal.     Patient's shoes and socks removed.    Right Foot/Ankle   Right Foot Inspection  Skin Exam: skin normal and skin intact. No dry skin, no warmth, no callus, no erythema, no maceration, no abnormal color, no pre-ulcer, no ulcer and no callus.     Toe Exam: ROM and strength within normal limits. No swelling, no tenderness, erythema and  no right toe deformity    Sensory   Vibration: intact  Monofilament testing: intact    Vascular  Capillary refills: < 3 seconds  The right DP pulse is 2+. The right PT pulse is 2+.     Left Foot/Ankle  Left Foot Inspection  Skin Exam: skin normal and skin intact. No dry skin, no warmth, no erythema, no maceration, normal color, no pre-ulcer, no ulcer and no callus.     Toe Exam: ROM and strength within normal limits. No swelling, no tenderness, no erythema and no left toe deformity.     Sensory   Vibration: intact  Monofilament testing: intact    Vascular  Capillary refills: < 3 seconds  The left DP pulse is 0. The left PT pulse is 2+.     Assign Risk Category  No deformity present  No loss of protective sensation  Weak pulses  Risk: 0

## 2024-12-20 NOTE — ASSESSMENT & PLAN NOTE
Increase Metformin to 1000 mg BID  Work on diet & exercise   A1c in 3 months  Will do rest of labs when he goes for Urology labs in Feb  Lab Results   Component Value Date    HGBA1C 7.6 (A) 12/20/2024     Orders:  •  POCT hemoglobin A1c  •  IRIS Diabetic eye exam  •  metFORMIN (GLUCOPHAGE) 500 mg tablet; Take 2 tablets (1,000 mg total) by mouth 2 (two) times a day with meals

## 2025-01-30 DIAGNOSIS — I25.10 ATHEROSCLEROSIS OF NATIVE CORONARY ARTERY OF NATIVE HEART WITHOUT ANGINA PECTORIS: ICD-10-CM

## 2025-01-30 DIAGNOSIS — E11.9 TYPE 2 DIABETES MELLITUS WITHOUT COMPLICATION, WITHOUT LONG-TERM CURRENT USE OF INSULIN (HCC): ICD-10-CM

## 2025-01-30 RX ORDER — ATORVASTATIN CALCIUM 40 MG/1
40 TABLET, FILM COATED ORAL DAILY
Qty: 90 TABLET | Refills: 1 | Status: SHIPPED | OUTPATIENT
Start: 2025-01-30

## 2025-01-30 NOTE — TELEPHONE ENCOUNTER
Reason for call:   [x] Refill   [] Prior Auth  [] Other:     Office:   [x] PCP/Provider - CINTIA ARMIJO  Authorized By: Adrienne Richard MD  [] Specialty/Provider -     Medication:     metFORMIN (GLUCOPHAGE) 500 mg tablet 1,000 mg, 2 times daily with meals         Pharmacy: [facility]/pharmacy #[id number] - [place], [place] - [address] [phone]     Does the patient have enough for 3 days?   [] Yes   [] No - Send as HP to POD

## 2025-01-30 NOTE — TELEPHONE ENCOUNTER
Reason for call:   [x] Refill   [] Prior Auth  [] Other:     Office:   [] PCP/Provider -   [x] Specialty/Provider -  [facility]  Authorized By: Nick Corona MD    Medication:     atorvastatin (LIPITOR) 40 mg tablet 40 mg, Daily         [id number] [place] [facility] [address], [address] [phone]     Does the patient have enough for 3 days?   [] Yes   [x] No - Send as HP to POD

## 2025-02-04 ENCOUNTER — OFFICE VISIT (OUTPATIENT)
Dept: LAB | Facility: HOSPITAL | Age: 85
End: 2025-02-04
Attending: UROLOGY
Payer: MEDICARE

## 2025-02-04 ENCOUNTER — APPOINTMENT (OUTPATIENT)
Dept: LAB | Facility: CLINIC | Age: 85
End: 2025-02-04
Payer: MEDICARE

## 2025-02-04 DIAGNOSIS — E11.9 TYPE 2 DIABETES MELLITUS WITHOUT COMPLICATION, WITHOUT LONG-TERM CURRENT USE OF INSULIN (HCC): ICD-10-CM

## 2025-02-04 DIAGNOSIS — R39.89 SUSPECTED UTI: ICD-10-CM

## 2025-02-04 DIAGNOSIS — N48.9 PENILE LESION: ICD-10-CM

## 2025-02-04 DIAGNOSIS — E78.2 MIXED HYPERLIPIDEMIA: ICD-10-CM

## 2025-02-04 DIAGNOSIS — Z01.810 PRE-OPERATIVE CARDIOVASCULAR EXAMINATION: ICD-10-CM

## 2025-02-04 DIAGNOSIS — N18.30 STAGE 3 CHRONIC KIDNEY DISEASE, UNSPECIFIED WHETHER STAGE 3A OR 3B CKD (HCC): ICD-10-CM

## 2025-02-04 DIAGNOSIS — Z01.812 PRE-OPERATIVE LABORATORY EXAMINATION: ICD-10-CM

## 2025-02-04 LAB
ATRIAL RATE: 68 BPM
BASOPHILS # BLD AUTO: 0.07 THOUSANDS/ΜL (ref 0–0.1)
BASOPHILS NFR BLD AUTO: 1 % (ref 0–1)
CREAT UR-MCNC: 132.5 MG/DL
EOSINOPHIL # BLD AUTO: 0.3 THOUSAND/ΜL (ref 0–0.61)
EOSINOPHIL NFR BLD AUTO: 4 % (ref 0–6)
ERYTHROCYTE [DISTWIDTH] IN BLOOD BY AUTOMATED COUNT: 12.5 % (ref 11.6–15.1)
EST. AVERAGE GLUCOSE BLD GHB EST-MCNC: 177 MG/DL
HBA1C MFR BLD: 7.8 %
HCT VFR BLD AUTO: 41.2 % (ref 36.5–49.3)
HGB BLD-MCNC: 13.3 G/DL (ref 12–17)
IMM GRANULOCYTES # BLD AUTO: 0.02 THOUSAND/UL (ref 0–0.2)
IMM GRANULOCYTES NFR BLD AUTO: 0 % (ref 0–2)
LYMPHOCYTES # BLD AUTO: 2.36 THOUSANDS/ΜL (ref 0.6–4.47)
LYMPHOCYTES NFR BLD AUTO: 29 % (ref 14–44)
MCH RBC QN AUTO: 33.5 PG (ref 26.8–34.3)
MCHC RBC AUTO-ENTMCNC: 32.3 G/DL (ref 31.4–37.4)
MCV RBC AUTO: 104 FL (ref 82–98)
MICROALBUMIN UR-MCNC: 7 MG/L
MICROALBUMIN/CREAT 24H UR: 5 MG/G CREATININE (ref 0–30)
MONOCYTES # BLD AUTO: 0.8 THOUSAND/ΜL (ref 0.17–1.22)
MONOCYTES NFR BLD AUTO: 10 % (ref 4–12)
NEUTROPHILS # BLD AUTO: 4.52 THOUSANDS/ΜL (ref 1.85–7.62)
NEUTS SEG NFR BLD AUTO: 56 % (ref 43–75)
NRBC BLD AUTO-RTO: 0 /100 WBCS
P AXIS: 31 DEGREES
PLATELET # BLD AUTO: 232 THOUSANDS/UL (ref 149–390)
PMV BLD AUTO: 10.7 FL (ref 8.9–12.7)
PR INTERVAL: 150 MS
QRS AXIS: -18 DEGREES
QRSD INTERVAL: 136 MS
QT INTERVAL: 446 MS
QTC INTERVAL: 475 MS
RBC # BLD AUTO: 3.97 MILLION/UL (ref 3.88–5.62)
T WAVE AXIS: 93 DEGREES
VENTRICULAR RATE: 68 BPM
WBC # BLD AUTO: 8.07 THOUSAND/UL (ref 4.31–10.16)

## 2025-02-04 PROCEDURE — 80061 LIPID PANEL: CPT

## 2025-02-04 PROCEDURE — 80053 COMPREHEN METABOLIC PANEL: CPT

## 2025-02-04 PROCEDURE — 87086 URINE CULTURE/COLONY COUNT: CPT

## 2025-02-04 PROCEDURE — 83036 HEMOGLOBIN GLYCOSYLATED A1C: CPT

## 2025-02-04 PROCEDURE — 82043 UR ALBUMIN QUANTITATIVE: CPT

## 2025-02-04 PROCEDURE — 36415 COLL VENOUS BLD VENIPUNCTURE: CPT

## 2025-02-04 PROCEDURE — 82570 ASSAY OF URINE CREATININE: CPT

## 2025-02-04 PROCEDURE — 93010 ELECTROCARDIOGRAM REPORT: CPT | Performed by: INTERNAL MEDICINE

## 2025-02-04 PROCEDURE — 85025 COMPLETE CBC W/AUTO DIFF WBC: CPT

## 2025-02-04 PROCEDURE — 93005 ELECTROCARDIOGRAM TRACING: CPT

## 2025-02-05 ENCOUNTER — RESULTS FOLLOW-UP (OUTPATIENT)
Dept: FAMILY MEDICINE CLINIC | Facility: CLINIC | Age: 85
End: 2025-02-05

## 2025-02-05 LAB
ALBUMIN SERPL BCG-MCNC: 4.2 G/DL (ref 3.5–5)
ALP SERPL-CCNC: 44 U/L (ref 34–104)
ALT SERPL W P-5'-P-CCNC: 14 U/L (ref 7–52)
ANION GAP SERPL CALCULATED.3IONS-SCNC: 12 MMOL/L (ref 4–13)
AST SERPL W P-5'-P-CCNC: 17 U/L (ref 13–39)
BACTERIA UR CULT: NORMAL
BILIRUB SERPL-MCNC: 0.44 MG/DL (ref 0.2–1)
BUN SERPL-MCNC: 21 MG/DL (ref 5–25)
CALCIUM SERPL-MCNC: 9.1 MG/DL (ref 8.4–10.2)
CHLORIDE SERPL-SCNC: 106 MMOL/L (ref 96–108)
CHOLEST SERPL-MCNC: 96 MG/DL (ref ?–200)
CO2 SERPL-SCNC: 24 MMOL/L (ref 21–32)
CREAT SERPL-MCNC: 1.37 MG/DL (ref 0.6–1.3)
GFR SERPL CREATININE-BSD FRML MDRD: 47 ML/MIN/1.73SQ M
GLUCOSE P FAST SERPL-MCNC: 169 MG/DL (ref 65–99)
HDLC SERPL-MCNC: 40 MG/DL
LDLC SERPL CALC-MCNC: 42 MG/DL (ref 0–100)
POTASSIUM SERPL-SCNC: 4.6 MMOL/L (ref 3.5–5.3)
PROT SERPL-MCNC: 7 G/DL (ref 6.4–8.4)
SODIUM SERPL-SCNC: 142 MMOL/L (ref 135–147)
TRIGL SERPL-MCNC: 69 MG/DL (ref ?–150)

## 2025-02-17 ENCOUNTER — TELEPHONE (OUTPATIENT)
Age: 85
End: 2025-02-17

## 2025-02-17 NOTE — TELEPHONE ENCOUNTER
Called patient and let him know his procedure did not require authorization. Medicare has a deductible at the beginning of the year. Should be covered Medicare and secondary

## 2025-02-17 NOTE — TELEPHONE ENCOUNTER
Pt calling because he is scheduled for surgery on 2/20/25.  Pt asking if his insurance is covering the surgery    Please call pt back at [phone]

## 2025-02-19 NOTE — PRE-PROCEDURE INSTRUCTIONS
Pre-Surgery Instructions:   Medication Instructions    aspirin (ECOTRIN LOW STRENGTH) 81 mg EC tablet Take as directed    atorvastatin (LIPITOR) 40 mg tablet Take as directed    clotrimazole-betamethasone (LOTRISONE) 1-0.05 % cream Hold day of surgery.    Diclofenac Sodium (VOLTAREN) 1 % Hold day of surgery.     fluticasone (FLONASE) 50 mcg/act nasal spray Uses PRN- OK to take day of surgery    levocetirizine (XYZAL) 5 MG tablet Take as directed    metFORMIN (GLUCOPHAGE) 500 mg tablet Hold day of surgery.    metoprolol tartrate (LOPRESSOR) 25 mg tablet Take day of surgery.    pantoprazole (PROTONIX) 40 mg tablet Take as directed    tamsulosin (FLOMAX) 0.4 mg Take night before surgery        Medication instructions for day of surgery reviewed. Please take all instructed medications with only a sip of water.       You will receive a call one business day prior to surgery with an arrival time and hospital directions. If your surgery is scheduled on a Monday, the hospital will be calling you on the Friday prior to your surgery. If you have not heard from anyone by 8pm, please call the hospital supervisor through the hospital  at [phone]. ([place] [phone] or [place] [phone]).    Do not eat or drink anything after midnight the night before your surgery, including candy, mints, lifesavers, or chewing gum. Do not drink alcohol 24hrs before your surgery. Try not to smoke at least 24hrs before your surgery.       Follow the pre surgery showering instructions as listed in the “My Surgical Experience Booklet” or otherwise provided by your surgeon's office. Do not use a blade to shave the surgical area 1 week before surgery. It is okay to use a clean electric clippers up to 24 hours before surgery. Do not apply any lotions, creams, including makeup, cologne, deodorant, or perfumes after showering on the day of your surgery. Do not use dry shampoo, hair spray, hair gel, or any type of hair products.      No contact lenses, eye make-up, or artificial eyelashes. Remove nail polish, including gel polish, and any artificial, gel, or acrylic nails if possible. Remove all jewelry including rings and body piercing jewelry.     Wear causal clothing that is easy to take on and off. Consider your type of surgery.    Keep any valuables, jewelry, piercings at home. Please bring any specially ordered equipment (sling, braces) if indicated.    Arrange for a responsible person to drive you to and from the hospital on the day of your surgery. Please confirm the visitor policy for the day of your procedure when you receive your phone call with an arrival time.     Call the surgeon's office with any new illnesses, exposures, or additional questions prior to surgery.    Please reference your “My Surgical Experience Booklet” for additional information to prepare for your upcoming surgery.

## 2025-02-20 ENCOUNTER — ANESTHESIA (OUTPATIENT)
Dept: PERIOP | Facility: HOSPITAL | Age: 85
End: 2025-02-20
Payer: MEDICARE

## 2025-02-20 ENCOUNTER — ANESTHESIA EVENT (OUTPATIENT)
Dept: PERIOP | Facility: HOSPITAL | Age: 85
End: 2025-02-20
Payer: MEDICARE

## 2025-02-20 ENCOUNTER — HOSPITAL ENCOUNTER (OUTPATIENT)
Facility: HOSPITAL | Age: 85
Setting detail: OUTPATIENT SURGERY
Discharge: HOME | End: 2025-02-20
Attending: UROLOGY | Admitting: UROLOGY
Payer: MEDICARE

## 2025-02-20 ENCOUNTER — TELEPHONE (OUTPATIENT)
Dept: UROLOGY | Facility: CLINIC | Age: 85
End: 2025-02-20

## 2025-02-20 VITALS
TEMPERATURE: 97.9 F | HEART RATE: 68 BPM | OXYGEN SATURATION: 98 % | WEIGHT: 207.01 LBS | DIASTOLIC BLOOD PRESSURE: 89 MMHG | BODY MASS INDEX: 31.37 KG/M2 | HEIGHT: 68 IN | RESPIRATION RATE: 16 BRPM | SYSTOLIC BLOOD PRESSURE: 149 MMHG

## 2025-02-20 DIAGNOSIS — N48.9 PENILE LESION: ICD-10-CM

## 2025-02-20 LAB — GLUCOSE SERPL-MCNC: 174 MG/DL (ref 65–140)

## 2025-02-20 PROCEDURE — 82948 REAGENT STRIP/BLOOD GLUCOSE: CPT

## 2025-02-20 PROCEDURE — NC001 PR NO CHARGE: Performed by: UROLOGY

## 2025-02-20 PROCEDURE — 54161 CIRCUM 28 DAYS OR OLDER: CPT | Performed by: UROLOGY

## 2025-02-20 PROCEDURE — 88313 SPECIAL STAINS GROUP 2: CPT | Performed by: PATHOLOGY

## 2025-02-20 PROCEDURE — 88305 TISSUE EXAM BY PATHOLOGIST: CPT | Performed by: PATHOLOGY

## 2025-02-20 PROCEDURE — 88304 TISSUE EXAM BY PATHOLOGIST: CPT | Performed by: PATHOLOGY

## 2025-02-20 RX ORDER — PROPOFOL 10 MG/ML
INJECTION, EMULSION INTRAVENOUS AS NEEDED
Status: DISCONTINUED | OUTPATIENT
Start: 2025-02-20 | End: 2025-02-20

## 2025-02-20 RX ORDER — LIDOCAINE HYDROCHLORIDE 10 MG/ML
INJECTION, SOLUTION EPIDURAL; INFILTRATION; INTRACAUDAL; PERINEURAL AS NEEDED
Status: DISCONTINUED | OUTPATIENT
Start: 2025-02-20 | End: 2025-02-20 | Stop reason: HOSPADM

## 2025-02-20 RX ORDER — CEFAZOLIN SODIUM 2 G/50ML
2000 SOLUTION INTRAVENOUS
Status: COMPLETED | OUTPATIENT
Start: 2025-02-20 | End: 2025-02-20

## 2025-02-20 RX ORDER — ACETAMINOPHEN 325 MG/1
650 TABLET ORAL EVERY 6 HOURS PRN
Status: DISCONTINUED | OUTPATIENT
Start: 2025-02-20 | End: 2025-02-20 | Stop reason: HOSPADM

## 2025-02-20 RX ORDER — SODIUM CHLORIDE, SODIUM LACTATE, POTASSIUM CHLORIDE, CALCIUM CHLORIDE 600; 310; 30; 20 MG/100ML; MG/100ML; MG/100ML; MG/100ML
INJECTION, SOLUTION INTRAVENOUS CONTINUOUS PRN
Status: DISCONTINUED | OUTPATIENT
Start: 2025-02-20 | End: 2025-02-20

## 2025-02-20 RX ORDER — MAGNESIUM HYDROXIDE 1200 MG/15ML
LIQUID ORAL AS NEEDED
Status: DISCONTINUED | OUTPATIENT
Start: 2025-02-20 | End: 2025-02-20 | Stop reason: HOSPADM

## 2025-02-20 RX ORDER — FENTANYL CITRATE/PF 50 MCG/ML
25 SYRINGE (ML) INJECTION
Status: DISCONTINUED | OUTPATIENT
Start: 2025-02-20 | End: 2025-02-20 | Stop reason: HOSPADM

## 2025-02-20 RX ORDER — LIDOCAINE HYDROCHLORIDE 10 MG/ML
INJECTION, SOLUTION EPIDURAL; INFILTRATION; INTRACAUDAL; PERINEURAL AS NEEDED
Status: DISCONTINUED | OUTPATIENT
Start: 2025-02-20 | End: 2025-02-20

## 2025-02-20 RX ORDER — OXYCODONE HYDROCHLORIDE 5 MG/1
5 TABLET ORAL EVERY 4 HOURS PRN
Qty: 7 TABLET | Refills: 0 | Status: SHIPPED | OUTPATIENT
Start: 2025-02-20 | End: 2025-03-02

## 2025-02-20 RX ORDER — OXYCODONE HYDROCHLORIDE 5 MG/1
5 TABLET ORAL EVERY 4 HOURS PRN
Refills: 0 | Status: DISCONTINUED | OUTPATIENT
Start: 2025-02-20 | End: 2025-02-20 | Stop reason: HOSPADM

## 2025-02-20 RX ORDER — FENTANYL CITRATE 50 UG/ML
INJECTION, SOLUTION INTRAMUSCULAR; INTRAVENOUS AS NEEDED
Status: DISCONTINUED | OUTPATIENT
Start: 2025-02-20 | End: 2025-02-20

## 2025-02-20 RX ORDER — ONDANSETRON 2 MG/ML
4 INJECTION INTRAMUSCULAR; INTRAVENOUS ONCE AS NEEDED
Status: DISCONTINUED | OUTPATIENT
Start: 2025-02-20 | End: 2025-02-20 | Stop reason: HOSPADM

## 2025-02-20 RX ADMIN — FENTANYL CITRATE 25 MCG: 50 INJECTION, SOLUTION INTRAMUSCULAR; INTRAVENOUS at 10:40

## 2025-02-20 RX ADMIN — FENTANYL CITRATE 50 MCG: 50 INJECTION, SOLUTION INTRAMUSCULAR; INTRAVENOUS at 10:20

## 2025-02-20 RX ADMIN — ACETAMINOPHEN 650 MG: 325 TABLET, FILM COATED ORAL at 13:15

## 2025-02-20 RX ADMIN — CEFAZOLIN SODIUM 2000 MG: 2 SOLUTION INTRAVENOUS at 10:20

## 2025-02-20 RX ADMIN — PROPOFOL 150 MG: 10 INJECTION, EMULSION INTRAVENOUS at 10:20

## 2025-02-20 RX ADMIN — LIDOCAINE HYDROCHLORIDE 50 MG: 10 INJECTION, SOLUTION EPIDURAL; INFILTRATION; INTRACAUDAL; PERINEURAL at 10:20

## 2025-02-20 RX ADMIN — SODIUM CHLORIDE, SODIUM LACTATE, POTASSIUM CHLORIDE, AND CALCIUM CHLORIDE: .6; .31; .03; .02 INJECTION, SOLUTION INTRAVENOUS at 10:17

## 2025-02-20 RX ADMIN — FENTANYL CITRATE 25 MCG: 50 INJECTION, SOLUTION INTRAMUSCULAR; INTRAVENOUS at 11:32

## 2025-02-20 NOTE — ANESTHESIA PREPROCEDURE EVALUATION
Procedure:  EXCISION BIOPSY LESION/MASS PENILE; possible circumcision (Penis)    KASIA to Lcx 2020 for STEMI  Last dose of BB 9p 2/19    METS >4, denies CP, SANCHEZ      Technically difficult study, wall motion is difficult to assess..    Left Ventricle: Left ventricular cavity size is normal. Wall thickness is normal. The left ventricular ejection fraction is 45%. Systolic function is mildly reduced. Wall motion is normal.  Possible mild anterior and apical hypokinesis. Diastolic function is mildly abnormal, consistent with grade I (abnormal) relaxation.    Right Atrium: A pacer wire is present.    No significant change since prior echo 5/10/2020.    Relevant Problems   CARDIO   (+) Hypertension   (+) Mixed hyperlipidemia   (+) ST elevation myocardial infarction involving left circumflex coronary artery (HCC)      ENDO   (+) Type 2 diabetes mellitus without complication, without long-term current use of insulin (HCC)      /RENAL   (+) BPH with obstruction/lower urinary tract symptoms   (+) Stage 3 chronic kidney disease, unspecified whether stage 3a or 3b CKD (HCC)      MUSCULOSKELETAL   (+) Primary osteoarthritis of both knees   IMPRESSION:  Mild restrictive ventilatory flow limitation on spirometry without significant improvement after the administration of bronchodilator  Restricted appearing flow-volume loop  Very mild reduction in total lung capacity at 77% predicted  Severe reduction in corrected DLCO for which clinical correlation is advised     Physical Exam    Airway    Mallampati score: IV  TM Distance: >3 FB       Dental   No notable dental hx     Cardiovascular  Cardiovascular exam normal    Pulmonary  Pulmonary exam normal     Other Findings        Anesthesia Plan  ASA Score- 3     Anesthesia Type- general with ASA Monitors.         Additional Monitors:     Airway Plan: LMA.    Comment: Risks/benefits and alternatives discussed with patient including possible PONV, sore throat, damage to  teeth/lips/gums/esophagus, and possibility of rare anesthetic and surgical emergencies including but not limited to heart attack, stroke, and/or death. All questions were answered.  .       Plan Factors-Exercise tolerance (METS): >4 METS.    Chart reviewed.   Existing labs reviewed. Patient summary reviewed.    Patient is not a current smoker.              Induction- intravenous.    Postoperative Plan- Plan for postoperative opioid use.         Informed Consent- Anesthetic plan and risks discussed with patient and spouse.  I personally reviewed this patient with the CRNA. Discussed and agreed on the Anesthesia Plan with the CRNA..      NPO Status:  Vitals Value Taken Time   Date of last liquid 02/20/25 02/20/25 0939   Time of last liquid 0800 02/20/25 0939   Date of last solid 02/19/25 02/20/25 0939   Time of last solid 1900 02/20/25 0939

## 2025-02-20 NOTE — DISCHARGE INSTR - AVS FIRST PAGE
Mr. Stout,      I took a biopsy of the lesion that was on the head of your penis.  I also did a full circumcision like we discussed.  The foreskin that was removed included the other lesions that were on your penis.    We will follow-up the pathology results to see if anything extra needs to be done.  If there is anything concerning on the pathology, I will call you and let you know.    Otherwise, we will plan on you following up in about 1 month for a wound check in the office.  The office will call you to set this up as well.    Please see the postoperative instructions below for additional details.    Please call the office with any questions or concerns.        Sincerely,  Saurav Gillis            You had a circumcision and penile lesion biopsy.    Please take Tylenol 500 mg or 650 mg (it depends on the type of Tylenol that you buy) every 6 hours, regardless if you have pain, for the first 48 hours.  After this, you can take the Tylenol on an as-needed basis.  I additionally sent you 7 tabs of oxycodone 5 mg in case you have breakthrough pain that is not controlled with the Tylenol.    You have a dressing on your penis. You can remove the dressing tomorrow. After you remove the dressing you may shower. You have absorbable stiches on your penis. They will fall off on their own eventually, so do not pick at them. When you shower, do not scrub the incision. You may let soapy water run over the incision. Pat dry with towel. Leave the dressing off and then reapply new dressing in 3 hours.    This means that you should have dressing on for 21 hours and off for 3 hours during the day. You may shower during the 3 hour period at which your dressing is off.     Prescription was sent to pharmacy for Bacitracin. You may also use over the counter Neosporin or Bacitracin (as long as it is ointment and not cream). You will apply the ointment to the incision before wrapping your penis each day.    See instructions below for  how to apply your dressing. You will apply this dressing daily for 7 days.      (Please note the Roxana is the name of the wrap gauze that you have been given)      Do not take any baths or go in any standing water (hot tub, pool, ocean, etc.) while your incision is healing.    You may see oozing from your incision, especially after you take the dressing off. You may also see some blood going through dressing. This is normal. If bleeding is excessive and is soaking the dressing despite application of Bacitracin and wrap dressing, please call the office or come to the ER.    You may resume your blood thinner, aspirin 81 mg, on 2/21/2025.    Please call office or present to ED if you experience any concerning signs or symptoms including but not limited to: fevers greater 100.4 degrees Fahrenheit, chills, nausea, vomiting, inability to urinate, passage of blood clots in urine, uncontrolled pain, opening of incision, purulent drainage from incision, excessive bleeding.    Please avoid lifting anything heavier than a carton of milk for 7 days. After that, you may gradually increase your physical activity. Additionally, it is ok for you to walk around the day of surgery and onward, but avoid running until at least 7 days. At that point, you may gradually increase physical activity as tolerated as well.    Do not participate in any sexual activity and do not masturbate until cleared to do so during your office appointment.    You have follow up appointment in about 1 month for incision check.

## 2025-02-20 NOTE — H&P
UROLOGY H&P NOTE     Patient Identifiers: Jay Stout (MRN [id number])    Date of Service: 2/20/2025    History of Present Illness:     Jay Stout is a 84 y.o. old with a history of penile lesion    Past Medical, Past Surgical History:     Past Medical History:   Diagnosis Date    Diabetes mellitus (HCC)     diet control    Heart disease 05/09/2020    Heart attack    Hyperlipidemia     Myocardial infarction (HCC)     Penile lesion     Prediabetes    :    Past Surgical History:   Procedure Laterality Date    CARDIAC SURGERY  05/2020    stent placement    COLONOSCOPY      HERNIA REPAIR      LARYNGOSCOPY  1978    with vocal cord polyp removal    LARYNGOSCOPY N/A 8/3/2021    Procedure: MICRODIRECT LARYNGOSCOPY WITH  EXCISION OF VOCAL CORD LESION;  Surgeon: Pito Tomlin DO;  Location: [facility];  Service: ENT   :    Medications, Allergies:     Current Facility-Administered Medications:     ceFAZolin (ANCEF) IVPB (premix in dextrose) 2,000 mg 50 mL, On Call To OR    Allergies:  Allergies   Allergen Reactions    Celecoxib      Other reaction(s): itchy  Other reaction(s): itchy   :    Social and Family History:   Social History:   Social History     Tobacco Use    Smoking status: Former     Current packs/day: 1.00     Average packs/day: 1 pack/day for 25.0 years (25.0 ttl pk-yrs)     Types: Cigarettes     Passive exposure: Never    Smokeless tobacco: Never   Vaping Use    Vaping status: Never Used   Substance Use Topics    Alcohol use: Not Currently     Comment: Social twice a month    Drug use: Never   .    Social History     Tobacco Use   Smoking Status Former    Current packs/day: 1.00    Average packs/day: 1 pack/day for 25.0 years (25.0 ttl pk-yrs)    Types: Cigarettes    Passive exposure: Never   Smokeless Tobacco Never       Family History:  Family History   Problem Relation Age of Onset    Heart disease Mother         Cardiac Disorder    No Known Problems Father     No Known Problems Sister    :     Review  "of Systems:     General: Fever, chills, or night sweats: negative  Cardiac: Negative for chest pain.    Pulmonary: Negative for shortness of breath.  Gastrointestinal: Abdominal pain negative.  Nausea, vomiting, or diarrhea negative,  Genitourinary: See HPI above.  Patient does not have hematuria.  All other systems queried were negative.    Physical Exam:   General: Patient is pleasant and in NAD. Awake and alert  /76   Pulse 87   Temp 97.5 °F (36.4 °C) (Temporal)   Resp 16   Ht 5' 8\" (1.727 m)   Wt 93.9 kg (207 lb 0.2 oz)   SpO2 98%   BMI 31.48 kg/m² Temp (24hrs), Av.5 °F (36.4 °C), Min:97.5 °F (36.4 °C), Max:97.5 °F (36.4 °C)  current; Temperature: 97.5 °F (36.4 °C)  No intake/output data recorded.  Cardiac: Peripheral edema: negative  Pulmonary: Non-labored breathing  Abdomen: Soft, non-tender, non-distended.  No surgical scars.  No masses, tenderness, hernias noted.    Genitourinary: Negative CVA tenderness, negative suprapubic tenderness.    (Male): foreskin present, mult small flat erythematous lesions subcoronal; approximately 3 mm flat red erythematous lesion on the glans penis    Labs:     Lab Results   Component Value Date    HGB 13.3 2025    HCT 41.2 2025    WBC 8.07 2025     2025   ]    Lab Results   Component Value Date    K 4.6 2025     2025    CO2 24 2025    BUN 21 2025    CREATININE 1.37 (H) 2025    CALCIUM 9.1 2025   ]    Imaging:     Any pertinent imaging was personally reviewed and discussed with patient. See below for details.    ASSESSMENT:     84 y.o. old male with penile lesion    AC: [id number]    Seen in office in  with penile lesion. Uncircumcised. Flat erythematous lesions subcoronal. Did not respond to Lotrisone.    Consented in office for penile lesion biopsy, possible circumcision.        After speaking with patient today he stated that he wants to just do the typical circumcision and include the " lesions in the specimen if possible.    I therefore will plan on doing a circumcision today.  I also explained that there is a small lesion on his glans penis that I will also try to resect and sent for pathology as well.    I had a discussion with the patient regarding his phimosis. I explained that phimosis is a hardening of the foreskin that makes it difficult to retract his foreskin. I explained that this could put him at risk for recurrent infections of the foreskin and glans penis. I also explained that tight foreskin could put him at risk for paraphimosis, which is an emergent condition in which the foreskin is stuck in a retracted position and could compromise blood flow to the glans penis and lead to tissue necrosis. I therefore explained that it would behoove him to have his phimosis treated.    I explained that this condition could be treated medically or surgically. We reviewed medical management, which typically includes application of steroid cream for a few weeks in combination with gentle massage and stretching of the phimotic ring.    We also reviewed the surgical management of his phimosis, which would be removal of the foreskin. This procedure is known as a circumcision.    We discussed that risks of circumcision include but are not limited to:   Bleeding requiring surgical exploration  Infection  Minor and major side effects related to anesthesia are possible as well  Foreskin might be cut too short or too long resulting in pain or dissatisfaction  Foreskin might fail to heal properly  Foreskin might reattach to the end of the penis, requiring surgical repair  Rare necessity of skin graft for incisional breakdown or poor healing  Changes to sensation at glans penis  Extremely rare outcome of glans skin necrosis    All questions were answered. Patient wishes to proceed to surgery.          PLAN:     Preop ancef ordered  OR plan as above  Dc home from pacu

## 2025-02-20 NOTE — ANESTHESIA POSTPROCEDURE EVALUATION
Post-Op Assessment Note    CV Status:  Stable    Pain management: adequate       Mental Status:  Alert and awake   Hydration Status:  Euvolemic   PONV Controlled:  Controlled   Airway Patency:  Patent     Post Op Vitals Reviewed: Yes    No anethesia notable event occurred.    Staff: CRNA           Last Filed PACU Vitals:  Vitals Value Taken Time   Temp 97.3    Pulse 69 02/20/25 1144   /59    Resp 16    SpO2 99 % 02/20/25 1144   Vitals shown include unfiled device data.    Modified Perla:     Vitals Value Taken Time   Activity 2 02/20/25 1200   Respiration 2 02/20/25 1200   Circulation 2 02/20/25 1200   Consciousness 2 02/20/25 1200   Oxygen Saturation 2 02/20/25 1200     Modified Perla Score: 10

## 2025-02-20 NOTE — TELEPHONE ENCOUNTER
Patient status post circumcision and penile lesion biopsy on 2/20/2025    Patient will do daily dressing with bacitracin and clean gauze for 7 days.  He has already got his supplies and instructions.    He needs an AP visit in about 1 month for wound check    If his biopsies come back with any concerning findings, I will call him and arrange for an additional appointment

## 2025-02-20 NOTE — TELEPHONE ENCOUNTER
Patient has been scheduled for 1 month follow up post circumcision and penile lesion biopsy with Jaz HIGGINS on 3/21/25 at 2 pm. Appointment will be confirmed during post phone call.

## 2025-02-20 NOTE — OP NOTE
OPERATIVE REPORT  PATIENT NAME: Jay Stout    :  1940  MRN: [id number]  Pt Location: [facility] ROOM 01    SURGERY DATE: 2025    Surgeons and Role:     * Saurav Gillis DO - Primary    Preop Diagnosis:  Penile lesion [N48.9]    Post-Op Diagnosis Codes:     * Penile lesion [N48.9]    Procedure(s):      Penile lesion biopsy  Circumcision  Adjacent tissue transfer        Specimen(s):  ID Type Source Tests Collected by Time Destination   1 : Foreskin Tissue Penis TISSUE EXAM Saurav Gillis,  2025 1057    2 : Penile lesion Tissue Penis TISSUE EXAM Saurav Gillis,  2025 1102        Estimated Blood Loss:   Minimal    Drains:  * No LDAs found *    Anesthesia Type:   General    Operative Indications:  Penile lesion [N48.9]        84 y.o. old male with penile lesion     AC: ASA81     Seen in office in  with penile lesion. Uncircumcised. Flat erythematous lesions subcoronal. Did not respond to Lotrisone.     Consented in office for penile lesion biopsy, possible circumcision.           After speaking with patient today he stated that he wants to just do the typical circumcision and include the lesions in the specimen if possible.     I therefore will plan on doing a circumcision today.  I also explained that there is a small lesion on his glans penis that I will also try to resect and sent for pathology as well.        Operative Findings:        Exam revealed 2 separate lesions just subcoronal.  They were flat, slightly erythematous, slightly brown.  Each were about 2 mm in diameter.  These lesions were included in the foreskin specimen.    Otherwise, uneventful circumcision.    Additional 1 cm flat, slightly red, slightly brown lesion on the right side of the glans penis, close to the corona.  This lesion was resected separately using sharp excision and sent as separate specimen, penile lesion biopsy.            Complications:   None    Procedure and  Technique:          Patient was identified in the preoperative holding area and brought back to the OR. He was placed in supine position. He underwent general anesthesia. Area was prepped and draped in sterile fashion. Timeout was performed.     20 cc of 1% lidocaine were used to perform dorsal and ring penile blocks.      I then performed circumcision with adjacent tissue transfer by making two collar incisions.  1 incision was just below the corona in order to include the penile shaft lesions that need to be biopsied.  See above findings for details. Foreskin was then reduced with some difficulty. A second collar incision was at the level of the skin overlying the corona. Foreskin was then removed in standard sleeve fashion using Bovie electrocautery. I then performed reapproximation of the tunica baker's and pulled the distal to proximal foreskin thus completing the adjacent tissue transfer of genitalia. The total area of tissue transfer was about 4 squared cm. This was done predominantly with interrupted 3-0 Chromic suture.     Attention was then turned toward the glans penis lesion mentioned in the findings section.  15 blade was used to incise the skin layer around the lesion.  The subcutaneous tissue below the lesion was undermined and resected using cut settings on the Bovie.  The resection bed was then thoroughly cauterized using coagulation settings on the Bovie.  This defect was then closed in 2 layers using 4-0 chromic suture.    I placed bacitracin, and then loose Roxana around the penis. All needle, sponge, and instrument counts were correct x 2 at the end of the case.      Patient was uneventfully awoken from anesthesia and extubated. Patient was brought back to PACU in good condition.             A qualified resident physician was not available.    Patient Disposition:  PACU     This procedure was not performed to treat primary cutaneous melanoma through wide local excision           SIGNATURE: Saurav  Gaetano Gillis,   DATE: February 20, 2025  TIME: 11:40 AM        Plan  - Patient will do daily bacitracin, Roxana gauze dressing for 7 days  - Will arrange for AP visit in about 1 month for incision check  - Will follow-up pathology results.  If any concerning pathology findings, will reach out the patient

## 2025-02-21 NOTE — TELEPHONE ENCOUNTER
Post Op Note    Jay Stout is a 84 y.o. male s/p EXCISION BIOPSY LESION/MASS PENILE (Penis)       CIRCUMCISION ADULT (Pelvis)  performed 2/21/25.  Jay Stout is a patient of Dr. Dr. Gillis and is seen at the [place] office.     How would you rate your pain on a scale from 1 to 10, 10 being the worst pain ever? 1  Have you had a fever? No  If so, what was your highest temperature? 98.6 F What is your current temperature? 98.6 F  Have your bowel movements been regular? Yes  Do you have any difficulty urinating? No  If the patient has an incision- do you have any redness around the incision or any drainage from the incision? No drainage, slight redness  If the patient has a drain- are you having any issues with the drain? No  If the patient has a powell- are you comfortable caring for your powell? No Is it draining urine? No  Do you have any other questions or concerns that I can address at this time?     Penis inverted, testicle are swollen and black  Evelyne is having trouble with changing dressing due to the penis being inverted. She is concerned about pulling on his penis. She applied Bacitracin and an clean guaze, no bleeding/drainage  Minimal pain, discussed applying covered ice pack for 20 minute increments to swollen testicles

## 2025-02-22 DIAGNOSIS — E11.9 TYPE 2 DIABETES MELLITUS WITHOUT COMPLICATION, WITHOUT LONG-TERM CURRENT USE OF INSULIN (HCC): ICD-10-CM

## 2025-02-24 NOTE — TELEPHONE ENCOUNTER
Called and spoke to Evelyne. Dr. Gillis's message relayed, she verbalized understanding. Evelyne stated that she has been putting bacitracin on incision and covering with gauze, pt wearing tight fitted underwear. Advised Evelyne to call the office if symptoms worsen or if she has any questions or concerns. Appt for 3/21 confirmed.

## 2025-02-27 PROCEDURE — 88305 TISSUE EXAM BY PATHOLOGIST: CPT | Performed by: PATHOLOGY

## 2025-02-27 PROCEDURE — 88304 TISSUE EXAM BY PATHOLOGIST: CPT | Performed by: PATHOLOGY

## 2025-02-27 PROCEDURE — 88313 SPECIAL STAINS GROUP 2: CPT | Performed by: PATHOLOGY

## 2025-03-13 ENCOUNTER — RA CDI HCC (OUTPATIENT)
Dept: OTHER | Facility: HOSPITAL | Age: 85
End: 2025-03-13

## 2025-03-19 ENCOUNTER — TELEPHONE (OUTPATIENT)
Dept: ADMINISTRATIVE | Facility: OTHER | Age: 85
End: 2025-03-19

## 2025-03-19 NOTE — TELEPHONE ENCOUNTER
03/19/25 2:49 PM    Patient contacted to bring Advance Directive, POLST, or Living Will document to next scheduled pcp visit.[facility] Department spoke with patient/ caregiver.    Thank you.  Donna Gaviria MA  [facility]

## 2025-03-20 ENCOUNTER — OFFICE VISIT (OUTPATIENT)
Dept: FAMILY MEDICINE CLINIC | Facility: CLINIC | Age: 85
End: 2025-03-20
Payer: MEDICARE

## 2025-03-20 VITALS
HEART RATE: 94 BPM | HEIGHT: 68 IN | TEMPERATURE: 97.8 F | WEIGHT: 195 LBS | OXYGEN SATURATION: 96 % | SYSTOLIC BLOOD PRESSURE: 108 MMHG | DIASTOLIC BLOOD PRESSURE: 64 MMHG | BODY MASS INDEX: 29.55 KG/M2

## 2025-03-20 DIAGNOSIS — N18.30 STAGE 3 CHRONIC KIDNEY DISEASE, UNSPECIFIED WHETHER STAGE 3A OR 3B CKD (HCC): ICD-10-CM

## 2025-03-20 DIAGNOSIS — E11.22 TYPE 2 DIABETES MELLITUS WITH STAGE 3 CHRONIC KIDNEY DISEASE, WITHOUT LONG-TERM CURRENT USE OF INSULIN, UNSPECIFIED WHETHER STAGE 3A OR 3B CKD (HCC): ICD-10-CM

## 2025-03-20 DIAGNOSIS — M17.0 PRIMARY OSTEOARTHRITIS OF BOTH KNEES: Primary | ICD-10-CM

## 2025-03-20 DIAGNOSIS — N18.30 TYPE 2 DIABETES MELLITUS WITH STAGE 3 CHRONIC KIDNEY DISEASE, WITHOUT LONG-TERM CURRENT USE OF INSULIN, UNSPECIFIED WHETHER STAGE 3A OR 3B CKD (HCC): ICD-10-CM

## 2025-03-20 PROBLEM — R00.1 DRUG-INDUCED BRADYCARDIA: Status: RESOLVED | Noted: 2023-03-31 | Resolved: 2025-03-20

## 2025-03-20 PROBLEM — T50.905A DRUG-INDUCED BRADYCARDIA: Status: RESOLVED | Noted: 2023-03-31 | Resolved: 2025-03-20

## 2025-03-20 PROBLEM — R09.81 NASAL SINUS CONGESTION: Status: RESOLVED | Noted: 2023-07-07 | Resolved: 2025-03-20

## 2025-03-20 PROCEDURE — 99214 OFFICE O/P EST MOD 30 MIN: CPT | Performed by: FAMILY MEDICINE

## 2025-03-20 PROCEDURE — G2211 COMPLEX E/M VISIT ADD ON: HCPCS | Performed by: FAMILY MEDICINE

## 2025-03-20 NOTE — ASSESSMENT & PLAN NOTE
Lab Results   Component Value Date    EGFR 47 02/04/2025    EGFR 42 03/09/2024    EGFR 47 12/27/2023    CREATININE 1.37 (H) 02/04/2025    CREATININE 1.51 (H) 03/09/2024    CREATININE 1.38 (H) 12/27/2023   CKD stable, will monitor labs.

## 2025-03-20 NOTE — PROGRESS NOTES
Name: Jay Stout      : 1940      MRN: [id number]  Encounter Provider: Adrienne Richard MD  Encounter Date: 3/20/2025   Encounter department: [facility]  :  Assessment & Plan  Type 2 diabetes mellitus with stage 3 chronic kidney disease, without long-term current use of insulin, unspecified whether stage 3a or 3b CKD (HCC)  A1c due in May  He reports normal fasting glucoses  Continue Metformin 1000 mg BID  Lab Results   Component Value Date    HGBA1C 7.8 (H) 2025     Orders:  •  metFORMIN (GLUCOPHAGE) 500 mg tablet; Take 2 tablets (1,000 mg total) by mouth 2 (two) times a day with meals  •  Hemoglobin A1C; Future    Stage 3 chronic kidney disease, unspecified whether stage 3a or 3b CKD (HCC)  Lab Results   Component Value Date    EGFR 47 2025    EGFR 42 2024    EGFR 47 2023    CREATININE 1.37 (H) 2025    CREATININE 1.51 (H) 2024    CREATININE 1.38 (H) 2023   CKD stable, will monitor labs.        Primary osteoarthritis of both knees  Seeing Orthopedics later this month              History of Present Illness   Here for diabetic check up. Fasting glucose 120-130s.  Not due for A1c until May.  Metformin was increased to 1000 mg Bid in December. Med list is incorrect. He has been taking 1000 mg BID.  He is going to see ortho for knee pain, is considering knee surgery.     Diabetes  Pertinent negatives for hypoglycemia include no headaches. Pertinent negatives for diabetes include no chest pain and no fatigue.     Review of Systems   Constitutional:  Negative for activity change, appetite change, fatigue and unexpected weight change.   Respiratory:  Negative for chest tightness and shortness of breath.    Cardiovascular:  Negative for chest pain and leg swelling.   Gastrointestinal:  Negative for abdominal pain.   Musculoskeletal:  Positive for arthralgias.   Neurological:  Negative for headaches.       Objective   /64   Pulse 94    "Temp 97.8 °F (36.6 °C)   Ht 5' 8\" (1.727 m)   Wt 88.5 kg (195 lb)   SpO2 96%   BMI 29.65 kg/m²      Physical Exam  Vitals and nursing note reviewed.   Constitutional:       General: He is not in acute distress.     Appearance: He is well-developed. He is not diaphoretic.   HENT:      Head: Normocephalic and atraumatic.      Right Ear: External ear normal.      Left Ear: External ear normal.   Cardiovascular:      Rate and Rhythm: Normal rate and regular rhythm.      Heart sounds: Normal heart sounds. No murmur heard.     No friction rub. No gallop.   Pulmonary:      Effort: Pulmonary effort is normal. No respiratory distress.      Breath sounds: Normal breath sounds. No stridor. No wheezing or rales.   Skin:     Findings: No rash.   Neurological:      General: No focal deficit present.      Mental Status: He is alert.   Psychiatric:         Mood and Affect: Mood normal.         Behavior: Behavior normal.         Thought Content: Thought content normal.         Judgment: Judgment normal.         "

## 2025-03-20 NOTE — PROGRESS NOTES
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 Denies symptoms at this time  Follow up in 6 weeks with repeat wound check            History of Present Illness   Jay Stout is a 84 y.o. male who presents for follow up.    Patient underwent circumcision and biopsy as above. Healing well. Denies any new or worsening symptoms today.     Review of Systems   Constitutional:  Negative for activity change, appetite change, chills and fever.   HENT:  Negative for congestion and trouble swallowing.    Respiratory:  Negative for cough and shortness of breath.    Cardiovascular:  Negative for chest pain, palpitations and leg swelling.   Gastrointestinal:  Negative for abdominal pain, constipation, diarrhea, nausea and vomiting.   Genitourinary:  Negative for difficulty urinating, dysuria, flank pain, frequency, hematuria and urgency.   Musculoskeletal:  Negative for back pain and gait problem.   Skin:  Negative for wound.   Allergic/Immunologic: Negative for immunocompromised state.   Neurological:  Negative for dizziness and syncope.   Hematological:  Does not bruise/bleed easily.   Psychiatric/Behavioral:  Negative for confusion.    All other systems reviewed and are negative.         Objective   /76 (BP Location: Left arm, Patient Position: Sitting, Cuff Size: Large)   Pulse 57   Temp 97.6 °F (36.4 °C) (Tympanic)   Ht 5' 8\" (1.727 m)   Wt 88.5 kg (195 lb)   SpO2 97%   BMI 29.65 kg/m²     Physical Exam  Constitutional:       Appearance: Normal appearance.   HENT:      Head: Normocephalic.      Nose: Nose normal.      Mouth/Throat:      Pharynx: Oropharynx is clear.   Eyes:      " Extraocular Movements: Extraocular movements intact.      Pupils: Pupils are equal, round, and reactive to light.   Pulmonary:      Effort: Pulmonary effort is normal.   Genitourinary:     Comments: Biopsy site and incisions well healing  Musculoskeletal:         General: Normal range of motion.      Cervical back: Normal range of motion.   Skin:     General: Skin is warm.   Neurological:      General: No focal deficit present.      Mental Status: He is alert and oriented to person, place, and time. Mental status is at baseline.   Psychiatric:         Mood and Affect: Mood normal.         Behavior: Behavior normal.         Thought Content: Thought content normal.         Judgment: Judgment normal.           Results   Lab Results   Component Value Date    PSA 1.8 12/02/2020    PSA 1.3 06/26/2018     Lab Results   Component Value Date    CALCIUM 9.1 02/04/2025    K 4.6 02/04/2025    CO2 24 02/04/2025     02/04/2025    BUN 21 02/04/2025    CREATININE 1.37 (H) 02/04/2025     Lab Results   Component Value Date    WBC 8.07 02/04/2025    HGB 13.3 02/04/2025    HCT 41.2 02/04/2025     (H) 02/04/2025     02/04/2025       Office Urine Dip  No results found for this or any previous visit (from the past hour).

## 2025-03-20 NOTE — ASSESSMENT & PLAN NOTE
A1c due in May  He reports normal fasting glucoses  Continue Metformin 1000 mg BID  Lab Results   Component Value Date    HGBA1C 7.8 (H) 02/04/2025     Orders:  •  metFORMIN (GLUCOPHAGE) 500 mg tablet; Take 2 tablets (1,000 mg total) by mouth 2 (two) times a day with meals  •  Hemoglobin A1C; Future

## 2025-03-21 ENCOUNTER — OFFICE VISIT (OUTPATIENT)
Dept: UROLOGY | Facility: CLINIC | Age: 85
End: 2025-03-21
Payer: MEDICARE

## 2025-03-21 ENCOUNTER — TELEPHONE (OUTPATIENT)
Dept: ADMINISTRATIVE | Facility: OTHER | Age: 85
End: 2025-03-21

## 2025-03-21 VITALS
HEIGHT: 68 IN | WEIGHT: 195 LBS | HEART RATE: 57 BPM | OXYGEN SATURATION: 97 % | SYSTOLIC BLOOD PRESSURE: 126 MMHG | TEMPERATURE: 97.6 F | DIASTOLIC BLOOD PRESSURE: 76 MMHG | BODY MASS INDEX: 29.55 KG/M2

## 2025-03-21 DIAGNOSIS — N48.1 BALANITIS: Primary | ICD-10-CM

## 2025-03-21 PROCEDURE — 99213 OFFICE O/P EST LOW 20 MIN: CPT | Performed by: PHYSICIAN ASSISTANT

## 2025-03-21 NOTE — TELEPHONE ENCOUNTER
----- Message from Leti DRUMMOND sent at 3/20/2025  9:24 AM EDT -----  Regarding: [employer] Request  03/20/25 9:24 AM    Hello, our patient Jay Stout has had Diabetic Eye Exam completed/performed. Please assist in updating the patient chart by making an External outreach to [facility] facility located in [place].    Thank you,  Leti Murillo MA  [facility]

## 2025-03-21 NOTE — TELEPHONE ENCOUNTER
Upon review of the In Basket request and the patient's chart, initial outreach has been made via fax to facility. Please see Contacts section for details.     Thank you  Haley Lim MA

## 2025-03-25 NOTE — TELEPHONE ENCOUNTER
Upon review of the In Basket request we  have the patient was last seen in their office in 2020.    Any additional questions or concerns should be emailed to the Practice Liaisons via the appropriate education email address, please do not reply via In Basket.    Thank you  Haley Lim MA   [facility]

## 2025-03-31 ENCOUNTER — APPOINTMENT (OUTPATIENT)
Dept: RADIOLOGY | Facility: MEDICAL CENTER | Age: 85
End: 2025-03-31
Payer: MEDICARE

## 2025-03-31 ENCOUNTER — OFFICE VISIT (OUTPATIENT)
Dept: OBGYN CLINIC | Facility: MEDICAL CENTER | Age: 85
End: 2025-03-31
Payer: MEDICARE

## 2025-03-31 VITALS — BODY MASS INDEX: 29.55 KG/M2 | WEIGHT: 195 LBS | HEIGHT: 68 IN

## 2025-03-31 DIAGNOSIS — M17.12 PRIMARY OSTEOARTHRITIS OF LEFT KNEE: ICD-10-CM

## 2025-03-31 DIAGNOSIS — M17.11 PRIMARY OSTEOARTHRITIS OF RIGHT KNEE: ICD-10-CM

## 2025-03-31 DIAGNOSIS — M17.11 PRIMARY OSTEOARTHRITIS OF RIGHT KNEE: Primary | ICD-10-CM

## 2025-03-31 DIAGNOSIS — Z01.818 ENCOUNTER FOR PREADMISSION TESTING: ICD-10-CM

## 2025-03-31 PROCEDURE — 73564 X-RAY EXAM KNEE 4 OR MORE: CPT

## 2025-03-31 PROCEDURE — 99214 OFFICE O/P EST MOD 30 MIN: CPT | Performed by: ORTHOPAEDIC SURGERY

## 2025-03-31 RX ORDER — CEFAZOLIN SODIUM 2 G/50ML
2000 SOLUTION INTRAVENOUS ONCE
OUTPATIENT
Start: 2025-03-31 | End: 2025-03-31

## 2025-03-31 RX ORDER — CHLORHEXIDINE GLUCONATE ORAL RINSE 1.2 MG/ML
15 SOLUTION DENTAL ONCE
OUTPATIENT
Start: 2025-03-31 | End: 2025-03-31

## 2025-03-31 RX ORDER — ACETAMINOPHEN 325 MG/1
650 TABLET ORAL EVERY 6 HOURS PRN
COMMUNITY

## 2025-03-31 RX ORDER — TRANEXAMIC ACID 10 MG/ML
1000 INJECTION, SOLUTION INTRAVENOUS ONCE
OUTPATIENT
Start: 2025-03-31 | End: 2025-03-31

## 2025-03-31 NOTE — PROGRESS NOTES
Orthopaedic Surgery - Office Note  Jay Stout (84 y.o. male)   : 1940   MRN: [id number]  Encounter Date: 3/31/2025    Assessment / Plan  Bilateral knee OA, pain worse R>L    The diagnosis and treatment options were reviewed.  The patient wishes to proceed with RIGHT TKA.  Scheduled for 2025  The risks, benefits, and alternatives were discussed.  Electronic consent was obtained.  Strongly recommended that he work on stretching prior to surgery to avoid post operative stiffness (Right ROM 0-110 and Left ROM 0-95 today)  Continue with HEP as tolerated  Low impact activity as tolerated, avoid painful activity  Ice, heat and anti-inflammatories prn   Ordered and reviewed XR during the visit   Follow-up:  Return for 2 week post op with Danis.      Chief Complaint / Date of Onset  Bilaeral knee pain, known OA, pain worse R>L   Injury Mechanism / Date  None  Surgery / Date  None    History of Present Illness   Jay Stout is a 84 y.o. male who presents for evaluation of bilateral knee OA. He was recently treated by Dr. Delgado. He has been having pain for many years with no inciting event. He has been treated by Dr. Delgado who has given him many CSI with mild relief. He has also completed many rounds of PT which have been mildly beneficial. He do feel the pain has become more limiting over time. He uses modification to adjust to things that cause pain. He has increased pain with WB and prolong flexion. He does have notable stiffness bilaterally. He has completed a few rounds of PT and is complaint with his HEP. He is interested in discussing a robotic assisted TKA today.     Treatment Summary  Medications / Modalities  Tylenol prn   Bracing / Immobilization  None  Physical Therapy  Completed 3+ months in the fall of   Complaint with HEP  Injections  Most recent B/L CSI on 2024   Long standing history of bilateral CSI since   Prior Surgeries  None  Other Treatments  None    Review  "of Systems  Pertinent items are noted in HPI.  All other systems were reviewed and are negative.      Physical Exam  Ht 5' 8\" (1.727 m)   Wt 88.5 kg (195 lb)   BMI 29.65 kg/m²   Cons: Appears well.  No apparent distress.  Psych: Alert. Oriented x3.  Mood and affect normal.  Eyes: PERRLA, EOMI  Resp: Normal effort.  No audible wheezing or stridor.  CV: Palpable pulse.  No discernable arrhythmia.  No LE edema.  Lymph:  No palpable cervical, axillary, or inguinal lymphadenopathy.  Skin: Warm.  No palpable masses.  No visible lesions.  Neuro: Normal muscle tone.  Normal and symmetric DTR's.     Bilateral Knee Exam  Alignment:  Normal knee alignment.  Inspection:   no swelling. No erythema.  Palpation:   mild joint line tenderness. small effusion.  ROM:  Knee Extension 0. Knee Flexion 110. Left ROM 0-95  Strength:  Able to actively extend knee against gravity.  Stability:  No objective knee instability. Stable Varus / Valgus stress, Lachman, and Posterior drawer.  Tests:  No pertinent positive or negative tests.  Patella:  Patella tracks centrally with crepitus.  Neurovascular:  Sensation intact in DP/SP/Pedroza/Sa/T nerve distributions.  2+ DP & PT pulses.  Gait:  Smooth.       Studies Reviewed  I have personally reviewed pertinent films in PACS.  XR of bilateral knee - images from 3/31/2025 showing advanced arthritic changes bilaterally but more advanced in the right knee       Procedures  No procedures today.    Medical, Surgical, Family, and Social History  The patient's medical history, family history, and social history, were reviewed and updated as appropriate.    Past Medical History:   Diagnosis Date    Diabetes mellitus (HCC)     diet control    Heart disease 05/09/2020    Heart attack    Hyperlipidemia     Myocardial infarction (HCC)     Penile lesion     Prediabetes        Past Surgical History:   Procedure Laterality Date    CARDIAC SURGERY  05/2020    stent placement    COLONOSCOPY      HERNIA REPAIR      " LARYNGOSCOPY  1978    with vocal cord polyp removal    LARYNGOSCOPY N/A 8/3/2021    Procedure: MICRODIRECT LARYNGOSCOPY WITH  EXCISION OF VOCAL CORD LESION;  Surgeon: Pito Tomlin DO;  Location: [facility];  Service: ENT    VA CIRCUMCISION AGE >28 DAYS N/A 2/20/2025    Procedure: CIRCUMCISION ADULT;  Surgeon: Saurav Gillis DO;  Location: [facility];  Service: Urology    VA EXC B9 LESION MRGN XCP SK TG S/N/H/F/G > 4.0CM N/A 2/20/2025    Procedure: EXCISION BIOPSY LESION/MASS PENILE;  Surgeon: Saurav Gillis DO;  Location: [facility];  Service: Urology       Family History   Problem Relation Age of Onset    Heart disease Mother         Cardiac Disorder    No Known Problems Father     No Known Problems Sister        Social History     Occupational History    Not on file   Tobacco Use    Smoking status: Former     Current packs/day: 1.00     Average packs/day: 1 pack/day for 25.0 years (25.0 ttl pk-yrs)     Types: Cigarettes     Passive exposure: Never    Smokeless tobacco: Never   Vaping Use    Vaping status: Never Used   Substance and Sexual Activity    Alcohol use: Not Currently     Comment: Social twice a month    Drug use: Never    Sexual activity: Yes       Allergies   Allergen Reactions    Celecoxib      Other reaction(s): itchy  Other reaction(s): itchy         Current Outpatient Medications:     acetaminophen (TYLENOL) 325 mg tablet, Take 650 mg by mouth every 6 (six) hours as needed for mild pain, Disp: , Rfl:     atorvastatin (LIPITOR) 40 mg tablet, Take 1 tablet (40 mg total) by mouth daily, Disp: 90 tablet, Rfl: 1    Blood Glucose Monitoring Suppl (FREESTYLE FREEDOM LITE) w/Device KIT, Check glucose levels once daily, Disp: 1 each, Rfl: 0    chlorhexidine (PERIDEX) 0.12 % solution, SWISH AND SPIT 15 ML TWICE A DAY, Disp: , Rfl:     Diclofenac Sodium (VOLTAREN) 1 %, Apply 2 g topically 4 (four) times a day, Disp: 100 g, Rfl: 1    fluticasone (FLONASE) 50 mcg/act nasal spray, 1 spray into  each nostril daily, Disp: 48 g, Rfl: 1    glucose blood (FREESTYLE LITE) test strip, Use 1 each in the morning, Disp: 100 strip, Rfl: 11    Lancets (FREESTYLE) lancets, TEST ONCE DAILY FASTING DXE11.9, Disp: 100 each, Rfl: 3    levocetirizine (XYZAL) 5 MG tablet, Take 1 tablet (5 mg total) by mouth every evening, Disp: 10 tablet, Rfl: 1    metFORMIN (GLUCOPHAGE) 500 mg tablet, Take 2 tablets (1,000 mg total) by mouth 2 (two) times a day with meals, Disp: 270 tablet, Rfl: 2    metoprolol tartrate (LOPRESSOR) 25 mg tablet, TAKE 1 TABLET (25 MG TOTAL) BY MOUTH EVERY 12 (TWELVE) HOURS, Disp: 180 tablet, Rfl: 1    pantoprazole (PROTONIX) 40 mg tablet, TAKE 1 TABLET (40 MG TOTAL) BY MOUTH DAILY TAKE IN MORNING, Disp: 90 tablet, Rfl: 1    tamsulosin (FLOMAX) 0.4 mg, TAKE 1 CAPSULE BY MOUTH EVERY DAY WITH DINNER, Disp: 90 capsule, Rfl: 1    aspirin (ECOTRIN LOW STRENGTH) 81 mg EC tablet, Take 1 tablet (81 mg total) by mouth daily, Disp: 60 tablet, Rfl: 0      Gladys Sigala    Scribe Attestation      I,:  Gladys Sigala am acting as a scribe while in the presence of the attending physician.:       I,:  Mayank Oropeza MD personally performed the services described in this documentation    as scribed in my presence.:

## 2025-03-31 NOTE — H&P (VIEW-ONLY)
Orthopaedic Surgery - Office Note  Jay Stout (84 y.o. male)   : 1940   MRN: [id number]  Encounter Date: 3/31/2025    Assessment / Plan  Bilateral knee OA, pain worse R>L    The diagnosis and treatment options were reviewed.  The patient wishes to proceed with RIGHT TKA.  Scheduled for 2025  The risks, benefits, and alternatives were discussed.  Electronic consent was obtained.  Strongly recommended that he work on stretching prior to surgery to avoid post operative stiffness (Right ROM 0-110 and Left ROM 0-95 today)  Continue with HEP as tolerated  Low impact activity as tolerated, avoid painful activity  Ice, heat and anti-inflammatories prn   Ordered and reviewed XR during the visit   Follow-up:  Return for 2 week post op with Danis.      Chief Complaint / Date of Onset  Bilaeral knee pain, known OA, pain worse R>L   Injury Mechanism / Date  None  Surgery / Date  None    History of Present Illness   Jay Stout is a 84 y.o. male who presents for evaluation of bilateral knee OA. He was recently treated by Dr. Delgado. He has been having pain for many years with no inciting event. He has been treated by Dr. Delgado who has given him many CSI with mild relief. He has also completed many rounds of PT which have been mildly beneficial. He do feel the pain has become more limiting over time. He uses modification to adjust to things that cause pain. He has increased pain with WB and prolong flexion. He does have notable stiffness bilaterally. He has completed a few rounds of PT and is complaint with his HEP. He is interested in discussing a robotic assisted TKA today.     Treatment Summary  Medications / Modalities  Tylenol prn   Bracing / Immobilization  None  Physical Therapy  Completed 3+ months in the fall of   Complaint with HEP  Injections  Most recent B/L CSI on 2024   Long standing history of bilateral CSI since   Prior Surgeries  None  Other Treatments  None    Review  "of Systems  Pertinent items are noted in HPI.  All other systems were reviewed and are negative.      Physical Exam  Ht 5' 8\" (1.727 m)   Wt 88.5 kg (195 lb)   BMI 29.65 kg/m²   Cons: Appears well.  No apparent distress.  Psych: Alert. Oriented x3.  Mood and affect normal.  Eyes: PERRLA, EOMI  Resp: Normal effort.  No audible wheezing or stridor.  CV: Palpable pulse.  No discernable arrhythmia.  No LE edema.  Lymph:  No palpable cervical, axillary, or inguinal lymphadenopathy.  Skin: Warm.  No palpable masses.  No visible lesions.  Neuro: Normal muscle tone.  Normal and symmetric DTR's.     Bilateral Knee Exam  Alignment:  Normal knee alignment.  Inspection:   no swelling. No erythema.  Palpation:   mild joint line tenderness. small effusion.  ROM:  Knee Extension 0. Knee Flexion 110. Left ROM 0-95  Strength:  Able to actively extend knee against gravity.  Stability:  No objective knee instability. Stable Varus / Valgus stress, Lachman, and Posterior drawer.  Tests:  No pertinent positive or negative tests.  Patella:  Patella tracks centrally with crepitus.  Neurovascular:  Sensation intact in DP/SP/Pedroza/Sa/T nerve distributions.  2+ DP & PT pulses.  Gait:  Smooth.       Studies Reviewed  I have personally reviewed pertinent films in PACS.  XR of bilateral knee - images from 3/31/2025 showing advanced arthritic changes bilaterally but more advanced in the right knee       Procedures  No procedures today.    Medical, Surgical, Family, and Social History  The patient's medical history, family history, and social history, were reviewed and updated as appropriate.    Past Medical History:   Diagnosis Date    Diabetes mellitus (HCC)     diet control    Heart disease 05/09/2020    Heart attack    Hyperlipidemia     Myocardial infarction (HCC)     Penile lesion     Prediabetes        Past Surgical History:   Procedure Laterality Date    CARDIAC SURGERY  05/2020    stent placement    COLONOSCOPY      HERNIA REPAIR      " LARYNGOSCOPY  1978    with vocal cord polyp removal    LARYNGOSCOPY N/A 8/3/2021    Procedure: MICRODIRECT LARYNGOSCOPY WITH  EXCISION OF VOCAL CORD LESION;  Surgeon: Pito Tomlin DO;  Location[facility];  Service: ENT    VT CIRCUMCISION AGE >28 DAYS N/A 2/20/2025    Procedure: CIRCUMCISION ADULT;  Surgeon: Saurav Gillis DO;  [facility];  Service: Urology    VT EXC B9 LESION MRGN XCP SK TG S/N/H/F/G > 4.0CM N/A 2/20/2025    Procedure: EXCISION BIOPSY LESION/MASS PENILE;  Surgeon: Saurav Gillis DO;  [facility];  Service: Urology       Family History   Problem Relation Age of Onset    Heart disease Mother         Cardiac Disorder    No Known Problems Father     No Known Problems Sister        Social History     Occupational History    Not on file   Tobacco Use    Smoking status: Former     Current packs/day: 1.00     Average packs/day: 1 pack/day for 25.0 years (25.0 ttl pk-yrs)     Types: Cigarettes     Passive exposure: Never    Smokeless tobacco: Never   Vaping Use    Vaping status: Never Used   Substance and Sexual Activity    Alcohol use: Not Currently     Comment: Social twice a month    Drug use: Never    Sexual activity: Yes       Allergies   Allergen Reactions    Celecoxib      Other reaction(s): itchy  Other reaction(s): itchy         Current Outpatient Medications:     acetaminophen (TYLENOL) 325 mg tablet, Take 650 mg by mouth every 6 (six) hours as needed for mild pain, Disp: , Rfl:     atorvastatin (LIPITOR) 40 mg tablet, Take 1 tablet (40 mg total) by mouth daily, Disp: 90 tablet, Rfl: 1    Blood Glucose Monitoring Suppl (FREESTYLE FREEDOM LITE) w/Device KIT, Check glucose levels once daily, Disp: 1 each, Rfl: 0    chlorhexidine (PERIDEX) 0.12 % solution, SWISH AND SPIT 15 ML TWICE A DAY, Disp: , Rfl:     Diclofenac Sodium (VOLTAREN) 1 %, Apply 2 g topically 4 (four) times a day, Disp: 100 g, Rfl: 1    fluticasone (FLONASE) 50 mcg/act nasal spray, 1 spray into  each nostril daily, Disp: 48 g, Rfl: 1    glucose blood (FREESTYLE LITE) test strip, Use 1 each in the morning, Disp: 100 strip, Rfl: 11    Lancets (FREESTYLE) lancets, TEST ONCE DAILY FASTING DXE11.9, Disp: 100 each, Rfl: 3    levocetirizine (XYZAL) 5 MG tablet, Take 1 tablet (5 mg total) by mouth every evening, Disp: 10 tablet, Rfl: 1    metFORMIN (GLUCOPHAGE) 500 mg tablet, Take 2 tablets (1,000 mg total) by mouth 2 (two) times a day with meals, Disp: 270 tablet, Rfl: 2    metoprolol tartrate (LOPRESSOR) 25 mg tablet, TAKE 1 TABLET (25 MG TOTAL) BY MOUTH EVERY 12 (TWELVE) HOURS, Disp: 180 tablet, Rfl: 1    pantoprazole (PROTONIX) 40 mg tablet, TAKE 1 TABLET (40 MG TOTAL) BY MOUTH DAILY TAKE IN MORNING, Disp: 90 tablet, Rfl: 1    tamsulosin (FLOMAX) 0.4 mg, TAKE 1 CAPSULE BY MOUTH EVERY DAY WITH DINNER, Disp: 90 capsule, Rfl: 1    aspirin (ECOTRIN LOW STRENGTH) 81 mg EC tablet, Take 1 tablet (81 mg total) by mouth daily, Disp: 60 tablet, Rfl: 0      Gladys Sigala    Scribe Attestation      I,:  Gladys Sigala am acting as a scribe while in the presence of the attending physician.:       I,:  Mayank Oropeza MD personally performed the services described in this documentation    as scribed in my presence.:

## 2025-04-01 ENCOUNTER — OFFICE VISIT (OUTPATIENT)
Dept: LAB | Facility: HOSPITAL | Age: 85
End: 2025-04-01
Payer: MEDICARE

## 2025-04-01 ENCOUNTER — APPOINTMENT (OUTPATIENT)
Dept: LAB | Facility: HOSPITAL | Age: 85
End: 2025-04-01
Payer: MEDICARE

## 2025-04-01 DIAGNOSIS — Z01.818 ENCOUNTER FOR PREADMISSION TESTING: ICD-10-CM

## 2025-04-01 DIAGNOSIS — M17.11 PRIMARY OSTEOARTHRITIS OF RIGHT KNEE: ICD-10-CM

## 2025-04-01 LAB
ALBUMIN SERPL BCG-MCNC: 4.1 G/DL (ref 3.5–5)
ALP SERPL-CCNC: 36 U/L (ref 34–104)
ALT SERPL W P-5'-P-CCNC: 12 U/L (ref 7–52)
ANION GAP SERPL CALCULATED.3IONS-SCNC: 9 MMOL/L (ref 4–13)
APTT PPP: 33 SECONDS (ref 23–34)
AST SERPL W P-5'-P-CCNC: 14 U/L (ref 13–39)
ATRIAL RATE: 0 BPM
ATRIAL RATE: 70 BPM
BASOPHILS # BLD AUTO: 0.05 THOUSANDS/ÂΜL (ref 0–0.1)
BASOPHILS NFR BLD AUTO: 1 % (ref 0–1)
BILIRUB SERPL-MCNC: 0.46 MG/DL (ref 0.2–1)
BUN SERPL-MCNC: 21 MG/DL (ref 5–25)
CALCIUM SERPL-MCNC: 8.7 MG/DL (ref 8.4–10.2)
CHLORIDE SERPL-SCNC: 109 MMOL/L (ref 96–108)
CO2 SERPL-SCNC: 24 MMOL/L (ref 21–32)
CREAT SERPL-MCNC: 1.27 MG/DL (ref 0.6–1.3)
EOSINOPHIL # BLD AUTO: 0.25 THOUSAND/ÂΜL (ref 0–0.61)
EOSINOPHIL NFR BLD AUTO: 3 % (ref 0–6)
ERYTHROCYTE [DISTWIDTH] IN BLOOD BY AUTOMATED COUNT: 12.3 % (ref 11.6–15.1)
EST. AVERAGE GLUCOSE BLD GHB EST-MCNC: 166 MG/DL
FERRITIN SERPL-MCNC: 107 NG/ML (ref 24–336)
GFR SERPL CREATININE-BSD FRML MDRD: 51 ML/MIN/1.73SQ M
GLUCOSE P FAST SERPL-MCNC: 145 MG/DL (ref 65–99)
HBA1C MFR BLD: 7.4 %
HCT VFR BLD AUTO: 38.4 % (ref 36.5–49.3)
HGB BLD-MCNC: 12.3 G/DL (ref 12–17)
IMM GRANULOCYTES # BLD AUTO: 0.03 THOUSAND/UL (ref 0–0.2)
IMM GRANULOCYTES NFR BLD AUTO: 0 % (ref 0–2)
INR PPP: 1.03 (ref 0.85–1.19)
IRON SATN MFR SERPL: 27 % (ref 15–50)
IRON SERPL-MCNC: 67 UG/DL (ref 50–212)
LYMPHOCYTES # BLD AUTO: 2.68 THOUSANDS/ÂΜL (ref 0.6–4.47)
LYMPHOCYTES NFR BLD AUTO: 36 % (ref 14–44)
MCH RBC QN AUTO: 32.2 PG (ref 26.8–34.3)
MCHC RBC AUTO-ENTMCNC: 32 G/DL (ref 31.4–37.4)
MCV RBC AUTO: 101 FL (ref 82–98)
MONOCYTES # BLD AUTO: 0.68 THOUSAND/ÂΜL (ref 0.17–1.22)
MONOCYTES NFR BLD AUTO: 9 % (ref 4–12)
NEUTROPHILS # BLD AUTO: 3.78 THOUSANDS/ÂΜL (ref 1.85–7.62)
NEUTS SEG NFR BLD AUTO: 51 % (ref 43–75)
NRBC BLD AUTO-RTO: 0 /100 WBCS
P AXIS: 0 DEGREES
P AXIS: 29 DEGREES
PLATELET # BLD AUTO: 184 THOUSANDS/UL (ref 149–390)
PMV BLD AUTO: 9.9 FL (ref 8.9–12.7)
POTASSIUM SERPL-SCNC: 4.4 MMOL/L (ref 3.5–5.3)
PR INTERVAL: 144 MS
PROT SERPL-MCNC: 6.7 G/DL (ref 6.4–8.4)
PROTHROMBIN TIME: 14.2 SECONDS (ref 12.3–15)
QRS AXIS: -16 DEGREES
QRS AXIS: 0 DEGREES
QRSD INTERVAL: 0 MS
QRSD INTERVAL: 136 MS
QT INTERVAL: 0 MS
QT INTERVAL: 440 MS
QTC INTERVAL: 0 MS
QTC INTERVAL: 475 MS
RBC # BLD AUTO: 3.82 MILLION/UL (ref 3.88–5.62)
RETICS # AUTO: NORMAL 10*3/UL (ref 14356–105094)
RETICS # CALC: 1.62 % (ref 0.37–1.87)
SODIUM SERPL-SCNC: 142 MMOL/L (ref 135–147)
T WAVE AXIS: 0 DEGREES
T WAVE AXIS: 86 DEGREES
TIBC SERPL-MCNC: 247.8 UG/DL (ref 250–450)
TRANSFERRIN SERPL-MCNC: 177 MG/DL (ref 203–362)
UIBC SERPL-MCNC: 181 UG/DL (ref 155–355)
VENTRICULAR RATE: 0 BPM
VENTRICULAR RATE: 70 BPM
WBC # BLD AUTO: 7.47 THOUSAND/UL (ref 4.31–10.16)

## 2025-04-01 PROCEDURE — 85730 THROMBOPLASTIN TIME PARTIAL: CPT

## 2025-04-01 PROCEDURE — 85610 PROTHROMBIN TIME: CPT

## 2025-04-01 PROCEDURE — 80053 COMPREHEN METABOLIC PANEL: CPT

## 2025-04-01 PROCEDURE — 83036 HEMOGLOBIN GLYCOSYLATED A1C: CPT

## 2025-04-01 PROCEDURE — 82728 ASSAY OF FERRITIN: CPT

## 2025-04-01 PROCEDURE — 87081 CULTURE SCREEN ONLY: CPT

## 2025-04-01 PROCEDURE — 93010 ELECTROCARDIOGRAM REPORT: CPT | Performed by: INTERNAL MEDICINE

## 2025-04-01 PROCEDURE — 83550 IRON BINDING TEST: CPT

## 2025-04-01 PROCEDURE — 36415 COLL VENOUS BLD VENIPUNCTURE: CPT

## 2025-04-01 PROCEDURE — 93005 ELECTROCARDIOGRAM TRACING: CPT

## 2025-04-01 PROCEDURE — 85045 AUTOMATED RETICULOCYTE COUNT: CPT

## 2025-04-01 PROCEDURE — 83540 ASSAY OF IRON: CPT

## 2025-04-01 PROCEDURE — 85025 COMPLETE CBC W/AUTO DIFF WBC: CPT

## 2025-04-02 LAB — MRSA NOSE QL CULT: NORMAL

## 2025-04-03 ENCOUNTER — TELEPHONE (OUTPATIENT)
Dept: OBGYN CLINIC | Facility: HOSPITAL | Age: 85
End: 2025-04-03

## 2025-04-03 NOTE — TELEPHONE ENCOUNTER
"Preoperative Elective Admission Assessment    EKG/LAB/MRSA SWAB/CXR date: completed     Living Situation:    Who does pt live with: significant other  What kind of home: single level  How do they enter the home: front  How many levels in home: 1   # of steps to enter home: 0, walkway only  Sleeping arrangement: first/entry floor  Where is Bathroom: first floor, walk in shower w/ grab bars and built in toilet  Toilet height? Concerns for low toilets: \"Comfort height toilets\"      First Floor Setup:   Is there a bathroom: Yes  Where would pt sleep: bed     DME: DME ordered, RW 4/3/25. Instructed to bring RW on DOS.   We discussed clearing pathways in the home and making sure there is accessibly to use the walker, for example, removing throw rugs.      Patient's Current Level of Function: Ambulates: Independently and ADLs: Independent    Post-op Caregiver: significant other and friends. Kids are spread out distance/living wise but can possibly help if needed.   Caregiver Name and phone number for Inpatient discharge needs: rohit (significant other)  Currently receive any HHC/aides/community supports: No     Post-op Transport: significant other  To/from hospital: significant other  To/from PT 2-3x/week: significant other  Uses community transport now: No     Outpatient Physical Therapy Site:  Site: [facility]  pre and post-op appts scheduled? Yes     Medication Management: self or assistance from sig other   Preferred Pharmacy for Post-op Medications: [id number] - [place] - [address], [address] [[id number]]   Blood Management Vitamin Regimen: Pt confirms taking as prescribed -- Per pt, picked up OTC and began taking per list given at office.   Post-op anticoagulant: to be determined by surgical team postoperatively, currently on aspirin  Has Bactroban for 5 days preop: No, needs script. Will route to surgeon  Educated on Preoperative Bathing Instructions, and use of Soap for 5 days before " surgery.      DC Plan: Pt plans to be discharged home, discussed potential same day    Barriers to DC identified preoperatively: none identified    BMI: 29.65    Patient Education:  Pt educated on post-op pain, early mobilization (POD0), LOS goals, OP PT goals, and preoperative bathing. Patient educated that our goal is to appropriately discharge patient based off their post-op function while striving to maintain maximal independence. The goal is to discharge patient to home and for them to attend outpatient physical therapy.    Assigned to care team? Yes

## 2025-04-04 DIAGNOSIS — M17.11 PRIMARY OSTEOARTHRITIS OF RIGHT KNEE: Primary | ICD-10-CM

## 2025-04-04 RX ORDER — ASCORBIC ACID 500 MG
500 TABLET ORAL 2 TIMES DAILY
Qty: 60 TABLET | Refills: 0 | Status: SHIPPED | OUTPATIENT
Start: 2025-04-04 | End: 2025-05-04

## 2025-04-04 RX ORDER — MUPIROCIN 20 MG/G
OINTMENT TOPICAL 2 TIMES DAILY
Qty: 15 G | Refills: 0 | Status: SHIPPED | OUTPATIENT
Start: 2025-04-04

## 2025-04-04 RX ORDER — FOLIC ACID 1 MG/1
1 TABLET ORAL DAILY
Qty: 30 TABLET | Refills: 0 | Status: SHIPPED | OUTPATIENT
Start: 2025-04-04 | End: 2025-05-04

## 2025-04-04 RX ORDER — FERROUS SULFATE 324(65)MG
324 TABLET, DELAYED RELEASE (ENTERIC COATED) ORAL
Qty: 60 TABLET | Refills: 0 | Status: SHIPPED | OUTPATIENT
Start: 2025-04-04 | End: 2025-05-04

## 2025-04-04 RX ORDER — MULTIVITAMIN
1 TABLET ORAL DAILY
Qty: 30 TABLET | Refills: 0 | Status: SHIPPED | OUTPATIENT
Start: 2025-04-04

## 2025-04-07 LAB
DME PARACHUTE DELIVERY DATE ACTUAL: NORMAL
DME PARACHUTE DELIVERY DATE EXPECTED: NORMAL
DME PARACHUTE DELIVERY DATE REQUESTED: NORMAL
DME PARACHUTE ITEM DESCRIPTION: NORMAL
DME PARACHUTE ORDER STATUS: NORMAL
DME PARACHUTE SUPPLIER NAME: NORMAL
DME PARACHUTE SUPPLIER PHONE: NORMAL

## 2025-04-08 ENCOUNTER — ANESTHESIA (OUTPATIENT)
Age: 85
End: 2025-04-08

## 2025-04-08 ENCOUNTER — ANESTHESIA EVENT (OUTPATIENT)
Age: 85
End: 2025-04-08

## 2025-04-08 PROBLEM — R05.3 CHRONIC COUGH: Status: RESOLVED | Noted: 2019-08-26 | Resolved: 2025-04-08

## 2025-04-08 RX ORDER — LORATADINE 10 MG/1
10 TABLET ORAL DAILY
COMMUNITY

## 2025-04-08 NOTE — PRE-PROCEDURE INSTRUCTIONS
Pre-Surgery Instructions:   Medication Instructions    acetaminophen (TYLENOL) 325 mg tablet Uses PRN- OK to take day of surgery    ascorbic acid (VITAMIN C) 500 MG tablet Hold day of surgery.    aspirin (ECOTRIN LOW STRENGTH) 81 mg EC tablet Hold day of surgery.pt takes with food therefore will not take DOS    atorvastatin (LIPITOR) 40 mg tablet Take night before surgery    Cholecalciferol (VITAMIN D-3 PO) Hold day of surgery.    ferrous sulfate 324 (65 Fe) mg Hold day of surgery.    loratadine (CLARITIN) 10 mg tablet Uses PRN- OK to take day of surgery    metFORMIN (GLUCOPHAGE) 500 mg tablet Hold day of surgery.    metoprolol tartrate (LOPRESSOR) 25 mg tablet Take day of surgery.    Multiple Vitamin (multivitamin) tablet Hold day of surgery.    pantoprazole (PROTONIX) 40 mg tablet Take day of surgery.    tamsulosin (FLOMAX) 0.4 mg Take night before surgery    Medication instructions for day of surgery reviewed. Please take all instructed medications with only a sip of water.       You will receive a call one business day prior to surgery with an arrival time and hospital directions. If your surgery is scheduled on a Monday, the hospital will be calling you on the Friday prior to your surgery. If you have not heard from anyone by 8pm, please call the hospital supervisor through the hospital  at [phone]. ([place] [phone] or [place] [phone]).    Do not eat or drink anything after midnight the night before your surgery, including candy, mints, lifesavers, or chewing gum. Do not drink alcohol 24hrs before your surgery. Try not to smoke at least 24hrs before your surgery.       Follow the pre surgery showering instructions as listed in the “My Surgical Experience Booklet” or otherwise provided by your surgeon's office. Do not use a blade to shave the surgical area 1 week before surgery. It is okay to use a clean electric clippers up to 24 hours before surgery. Do not apply any lotions, creams,  "including makeup, cologne, deodorant, or perfumes after showering on the day of your surgery. Do not use dry shampoo, hair spray, hair gel, or any type of hair products.     No contact lenses, eye make-up, or artificial eyelashes. Remove nail polish, including gel polish, and any artificial, gel, or acrylic nails if possible. Remove all jewelry including rings and body piercing jewelry.     Wear causal clothing that is easy to take on and off. Consider your type of surgery.    Keep any valuables, jewelry, piercings at home. Please bring any specially ordered equipment (sling, braces) if indicated.    Arrange for a responsible person to drive you to and from the hospital on the day of your surgery. Please confirm the visitor policy for the day of your procedure when you receive your phone call with an arrival time.     Call the surgeon's office with any new illnesses, exposures, or additional questions prior to surgery.    Please reference your “My Surgical Experience Booklet” for additional information to prepare for your upcoming surgery.  See Geriatric Assessment below...  Cognitive Assessment:   CAM:   TUG <15 sec:  Falls (last 6 months): no  Hand  score:  -Attempt 1:  -Attempt 2:  -Attempt 3:  Alfredo Total Score: 20  PHQ- 9 Depression Scale:0  Nutrition Assessment Score:14  METS:7.77  Incentive Spirometry Level:   Health goals:  -What are your overall health goals? (quit smoking, wt. loss, rest, decrease stress)  \"Lose some weight and no knee pain\"  -What brings you strength? (family, friends, Christian, health)  \"Laughter -Fun\"  -What activities are important to you? (exercise, reading, travel, work)               \"Travel-read-likes to write\"    "

## 2025-04-08 NOTE — ASSESSMENT & PLAN NOTE
Remote history  Stable since  F/b cardiology and seen in December  No change in symptoms  Functional capacity adequate

## 2025-04-08 NOTE — PATIENT INSTRUCTIONS
DO NOT STOP ASPIRIN    BEFORE SURGERY    Contact your surgical nurse navigator or surgical provider with any questions regarding preoperative plan or schedule.  Stop all over the counter supplements, herbal, naturopathic  medications for 1 week prior to surgery UNLESS prescribed by your surgeon  Hold NSAIDS (i.e. advil, alleve, motrin, ibuprofen, celebrex) minimum 5 days prior to surgery  Follow presurgical medication instructions provided by preadmission nursing team reviewed during your presurgery phone call  Strategies for optimizing your surgery through breathing exercises, nutrition and physical activity can be found at [email or web address]  Call [phone] with any presurgical concerns or medications questions or use the messaging feature in your [employer] grayson to contact your provider    AFTER SURGERY    Recommend using Tylenol ( acetaminophen ) 1000 mg every eight hours during the first week post discharge along with icing the area for 20 mins every 3-4 hours while awake can be helpful in reducing your need for post operative opioid use. This opioid sparing plan can be used along side your surgeons pain plan.  Use stool softener over the counter (colace) daily after surgery during the first 1-2 weeks to avoid post operative constipation issues  Drink plenty of fluids post operatively  If no bowel movement within 3 days after surgery then use over the counter Miralax in addition to your stool softener   If cleared by your surgical team for activity then early and often walking is encouraged and can be important in prevention of post surgical blood clots. Additionally spend as much time out of bed as possible and allowed by your surgical team  Use your incentive spirometer twice per hour in the first seven days after surgery to help prevent post surgery lung complications and infections  It is very important you follow the instructions from your surgeon regarding any medications for after surgery blood clot  prevention. Compliance with these medications or interventions is very important.  Call [phone] with any post discharge concerns or medical issues or use the messaging feature in your [employer] grayson to contact your provider

## 2025-04-08 NOTE — PROGRESS NOTES
Internal Medicine Pre-Operative Evaluation:     Reason for Visit: Pre-operative Evaluation for Risk Stratification and Optimization    Patient ID: Jay Stout is a 84 y.o. male.     Case: [id number] Date/Time: 04/22/25 0730   Procedure: ARTHROPLASTY KNEE TOTAL W ROBOT (psb same day dc) (Right: Knee)   Anesthesia type: Spinal   Diagnosis: Primary osteoarthritis of right knee [M17.11]   Pre-op diagnosis: Primary osteoarthritis of right knee [M17.11]   Location: [facility] OR ROOM 06 / [facility]   Surgeons: Mayank Oropeza MD         Recommendations to Proceed withSurgery    Patient is considered to be Low risk for Medium risk procedure.     After evaluation and discussion with patient with emphasis that all surgery has some degree of inherent risk it is acknowledged by patient this risk is Acceptable.    Patient is optimized and may proceed with planned procedure.     Assessment    Pre-operative Medical Evaluation for planned surgery  Recommendations as listed in PLAN section below  Contact surgical nurse  navigator with any questions regarding preoperative plan or schedule.      Assessment & Plan  Preoperative clearance    Primary osteoarthritis of right knee  Failed outpatient conservative measures  Electing to undergo surgical procedure as stated above    Type 2 diabetes mellitus without complication, without long-term current use of insulin (HCC)    Lab Results   Component Value Date    HGBA1C 7.4 (H) 04/01/2025   Hold medications as instructed by PAT  Adhere to strict DM diet in the post operative phase   Continue ADA diet    Stage 3 chronic kidney disease, unspecified whether stage 3a or 3b CKD (HCC)  Lab Results   Component Value Date    EGFR 51 04/01/2025    EGFR 47 02/04/2025    EGFR 42 03/09/2024    CREATININE 1.27 04/01/2025    CREATININE 1.37 (H) 02/04/2025    CREATININE 1.51 (H) 03/09/2024   Baseline creatinine as above  f/b nephrology No  Cont to avoid  NSAIDS/nephrotoxic medications  Avoid hypotension in the post operative setting  Hold all ACE/ARB/Diuretics as directed by PAT    BPH with obstruction/lower urinary tract symptoms  Monitor for post operative retention  ST elevation myocardial infarction involving left circumflex coronary artery (HCC)  Remote history  Stable since  F/b cardiology and seen in December  No change in symptoms  Functional capacity adequate  Primary hypertension  Stable   Refer to PAT instructions regarding medication administration the morning of surgery             Plan:     1. Further preoperative workup as follows:   - none no further testing required may proceed with surgery    2. Preoperative Medication Management Review performed by PAT nursing  YES    3. Patient requires further consultation with:   No Consults Required    4. Discharge Planning / Barriers to Discharge  none identified - patients has post discharge therapy plan in place, transportation arranged for discharge day, adequate family support at home to assist with discharge to home.        Subjective:           History of Present Illness:     Jay Stout is a 84 y.o. male who presents to the office today for a preoperative consultation at the request of surgeon. The patient understands this is an elective procedure and not emergent. They are electing to undergo planned procedure with an understanding that all surgery has inherent risk. They have worked with their surgeon and failed conservative treatment measures. Today they present for preoperative risk assessment and recommendations for optimization in preparation for surgery.    Pt seen in center for perioperative medicine for upcoming proposed surgery. They have failed previous conservative measures and have elected surgical intervention.     Pt meets presurgical lab and BMI optimization goals, except Gfr <60, pt is not f/b nephrology. Their overall trend remains stable.  We will avoid NSAIDS as well as large  shifts in BP in the post operative setting. Pt was encouraged to ensure adequate hydration.     Pt has a h/o remote STEMI w/stenting to circumflex. He is followed by Dr. Tamez and saw him in December. He has a known reduced ef 45% that has also remained stable. He has a chronic LBBB. His cardiac status is stable. He goes to the gym regularly and is able to walk an hour on the treadmill without any chest pain      Jay Stout has an IN HOSPITAL cardiac risk of RCI RISK CLASS II (1 risk factor, risk of major cardiac compl. appr. 1.3%) based on RCRI calculator    Cardiac Risk Estimation: per the Revised Cardiac Risk Index (Circ. 100:1043, 1999),         Pre-op Exam    Previous history of bleeding disorders or clots?: No  Previous Anesthesia reaction?: No  Prolonged steroid use in the last 6 months?: No    Assessment of Cardiac Risk:   - Unstable or severe angina or MI in the last 6 weeks or history of stent placement in the last year?: No   - Decompensated heart failure (e.g. New onset heart failure, NYHA  Class IV heart failure, or worsening existing heart failure)?: No  - Significant arrhythmias such as high grade AV block, symptomatic ventricular arrhythmia, newly recognized ventricular tachycardia, supraventricular tachycardia with resting heart rate >100, or symptomatic bradycardia?: No  - Severe heart valve disease including aortic stenosis or symptomatic mitral stenosis?: No      Pre-operative Risk Factors:  Elevated-risk surgery: No    History of cerebrovascular disease: No    History of ischemic heart disease: Yes    Pre-operative treatment with insulin: No  Pre-operative creatinine >2 mg/dL: No    History of congestive heart failure: No    Duke Activity Status Index (DASI):   DASI Total Score: 18.95  METs: 5.1        ROS: No TIA's or unusual headaches, no dysphagia.  No prolonged cough. No dyspnea or chest pain on exertion.  No abdominal pain, change in bowel habits, black or bloody stools.  No  "urinary tract or BPH symptoms.  Positive reported pain in arthritic joint. Positive difficulty with gait. No skin rashes or issues.      Objective:    /66   Pulse 58   Ht 5' 8\" (1.727 m)   Wt 88.5 kg (195 lb)   BMI 29.65 kg/m²       General Appearance: no distress, conversive  HEENT: PERRLA, conjuctiva normal; oropharynx clear; mucous membranes moist;   Neck:  Supple, no lymphadenopathy or thyromegaly  Lungs: breath sounds normal, normal respiratory effort, no retractions, expiratory effort normal  CV: normal heart sounds S1/S2, PMI normal   ABD: soft non tender, no masses , no hepatic or splenomegaly  EXT: DP pulses intact, no lymphadenopathy, no edema  Skin: normal turgor, normal texture, no rash  Psych: affect normal, mood normal  Neuro: AAOx3        The following portions of the patient's history were reviewed and updated as appropriate: allergies, current medications, past family history, past medical history, past social history, past surgical history and problem list.     Past History:       Past Medical History:   Diagnosis Date    Arthritis     Chronic kidney disease     Dental crown present     Diabetes mellitus (HCC)     Does use hearing aid     to wear DOS    Exercise involving walking     3x/week at the gym    GERD (gastroesophageal reflux disease)     taking pantoprazole    Heart disease 05/09/2020    Heart attack    History of coronary artery stent placement     Hoarseness of voice     \"raspy\"    Hyperlipidemia     Myocardial infarction (HCC)     SL cardio yearly    Penile lesion     Prediabetes     Wears glasses     Past Surgical History:   Procedure Laterality Date    CARDIAC SURGERY  05/2020    stent placement    COLONOSCOPY      HERNIA REPAIR      LARYNGOSCOPY  1978    with vocal cord polyp removal    LARYNGOSCOPY N/A 8/3/2021    Procedure: MICRODIRECT LARYNGOSCOPY WITH  EXCISION OF VOCAL CORD LESION;  Surgeon: Pito Tomlin DO;  Location: [facility];  Service: ENT    ID CIRCUMCISION " AGE >28 DAYS N/A 2/20/2025    Procedure: CIRCUMCISION ADULT;  Surgeon: Saurav Gillis DO;  Location: [facility];  Service: Urology    MO EXC B9 LESION MRGN XCP SK TG S/N/H/F/G > 4.0CM N/A 2/20/2025    Procedure: EXCISION BIOPSY LESION/MASS PENILE;  Surgeon: Saurav Gillis DO;  Location: [facility];  Service: Urology          Social History     Tobacco Use    Smoking status: Former     Current packs/day: 1.00     Average packs/day: 1 pack/day for 25.0 years (25.0 ttl pk-yrs)     Types: Cigarettes    Smokeless tobacco: Never    Tobacco comments:     Quit 45 yrs ago   Vaping Use    Vaping status: Never Used   Substance Use Topics    Alcohol use: Yes     Comment: Social twice a month    Drug use: Never     Family History   Problem Relation Age of Onset    Heart disease Mother         Cardiac Disorder    No Known Problems Father     No Known Problems Sister           Allergies:     Allergies   Allergen Reactions    Celebrex [Celecoxib] Other (See Comments)     Other reaction(s): itchy  Other reaction(s): itchy        Current Medications:     Current Outpatient Medications   Medication Instructions    acetaminophen (TYLENOL) 650 mg, Every 6 hours PRN    ascorbic acid (VITAMIN C) 500 mg, Oral, 2 times daily    aspirin (ECOTRIN LOW STRENGTH) 81 mg, Oral, Daily    atorvastatin (LIPITOR) 40 mg, Oral, Daily    Blood Glucose Monitoring Suppl (FREESTYLE FREEDOM LITE) w/Device KIT Check glucose levels once daily    Cholecalciferol (VITAMIN D-3 PO) Take by mouth    Diclofenac Sodium (VOLTAREN) 2 g, Topical, 4 times daily    ferrous sulfate 324 mg, Oral, 2 times daily before meals    fluticasone (FLONASE) 50 mcg/act nasal spray 1 spray, Nasal, Daily    folic acid (FOLVITE) 1 mg, Oral, Daily    glucose blood (FREESTYLE LITE) test strip 1 each, Other, Daily    Lancets (FREESTYLE) lancets TEST ONCE DAILY FASTING DXE11.9    loratadine (CLARITIN) 10 mg, Daily    metFORMIN (GLUCOPHAGE) 1,000 mg, Oral, 2 times daily with  "meals    metoprolol tartrate (LOPRESSOR) 25 mg, Oral, Every 12 hours scheduled    Multiple Vitamin (multivitamin) tablet 1 tablet, Oral, Daily    mupirocin (BACTROBAN) 2 % ointment Topical, 2 times daily, Apply to each nostril 2 times daily for 5 days before and including the day of surgery    pantoprazole (PROTONIX) 40 mg, Oral, Daily, Take in morning    tamsulosin (FLOMAX) 0.4 mg, Oral, Daily with dinner           PRE-OP WORKSHEET DATA    Assessment of Pre-Operative Risks     MLJ Quality Hard Stops:    BMI (<40) : Estimated body mass index is 29.65 kg/m² as calculated from the following:    Height as of this encounter: 5' 8\" (1.727 m).    Weight as of this encounter: 88.5 kg (195 lb).    Hgb ( >11):   Lab Results   Component Value Date    HGB 12.3 04/01/2025    HGB 13.3 02/04/2025    HGB 12.5 03/09/2024       HbA1c (<7.5) :   Lab Results   Component Value Date    HGBA1C 7.4 (H) 04/01/2025       GFR (>60) (Less then 45 = Nephrology consult):    Lab Results   Component Value Date    EGFR 51 04/01/2025    EGFR 47 02/04/2025    EGFR 42 03/09/2024            Pre-Op Data Reviewed:       Laboratory Results: I have personally reviewed the pertinent reports    EKG: I personally reviewed and interpreted available tracings in the electronic medical record    Encounter Date: 04/01/25   ECG 12 lead   Result Value    Ventricular Rate 0    Atrial Rate 0    TN Interval     QRSD Interval 0    QT Interval 0    QTC Interval 0    P Axis 0    QRS Axis 0    T Wave Axis 0       OLD RECORDS: reviewed old records in the chart review section if EHR on day of visit.    Previous cardiopulmonary studies within the past year:  Echocardiogram: yes   Lab Results   Component Value Date    LVEF 45 12/05/2024     Cardiac Catheterization: no  Stress Test: no      Time of visit including pre-visit chart review, visit and post-visit coordination of plan and care , review of pre-surgical lab work, preparation and time spent documenting note in " electronic medical record, time spent face-to-face in physical examination answering patient questions by care team 35 minutes             [facility]

## 2025-04-08 NOTE — ASSESSMENT & PLAN NOTE
Lab Results   Component Value Date    HGBA1C 7.4 (H) 04/01/2025   Hold medications as instructed by PAT  Adhere to strict DM diet in the post operative phase   Continue ADA diet

## 2025-04-08 NOTE — ASSESSMENT & PLAN NOTE
Lab Results   Component Value Date    EGFR 51 04/01/2025    EGFR 47 02/04/2025    EGFR 42 03/09/2024    CREATININE 1.27 04/01/2025    CREATININE 1.37 (H) 02/04/2025    CREATININE 1.51 (H) 03/09/2024   Baseline creatinine as above  f/b nephrology No  Cont to avoid NSAIDS/nephrotoxic medications  Avoid hypotension in the post operative setting  Hold all ACE/ARB/Diuretics as directed by MINESH

## 2025-04-10 ENCOUNTER — OFFICE VISIT (OUTPATIENT)
Age: 85
End: 2025-04-10
Payer: MEDICARE

## 2025-04-10 VITALS
HEIGHT: 68 IN | BODY MASS INDEX: 29.55 KG/M2 | DIASTOLIC BLOOD PRESSURE: 66 MMHG | WEIGHT: 195 LBS | HEART RATE: 58 BPM | SYSTOLIC BLOOD PRESSURE: 106 MMHG

## 2025-04-10 DIAGNOSIS — N40.1 BPH WITH OBSTRUCTION/LOWER URINARY TRACT SYMPTOMS: ICD-10-CM

## 2025-04-10 DIAGNOSIS — N18.30 STAGE 3 CHRONIC KIDNEY DISEASE, UNSPECIFIED WHETHER STAGE 3A OR 3B CKD (HCC): ICD-10-CM

## 2025-04-10 DIAGNOSIS — E11.9 TYPE 2 DIABETES MELLITUS WITHOUT COMPLICATION, WITHOUT LONG-TERM CURRENT USE OF INSULIN (HCC): ICD-10-CM

## 2025-04-10 DIAGNOSIS — Z01.818 PREOPERATIVE CLEARANCE: Primary | ICD-10-CM

## 2025-04-10 DIAGNOSIS — I10 PRIMARY HYPERTENSION: ICD-10-CM

## 2025-04-10 DIAGNOSIS — N13.8 BPH WITH OBSTRUCTION/LOWER URINARY TRACT SYMPTOMS: ICD-10-CM

## 2025-04-10 DIAGNOSIS — M17.11 PRIMARY OSTEOARTHRITIS OF RIGHT KNEE: ICD-10-CM

## 2025-04-10 DIAGNOSIS — I21.21 ST ELEVATION MYOCARDIAL INFARCTION INVOLVING LEFT CIRCUMFLEX CORONARY ARTERY (HCC): ICD-10-CM

## 2025-04-10 PROCEDURE — 99214 OFFICE O/P EST MOD 30 MIN: CPT | Performed by: NURSE PRACTITIONER

## 2025-04-10 PROCEDURE — G2211 COMPLEX E/M VISIT ADD ON: HCPCS | Performed by: NURSE PRACTITIONER

## 2025-04-16 ENCOUNTER — EVALUATION (OUTPATIENT)
Dept: PHYSICAL THERAPY | Facility: CLINIC | Age: 85
End: 2025-04-16
Payer: MEDICARE

## 2025-04-16 DIAGNOSIS — M17.11 PRIMARY OSTEOARTHRITIS OF RIGHT KNEE: ICD-10-CM

## 2025-04-16 PROCEDURE — 97110 THERAPEUTIC EXERCISES: CPT | Performed by: PHYSICAL THERAPIST

## 2025-04-16 PROCEDURE — 97161 PT EVAL LOW COMPLEX 20 MIN: CPT | Performed by: PHYSICAL THERAPIST

## 2025-04-16 NOTE — PROGRESS NOTES
PT Evaluation     Today's date: 2025  Patient name: Jay Stout  : 1940  MRN: [id number]  Referring provider: Mayank Oropeza, *  Dx:   Encounter Diagnosis     ICD-10-CM    1. Primary osteoarthritis of right knee  M17.11 Ambulatory referral to Physical Therapy          Start Time: 1147  Stop Time: 1300  Total time in clinic (min): 73 minutes    Assessment  Impairments: abnormal or restricted ROM, activity intolerance, impaired balance, impaired physical strength, lacks appropriate home exercise program, pain with function and weight-bearing intolerance    Assessment details: Jay Stout is a 84 y.o. male who presents with chronic right knee pain having failed conservative treatment, including steroid and viscosupplementation injections, as well as physical therapy. Patient has elected to undergo surgical intervention and is currently scheduled to undergo a right TKA, DOS: 25. Patient has been educated in surgical and rehab overview, signs/symptoms of infection, edema/pain management, graded activity, and basic post-op HEP. Patient would benefit from skilled physical therapy to address their aforementioned impairments, improve their level of function and to improve their overall quality of life.    Understanding of Dx/Px/POC: excellent     Prognosis: excellent    Goals  Short Term Goals: to be achieved by 4 weeks  1) Patient to be independent with basic HEP.  2) Decrease pain by 5/10 at its worst.  3) Increase knee flexion ROM to 90 deg.  4) Increase knee extension ROM by > 5 deg.  5) Increase LE strength by 1/2 MMT grade in all deficient planes.  6) Patient to negotiate steps with a step-to pattern with use of HR.   7) Patient to report decreased sleep interruption secondary to pain.  8) Increase ambulatory tolerance by 10 min with LRAD.    Long Term Goals: to be achieved by discharge  1) FOTO equal to or greater than TBD postoperatively.  2) Patient to be independent with  "comprehensive HEP.  3) Abolish pain for improved quality of life.  4) Increase LE strength to 5/5 MMT grade in all planes to improve a/iadls.  5) Achieve full knee extension ROM to improve a/iadls.  6) Increase knee flexion ROM to within 5 deg of contralateral LE to improve a/iadls.  7) Patient to negotiate steps with a reciprocal pattern with use of Hr.  8) Increase ambulatory tolerance to 60 min to improve participation in social activities.  9) Patient to report no sleep interruption secondary to pain.    Plan  Patient would benefit from: skilled PT  Planned modality interventions: biofeedback, cryotherapy, electrical stimulation/Russian stimulation, TENS and low level laser therapy    Planned therapy interventions: activity modification, ADL retraining, ADL training, balance, balance/weight bearing training, body mechanics training, dressing changes, functional ROM exercises, gait training, home exercise program, IADL retraining, joint mobilization, manual therapy, massage, neuromuscular re-education, patient education, self care, strengthening, stretching, therapeutic activities, therapeutic exercise and transfer training    Frequency: 2-3x week.  Duration in weeks: 12  Plan of Care beginning date: 4/16/2025  Plan of Care expiration date: 7/9/2025  Treatment plan discussed with: patient        Subjective Evaluation    History of Present Illness  Mechanism of injury: Patient presents with c/o worsening right knee pain despite conservative treatment, including PT, steroid injections, and viscosupplementation injections. Patient has elected to undergo a right total knee arthroplasty, DOS: 4/22/25. Patient has intermittent tingling/numbness in his lower legs. Patient lives in a 1 level townhouse. Patient has no steps to enter the home. Patient has a walk-in shower with grab bars and anti slip mats. Patient owns a standard walker and will be getting a commode. Patient does not own a SPC.      XR right knee: \"Severe " "medial compartment degenerative changes as above with secondary varus angulation. Slight narrowing of the lateral patellofemoral space. Small effusion. No fracture\"  Patient Goals  Patient goals for therapy: decreased pain and independence with ADLs/IADLs    Pain  Current pain ratin  At best pain ratin  At worst pain ratin  Location: right knee: medial joint line ; distal to ankle  Quality: burning and dull ache  Exacerbated by: squatting, ballet, kneeling, don/doffing pants,    Social Support    Employment status: not working        Objective     Active Range of Motion   Left Knee   Flexion: 105 degrees with pain  Extension: 5 (lacking terminal extension) degrees     Right Knee   Flexion: 108 degrees with pain  Extension: 8 (lacking terminal extension) degrees     Passive Range of Motion   Left Knee   Flexion: 112 degrees with pain  Extension: 5 (lacking terminal extension) degrees     Right Knee   Flexion: 110 degrees with pain  Extension: 5 (lacking terminal extension) degrees     Mobility   Patellar Mobility:   Left Knee   Hypomobile: left medial, left lateral, left superior and left inferior    Right Knee   Hypomobile: medial, lateral, superior and inferior     Strength/Myotome Testing     Left Hip   Planes of Motion   Flexion: 5    Right Hip   Planes of Motion   Flexion: 5    Left Knee   Normal strength  Quadriceps contraction: good    Right Knee   Normal strength  Quadriceps contraction: good    Left Ankle/Foot   Dorsiflexion: 5    Right Ankle/Foot   Dorsiflexion: 5    Ambulation   Weight-Bearing Status   Weight-Bearing Status (Left): full weight bearing   Weight-Bearing Status (Right): full weight-bearing    Assistive device used: none    Ambulation: Level Surfaces   Ambulation without assistive device: independent    Observational Gait   Gait: within functional limits     Functional Assessment      Squat    Left within functional limits and right within functional limits. "     Comments  Preoperative Evaluation (4/16/25):    Timed Up and Go: 8.21 sec, Independent sit to/from stand    Five Times Sit to Stand Test: 16.05 sec, Independent, good eccentric control             POC expires Unit limit Auth  expiration date PT/OT + Visit Limit?   7/9/25 N/A N/A BOMN                 Visit/Unit Tracking  AUTH Status:  Date 4/16              N/A Used 1               Remaining                  Precautions: right TKA (DOS: 4/22/25), DM, HTN, h/o MI      Manuals 4/16            Post-op RE NV                                                   Neuro Re-Ed             Supine SLR Reviewed            Quad sets with heel prop Reviewed                                                                             Ther Ex             Patient education: pathophysiology, diagnostic imaging review, signs/symptoms of infection, edema/pain management, graded activity, HEP review GR            Ankle pumps Reviewed            Seated knee extension LLPS Reviewed            Seated knee flexion/extension AAROM at edge of plinthe Reviewed            Seated knee flexion AAROM in chair             Long-sitting calf str. Reviewed                                      Ther Activity                                       Gait Training                                       Modalities

## 2025-04-19 LAB
DME PARACHUTE DELIVERY DATE ACTUAL: NORMAL
DME PARACHUTE DELIVERY DATE REQUESTED: NORMAL
DME PARACHUTE ITEM DESCRIPTION: NORMAL
DME PARACHUTE ORDER STATUS: NORMAL
DME PARACHUTE SUPPLIER NAME: NORMAL
DME PARACHUTE SUPPLIER PHONE: NORMAL

## 2025-04-21 ENCOUNTER — ANESTHESIA EVENT (OUTPATIENT)
Age: 85
End: 2025-04-21
Payer: MEDICARE

## 2025-04-22 ENCOUNTER — HOSPITAL ENCOUNTER (OUTPATIENT)
Age: 85
Setting detail: OUTPATIENT SURGERY
Discharge: HOME | End: 2025-04-22
Attending: ORTHOPAEDIC SURGERY | Admitting: ORTHOPAEDIC SURGERY
Payer: MEDICARE

## 2025-04-22 ENCOUNTER — ANESTHESIA (OUTPATIENT)
Age: 85
End: 2025-04-22
Payer: MEDICARE

## 2025-04-22 VITALS
SYSTOLIC BLOOD PRESSURE: 109 MMHG | BODY MASS INDEX: 29.55 KG/M2 | OXYGEN SATURATION: 97 % | DIASTOLIC BLOOD PRESSURE: 64 MMHG | HEIGHT: 68 IN | TEMPERATURE: 97.2 F | HEART RATE: 86 BPM | RESPIRATION RATE: 14 BRPM | WEIGHT: 195 LBS

## 2025-04-22 DIAGNOSIS — M17.11 PRIMARY OSTEOARTHRITIS OF RIGHT KNEE: Primary | ICD-10-CM

## 2025-04-22 LAB
GLUCOSE SERPL-MCNC: 138 MG/DL (ref 65–140)
GLUCOSE SERPL-MCNC: 177 MG/DL (ref 65–140)
GLUCOSE SERPL-MCNC: 203 MG/DL (ref 65–140)

## 2025-04-22 PROCEDURE — C1776 JOINT DEVICE (IMPLANTABLE): HCPCS | Performed by: ORTHOPAEDIC SURGERY

## 2025-04-22 PROCEDURE — S2900 ROBOTIC SURGICAL SYSTEM: HCPCS | Performed by: ORTHOPAEDIC SURGERY

## 2025-04-22 PROCEDURE — 97535 SELF CARE MNGMENT TRAINING: CPT

## 2025-04-22 PROCEDURE — 27447 TOTAL KNEE ARTHROPLASTY: CPT | Performed by: ORTHOPAEDIC SURGERY

## 2025-04-22 PROCEDURE — 97167 OT EVAL HIGH COMPLEX 60 MIN: CPT

## 2025-04-22 PROCEDURE — 97163 PT EVAL HIGH COMPLEX 45 MIN: CPT

## 2025-04-22 PROCEDURE — 97110 THERAPEUTIC EXERCISES: CPT

## 2025-04-22 PROCEDURE — C1713 ANCHOR/SCREW BN/BN,TIS/BN: HCPCS | Performed by: ORTHOPAEDIC SURGERY

## 2025-04-22 PROCEDURE — 82948 REAGENT STRIP/BLOOD GLUCOSE: CPT

## 2025-04-22 DEVICE — DEPUY CMW 2 FAST SET BONE CEMENT 20G: Type: IMPLANTABLE DEVICE | Site: KNEE | Status: FUNCTIONAL

## 2025-04-22 DEVICE — ATTUNE KNEE SYSTEM TIBIAL BASE AFFIXIUM FIXED BEARING SIZE 6
Type: IMPLANTABLE DEVICE | Site: KNEE | Status: FUNCTIONAL
Brand: ATTUNE AFFIXIUM

## 2025-04-22 DEVICE — ATTUNE FEMORAL POROCOAT POSTERIOR STABILIZED SIZE 6 RIGHT CEMENTLESS
Type: IMPLANTABLE DEVICE | Site: KNEE | Status: FUNCTIONAL
Brand: ATTUNE

## 2025-04-22 DEVICE — ATTUNE PATELLA MEDIALIZED DOME 41MM CEMENTED AOX
Type: IMPLANTABLE DEVICE | Site: KNEE | Status: FUNCTIONAL
Brand: ATTUNE

## 2025-04-22 DEVICE — ATTUNE KNEE SYSTEM TIBIAL INSERT FIXED BEARING POSTERIOR STABILIZED 6 6MM AOX
Type: IMPLANTABLE DEVICE | Site: KNEE | Status: FUNCTIONAL
Brand: ATTUNE

## 2025-04-22 RX ORDER — FERROUS SULFATE 325(65) MG
325 TABLET ORAL 2 TIMES DAILY WITH MEALS
Status: CANCELLED | OUTPATIENT
Start: 2025-04-22

## 2025-04-22 RX ORDER — FOLIC ACID 1 MG/1
1 TABLET ORAL DAILY
Status: CANCELLED | OUTPATIENT
Start: 2025-04-22

## 2025-04-22 RX ORDER — CEPHALEXIN 500 MG/1
2000 CAPSULE ORAL EVERY 12 HOURS SCHEDULED
Qty: 8 CAPSULE | Refills: 0 | Status: SHIPPED | OUTPATIENT
Start: 2025-04-22 | End: 2025-04-23

## 2025-04-22 RX ORDER — CEFAZOLIN SODIUM 2 G/50ML
2000 SOLUTION INTRAVENOUS ONCE
Status: COMPLETED | OUTPATIENT
Start: 2025-04-22 | End: 2025-04-22

## 2025-04-22 RX ORDER — MORPHINE SULFATE 10 MG/ML
INJECTION, SOLUTION INTRAMUSCULAR; INTRAVENOUS AS NEEDED
Status: DISCONTINUED | OUTPATIENT
Start: 2025-04-22 | End: 2025-04-22 | Stop reason: HOSPADM

## 2025-04-22 RX ORDER — OXYCODONE HYDROCHLORIDE 5 MG/1
5 TABLET ORAL EVERY 4 HOURS PRN
Qty: 15 TABLET | Refills: 0 | Status: SHIPPED | OUTPATIENT
Start: 2025-04-22 | End: 2025-05-01 | Stop reason: SDUPTHER

## 2025-04-22 RX ORDER — GABAPENTIN 300 MG/1
300 CAPSULE ORAL
Status: CANCELLED | OUTPATIENT
Start: 2025-04-22

## 2025-04-22 RX ORDER — ACETAMINOPHEN 325 MG/1
975 TABLET ORAL ONCE
Status: DISCONTINUED | OUTPATIENT
Start: 2025-04-22 | End: 2025-04-22 | Stop reason: HOSPADM

## 2025-04-22 RX ORDER — OXYCODONE HYDROCHLORIDE 5 MG/1
5 TABLET ORAL EVERY 4 HOURS PRN
Status: DISCONTINUED | OUTPATIENT
Start: 2025-04-22 | End: 2025-04-22 | Stop reason: HOSPADM

## 2025-04-22 RX ORDER — SENNOSIDES 8.6 MG
1 TABLET ORAL DAILY
Status: CANCELLED | OUTPATIENT
Start: 2025-04-22

## 2025-04-22 RX ORDER — EPINEPHRINE 1 MG/ML
INJECTION, SOLUTION, CONCENTRATE INTRAVENOUS AS NEEDED
Status: DISCONTINUED | OUTPATIENT
Start: 2025-04-22 | End: 2025-04-22 | Stop reason: HOSPADM

## 2025-04-22 RX ORDER — OXYCODONE HYDROCHLORIDE 10 MG/1
10 TABLET ORAL EVERY 4 HOURS PRN
Status: DISCONTINUED | OUTPATIENT
Start: 2025-04-22 | End: 2025-04-22 | Stop reason: HOSPADM

## 2025-04-22 RX ORDER — PROPOFOL 10 MG/ML
INJECTION, EMULSION INTRAVENOUS CONTINUOUS PRN
Status: DISCONTINUED | OUTPATIENT
Start: 2025-04-22 | End: 2025-04-22

## 2025-04-22 RX ORDER — SODIUM CHLORIDE, SODIUM LACTATE, POTASSIUM CHLORIDE, CALCIUM CHLORIDE 600; 310; 30; 20 MG/100ML; MG/100ML; MG/100ML; MG/100ML
75 INJECTION, SOLUTION INTRAVENOUS CONTINUOUS
Status: CANCELLED | OUTPATIENT
Start: 2025-04-22

## 2025-04-22 RX ORDER — CALCIUM CARBONATE 500 MG/1
1000 TABLET, CHEWABLE ORAL DAILY PRN
Status: CANCELLED | OUTPATIENT
Start: 2025-04-22

## 2025-04-22 RX ORDER — DEXAMETHASONE SODIUM PHOSPHATE 10 MG/ML
INJECTION, SOLUTION INTRAMUSCULAR; INTRAVENOUS AS NEEDED
Status: DISCONTINUED | OUTPATIENT
Start: 2025-04-22 | End: 2025-04-22

## 2025-04-22 RX ORDER — ROPIVACAINE HYDROCHLORIDE 5 MG/ML
INJECTION, SOLUTION EPIDURAL; INFILTRATION; PERINEURAL AS NEEDED
Status: DISCONTINUED | OUTPATIENT
Start: 2025-04-22 | End: 2025-04-22 | Stop reason: HOSPADM

## 2025-04-22 RX ORDER — DOCUSATE SODIUM 100 MG/1
100 CAPSULE, LIQUID FILLED ORAL 2 TIMES DAILY
Status: CANCELLED | OUTPATIENT
Start: 2025-04-22

## 2025-04-22 RX ORDER — HYDROMORPHONE HCL/PF 1 MG/ML
0.5 SYRINGE (ML) INJECTION
Status: DISCONTINUED | OUTPATIENT
Start: 2025-04-22 | End: 2025-04-22 | Stop reason: HOSPADM

## 2025-04-22 RX ORDER — MAGNESIUM HYDROXIDE 1200 MG/15ML
LIQUID ORAL AS NEEDED
Status: DISCONTINUED | OUTPATIENT
Start: 2025-04-22 | End: 2025-04-22 | Stop reason: HOSPADM

## 2025-04-22 RX ORDER — MIDAZOLAM HYDROCHLORIDE 2 MG/2ML
INJECTION, SOLUTION INTRAMUSCULAR; INTRAVENOUS AS NEEDED
Status: DISCONTINUED | OUTPATIENT
Start: 2025-04-22 | End: 2025-04-22

## 2025-04-22 RX ORDER — SODIUM CHLORIDE 9 MG/ML
INJECTION INTRAVENOUS AS NEEDED
Status: DISCONTINUED | OUTPATIENT
Start: 2025-04-22 | End: 2025-04-22 | Stop reason: HOSPADM

## 2025-04-22 RX ORDER — BUPIVACAINE HYDROCHLORIDE 2.5 MG/ML
INJECTION, SOLUTION EPIDURAL; INFILTRATION; INTRACAUDAL; PERINEURAL
Status: COMPLETED | OUTPATIENT
Start: 2025-04-22 | End: 2025-04-22

## 2025-04-22 RX ORDER — CHLORHEXIDINE GLUCONATE ORAL RINSE 1.2 MG/ML
15 SOLUTION DENTAL ONCE
Status: COMPLETED | OUTPATIENT
Start: 2025-04-22 | End: 2025-04-22

## 2025-04-22 RX ORDER — ONDANSETRON 2 MG/ML
INJECTION INTRAMUSCULAR; INTRAVENOUS AS NEEDED
Status: DISCONTINUED | OUTPATIENT
Start: 2025-04-22 | End: 2025-04-22

## 2025-04-22 RX ORDER — ACETAMINOPHEN 325 MG/1
650 TABLET ORAL EVERY 4 HOURS PRN
Status: CANCELLED | OUTPATIENT
Start: 2025-04-22

## 2025-04-22 RX ORDER — ACETAMINOPHEN 325 MG/1
650 TABLET ORAL EVERY 6 HOURS SCHEDULED
Status: CANCELLED | OUTPATIENT
Start: 2025-04-22

## 2025-04-22 RX ORDER — CEFAZOLIN SODIUM 2 G/50ML
2000 SOLUTION INTRAVENOUS EVERY 8 HOURS
Status: CANCELLED | OUTPATIENT
Start: 2025-04-22 | End: 2025-04-23

## 2025-04-22 RX ORDER — TRAMADOL HYDROCHLORIDE 50 MG/1
50 TABLET ORAL EVERY 6 HOURS SCHEDULED
Status: CANCELLED | OUTPATIENT
Start: 2025-04-22

## 2025-04-22 RX ORDER — SENNOSIDES 8.6 MG
650 CAPSULE ORAL EVERY 6 HOURS PRN
Qty: 90 TABLET | Refills: 0 | Status: SHIPPED | OUTPATIENT
Start: 2025-04-22

## 2025-04-22 RX ORDER — PROMETHAZINE HYDROCHLORIDE 25 MG/ML
6.25 INJECTION, SOLUTION INTRAMUSCULAR; INTRAVENOUS ONCE AS NEEDED
Status: DISCONTINUED | OUTPATIENT
Start: 2025-04-22 | End: 2025-04-22 | Stop reason: HOSPADM

## 2025-04-22 RX ORDER — SODIUM CHLORIDE, SODIUM LACTATE, POTASSIUM CHLORIDE, CALCIUM CHLORIDE 600; 310; 30; 20 MG/100ML; MG/100ML; MG/100ML; MG/100ML
125 INJECTION, SOLUTION INTRAVENOUS CONTINUOUS
Status: DISCONTINUED | OUTPATIENT
Start: 2025-04-22 | End: 2025-04-22 | Stop reason: HOSPADM

## 2025-04-22 RX ORDER — EPHEDRINE SULFATE 50 MG/ML
INJECTION INTRAVENOUS AS NEEDED
Status: DISCONTINUED | OUTPATIENT
Start: 2025-04-22 | End: 2025-04-22

## 2025-04-22 RX ORDER — ASCORBIC ACID 500 MG
500 TABLET ORAL 2 TIMES DAILY
Status: CANCELLED | OUTPATIENT
Start: 2025-04-22

## 2025-04-22 RX ORDER — SIMETHICONE 80 MG
80 TABLET,CHEWABLE ORAL 4 TIMES DAILY PRN
Status: CANCELLED | OUTPATIENT
Start: 2025-04-22

## 2025-04-22 RX ORDER — PANTOPRAZOLE SODIUM 40 MG/1
40 TABLET, DELAYED RELEASE ORAL DAILY
Status: CANCELLED | OUTPATIENT
Start: 2025-04-22

## 2025-04-22 RX ORDER — ONDANSETRON 2 MG/ML
4 INJECTION INTRAMUSCULAR; INTRAVENOUS EVERY 6 HOURS PRN
Status: CANCELLED | OUTPATIENT
Start: 2025-04-22

## 2025-04-22 RX ORDER — TRANEXAMIC ACID 10 MG/ML
1000 INJECTION, SOLUTION INTRAVENOUS ONCE
Status: COMPLETED | OUTPATIENT
Start: 2025-04-22 | End: 2025-04-22

## 2025-04-22 RX ORDER — ONDANSETRON 4 MG/1
4 TABLET, ORALLY DISINTEGRATING ORAL EVERY 8 HOURS PRN
Qty: 15 TABLET | Refills: 0 | Status: SHIPPED | OUTPATIENT
Start: 2025-04-22

## 2025-04-22 RX ORDER — PHENYLEPHRINE HCL IN 0.9% NACL 1 MG/10 ML
SYRINGE (ML) INTRAVENOUS AS NEEDED
Status: DISCONTINUED | OUTPATIENT
Start: 2025-04-22 | End: 2025-04-22

## 2025-04-22 RX ORDER — ASPIRIN 81 MG/1
81 TABLET ORAL 2 TIMES DAILY
Qty: 60 TABLET | Refills: 0 | Status: SHIPPED | OUTPATIENT
Start: 2025-04-22 | End: 2025-05-22

## 2025-04-22 RX ADMIN — SODIUM CHLORIDE, SODIUM LACTATE, POTASSIUM CHLORIDE, AND CALCIUM CHLORIDE 125 ML/HR: .6; .31; .03; .02 INJECTION, SOLUTION INTRAVENOUS at 06:44

## 2025-04-22 RX ADMIN — DEXAMETHASONE SODIUM PHOSPHATE 10 MG: 10 INJECTION INTRAMUSCULAR; INTRAVENOUS at 08:07

## 2025-04-22 RX ADMIN — ONDANSETRON 4 MG: 2 INJECTION INTRAMUSCULAR; INTRAVENOUS at 08:07

## 2025-04-22 RX ADMIN — PHENYLEPHRINE HYDROCHLORIDE 30 MCG/MIN: 10 INJECTION INTRAVENOUS at 07:59

## 2025-04-22 RX ADMIN — EPHEDRINE SULFATE 10 MG: 50 INJECTION, SOLUTION INTRAVENOUS at 09:05

## 2025-04-22 RX ADMIN — BUPIVACAINE 20 ML: 13.3 INJECTION, SUSPENSION, LIPOSOMAL INFILTRATION at 07:06

## 2025-04-22 RX ADMIN — CHLORHEXIDINE GLUCONATE 15 ML: 1.2 SOLUTION ORAL at 06:21

## 2025-04-22 RX ADMIN — INSULIN HUMAN 3 UNITS: 100 INJECTION, SOLUTION PARENTERAL at 09:56

## 2025-04-22 RX ADMIN — MIDAZOLAM 2 MG: 1 INJECTION INTRAMUSCULAR; INTRAVENOUS at 07:05

## 2025-04-22 RX ADMIN — BUPIVACAINE HYDROCHLORIDE 20 ML: 2.5 INJECTION, SOLUTION EPIDURAL; INFILTRATION; INTRACAUDAL; PERINEURAL at 07:06

## 2025-04-22 RX ADMIN — TRANEXAMIC ACID 1000 MG: 10 INJECTION, SOLUTION INTRAVENOUS at 08:01

## 2025-04-22 RX ADMIN — SODIUM CHLORIDE, SODIUM LACTATE, POTASSIUM CHLORIDE, AND CALCIUM CHLORIDE: .6; .31; .03; .02 INJECTION, SOLUTION INTRAVENOUS at 09:19

## 2025-04-22 RX ADMIN — Medication 100 MCG: at 09:05

## 2025-04-22 RX ADMIN — MEPIVACAINE HYDROCHLORIDE 3.5 ML: 15 INJECTION, SOLUTION EPIDURAL; INFILTRATION at 07:51

## 2025-04-22 RX ADMIN — PROPOFOL 70 MCG/KG/MIN: 10 INJECTION, EMULSION INTRAVENOUS at 07:54

## 2025-04-22 RX ADMIN — CEFAZOLIN SODIUM 2000 MG: 2 SOLUTION INTRAVENOUS at 07:48

## 2025-04-22 NOTE — ANESTHESIA PROCEDURE NOTES
Peripheral Block    Patient location during procedure: holding area  Start time: 4/22/2025 7:06 AM  Reason for block: at surgeon's request and post-op pain management  Staffing  Performed by: Winston Wright MD  Authorized by: Winston Wright MD    Preanesthetic Checklist  Completed: patient identified, IV checked, site marked, risks and benefits discussed, surgical consent, monitors and equipment checked, pre-op evaluation and timeout performed  Peripheral Block  Patient position: supine  Prep: ChloraPrep  Patient monitoring: continuous pulse ox and frequent blood pressure checks  Block type: adductor canal block  Laterality: right  Injection technique: single-shot  Procedures: ultrasound guided, Ultrasound guidance required for the procedure to increase accuracy and safety of medication placement and decrease risk of complications.  Ultrasound permanent image saved  bupivacaine (PF) (MARCAINE) 0.25 % injection 20 mL - Perineural   20 mL - 4/22/2025 7:06:00 AM  Needle  Needle type: Stimuplex   Needle gauge: 20 G  Needle length: 4 in  Needle localization: ultrasound guidance  Assessment  Injection assessment: incremental injection and local visualized surrounding nerve on ultrasound  Paresthesia pain: none  patient tolerated the procedure well with no immediate complications  Additional Notes  Uncomplicated adductor canal block (saphenous nerve, nerve to vastus medialis)  Superficial femoral artery identified  Dose: 20ml exparel + 10ml 0.25% bupivacaine    Supplemented with 10ml 0.25% bupivacaine for anterior femoral cutaneous nerve

## 2025-04-22 NOTE — OP NOTE
OPERATIVE REPORT    PATIENT NAME: Jay Stout   :  1940  MRN: [id number]  Pt Location: [facility] OR ROOM 05    SURGERY DATE: 2025    SURGEON(S) and ROLE:  Primary: Mayank Oropeza MD  Assisting: Danis Soliman PA-C; Brenda Ford MD    NOTE:  I was present for the entire procedure and performed all essential portions of the surgery.      PREOPERATIVE DIAGNOSES:  Right Knee Osteoarthritis    POSTOPERATIVE DIAGNOSES:  Same as Preoperative Diagnosis    PROCEDURES:  Right Total Knee Arthroplasty with Robotic Assist      ANESTHESIA TYPE:  Spinal    ANESTHESIA STAFF:   Anesthesiologist: Winston Wright MD  CRNA: Gunner Aguilar CRNA; Olivia Fuentes CRNA    ESTIMATED BLOOD LOSS:  150 mL    TOURNIQUET TIME:  not used    PERIOPERATIVE ANTIBIOTICS:  cefazolin, 2 grams    IMPLANTS: Depuy Attune    Femur:  Size 6 PS cementless    Tibia:  Size 6 FB Affixium cementless    Patella: 41 mm oval dome cemented    Poly : 6 mm PS      Implant Name Type Inv. Item Serial No.  Lot No. LRB No. Used Action   DEPUY BONE CEMENT CMW2 - [id number]  DEPUY BONE CEMENT CMW2  DEPUY [id number] Right 1 Implanted   INSERT TIBIAL 6MM SZ 6 FB PS ATTUNE - [id number]  INSERT TIBIAL 6MM SZ 6 FB PS ATTUNE  DEPUY [id number] Right 1 Implanted   COMPONENT FEM SZ 6 RT CMNTLS POR PS ATTUNE - [id number]  COMPONENT FEM SZ 6 RT CMNTLS POR PS ATTUNE  DEPUY [id number] Right 1 Implanted   COMPONENT PATELLA 41MM MEDIAL DOME ATTUNE - [id number]  COMPONENT PATELLA 41MM MEDIAL DOME ATTUNE  DEPUY [id number] Right 1 Implanted   BASEPLATE TIBIAL SZ 6 FX BRNG  ATTUNE - [id number]  BASEPLATE TIBIAL SZ 6 FX BRNG  ATTUNE  DEPUY [id number] Right 1 Implanted       SPECIMENS:  * No specimens in log *    DRAINS:  None      OPERATIVE INDICATIONS:  The patient is a 84 y.o. male with right knee pain and severe osteoarthritis.  Surgical treatment was indicated due to persistent symptoms despite non-surgical treatment.  After a thorough discussion of the  potential risks, benefits, and alternative treatments, the patient agreed to proceed with surgery.  The patient understands that the risks of surgery include, but are not limited to: infection, neurovascular injury, wound healing complications, venous thromboembolism, persistent pain, stiffness, instability, recurrence of symptoms, potential need for additional surgeries, and loss of limb or life.  Oral and written consent for surgery was obtained from the patient preoperatively.    PROCEDURE AND TECHNIQUE:  On the day of surgery, the patient was identified in the preoperative holding area.  The operative site was marked by the surgeon.  The patient was taken into the operating room.  A time-out was conducted to confirm the patient's identity, the operative site, and the proposed procedure.  The patient was anesthetized, and perioperative antibiotics were administered.  The patient was positioned supine on the OR table.  All bony prominences were padded.  A tourniquet was not used.  The operative site was prepped and draped using standard sterile technique.      An anterior midline incision was carried sharply to the extensor mechanism.  Hemostasis was obtained with electrocautery.  A medial parapatellar arthrotomy was made, and the patella was subluxated laterally.  Severe tri-compartmental degenerative changes were noted.  The infrapatellar fat pad, anterior horns of the menisci, cruciate ligaments, and synovium over the anterior femur were excised.  The lateral patellofemoral ligament was divided.  A lateral retinacular release was not performed.  The medial soft tissue sleeve was elevated from the tibia to the posteromedial corner.  Medial and lateral osteophytes were removed from the tibia and femur.    Two array drill pins were placed in the anteromedial proximal tibia, and two were placed in the anteromedial proximal femur.  The tibial and femoral arrays were clamped to the pins and aligned with the tibial  and femoral long axes.  The Velys robot was positioned along the non-operative side of the OR table.  The arrays were registered with the camera, and the tibial and femoral checkpoints were acquired.  All required anatomical landmarks were acquired and verified.  The knee was then taken through a full range of motion to obtain the initial leg alignment and Accubalance graph.  The surgical plan was then formulated using the Proadjust software.        Medial and lateral Z-retractors were placed on the tibia.  A Viki retractor placed in the intercondylar notch was used to subluxate the tibia anteriorly.  The [place] [employer] (GL[employer]ys robotic-assisted device was positioned on the patient's operative side, and the tibial checkpoint was verified.  The tibial cut was made with 3 degrees of posterior slope and 1 degrees varus in the coronal plane,  removing 8.5 mm from the medial plateau and 9.0 mm from the lateral plateau.    Meniscus remnants and posterior osteophytes were removed from the posterior recess.  A posterior capsular release was performed with a Ellis elevator.  The ligament tensor was placed, and a new Accubalance graph was obtained.  The surgical plan for the femoral component size and position was adjusted using the Proadjust software to optimize gap balancing and leg alignment.       The femoral checkpoint was verified.  The distal femoral cut was made in 0 degrees of varus to the mechanical axis, removing 8.0 mm of bone medially and 9.0 mm of bone laterally.  Femoral component rotation was set to 2.5 degrees of external rotation.  Femoral component sagittal plane alignment was set to 3 degrees of flexion and 1 mm of anterior translation.  This removed 10 mm of bone from the posteromedial femoral condyle and 8 mm of bone from the posterolateral femoral condyle.  The anterior, posterior, and chamfer cuts were then completed with the [place] [employer] (GL[employer]ys robotic-assisted device.  Spacer blocks were used to verify balanced flexion and  extension gaps and to determine polyethylene insert thickness.  The box cuts were made using the appropriate sized cutting guide.    Trial components were placed.  The trial liner was adjusted as necessary to provide appropriate soft tissue tension and knee range of motion.  The knee demonstrated excellent range of motion from full extension to 120 degrees of flexion and appropriate varus / valgus stability in full extension and mid-flexion.  A final Accubalance graph was obtained, demonstrating extension gaps of 2.0 mm medially and 3.5 mm laterally and flexion gaps of 2.5 medially and 4.5 mm laterally.  The final HKA was 1 degrees varus.  The patella was cut freehand, sized, and prepared to accept the patellar component.  A patellar trial was placed.  Patellar tracking was stable using the no-thumbs method.  Tibial component rotation was marked with electocautery.    The trial components were removed.  The tibial and femoral arrays and array drill pins were removed.  The tibia was exposed, sized, and prepared with the reamer and keel-punch.  Cement was mixed on the back table while the knee was irrigated with sterile saline.  The bone cuts were dried, and the tibial, femoral and patellar components were placed.  Compression was applied while the cement cured.  Excess cement was removed.  The polyethylene liner was placed.  Hemostasis was obtained with electrocautery.  The knee was again irrigated with sterile saline.  A Hemovac drain was not placed.  The arthrotomy and the deep fasica were closed with #1 Vicryl and #1 Stratafix sutures.  The skin was closed with 0 Vicryl, 2-0 Stratfix and 3-0 stratafix.  A sterile dressing was applied, and the drapes were removed.  The patient was awakened from anesthesia and taken to the PACU in stable condition.      COMPLICATIONS:  None    PATIENT DISPOSITION:  PACU       SIGNATURE:  Mayank Oropeza MD  DATE:  April 22, 2025  TIME:  4:24 PM

## 2025-04-22 NOTE — ANESTHESIA POSTPROCEDURE EVALUATION
Post-Op Assessment Note    CV Status:  Stable    Pain management: adequate       Mental Status:  Awake   Hydration Status:  Euvolemic   PONV Controlled:  Controlled   Airway Patency:  Patent     Post Op Vitals Reviewed: Yes    No anethesia notable event occurred.    Staff: Anesthesiologist           Last Filed PACU Vitals:  Vitals Value Taken Time   Temp 97.2 °F (36.2 °C) 04/22/25 1015   Pulse 89 04/22/25 1015   /64 04/22/25 1015   Resp 22 04/22/25 1015   SpO2 96 % 04/22/25 1015       Modified Perla:     Vitals Value Taken Time   Activity 2 04/22/25 1015   Respiration 2 04/22/25 1015   Circulation 0 04/22/25 1015   Consciousness 2 04/22/25 1015   Oxygen Saturation 2 04/22/25 1015     Modified Perla Score: 8

## 2025-04-22 NOTE — INTERVAL H&P NOTE
H&P reviewed. After examining the patient I find no changes in the patients condition since the H&P had been written.    Vitals:    04/22/25 0710   BP: 113/66   Pulse: 69   Resp: 20   Temp:    SpO2: 97%      with patient

## 2025-04-22 NOTE — DISCHARGE INSTR - AVS FIRST PAGE
POSTOPERATIVE INSTRUCTIONS following KNEE SURGERY    MEDICATIONS:  Resume all home medications unless otherwise instructed by your surgeon.  Antibiotic: Keflex 500 mg take 4 pills at 8 pm tonight and 4 pills at 8 am tomorrow morning  Pain Medication:  Oxycodone 5 mg, 1-2 tablets every 4 hours as needed  If you were given a regional anesthetic (nerve block), please begin taking the pain medication as soon as you get home, even if you have minimal or no pain.  DO NOT WAIT FOR THE NERVE BLOCK TO WEAR OFF.  Possible side effects include nausea, constipation, and urinary retention.  If you experience these side effects, please call our office for assistance.  Pain med refills are authorized only during office hours (8am-4pm, Mon-Fri).  Anti-Inflammatory:  Tylenol 650 mg, 1 tablet every 6 hours  Take with food.  Stop if you experience nausea, reflux, or stomach pain.  Nausea Medication:  Zofran ODT 4 mg, 1 tablet every 6 hours as needed  Fill prescription ONLY if you expericnce severe nausea.  Blood Clot Prevention:  Aspirin 81 mg , 1 tablet twice daily for 30 days  Pump your foot up and down 20 times per hour while you are less mobile.    WOUND CARE:  Keep the dressing clean and dry.  Light drainage may occur the first 2 days postop.  Mypilex dressing with Dylan stocking for 7 days then remove mypilex/bandage and continue with stocking until seen in the office.   Please call our office ([phone]) if you experience either of the following:  Sudden increase in swelling, redness, or warmth at the surgical site  Excessive incisional drainage that persists beyond the 3rd day after surgery  Oral temperature greater than 101 degrees, not relieved with Tylenol  Shortness of breath, chest pain, nausea, or any other concerning symptoms    SWELLING CONTROL:  Cold Therapy:  The cold therapy device may be used either continuously or only as needed, according to your preference.  Do not let the pad directly touch your skin.   "Alternatively, apply ice (20 min on, 20 min off) as often as you feel is necessary.  Elevation:  Elevate the entire leg above heart level.  Place pillows under your ankle to keep your knee straight.  Compression:  Apply ACE wraps or a thigh-length compression stocking as needed.    RANGE OF MOTION:  You are allowed FULL RANGE OF MOTION as tolerated.    IMMOBILIZATION:  None.  You are allowed full range of motion as tolerated.    ACTIVITY:   BEAR FULL WEIGHT AS TOLERATED on the operative leg.  Use crutches to assist only as needed.  Using Crutches on Stairs:  Going up, lead with your \"good\" (nonoperative) leg.  Going down, lead with your \"bad\" (operative) leg.  Use a hand rail when available.  Knee Extension:  Place a rolled towel or pillow under your ankle for 20-30 minutes 3-5 times per day.  This will help to maintain full knee extension.  Quad Sets:  Sit or lie with your knee straight.  Tighten your quadriceps (front thigh) muscle.  Hold for 3 seconds, then relax.  Repeat 20 times per hour while awake.    PHYSICAL THERAPY:  Begin therapy 5 TO 7 DAYS AFTER SURGERY.  You were given a prescription for therapy at your preoperative office visit.  If you do not have physical therapy scheduled yet, please call our office for assistance.    FOLLOW-UP APPOINTMENT:  2 weeks after surgery with:    Dr. Mayank Oropeza MD  [facility]  [address] [place] [address], [address], [place] [address]  [phone]   "

## 2025-04-22 NOTE — PLAN OF CARE
Problem: PHYSICAL THERAPY ADULT  Goal: Performs mobility at highest level of function for planned discharge setting.  See evaluation for individualized goals.  Description: Treatment/Interventions: Functional transfer training, LE strengthening/ROM, Elevations, Therapeutic exercise, Endurance training, Patient/family training, Equipment eval/education, Bed mobility, Gait training, Compensatory technique education, Continued evaluation, Spoke to nursing, OT  Equipment Recommended:  (has walker)       See flowsheet documentation for full assessment, interventions and recommendations.  Outcome: Completed  Note: Prognosis: Good  Problem List: Decreased strength, Decreased range of motion, Impaired balance, Decreased mobility, Decreased safety awareness, Pain, Orthopedic restrictions  Assessment: Octavio Stout is an 83 y/o male who presents to [facility] for R TKA by Dr. Oropeza on 4/22.  Hx of L knee OA also noted.  Prior to admission resides with s/o in one story home with one small BREANNE.  I without AD and driving prior to admission.  Currently presents with functional limitations related to decreased activity tolerance compared to baseline, decreased R knee ROM, strength.  Impaired posture, balance, functional mobility and locomotion requiring more restrictive AD use of RW support.  Demonstrates S for bed mobility, transfers and ambulation with RW support. Cues for safety with mobility and ambulation with RW.  Able to progress from step to pattern to step through pattern with progression of training.  Negotiates one 4 inch step with RW support and Denise.  Tolerated well.  The patient's AM-PAC Basic Mobility Inpatient Short Form Raw Score is 23. A Raw score of greater than 16 suggests the patient may benefit from discharge to home. Please also refer to the recommendation of the Physical Therapist for safe discharge planning.  Anticipate safe d/c to home with OPPT at this time. See treatment note below.        Rehab Resource  Intensity Level, PT: III (Minimum Resource Intensity) (OPPT planned Friday)    See flowsheet documentation for full assessment.

## 2025-04-22 NOTE — PLAN OF CARE
Problem: OCCUPATIONAL THERAPY ADULT  Goal: Performs self-care activities at highest level of function for planned discharge setting.  See evaluation for individualized goals.  Description: Treatment Interventions: ADL retraining, Functional transfer training, Endurance training, Patient/family training, Equipment evaluation/education, Compensatory technique education, Continued evaluation, Energy conservation, Activityengagement          See flowsheet documentation for full assessment, interventions and recommendations.   Note: Limitation: Decreased ADL status, Decreased endurance, Decreased self-care trans, Decreased high-level ADLs  Prognosis: Good  Assessment: Pt is a 84 y.o. male seen for OT evaluation s/p adm to [facility] on 4/22/2025 w/ Primary osteoarthritis of both knees . Comorbidities affecting pt’s functional performance include a significant PMH of arthritis, CKD, DM, GERD, heart disease, heart attack, hyperlipidemia, MI, CAD. Pt with active OT orders and activity orders for Activity beginning POD #0. Pt lives w/ significant other in a single level house with 0 BREANNE. Pt has walkin shower and comfort height toilets. At baseline, pt was independent with all ADLs/IADLs. Pt completed supine to sit with S. Pt completed functional mobility from EOB functional household distance with use of RW and S. Pt completed toileting in standing RW over toilet with use of RW and S. See additional treatment session focusing on ADLs/IADLs, transfers, and LE management. Upon evaluation, pt currently requires S for UB ADLs, S for LB ADLs, S for toileting, S for bed mobility, S for functional mobility, and S for transfers 2* the following deficits impacting occupational performance: weakness, decreased strength , decreased balance, decreased activity tolerance, increased pain, and orthopedic restrictions. These impairments, as well at pt’s personal factors of: difficulty performing ADLs, difficulty performing IADLs,  difficulty performing transfers/mobility, WBS, fall risk , new use of AD for functional transfers/mobility, and advanced age limit pt’s ability to safely engage in all baseline areas of occupation. Based on the aforementioned OT evaluation, functional performance deficits, and assessments, pt has been identified as a high complexity evaluation. Pt to continue to benefit from continued acute OT services during hospital stay to address defined deficits and to maximize level of functional independence in the following Occupational Performance areas: grooming, bathing/shower, toilet hygiene, dressing, health maintenance, functional mobility, community mobility, clothing management, cleaning, household maintenance, and job performance/volunteering. From OT standpoint, recommend No post-acute rehabilitation needs upon D/C. OT will continue to follow pt 3-5x/wk.     Rehab Resource Intensity Level, OT: No post-acute rehabilitation needs

## 2025-04-22 NOTE — OCCUPATIONAL THERAPY NOTE
"    Occupational Therapy Evaluation and Treatment     Patient Name: Jay Stout  Today's Date: 4/22/2025  Problem List  Principal Problem:    Primary osteoarthritis of both knees  Active Problems:    Primary osteoarthritis of right knee    Past Medical History  Past Medical History:   Diagnosis Date    Arthritis     Chronic kidney disease     Dental crown present     Diabetes mellitus (HCC)     Does use hearing aid     to wear DOS    Exercise involving walking     3x/week at the gym    GERD (gastroesophageal reflux disease)     taking pantoprazole    Heart disease 05/09/2020    Heart attack    History of coronary artery stent placement     Hoarseness of voice     \"raspy\"    Hyperlipidemia     Myocardial infarction (HCC)     SL cardio yearly    Penile lesion     Prediabetes     Wears glasses      Past Surgical History  Past Surgical History:   Procedure Laterality Date    CARDIAC SURGERY  05/2020    stent placement    COLONOSCOPY      HERNIA REPAIR      LARYNGOSCOPY  1978    with vocal cord polyp removal    LARYNGOSCOPY N/A 8/3/2021    Procedure: MICRODIRECT LARYNGOSCOPY WITH  EXCISION OF VOCAL CORD LESION;  Surgeon: Pito Tomlin DO;  Location: [facility];  Service: ENT    WV CIRCUMCISION AGE >28 DAYS N/A 2/20/2025    Procedure: CIRCUMCISION ADULT;  Surgeon: Saurav Gillis DO;  Location: [facility];  Service: Urology    WV EXC B9 LESION MRGN XCP SK TG S/N/H/F/G > 4.0CM N/A 2/20/2025    Procedure: EXCISION BIOPSY LESION/MASS PENILE;  Surgeon: Saurav Gillis DO;  Location: [facility];  Service: Urology        04/22/25 1119   OT Last Visit   OT Visit Date 04/22/25   Note Type   Note type Evaluation   Additional Comments pt greeted in supine, agreeable to OT evaluation   Pain Assessment   Pain Assessment Tool 0-10   Pain Score No Pain   Hospital Pain Intervention(s) Repositioned;Ambulation/increased activity;Emotional support;Rest   Restrictions/Precautions   Weight Bearing Precautions Per Order " Yes   RLE Weight Bearing Per Order WBAT   Other Precautions WBS;Fall Risk;Pain   Home Living   Type of Home House   Home Layout One level;Performs ADLs on one level;Able to live on main level with bedroom/bathroom  (1 small lip to enter)   Bathroom Shower/Tub Walk-in shower   Bathroom Toilet Raised   Bathroom Equipment Grab bars in shower;Commode   Bathroom Accessibility Accessible   Home Equipment Walker;Cane   Additional Comments Pt resides in a 1SH with significant other. Pt has walkin shower and raised toilets.   Prior Function   Level of [place] Independent with ADLs;Independent with functional mobility;Independent with IADLS   Lives With Significant other   Receives Help From Family   IADLs Independent with driving;Independent with meal prep;Independent with medication management   Falls in the last 6 months   (1 trip and fall about 6 months ago)   Vocational Retired   Comments (+) , no AD use at baseline   Lifestyle   Aut[place] Independent with all ADLs/IADLs, no AD use at baseline   Reciprocal Relationships Significant other   Service to Others Retired   Intrinsic Gratification making movies   General   Family/Caregiver Present Yes   ADL   Where Assessed Edge of bed   Eating Assistance 5  Supervision/Setup   Grooming Assistance 5  Supervision/Setup   UB Bathing Assistance 5  Supervision/Setup   LB Bathing Assistance 5  Supervision/Setup   UB Dressing Assistance 5  Supervision/Setup   LB Dressing Assistance 5  Supervision/Setup   Toileting Assistance  5  Supervision/Setup   Bed Mobility   Supine to Sit 5  Supervision   Additional items Increased time required;Verbal cues;HOB elevated   Sit to Supine Unable to assess   Additional Comments Pt greeted in supine, /56.   Transfers   Sit to Stand 5  Supervision   Additional items Increased time required;Verbal cues  (RW)   Stand to Sit 5  Supervision   Additional items Increased time required;Verbal cues  (RW)   Toilet transfer   (standing to void  with use of RW for stability)   Additional Comments use of RW, verbal/visual cues for hand placement. BP EOB: 121/57.   Functional Mobility   Functional Mobility 5  Supervision   Additional Comments Pt completed functional mobility functional household distance with use of RW and S   Additional items Rolling walker   Balance   Static Sitting Good   Dynamic Sitting Fair +   Static Standing Fair   Dynamic Standing Fair -   Ambulatory Fair -   Activity Tolerance   Activity Tolerance Patient tolerated treatment well   Medical Staff Made Aware PT Funmi, Pt seen for co-evaluation/treatment with skilled Physical Therapy due to pt's medical complexity, decreased endurance, overall functional level, overall safety, and post surgical day #0.   Nurse Made Aware RN Ninoska   RUE Assessment   RUE Assessment WFL   LUE Assessment   LUE Assessment WFL   Hand Function   Gross Motor Coordination Functional   Fine Motor Coordination Functional   Cognition   Overall Cognitive Status WFL   Arousal/Participation Alert;Cooperative   Attention Within functional limits   Orientation Level Oriented X4   Memory Within functional limits   Following Commands Follows all commands and directions without difficulty   Comments Cooperative and Pleasant   Assessment   Limitation Decreased ADL status;Decreased endurance;Decreased self-care trans;Decreased high-level ADLs   Prognosis Good   Assessment Pt is a 84 y.o. male seen for OT evaluation s/p adm [facility] on 4/22/2025 w/ Primary osteoarthritis of both knees . Comorbidities affecting pt’s functional performance include a significant PMH of arthritis, CKD, DM, GERD, heart disease, heart attack, hyperlipidemia, MI, CAD. Pt with active OT orders and activity orders for Activity beginning POD #0. Pt lives w/ significant other in a single level house with 0 BREANNE. Pt has walkin shower and comfort height toilets. At baseline, pt was independent with all ADLs/IADLs. Pt completed supine to sit  with S. Pt completed functional mobility from EOB functional household distance with use of RW and S. Pt completed toileting in standing RW over toilet with use of RW and S. See additional treatment session focusing on ADLs/IADLs, transfers, and LE management. Upon evaluation, pt currently requires S for UB ADLs, S for LB ADLs, S for toileting, S for bed mobility, S for functional mobility, and S for transfers 2* the following deficits impacting occupational performance: weakness, decreased strength , decreased balance, decreased activity tolerance, increased pain, and orthopedic restrictions. These impairments, as well at pt’s personal factors of: difficulty performing ADLs, difficulty performing IADLs, difficulty performing transfers/mobility, WBS, fall risk , new use of AD for functional transfers/mobility, and advanced age limit pt’s ability to safely engage in all baseline areas of occupation. Based on the aforementioned OT evaluation, functional performance deficits, and assessments, pt has been identified as a high complexity evaluation. Pt to continue to benefit from continued acute OT services during hospital stay to address defined deficits and to maximize level of functional independence in the following Occupational Performance areas: grooming, bathing/shower, toilet hygiene, dressing, health maintenance, functional mobility, community mobility, clothing management, cleaning, household maintenance, and job performance/volunteering. From OT standpoint, recommend No post-acute rehabilitation needs upon D/C. OT will continue to follow pt 3-5x/wk.   Goals   Patient Goals to go home   STG Time Frame 1-3   Short Term Goal #1 Pt will improve activity tolerance to G for min 30 min treatment sessions for increase engagement in functional tasks   Short Term Goal #2 Pt will complete bed mobility at a mod I level w/ G balance/safety demonstrated to decrease caregiver assistance required   Short Term Goal  Pt will  complete LB dressing/self care w/ mod I using adaptive device and DME as needed   LTG Time Frame 3-7   Long Term Goal #1 Pt will complete toileting w/ mod I w/ G hygiene/thoroughness using DME as needed   Long Term Goal #2 Pt will improve functional transfers to mod I on/off all surfaces using DME as needed w/ G balance/safety   Long Term Goal Pt will improve functional mobility during ADL/IADL/leisure tasks to mod I using DME as needed w/ G balance/safety   Plan   Treatment Interventions ADL retraining;Functional transfer training;Endurance training;Patient/family training;Equipment evaluation/education;Compensatory technique education;Continued evaluation;Energy conservation;Activityengagement   Goal Expiration Date 04/29/25   OT Treatment Day 0   OT Frequency 3-5x/wk   Discharge Recommendation   Rehab Resource Intensity Level, OT No post-acute rehabilitation needs   Additional Comments  The patient's raw score on the AM-PAC Daily Activity Inpatient Short Form is 21 . A raw score of greater than or equal to 19 suggests the patient may benefit from discharge to home. Please refer to the recommendation of the Occupational Therapist for safe discharge planning.   AM-PAC Daily Activity Inpatient   Lower Body Dressing 3   Bathing 3   Toileting 3   Upper Body Dressing 4   Grooming 4   Eating 4   Daily Activity Raw Score 21   Daily Activity Standardized Score (Calc for Raw Score >=11) 44.27   AM-PAC Applied Cognition Inpatient   Following a Speech/Presentation 4   Understanding Ordinary Conversation 4   Taking Medications 4   Remembering Where Things Are Placed or Put Away 4   Remembering List of 4-5 Errands 4   Taking Care of Complicated Tasks 4   Applied Cognition Raw Score 24   Applied Cognition Standardized Score 62.21   Additional Treatment Session   Start Time 1140   End Time 1150   Treatment Assessment Pt seen for OT treatment session focusing on ADLs/IADLs, functional mobility, functional standing tolerance,  functional transfers, patient education, continued evaluation. Pt alert and cooperative throughout session. Pt with good sitting balance and fair - dynamic standing balance. Pt tolerated treatment well. Pt completed functional mobility back to EOB with S and use of RW during treatment today. Pt completed don of pants EOB, then standing to pull over waist with S, RW for stability. Pt completed don of shirt with S. Pt educated on all ADLs/IADLs, LE management for independence at home. Pt reports no pain and no dizziness. Pt's vitals include: stable.     Pt ended session seated EOB. Call bell and phone within reach. All needs met and pt reports no further questions at this time. Continue to recommend No post-acute rehabilitation needs when medically cleared. OT will continue to follow pt on caseload.   Additional Treatment Day 1   End of Consult   Education Provided Yes;Family or social support of family present for education by provider   Patient Position at End of Consult Seated edge of bed;All needs within reach   Nurse Communication Nurse aware of consult   End of Consult Comments Pt seated EOB at end of session. Call bell and phone within reach. All needs met and pt reports no further questions for OT at this time.   Kailee Dumont, OT

## 2025-04-22 NOTE — ANESTHESIA PREPROCEDURE EVALUATION
Procedure:  ARTHROPLASTY KNEE TOTAL W ROBOT (psb same day dc) (Right: Knee)    #CAD c/b STEMI in the past - bASA, metoprolol    Relevant Problems   CARDIO   (+) Coronary artery disease involving native heart without angina pectoris   (+) Hypertension   (+) Mixed hyperlipidemia   (+) ST elevation myocardial infarction involving left circumflex coronary artery (HCC)      ENDO   (+) Type 2 diabetes mellitus with stage 3 chronic kidney disease, without long-term current use of insulin, unspecified whether stage 3a or 3b CKD (HCC)   (+) Type 2 diabetes mellitus without complication, without long-term current use of insulin (HCC)      /RENAL   (+) BPH with obstruction/lower urinary tract symptoms   (+) Stage 3 chronic kidney disease, unspecified whether stage 3a or 3b CKD (HCC)      MUSCULOSKELETAL   (+) Primary osteoarthritis of both knees   (+) Primary osteoarthritis of left knee   (+) Primary osteoarthritis of right knee      TTE (12/2024):  Left ventricular cavity size is normal. Wall thickness is normal. The left ventricular ejection fraction is 45%. Systolic function is mildly reduced. Wall motion is normal.  Possible mild anterior and apical hypokinesis. Diastolic function is mildly abnormal, consistent with grade I (abnormal) relaxation.   Right Ventricle Right ventricular cavity size is normal. Systolic function is normal. Wall thickness is normal.   Left Atrium The atrium is normal in size.   Right Atrium The atrium is normal in size. A pacer wire is present.   Aortic Valve The aortic valve is trileaflet. The leaflets are not thickened. The leaflets are not calcified. The leaflets exhibit normal mobility. There is no evidence of regurgitation. The aortic valve has no significant stenosis.   Mitral Valve There is mild thickening.  There is no evidence of regurgitation. There is no evidence of stenosis. The valve has normal function.   Tricuspid Valve Tricuspid valve structure is normal. There is no evidence of  regurgitation. There is no evidence of stenosis.   Pulmonic Valve Pulmonic valve structure is normal. There is no evidence of regurgitation. There is no evidence of stenosis.   Ascending Aorta The aortic root is normal in size.   IVC/SVC The inferior vena cava is normal in size.   Pericardium There is no pericardial effusion. The pericardium is normal in appearance.     ETT Placement Date: 08/03/21; Placement Time: 1054; Mask Ventilation: Ventilated by mask (1); Technique: Stylet, Video laryngoscopy; Type: Oral, Cuffed (MLT tube); Tube Size: 6 mm; Laryngoscope: GlideScope; Blade Size: 3; Location: Oral; Grade View: 1; Insertion Attempts: 1; Placement Verification: Auscultation, Cuff palpitation, End tidal CO2, Symmetrical chest wall movement; Removal Date: 08/03/21; Removal Time: 1126       Physical Exam    Airway    Mallampati score: II  TM Distance: >3 FB  Neck ROM: full     Dental   No notable dental hx     Cardiovascular  Rhythm: regular, Rate: normal    Pulmonary   Breath sounds clear to auscultation    Other Findings  Intercisor Distance > 3cm          Anesthesia Plan  ASA Score- 3     Anesthesia Type- spinal with ASA Monitors.         Additional Monitors:     Airway Plan:     Comment: Discussed benefits/risks of neuraxial anesthesia including possibility of discomfort at site of injection, post-dural puncture headache, block failure, and more rare complications such as bleeding, infection, and permanent nerve damage. If block fails or there is difficulty placing neuraxial block, general anesthesia was discussed as back-up plan. Patient understands and wishes to proceed.     Discussed nerve block to assist with post-operative analgesia. Discussed possibility of uncommon complications including permanent nerve injury, damage to blood vessels, infection local anesthetic toxicity, and nerve block failure. Patient understands and wishes to proceed.     All questions answered.   .       Plan Factors-Exercise  tolerance (METS): >4 METS.    Chart reviewed. EKG reviewed.  Existing labs reviewed.                   Induction-     Postoperative Plan- Plan for postoperative opioid use.         Informed Consent- Anesthetic plan and risks discussed with patient.  I personally reviewed this patient with the CRNA. Discussed and agreed on the Anesthesia Plan with the CRNA..      NPO Status:  Vitals Value Taken Time   Date of last liquid 04/22/25 04/22/25 0614   Time of last liquid 0400 04/22/25 0614   Date of last solid 04/21/25 04/22/25 0614   Time of last solid 2200 04/22/25 0614

## 2025-04-22 NOTE — ANESTHESIA PROCEDURE NOTES
Spinal Block    Patient location during procedure: OR  Start time: 4/22/2025 7:51 AM  Reason for block: primary anesthetic  Staffing  Performed by: Gunner Aguilar CRNA  Authorized by: Winston Wright MD    Preanesthetic Checklist  Completed: patient identified, IV checked, site marked, risks and benefits discussed, surgical consent, monitors and equipment checked, pre-op evaluation and timeout performed  Spinal Block  Patient position: sitting  Prep: ChloraPrep  Patient monitoring: heart rate, continuous pulse ox and frequent blood pressure checks  Approach: midline  Location: L3-4  Needle  Needle type: Pencan   Needle gauge: 24 G  Needle length: 3.5 in  Assessment  Injection Assessment:  positive aspiration for clear CSF, no paresthesia on injection and negative aspiration for heme.  Post-procedure:  site cleaned

## 2025-04-22 NOTE — ANESTHESIA POSTPROCEDURE EVALUATION
Post-Op Assessment Note    CV Status:  Stable    Pain management: adequate       Mental Status:  Alert and awake   Hydration Status:  Euvolemic   PONV Controlled:  Controlled   Airway Patency:  Patent     Post Op Vitals Reviewed: Yes    No anethesia notable event occurred.    Staff: CRNA           Last Filed PACU Vitals:  Vitals Value Taken Time   Temp 97.5    Pulse 96 04/22/25 0944   /57    Resp 22 04/22/25 0944   SpO2 94 % 04/22/25 0944   Vitals shown include unfiled device data.

## 2025-04-22 NOTE — PHYSICAL THERAPY NOTE
"   PT [id number]:[id number]:35  Treat: 11:40-11:55    84 y.o.    [id number]    Primary osteoarthritis of right knee [M17.11]    Past Medical History:   Diagnosis Date    Arthritis     Chronic kidney disease     Dental crown present     Diabetes mellitus (HCC)     Does use hearing aid     to wear DOS    Exercise involving walking     3x/week at the gym    GERD (gastroesophageal reflux disease)     taking pantoprazole    Heart disease 05/09/2020    Heart attack    History of coronary artery stent placement     Hoarseness of voice     \"raspy\"    Hyperlipidemia     Myocardial infarction (HCC)     SL cardio yearly    Penile lesion     Prediabetes     Wears glasses          Past Surgical History:   Procedure Laterality Date    CARDIAC SURGERY  05/2020    stent placement    COLONOSCOPY      HERNIA REPAIR      LARYNGOSCOPY  1978    with vocal cord polyp removal    LARYNGOSCOPY N/A 8/3/2021    Procedure: MICRODIRECT LARYNGOSCOPY WITH  EXCISION OF VOCAL CORD LESION;  Surgeon: Pito Tomlin DO;  Location: [facility];  Service: ENT    IL CIRCUMCISION AGE >28 DAYS N/A 2/20/2025    Procedure: CIRCUMCISION ADULT;  Surgeon: Saurav Gillis DO;  Location: [facility];  Service: Urology    IL EXC B9 LESION MRGN XCP SK TG S/N/H/F/G > 4.0CM N/A 2/20/2025    Procedure: EXCISION BIOPSY LESION/MASS PENILE;  Surgeon: Saurav Gillis DO;  Location: [facility];  Service: Urology        04/22/25 1120   PT Last Visit   PT Visit Date 04/22/25   Note Type   Note type Evaluation  (and treatment)   Pain Assessment   Pain Score No Pain   Hospital Pain Intervention(s) Repositioned;Ambulation/increased activity   Restrictions/Precautions   Weight Bearing Precautions Per Order Yes   RLE Weight Bearing Per Order WBAT   Other Precautions Fall Risk;Pain   Home Living   Type of Home House   Home Layout One level  (one small lip to enter the home.)   Bathroom Shower/Tub Walk-in shower   Bathroom Toilet Raised   Bathroom Equipment Grab bars in " "shower;Commode   Bathroom Accessibility Accessible   Home Equipment Walker;Cane   Additional Comments resides in one story home wiht s/o.  Only one small \"lip\" to enter home.   Prior Function   Level of [place] Independent with ADLs;Independent with functional mobility;Independent with IADLS   Lives With Significant other   Receives Help From Other (Comment)  (s/o)   IADLs Independent with medication management;Independent with meal prep;Independent with driving   Falls in the last 6 months   (1 trip and fall about 6 months ago)   Vocational Retired   Comments I PTA without AD use.  Drives.   General   Additional Pertinent History Pt is 83 y/o male presents for R TKR.  hx of B/L OA wiht R > L.   Family/Caregiver Present Yes   Cognition   Overall Cognitive Status WFL   Orientation Level Oriented X4   Comments talkative and pleasant.   Subjective   Subjective Reports feeling good and ready to go.   RUE Assessment   RUE Assessment WFL   LUE Assessment   LUE Assessment WFL   RLE Assessment   RLE Assessment X  (p TKR grossly 3-/5)   LLE Assessment   LLE Assessment WFL   Bed Mobility   Supine to Sit 5  Supervision   Additional items Increased time required;Verbal cues   Sit to Supine 5  Supervision   Additional items Increased time required;Verbal cues   Additional Comments /56.   Transfers   Sit to Stand 5  Supervision   Additional items Increased time required;Verbal cues   Stand to Sit 5  Supervision   Additional items Increased time required;Verbal cues   Additional Comments Cues for hand placement with transiitons for optimization of technique and safety .   Ambulation/Elevation   Gait pattern Improper Weight shift;Antalgic;Decreased R stance;Step to;Step through pattern  (progress from step to into step through pattern.)   Gait Assistance 5  Supervision   Additional items Verbal cues   Assistive Device Rolling walker   Distance Amb with RW 60'x1.  Followed by stair training, then additional 60'x1, 15'x1. "   Stair Management Assistance 4  Minimal assist   Additional items Assist x 1;Verbal cues   Stair Management Technique Step to pattern;Foreward;With walker   Number of Stairs 1  (one 4 inch curb step with RW support.)   Ambulation/Elevation Additional Comments gait progressed with treatment to step through pattern,   Balance   Static Sitting Good   Dynamic Sitting Fair +   Static Standing Fair   Dynamic Standing Fair -   Ambulatory Fair -   Activity Tolerance   Activity Tolerance Patient tolerated treatment well   Medical Staff Made Aware NurseNinoska. OT-Kailee:Pt seen for co-evaluation/treatment with skilled Occupational Therapist 2* clinically unstable/unpredictable presentation, medical complexity, fall risk, POD#0, functional/physical limitations, impaired functional balance, decreased safety awareness, limited activity tolerance which is decline from PLOF and may impact overall functional mobility/mobility safety.   Nurse Made Aware yes   Assessment   Prognosis Good   Problem List Decreased strength;Decreased range of motion;Impaired balance;Decreased mobility;Decreased safety awareness;Pain;Orthopedic restrictions   Assessment Octavio Stout is an 85 y/o male who presents to [facility] for R TKA by Dr. Oropeza on 4/22.  Hx of L knee OA also noted.  Prior to admission resides with s/o in one story home with one small BREANNE.  I without AD and driving prior to admission.  Currently presents with functional limitations related to decreased activity tolerance compared to baseline, decreased R knee ROM, strength.  Impaired posture, balance, functional mobility and locomotion requiring more restrictive AD use of RW support.  Demonstrates S for bed mobility, transfers and ambulation with RW support. Cues for safety with mobility and ambulation with RW.  Able to progress from step to pattern to step through pattern with progression of training.  Negotiates one 4 inch step with RW support and Denise.  Tolerated well.  The patient's  AM-PAC Basic Mobility Inpatient Short Form Raw Score is 23. A Raw score of greater than 16 suggests the patient may benefit from discharge to home. Please also refer to the recommendation of the Physical Therapist for safe discharge planning.  Anticipate safe d/c to home with OPPT at this time. See treatment note below.   Goals   Patient Goals go home today   STG Expiration Date 04/24/25   Short Term Goal #1 1-2 sessions:1).  Pt will perform bed mobility with Omer demonstrating appropriate technique 100% of the time in order to improve function.2)  Perform all transfers with Omer demonstrating safe and appropriate technique 100% of the time in order to improve ability to negotiate safely in home environment.3) Amb with least restrictive AD > 80'x1 with mod I in order to demonstrate ability to negotiate in home environment.4)  Improve overall strength and balance 1/2 grade in order to optimize ability to perform functional tasks and reduce fall risk.5) Increase activity tolerance to 45 minutes in order to improve endurance to functional tasks.6)  Negotiate stairs using most appropriate technique and S in order to be able to negotiate safely in home environment. 7) PT for ongoing patient and family/caregiver education, DME needs and d/c planning in order to promote highest level of function in least restrictive environment.   PT Treatment Day 1   Plan   Treatment/Interventions Functional transfer training;LE strengthening/ROM;Elevations;Therapeutic exercise;Endurance training;Patient/family training;Equipment eval/education;Bed mobility;Gait training;Compensatory technique education;Continued evaluation;Spoke to nursing;OT   PT Frequency Twice a day  (prn to facilitate discharge to home.)   Discharge Recommendation   Rehab Resource Intensity Level, PT III (Minimum Resource Intensity)  (OPPT planned Friday)   Equipment Recommended   (has walker)   AM-PAC Basic Mobility Inpatient   Turning in Flat Bed Without Bedrails 4    Lying on Back to Sitting on Edge of Flat Bed Without Bedrails 4   Moving Bed to Chair 4   Standing Up From Chair Using Arms 4   Walk in Room 4   Climb 3-5 Stairs With Railing 3   Basic Mobility Inpatient Raw Score 23   Basic Mobility Standardized Score 50.88   [facility] Highest Level Of Mobility   -HLM Goal 7: Walk 25 feet or more   JH-HLM Achieved 7: Walk 25 feet or more   Additional Treatment Session   Start Time 1140   End Time 1155   Treatment Assessment Treatment consisted of patient/family educaiton on HEP with performance of 3 return demo reps of each.  Educated on frequency and progression of HEP.  Reviewed discharge TKA handouts and all questions addressed. Able to progress with ambulation, stair training and demonstrates S for transfers.  Adequate for discharge to home with OPPT planned Friday.   Equipment Use RW   Additional Treatment Day 1   Exercises   Neuro re-ed reinforced importance of positioning to promote extension.   End of Consult   Patient Position at End of Consult Supine;All needs within reach   End of Consult Comments ice to knee. S/O an nursing at bedside.     Hx/personal factors: difficulty performing IADLs, WBS, fall risk , increased reliance on DME , new use of AD for functional transfers/mobility, and advanced age, comorbidities    Examination:decreased strength , decreased LE ROM, joint integrity, decreased balance, decreased activity tolerance, gait deviations, impaired posture, WBS, and orthopedic restrictions.     Clinical: unpredictable Trending/abnormal lab values, Risk for falls, Safety awareness, Continuous monitoring, Pain management, Decreased activity tolerance compared to baseline, More restrictive AD use than baseline, Decline from PLOF., POD#0, and WBS.     Complexity: high           Funmi Perera, PT

## 2025-04-23 ENCOUNTER — TELEPHONE (OUTPATIENT)
Dept: OBGYN CLINIC | Facility: HOSPITAL | Age: 85
End: 2025-04-23

## 2025-04-23 NOTE — TELEPHONE ENCOUNTER
"Patient contacted for a postoperative follow up assessment, spoke to pt/pt S/O. Patient states current pain level of a  \"very well, only used 1 oxy so far\"  and is walking with RW.  Patient reports swelling and dressing is Dressing C/D/I. Patient is icing the site regularly. NN educated patient on post-op swelling, icing, and constipation.    Confirmed upcoming appointments w/ patient:   PT 4/25  Postop 5/7     We reviewed patients AVS medication list. Patient is taking Oxycodone 5mg PRN, ASA 81mg BID, and Zofran 4mg PRN, Tylenol 500-1000mg PRN. Educated on nerve block, discussed starting Tylenol 1000mg every 8 hours scheduled. Patient confirms he completed Keflex course. Patient has not had a BM but is not taking OTC stool softener. Discussed postop constipation, increasing fluids/fiber, prunes or prune juice. Pt/pt S/O are agreeable to start Colace or Miralax daily into regimen.      Pt/pt S/O state the pharmacy did not fill Eliquis due to cost. He is currently taking aspirin 81mg BID. Pt's S/O states she can get the eliquis free at [facility] if he should be on it instead of the aspirin. Routed to surgeon for clarification of what was discussed/decided prior to DC from hospital.     Pt is also asking when he can resume showering. AVS notes dressing instructions/wound care, but doesn't specify showering. Please advise.     Patient denies nausea, vomiting, abdominal pain, chest pain, shortness of breath, fever, dizziness, and calf pain. Patient does not have any other questions or concerns at this time. Pt was encouraged to call with any questions, concerns or issues.         "

## 2025-04-23 NOTE — TELEPHONE ENCOUNTER
Speak with the patient and confirm that he is allowed to shower with the Mepilex dressing right away.  He should continue with the stockings though otherwise.  Also he is going to be taking the aspirin 81 twice daily for DVT prophylaxis since he does not smoke anymore.  He does not need to get the Eliquis.

## 2025-04-24 DIAGNOSIS — M17.11 PRIMARY OSTEOARTHRITIS OF RIGHT KNEE: Primary | ICD-10-CM

## 2025-04-24 RX ORDER — OXYCODONE HYDROCHLORIDE 5 MG/1
5-10 TABLET ORAL EVERY 4 HOURS PRN
Qty: 15 TABLET | Refills: 0 | Status: SHIPPED | OUTPATIENT
Start: 2025-04-24 | End: 2025-04-28 | Stop reason: SDUPTHER

## 2025-04-24 RX ORDER — GABAPENTIN 300 MG/1
300 CAPSULE ORAL 2 TIMES DAILY
Qty: 28 CAPSULE | Refills: 0 | Status: SHIPPED | OUTPATIENT
Start: 2025-04-24 | End: 2025-05-08

## 2025-04-24 NOTE — TELEPHONE ENCOUNTER
Please let them know that I did send a refill of oxycodone along with gabapentin to their pharmacy.

## 2025-04-25 ENCOUNTER — OFFICE VISIT (OUTPATIENT)
Dept: PHYSICAL THERAPY | Facility: CLINIC | Age: 85
End: 2025-04-25
Payer: MEDICARE

## 2025-04-25 DIAGNOSIS — M17.11 PRIMARY OSTEOARTHRITIS OF RIGHT KNEE: Primary | ICD-10-CM

## 2025-04-25 PROCEDURE — 97110 THERAPEUTIC EXERCISES: CPT | Performed by: PHYSICAL THERAPIST

## 2025-04-25 PROCEDURE — 97164 PT RE-EVAL EST PLAN CARE: CPT | Performed by: PHYSICAL THERAPIST

## 2025-04-25 NOTE — PROGRESS NOTES
PT Re-Evaluation     Today's date: 2025  Patient name: Jay Stout  : 1940  MRN: [id number]  Referring provider: Mayank Oropeza, *  Dx:   Encounter Diagnosis     ICD-10-CM    1. Primary osteoarthritis of right knee  M17.11           Start Time: 1030  Stop Time: 1140  Total time in clinic (min): 70 minutes    Assessment  Impairments: abnormal or restricted ROM, activity intolerance, impaired balance, impaired physical strength, lacks appropriate home exercise program, pain with function and weight-bearing intolerance    Assessment details: Jay Stout is a 84 y.o. male who presents with chronic right knee pain having failed conservative treatment, including steroid and viscosupplementation injections, as well as physical therapy. Patient has elected to undergo surgical intervention and is currently s/p right TKA, DOS: 25. Patient denies experiencing acute post-operative complications. Patient's pain is controlled with pain medication. Examination findings demonstrate no signs/symptoms of infection. Poor quadriceps activation, antalgic gait, limited terminal knee flexion and extension, and decreased LE strength noted. Patient has been reeducated in surgical and rehab overview, signs/symptoms of infection, edema/pain management, graded activity, and basic post-op HEP. Patient would benefit from skilled physical therapy to address their aforementioned impairments, improve their level of function and to improve their overall quality of life.  Understanding of Dx/Px/POC: excellent     Prognosis: excellent    Goals  Short Term Goals: to be achieved by 4 weeks  1) Patient to be independent with basic HEP.  2) Decrease pain by 5/10 at its worst.  3) Increase knee flexion ROM to 90 deg.  4) Increase knee extension ROM by > 5 deg.  5) Increase LE strength by 1/2 MMT grade in all deficient planes.  6) Patient to negotiate steps with a step-to pattern with use of HR.   7) Patient to report  decreased sleep interruption secondary to pain.  8) Increase ambulatory tolerance by 10 min with LRAD.    Long Term Goals: to be achieved by discharge  1) FOTO equal to or greater than 57.  2) Patient to be independent with comprehensive HEP.  3) Abolish pain for improved quality of life.  4) Increase LE strength to 5/5 MMT grade in all planes to improve a/iadls.  5) Achieve full knee extension ROM to improve a/iadls.  6) Increase knee flexion ROM to within 5 deg of contralateral LE to improve a/iadls.  7) Patient to negotiate steps with a reciprocal pattern with use of Hr.  8) Increase ambulatory tolerance to 60 min to improve participation in social activities.  9) Patient to report no sleep interruption secondary to pain.    Plan  Patient would benefit from: skilled PT  Planned modality interventions: biofeedback, cryotherapy, electrical stimulation/Russian stimulation, TENS and low level laser therapy    Planned therapy interventions: activity modification, ADL retraining, ADL training, balance, balance/weight bearing training, body mechanics training, dressing changes, functional ROM exercises, gait training, home exercise program, IADL retraining, joint mobilization, manual therapy, massage, neuromuscular re-education, patient education, self care, strengthening, stretching, therapeutic activities, therapeutic exercise and transfer training    Frequency: 2-3x week.  Duration in weeks: 12  Plan of Care beginning date: 4/25/2025  Plan of Care expiration date: 7/18/2025  Treatment plan discussed with: patient and family (Patient's partner, Evelyne, present for duration of IE.)        Subjective Evaluation    History of Present Illness  Mechanism of injury: Patient presents s/p right TKA, DOS: 4/22/25. Patient denies experiencing acute post-operative complications. Patient was discharged same day as surgery. Patient is managing his pain with Tylenol, Gabapentin, and Oxycodone, as well as ice.   Patient  Goals  Patient goals for therapy: decreased pain and independence with ADLs/IADLs    Pain  Current pain ratin  At best pain ratin  At worst pain rating: 10  Location: right knee: diffuse  Quality: burning, dull ache and sharp  Exacerbated by: squatting, ballet, kneeling, don/doffing pants,    Social Support    Employment status: not working        Objective     Observations   Left Knee   Negative for edema, effusion and incision.     Right Knee   Positive for edema (significant swelling t/o thigh to knee), effusion and incision (covered with Tegaderm, no signs of infection).     Active Range of Motion   Left Knee   Flexion: 105 degrees with pain  Extension: 5 (lacking terminal extension) degrees     Right Knee   Flexion: 60 degrees with pain  Extension: 3 (lacking terminal extension) degrees     Passive Range of Motion   Left Knee   Flexion: 112 degrees with pain  Extension: 5 (lacking terminal extension) degrees     Right Knee   Flexion: 75 degrees with pain  Extension: 3 (lacking terminal extension) degrees     Mobility   Patellar Mobility:   Left Knee   Hypomobile: left medial, left lateral, left superior and left inferior    Right Knee   Hypomobile: medial, lateral, superior and inferior     Strength/Myotome Testing     Left Hip   Planes of Motion   Flexion: 5    Right Hip   Planes of Motion   Flexion: 3-    Left Knee   Normal strength  Quadriceps contraction: good    Right Knee   Flexion: 3-  Extension: 3-  Quadriceps contraction: poor (unable to perform supine SLR)    Left Ankle/Foot   Dorsiflexion: 5    Right Ankle/Foot   Dorsiflexion: 5    Tests     Right Ankle/Foot   Negative for Homans' sign.     Ambulation   Weight-Bearing Status   Weight-Bearing Status (Left): full weight bearing   Weight-Bearing Status (Right): full weight-bearing    Assistive device used: standard walker    Ambulation: Level Surfaces   Ambulation with assistive device: independent    Observational Gait   Gait: antalgic      Functional Assessment        Comments  Preoperative Evaluation (4/16/25):    Timed Up and Go: 8.21 sec, Independent sit to/from stand    Five Times Sit to Stand Test: 16.05 sec, Independent, good eccentric control    Postoperative Evaluation (4/25/25):    Timed Up and Go: 27.83 sec, Mod I ambulation with standard walker, Mod I sit/from stand with b/l arm rests    Five Times Sit to Stand Test: 24.33 sec, Mod I with b/l arm rests, left knee extended with increased weightbearing through right             POC expires Unit limit Auth  expiration date PT/OT + Visit Limit?   7/18/25 N/A N/A BOMN                 Visit/Unit Tracking  AUTH Status:  Date 4/16 4/25             N/A Used 1 2              Remaining                  Precautions: right TKA (DOS: 4/22/25), DM, HTN, h/o MI      Manuals 4/16 4/25           Post-op RE NV GR                                                  Neuro Re-Ed             Supine SLR Reviewed            Quad sets with heel prop Reviewed Reviewed           TKE             LAQ             SAQ                                       Ther Ex             Patient education: pathophysiology, diagnostic imaging review, signs/symptoms of infection, edema/pain management, graded activity, HEP review GR GR           Ankle pumps Reviewed Reviewed           Seated knee extension LLPS Reviewed Reviewed           Seated knee flexion/extension AAROM at edge of plinthe Reviewed Reviewed           Seated knee flexion AAROM in chair  Reviewed           Long-sitting calf str. Reviewed            HR             Standing calf str. With SB             Nustep: knee ROM ; Rec bike  Nustep L1 10 min           Standing hip abd, ext with TB                                                                 Ther Activity             Squats             FSU             FSD             Total gym: squats             Gait Training                                       Modalities

## 2025-04-25 NOTE — TELEPHONE ENCOUNTER
"Spoke to pt S/O, confirmed they received Oxycodone and Gabapentin script and is helpful. \"Last night was much better.\"     S/O reports \"a fever yesterday but unsure of the reading.\" Discussed expected low grade postop temperature, up to 100.4. Denies ongoing fever concerns, chills, drainage, redness. Pt/pt S/O will continue to monitor and contact office back for any fever, redness, drainage, warmth or other concerns.     No additional needs at this time. Pt and S/O have direct NN number.   "

## 2025-04-28 ENCOUNTER — OFFICE VISIT (OUTPATIENT)
Dept: PHYSICAL THERAPY | Facility: CLINIC | Age: 85
End: 2025-04-28
Attending: ORTHOPAEDIC SURGERY
Payer: MEDICARE

## 2025-04-28 DIAGNOSIS — M17.11 PRIMARY OSTEOARTHRITIS OF RIGHT KNEE: ICD-10-CM

## 2025-04-28 DIAGNOSIS — M17.11 PRIMARY OSTEOARTHRITIS OF RIGHT KNEE: Primary | ICD-10-CM

## 2025-04-28 PROCEDURE — 97110 THERAPEUTIC EXERCISES: CPT | Performed by: PHYSICAL THERAPIST

## 2025-04-28 PROCEDURE — 97112 NEUROMUSCULAR REEDUCATION: CPT | Performed by: PHYSICAL THERAPIST

## 2025-04-28 PROCEDURE — 97140 MANUAL THERAPY 1/> REGIONS: CPT | Performed by: PHYSICAL THERAPIST

## 2025-04-28 RX ORDER — OXYCODONE HYDROCHLORIDE 5 MG/1
5-10 TABLET ORAL EVERY 4 HOURS PRN
Qty: 15 TABLET | Refills: 0 | Status: SHIPPED | OUTPATIENT
Start: 2025-04-28 | End: 2025-05-08

## 2025-04-28 NOTE — TELEPHONE ENCOUNTER
Received VM from patient's S/O, pasted below.     Called and spoke to pt's S/O. Reviewed discharge AVS instructions. WOUND CARE: Keep the dressing clean and dry. Light drainage may occur the first 2 days postop. Mypilex dressing with Dylan stocking for 7 days then remove mypilex/bandage and continue with stocking until seen in the office. Discussed if incision becomes bothersome under Dylan stocking, dry sterile gauze can be placed in between to avoid irritation. Discussed covering with water barrier for showering, saran wrap, plastic, etc.     Pt/pt S/O are requesting Oxy refill. Will route to surgeon.     Good morning, Gladys. This is Octavio Naranjo's partner. He had surgery last Tuesday. A couple of questions for you. One, we're supposed to take his bandage off from what it says tomorrow. I just like to know what things I need on hand, what I need exactly to do, just pull it off and that's it. That's one question. The other question is we're running out of oxycodone. If in the next day or two, two days really, and if we could get another prescription for that. He's doing very well, know he's still very, of course, very uncomfortable, but he's moving around much better. The gabapentin did a wonderful job with that and he's getting it twice a day. I give it to him at night and then when in the morning when he gets up, feel very uncomfortable. But we have three sessions with PP every week, so we'll be going this afternoon around 2:00. All right, Gladys, I think that's all the questions I have for you. Honey, I'm sorry to bother you, but I'm a little apprehensive about taking this bandage off tomorrow. So I'd just like to know if everything's all right with that. OK. All right, Take care. [phone] Thank you.

## 2025-04-28 NOTE — PROGRESS NOTES
"Daily Note     Today's date: 2025  Patient name: Jay Stout  : 1940  MRN: [id number]  Referring provider: Mayank Oropeza, *  Dx:   Encounter Diagnosis     ICD-10-CM    1. Primary osteoarthritis of right knee  M17.11           Start Time: 1400  Stop Time: 1514  Total time in clinic (min): 74 minutes    Subjective: Patient reports that he has been stretching as able at home. Patient has been tired from the Gabapentin.      Objective: See treatment diary below      Assessment: Tolerated treatment well. Patient demonstrated fatigue post treatment and would benefit from continued PT. Patient with improved alertness and mobility, able to make one full revolution around bike. Reeducated Patient on importance of gradual activity and HEP compliance.      Plan: Continue per plan of care.  Progress treatment as tolerated.         POC expires Unit limit Auth  expiration date PT/OT + Visit Limit?   25 N/A N/A BOMN                 Visit/Unit Tracking  AUTH Status:  Date             N/A Used 1 2 3             Remaining                  Precautions: right TKA (DOS: 25), DM, HTN, h/o MI      Manuals           Post-op RE NV GR           R knee PROM   GR                                    Neuro Re-Ed             Supine SLR Reviewed            Quad sets with heel prop Reviewed Reviewed           TKE   8# 20x5\"          LAQ             SAQ   20x5\"                                    Ther Ex             Patient education: pathophysiology, diagnostic imaging review, signs/symptoms of infection, edema/pain management, graded activity, HEP review GR GR           Ankle pumps Reviewed Reviewed           Seated knee extension LLPS Reviewed Reviewed           Seated knee flexion/extension AAROM at edge of plinthe Reviewed Reviewed           Seated knee flexion AAROM in chair  Reviewed           Long-sitting calf str. Reviewed            HR   2x10          Standing calf str. With SB  " " 3x30\"          Nustep: knee ROM ; Rec bike  Nustep L1 10 min Rec bike 10 min          Standing hip abd, ext with TB                                                                 Ther Activity             Squats             FSU             FSD             Total gym: squats             Gait Training                                       Modalities                                            "

## 2025-05-01 ENCOUNTER — OFFICE VISIT (OUTPATIENT)
Dept: PHYSICAL THERAPY | Facility: CLINIC | Age: 85
End: 2025-05-01
Attending: ORTHOPAEDIC SURGERY
Payer: MEDICARE

## 2025-05-01 DIAGNOSIS — M17.11 PRIMARY OSTEOARTHRITIS OF RIGHT KNEE: ICD-10-CM

## 2025-05-01 DIAGNOSIS — M17.11 PRIMARY OSTEOARTHRITIS OF RIGHT KNEE: Primary | ICD-10-CM

## 2025-05-01 PROCEDURE — 97110 THERAPEUTIC EXERCISES: CPT

## 2025-05-01 PROCEDURE — 97112 NEUROMUSCULAR REEDUCATION: CPT

## 2025-05-01 PROCEDURE — 97140 MANUAL THERAPY 1/> REGIONS: CPT

## 2025-05-01 RX ORDER — FERROUS SULFATE 324(65)MG
324 TABLET, DELAYED RELEASE (ENTERIC COATED) ORAL
Qty: 180 TABLET | Refills: 1 | OUTPATIENT
Start: 2025-05-01

## 2025-05-01 RX ORDER — MULTIVITAMIN
1 TABLET ORAL DAILY
Qty: 90 TABLET | Refills: 1 | OUTPATIENT
Start: 2025-05-01

## 2025-05-01 RX ORDER — FOLIC ACID 1 MG/1
1000 TABLET ORAL DAILY
Qty: 90 TABLET | Refills: 1 | OUTPATIENT
Start: 2025-05-01

## 2025-05-01 RX ORDER — OXYCODONE HYDROCHLORIDE 5 MG/1
5 TABLET ORAL EVERY 4 HOURS PRN
Qty: 15 TABLET | Refills: 0 | Status: SHIPPED | OUTPATIENT
Start: 2025-05-01 | End: 2025-05-11

## 2025-05-01 RX ORDER — ASCORBIC ACID 500 MG
500 TABLET ORAL 2 TIMES DAILY
Qty: 180 TABLET | Refills: 1 | OUTPATIENT
Start: 2025-05-01

## 2025-05-01 NOTE — PROGRESS NOTES
"Daily Note     Today's date: 2025  Patient name: Jay Stout  : 1940  MRN: [id number]  Referring provider: Mayank Oropeza, *  Dx:   Encounter Diagnosis     ICD-10-CM    1. Primary osteoarthritis of right knee  M17.11                      Subjective: Patient reports that he had a rough night of being up all night. He took tylenol and oxycodone before coming in to today's session due to pain.       Objective: See treatment diary below      Assessment: Tolerated treatment well. Patient with improving quadriceps activation and strength noted by better ROM achieved during SAQ's. Patient demonstrated fatigue post treatment, exhibited good technique with therapeutic exercises, and would benefit from continued PT      Plan: Continue per plan of care.        POC expires Unit limit Auth  expiration date PT/OT + Visit Limit?   25 N/A N/A BOMN                 Visit/Unit Tracking  AUTH Status:  Date            N/A Used 1 2 3 4            Remaining                  Precautions: right TKA (DOS: 25), DM, HTN, h/o MI      Manuals  5         Post-op RE NV GR           R knee PROM   GR BE                                   Neuro Re-Ed             Supine SLR Reviewed            Quad sets with heel prop Reviewed Reviewed           TKE   8# 20x5\" 8# 20x5\"         LAQ             SAQ   20x5\" 5\"x20                                   Ther Ex             Patient education: pathophysiology, diagnostic imaging review, signs/symptoms of infection, edema/pain management, graded activity, HEP review GR GR           Ankle pumps Reviewed Reviewed           Seated knee extension LLPS Reviewed Reviewed           Seated knee flexion/extension AAROM at edge of plinthe Reviewed Reviewed           Seated knee flexion AAROM in chair  Reviewed           Long-sitting calf str. Reviewed            HR   2x10          Standing calf str. With SB   3x30\" 3x30\"         Nustep: knee ROM ; Rec bike  " "Nustep L1 10 min Rec bike 10 min Rec bike 10 min         Standing hip abd, ext with TB             Heel slides with strap     5\"x20                                                Ther Activity             Squats             FSU             FSD             Total gym: squats    L19 2x10         Gait Training                                       Modalities                                              "

## 2025-05-02 ENCOUNTER — OFFICE VISIT (OUTPATIENT)
Dept: PHYSICAL THERAPY | Facility: CLINIC | Age: 85
End: 2025-05-02
Attending: ORTHOPAEDIC SURGERY
Payer: MEDICARE

## 2025-05-02 DIAGNOSIS — M17.11 PRIMARY OSTEOARTHRITIS OF RIGHT KNEE: Primary | ICD-10-CM

## 2025-05-02 PROCEDURE — 97140 MANUAL THERAPY 1/> REGIONS: CPT

## 2025-05-02 PROCEDURE — 97112 NEUROMUSCULAR REEDUCATION: CPT

## 2025-05-02 NOTE — PROGRESS NOTES
"Daily Note     Today's date: 2025  Patient name: Jay Stout  : 1940  MRN: [id number]  Referring provider: Mayank Oropeza, *  Dx:   Encounter Diagnosis     ICD-10-CM    1. Primary osteoarthritis of right knee  M17.11                      Subjective: Patient reports that he feels better arriving to today's session; more awake, less groggy, and less pain in his knee.       Objective: See treatment diary below      Assessment: Tolerated treatment well. Performed step ups this visit to promote improved quadriceps strength functionally. Hip compensatory movements and UE usage noted, requiring VC's to correct. Patient demonstrated fatigue post treatment, exhibited good technique with therapeutic exercises, and would benefit from continued PT      Plan: Continue per plan of care.  Progress treatment as tolerated.         POC expires Unit limit Auth  expiration date PT/OT + Visit Limit?   25 N/A N/A BOMN                 Visit/Unit Tracking  AUTH Status:  Date  5/          N/A Used 1 2 3 4 5           Remaining                  Precautions: right TKA (DOS: 25), DM, HTN, h/o MI      Manuals  5/2        Post-op RE NV GR           R knee PROM   GR BE BE                                  Neuro Re-Ed             Supine SLR Reviewed            Quad sets with heel prop Reviewed Reviewed           TKE   8# 20x5\" 8# 20x5\" 8# 20x5\"        LAQ     3\" x20        SAQ   20x5\" 5\"x20 5\"x20                                  Ther Ex             Patient education: pathophysiology, diagnostic imaging review, signs/symptoms of infection, edema/pain management, graded activity, HEP review GR GR           Ankle pumps Reviewed Reviewed           Seated knee extension LLPS Reviewed Reviewed           Seated knee flexion/extension AAROM at edge of plinthe Reviewed Reviewed           Seated knee flexion AAROM in chair  Reviewed           Long-sitting calf str. Reviewed            HR  " " 2x10          Standing calf str. With SB   3x30\" 3x30\" 3x30\"        Nustep: knee ROM ; Rec bike  Nustep L1 10 min Rec bike 10 min Rec bike 10 min Rec bike 10 min        Standing hip abd, ext with TB             Heel slides with strap     5\"x20 5\"x20                                               Ther Activity             Squats             FSU     6\" x10        FSD             Total gym: squats    L19 2x10 L19 3x10        Gait Training                                       Modalities                                                "

## 2025-05-05 ENCOUNTER — OFFICE VISIT (OUTPATIENT)
Dept: PHYSICAL THERAPY | Facility: CLINIC | Age: 85
End: 2025-05-05
Attending: ORTHOPAEDIC SURGERY
Payer: MEDICARE

## 2025-05-05 ENCOUNTER — TELEPHONE (OUTPATIENT)
Dept: OBGYN CLINIC | Facility: HOSPITAL | Age: 85
End: 2025-05-05

## 2025-05-05 DIAGNOSIS — M17.11 PRIMARY OSTEOARTHRITIS OF RIGHT KNEE: Primary | ICD-10-CM

## 2025-05-05 DIAGNOSIS — M17.11 PRIMARY OSTEOARTHRITIS OF RIGHT KNEE: ICD-10-CM

## 2025-05-05 PROCEDURE — 97110 THERAPEUTIC EXERCISES: CPT

## 2025-05-05 PROCEDURE — 97530 THERAPEUTIC ACTIVITIES: CPT

## 2025-05-05 PROCEDURE — 97140 MANUAL THERAPY 1/> REGIONS: CPT

## 2025-05-05 RX ORDER — OXYCODONE HYDROCHLORIDE 5 MG/1
5-10 TABLET ORAL EVERY 4 HOURS PRN
Qty: 15 TABLET | Refills: 0 | Status: SHIPPED | OUTPATIENT
Start: 2025-05-05 | End: 2025-05-15

## 2025-05-05 NOTE — PROGRESS NOTES
"Daily Note     Today's date: 2025  Patient name: Jay Stout  : 1940  MRN: [id number]  Referring provider: Mayank Oropeza, *  Dx:   Encounter Diagnosis     ICD-10-CM    1. Primary osteoarthritis of right knee  M17.11           Start Time: 1400  Stop Time: 1445  Total time in clinic (min): 45 minutes    Subjective: Octavio reports no changes today. He is ambulating with RW and has some continued pain in his knee at this time      Objective: See treatment diary below      Assessment: Tolerated treatment fair. Patient demonstrated fatigue post treatment and would benefit from continued PT.       Plan: Continue per plan of care.  Progress treatment as tolerated.         POC expires Unit limit Auth  expiration date PT/OT + Visit Limit?   25 N/A N/A BOMN                 Visit/Unit Tracking  AUTH Status:  Date          N/A Used 1 2 3 4 5 6          Remaining                  Precautions: right TKA (DOS: 25), DM, HTN, h/o MI      Manuals  5/2 5/       Post-op RE NV GR           R knee PROM   GR BE BE TS                                 Neuro Re-Ed             Supine SLR Reviewed            Quad sets with heel prop Reviewed Reviewed           TKE   8# 20x5\" 8# 20x5\" 8# 20x5\"        LAQ     3\" x20        SAQ   20x5\" 5\"x20 5\"x20                                  Ther Ex             Patient education: pathophysiology, diagnostic imaging review, signs/symptoms of infection, edema/pain management, graded activity, HEP review GR GR           Ankle pumps Reviewed Reviewed           Seated knee extension LLPS Reviewed Reviewed           Seated knee flexion/extension AAROM at edge of plinthe Reviewed Reviewed           Seated knee flexion AAROM in chair  Reviewed           Long-sitting calf str. Reviewed            HR   2x10          Standing calf str. With SB   3x30\" 3x30\" 3x30\"        Nustep: knee ROM ; Rec bike  Nustep L1 10 min Rec bike 10 min Rec bike 10 " "min Rec bike 10 min Rec 10' full rev.       Standing hip abd, ext with TB             Heel slides with strap     5\"x20 5\"x20                                               Ther Activity             Squats             FSU     6\" x10 4\" 2x10        LSU      4\" x5       FSD             Total gym: squats    L19 2x10 L19 3x10 L19 3x10        Gait Training                                       Modalities                                                  "

## 2025-05-05 NOTE — TELEPHONE ENCOUNTER
Please let them know that the medication was sent we will discuss pain management going forward at his appointment on Wednesday.

## 2025-05-05 NOTE — TELEPHONE ENCOUNTER
Called and spoke to S/O. Made aware that oxy refill was sent to pharmacy, pain regimen will be discussed at PO visit Wed 5/7. S/O verbalizes understanding. No additional needs at this time.

## 2025-05-07 ENCOUNTER — APPOINTMENT (OUTPATIENT)
Dept: RADIOLOGY | Facility: OTHER | Age: 85
End: 2025-05-07
Attending: PHYSICIAN ASSISTANT
Payer: MEDICARE

## 2025-05-07 ENCOUNTER — OFFICE VISIT (OUTPATIENT)
Dept: PHYSICAL THERAPY | Facility: CLINIC | Age: 85
End: 2025-05-07
Attending: ORTHOPAEDIC SURGERY
Payer: MEDICARE

## 2025-05-07 ENCOUNTER — OFFICE VISIT (OUTPATIENT)
Dept: OBGYN CLINIC | Facility: OTHER | Age: 85
End: 2025-05-07

## 2025-05-07 VITALS — HEIGHT: 68 IN | WEIGHT: 195 LBS | BODY MASS INDEX: 29.55 KG/M2

## 2025-05-07 DIAGNOSIS — M17.11 PRIMARY OSTEOARTHRITIS OF RIGHT KNEE: Primary | ICD-10-CM

## 2025-05-07 DIAGNOSIS — Z96.651 STATUS POST TOTAL KNEE REPLACEMENT, RIGHT: Primary | ICD-10-CM

## 2025-05-07 DIAGNOSIS — Z96.651 STATUS POST TOTAL KNEE REPLACEMENT, RIGHT: ICD-10-CM

## 2025-05-07 PROCEDURE — 99024 POSTOP FOLLOW-UP VISIT: CPT | Performed by: PHYSICIAN ASSISTANT

## 2025-05-07 PROCEDURE — 97110 THERAPEUTIC EXERCISES: CPT | Performed by: PHYSICAL THERAPIST

## 2025-05-07 PROCEDURE — 97140 MANUAL THERAPY 1/> REGIONS: CPT | Performed by: PHYSICAL THERAPIST

## 2025-05-07 PROCEDURE — 73562 X-RAY EXAM OF KNEE 3: CPT

## 2025-05-07 NOTE — PROGRESS NOTES
Orthopaedic Surgery - Office Note  Jay Stout (84 y.o. male)   : 1940   MRN: [id number]  Encounter Date: 2025    Assessment / Plan    Assessment & Plan  Status post total knee replacement, right  - Status post right total knee replacement with robotic assistance on 2025   -Continue with ice and analgesics as needed for pain.  We did discuss continuing to wean from the oxycodone at this time.  -Continue with physical therapy with focus on range of motion.  -Continue with aspirin 81 mg twice daily for 30 days for DVT prophylaxis.  -We did get x-rays of the right knee which were reviewed with the patient.  -We did discuss the need for dental antibiotic prophylaxis as a lifetime recommendation.  The patient can contact the office if they need medication.  Orders:    XR knee 3 vw right non injury; Future    Follow-up:  Return in about 6 weeks (around 2025) for follow up with Dr. Oropeza .      Chief Complaint / Date of Onset  Bilateral knee pain, right worse than left secondary to osteoarthritis  Injury Mechanism / Date  None  Surgery / Date  Status post right total knee replacement with robotic assistance on 2025    History of Present Illness   Jay Stout is a 84 y.o. male who presents for follow-up status post right total knee replacement with robotic assistance on 2025.  Overall patient feels that he has been progressing well.  He feels that he has been doing well with physical therapy.  He has been ambulating mostly with a walker.  He denies any issues with the incision up to this point.  They have been compliant with the compression stockings.  Patient denies any distal numbness or tingling at this time.  He has been using Tylenol and naproxen regularly with oxycodone as needed up to this point.    Treatment Summary  Medications / Modalities  Tylenol prn   Bracing / Immobilization  None  Physical Therapy  Completed 3+ months in the  of   Complaint with  "HEP  Injections  Most recent B/L CSI on 11/06/2024   Long standing history of bilateral CSI since 2020  Prior Surgeries  None  Other Treatments  None      Review of Systems  Pertinent items are noted in HPI.  All other systems were reviewed and are negative.      Physical Exam  Ht 5' 8\" (1.727 m)   Wt 88.5 kg (195 lb)   BMI 29.65 kg/m²   Cons: Appears well.  No apparent distress.  Psych: Alert. Oriented x3.  Mood and affect normal.  Eyes: PERRLA, EOMI  Resp: Normal effort.  No audible wheezing or stridor.  CV: Palpable pulse.  No discernable arrhythmia.  No LE edema.  Lymph:  No palpable cervical, axillary, or inguinal lymphadenopathy.  Skin: Warm.  No palpable masses.  No visible lesions.  Neuro: Normal muscle tone.  Normal and symmetric DTR's.     Right Knee Exam  Alignment:  Normal knee alignment.  Inspection:   Incision healing well at this time without erythema or drainage.  Glue is intact.  Mild swelling is noted around the knee and lower leg.  Palpation:   Mild tenderness at luis-incisional area.  ROM:  Knee Extension 5. Knee Flexion 90.  Strength:  Able to actively extend knee against gravity.  Stability:  No objective knee instability. Stable Varus / Valgus stress, Lachman, and Posterior drawer.  Tests:  No pertinent positive or negative tests.  Patella:  Patella tracks centrally without crepitus.  Neurovascular:  Sensation intact in DP/SP/Pedroza/Sa/T nerve distributions. Sensation intact in all digital nerve distributions.  Toes warm and perfused.  Gait:  Steady with walker.       Studies Reviewed  I have personally reviewed pertinent films in PACS.  XR of right knee - from 5/7/2025 shows prosthesis in good position with no signs of loosening.  No fracture or dislocation seen.      Procedures  No procedures today.    Medical, Surgical, Family, and Social History  The patient's medical history, family history, and social history, were reviewed and updated as appropriate.    Past Medical History:   Diagnosis " "Date    Arthritis     Chronic kidney disease     Dental crown present     Diabetes mellitus (HCC)     Does use hearing aid     to wear DOS    Exercise involving walking     3x/week at the gym    GERD (gastroesophageal reflux disease)     taking pantoprazole    Heart disease 05/09/2020    Heart attack    History of coronary artery stent placement     Hoarseness of voice     \"raspy\"    Hyperlipidemia     Myocardial infarction (HCC)     SL cardio yearly    Penile lesion     Prediabetes     Wears glasses        Past Surgical History:   Procedure Laterality Date    CARDIAC SURGERY  05/2020    stent placement    COLONOSCOPY      HERNIA REPAIR      LARYNGOSCOPY  1978    with vocal cord polyp removal    LARYNGOSCOPY N/A 8/3/2021    Procedure: MICRODIRECT LARYNGOSCOPY WITH  EXCISION OF VOCAL CORD LESION;  Surgeon: Pito Tomlin DO;  Location: [facility];  Service: ENT    GA ARTHRP KNE CONDYLE&PLATU MEDIAL&LAT COMPARTMENTS Right 4/22/2025    Procedure: ARTHROPLASTY KNEE TOTAL W ROBOT (psb same day dc);  Surgeon: Mayank Oropeza MD;  Location:  MAIN OR;  Service: Orthopedics    GA CIRCUMCISION AGE >28 DAYS N/A 2/20/2025    Procedure: CIRCUMCISION ADULT;  Surgeon: Saurav Gillis DO;  Location: [facility];  Service: Urology    GA EXC B9 LESION MRGN XCP SK TG S/N/H/F/G > 4.0CM N/A 2/20/2025    Procedure: EXCISION BIOPSY LESION/MASS PENILE;  Surgeon: Saurav Gillis DO;  Location: [facility];  Service: Urology       Family History   Problem Relation Age of Onset    Heart disease Mother         Cardiac Disorder    No Known Problems Father     No Known Problems Sister        Social History     Occupational History    Not on file   Tobacco Use    Smoking status: Former     Current packs/day: 1.00     Average packs/day: 1 pack/day for 25.0 years (25.0 ttl pk-yrs)     Types: Cigarettes    Smokeless tobacco: Never    Tobacco comments:     Quit 45 yrs ago   Vaping Use    Vaping status: Never Used   Substance and " Sexual Activity    Alcohol use: Yes     Comment: Social twice a month    Drug use: Never    Sexual activity: Not on file     Comment: defer       Allergies   Allergen Reactions    Celebrex [Celecoxib] Other (See Comments)     Other reaction(s): itchy  Other reaction(s): itchy         Current Outpatient Medications:     acetaminophen (TYLENOL) 325 mg tablet, Take 650 mg by mouth every 6 (six) hours as needed for mild pain, Disp: , Rfl:     acetaminophen (TYLENOL) 650 mg CR tablet, Take 1 tablet (650 mg total) by mouth every 6 (six) hours as needed for mild pain, Disp: 90 tablet, Rfl: 0    aspirin (ECOTRIN LOW STRENGTH) 81 mg EC tablet, Take 1 tablet (81 mg total) by mouth 2 (two) times a day, Disp: 60 tablet, Rfl: 0    atorvastatin (LIPITOR) 40 mg tablet, Take 1 tablet (40 mg total) by mouth daily (Patient taking differently: Take 40 mg by mouth every evening), Disp: 90 tablet, Rfl: 1    Blood Glucose Monitoring Suppl (FREESTYLE FREEDOM LITE) w/Device KIT, Check glucose levels once daily, Disp: 1 each, Rfl: 0    Cholecalciferol (VITAMIN D-3 PO), Take by mouth, Disp: , Rfl:     Diclofenac Sodium (VOLTAREN) 1 %, Apply 2 g topically 4 (four) times a day, Disp: 100 g, Rfl: 1    gabapentin (Neurontin) 300 mg capsule, Take 1 capsule (300 mg total) by mouth 2 (two) times a day for 14 days, Disp: 28 capsule, Rfl: 0    glucose blood (FREESTYLE LITE) test strip, Use 1 each in the morning, Disp: 100 strip, Rfl: 11    Lancets (FREESTYLE) lancets, TEST ONCE DAILY FASTING DXE11.9, Disp: 100 each, Rfl: 3    loratadine (CLARITIN) 10 mg tablet, Take 10 mg by mouth daily, Disp: , Rfl:     metFORMIN (GLUCOPHAGE) 500 mg tablet, Take 2 tablets (1,000 mg total) by mouth 2 (two) times a day with meals, Disp: 270 tablet, Rfl: 2    Multiple Vitamin (multivitamin) tablet, Take 1 tablet by mouth daily, Disp: 30 tablet, Rfl: 0    mupirocin (BACTROBAN) 2 % ointment, Apply topically 2 (two) times a day Apply to each nostril 2 times daily for 5  days before and including the day of surgery, Disp: 15 g, Rfl: 0    oxyCODONE (ROXICODONE) 5 immediate release tablet, Take 1 tablet (5 mg total) by mouth every 4 (four) hours as needed for moderate pain for up to 10 days Max Daily Amount: 30 mg, Disp: 15 tablet, Rfl: 0    oxyCODONE (ROXICODONE) 5 immediate release tablet, Take 1-2 tablets (5-10 mg total) by mouth every 4 (four) hours as needed for moderate pain for up to 10 days Max Daily Amount: 60 mg, Disp: 15 tablet, Rfl: 0    pantoprazole (PROTONIX) 40 mg tablet, TAKE 1 TABLET (40 MG TOTAL) BY MOUTH DAILY TAKE IN MORNING, Disp: 90 tablet, Rfl: 1    tamsulosin (FLOMAX) 0.4 mg, TAKE 1 CAPSULE BY MOUTH EVERY DAY WITH DINNER, Disp: 90 capsule, Rfl: 1    ascorbic acid (VITAMIN C) 500 MG tablet, Take 1 tablet (500 mg total) by mouth 2 (two) times a day, Disp: 60 tablet, Rfl: 0    aspirin (ECOTRIN LOW STRENGTH) 81 mg EC tablet, Take 1 tablet (81 mg total) by mouth daily (Patient taking differently: Take 81 mg by mouth daily Takes with breakfast), Disp: 60 tablet, Rfl: 0    ferrous sulfate 324 (65 Fe) mg, Take 1 tablet (324 mg total) by mouth 2 (two) times a day before meals (Patient taking differently: Take 324 mg by mouth 2 (two) times a day before meals Per pt/caregiver taking daily), Disp: 60 tablet, Rfl: 0    fluticasone (FLONASE) 50 mcg/act nasal spray, 1 spray into each nostril daily (Patient not taking: Reported on 5/7/2025), Disp: 48 g, Rfl: 1    folic acid (FOLVITE) 1 mg tablet, Take 1 tablet (1 mg total) by mouth daily, Disp: 30 tablet, Rfl: 0    metoprolol tartrate (LOPRESSOR) 25 mg tablet, TAKE 1 TABLET (25 MG TOTAL) BY MOUTH EVERY 12 (TWELVE) HOURS, Disp: 180 tablet, Rfl: 1    ondansetron (ZOFRAN-ODT) 4 mg disintegrating tablet, Take 1 tablet (4 mg total) by mouth every 8 (eight) hours as needed for nausea or vomiting (Patient not taking: Reported on 5/7/2025), Disp: 15 tablet, Rfl: 0      Danis Soliman PA-C    Scribe Attestation      I,:   am acting  as a scribe while in the presence of the attending physician.:       I,:   personally performed the services described in this documentation    as scribed in my presence.:

## 2025-05-07 NOTE — PROGRESS NOTES
"Daily Note     Today's date: 2025  Patient name: Jay Stout  : 1940  MRN: [id number]  Referring provider: Mayank Oropeza, *  Dx:   Encounter Diagnosis     ICD-10-CM    1. Primary osteoarthritis of right knee  M17.11           Start Time: 1455  Stop Time: 1600  Total time in clinic (min): 65 minutes    Subjective: Patient reports that he had a f/u appointment with his surgeon today who took radiographs and told him he can remove the compression stocking. Patient is stiff and sore from walking a lot today.      Objective: See treatment diary below      Assessment: Tolerated treatment fair. Patient demonstrated fatigue post treatment and would benefit from continued PT. Patient with increased SOB with light activity this visit. Patient instructed to contact PCP, surgeon or present to Urgent Care if SOB persists or worsens to r/o DVT or pulmonary emobolism.      Plan: Continue per plan of care.  Progress treatment as tolerated.         POC expires Unit limit Auth  expiration date PT/OT + Visit Limit?   25 N/A N/A BOMN                 Visit/Unit Tracking  AUTH Status:  Date         N/A Used 1 2 3 4 5 6 7         Remaining                  Precautions: right TKA (DOS: 25), DM, HTN, h/o MI      Manuals  5/2 5 5      Post-op RE NV GR           R knee PROM   GR BE BE TS GR      Gastroc str.       GR      Pat mobs       GR      Neuro Re-Ed             Supine SLR Reviewed            Quad sets with heel prop Reviewed Reviewed     30x5\"      TKE   8# 20x5\" 8# 20x5\" 8# 20x5\"        LAQ     3\" x20        SAQ   20x5\" 5\"x20 5\"x20                                  Ther Ex             Patient education: pathophysiology, diagnostic imaging review, signs/symptoms of infection, edema/pain management, graded activity, HEP review GR GR     HEP review      Ankle pumps Reviewed Reviewed           Seated knee extension LLPS Reviewed Reviewed           Seated " "knee flexion/extension AAROM at edge of plinthe Reviewed Reviewed           Seated knee flexion AAROM in chair  Reviewed           Long-sitting calf str. Reviewed      5x30\"      HR   2x10          Standing calf str. With SB   3x30\" 3x30\" 3x30\"        Nustep: knee ROM ; Rec bike  Nustep L1 10 min Rec bike 10 min Rec bike 10 min Rec bike 10 min Rec 10' full rev. Rec bike 10 min      Standing hip abd, ext with TB             Heel slides with strap     5\"x20 5\"x20  30x5\"                                             Ther Activity             Squats             FSU     6\" x10 4\" 2x10        LSU      4\" x5       FSD             Total gym: squats    L19 2x10 L19 3x10 L19 3x10        Gait Training                                       Modalities                                                    "

## 2025-05-09 ENCOUNTER — OFFICE VISIT (OUTPATIENT)
Dept: PHYSICAL THERAPY | Facility: CLINIC | Age: 85
End: 2025-05-09
Attending: ORTHOPAEDIC SURGERY
Payer: MEDICARE

## 2025-05-09 DIAGNOSIS — M17.11 PRIMARY OSTEOARTHRITIS OF RIGHT KNEE: Primary | ICD-10-CM

## 2025-05-09 PROCEDURE — 97112 NEUROMUSCULAR REEDUCATION: CPT

## 2025-05-09 PROCEDURE — 97140 MANUAL THERAPY 1/> REGIONS: CPT

## 2025-05-09 PROCEDURE — 97110 THERAPEUTIC EXERCISES: CPT

## 2025-05-09 NOTE — PROGRESS NOTES
"Daily Note     Today's date: 2025  Patient name: Jay Stout  : 1940  MRN: [id number]  Referring provider: Mayank Oropeza, *  Dx:   Encounter Diagnosis     ICD-10-CM    1. Primary osteoarthritis of right knee  M17.11           Start Time: 1115  Stop Time: 1200  Total time in clinic (min): 45 minutes    Subjective: Pt noted that he has been haivng a rough night last night and noted that his R knee has been really stiff.         Objective: See treatment diary below      Assessment: Tolerated treatment well. Patient exhibited good technique with therapeutic exercises and would benefit from continued PT      Plan: Continue per plan of care.        POC expires Unit limit Auth  expiration date PT/OT + Visit Limit?   25 N/A N/A BOMN                 Visit/Unit Tracking  AUTH Status:  Date  5 5        N/A Used 1 2 3 4 5 6 7 8         Remaining                  Precautions: right TKA (DOS: 25), DM, HTN, h/o MI      Manuals  5/2 5 5 5      Post-op RE NV GR           R knee PROM   GR BE BE TS GR SC     Gastroc str.       GR      Pat mobs       GR      Neuro Re-Ed             Supine SLR Reviewed            Quad sets with heel prop Reviewed Reviewed     30x5\"      TKE   8# 20x5\" 8# 20x5\" 8# 20x5\"        LAQ     3\" x20   3\" 2x 10      SAQ   20x5\" 5\"x20 5\"x20   5\" 2x 10                                Ther Ex             Patient education: pathophysiology, diagnostic imaging review, signs/symptoms of infection, edema/pain management, graded activity, HEP review GR GR     HEP review      Ankle pumps Reviewed Reviewed           Seated knee extension LLPS Reviewed Reviewed           Seated knee flexion/extension AAROM at edge of plinthe Reviewed Reviewed           Seated knee flexion AAROM in chair  Reviewed           Standing knee flexion stretch         10\"x 10      Long-sitting calf str. Reviewed      5x30\"      HR   2x10          Standing calf " "str. With SB   3x30\" 3x30\" 3x30\"        Nustep: knee ROM ; Rec bike  Nustep L1 10 min Rec bike 10 min Rec bike 10 min Rec bike 10 min Rec 10' full rev. Rec bike 10 min Rec 10 min      Standing hip abd, ext with TB             Heel slides with strap     5\"x20 5\"x20  30x5\"      HS stretch         30\"X 3                                Ther Activity             Squats             FSU     6\" x10 4\" 2x10        LSU      4\" x5       FSD             Total gym: squats    L19 2x10 L19 3x10 L19 3x10        Gait Training                                       Modalities                                                      "

## 2025-05-12 ENCOUNTER — TELEPHONE (OUTPATIENT)
Dept: OBGYN CLINIC | Facility: HOSPITAL | Age: 85
End: 2025-05-12

## 2025-05-12 ENCOUNTER — OFFICE VISIT (OUTPATIENT)
Dept: PHYSICAL THERAPY | Facility: CLINIC | Age: 85
End: 2025-05-12
Attending: ORTHOPAEDIC SURGERY
Payer: MEDICARE

## 2025-05-12 DIAGNOSIS — Z96.651 STATUS POST TOTAL KNEE REPLACEMENT, RIGHT: Primary | ICD-10-CM

## 2025-05-12 DIAGNOSIS — M17.11 PRIMARY OSTEOARTHRITIS OF RIGHT KNEE: Primary | ICD-10-CM

## 2025-05-12 PROCEDURE — 97110 THERAPEUTIC EXERCISES: CPT

## 2025-05-12 PROCEDURE — 97140 MANUAL THERAPY 1/> REGIONS: CPT

## 2025-05-12 PROCEDURE — 97112 NEUROMUSCULAR REEDUCATION: CPT

## 2025-05-12 RX ORDER — OXYCODONE HYDROCHLORIDE 5 MG/1
5 TABLET ORAL EVERY 6 HOURS PRN
Qty: 15 TABLET | Refills: 0 | Status: SHIPPED | OUTPATIENT
Start: 2025-05-12 | End: 2025-05-22

## 2025-05-12 NOTE — TELEPHONE ENCOUNTER
I did speak with Evelyne and we did discuss the stockings as being as needed for swelling.  He should be able to wean off of them as the swelling improves.  I am going to send a prescription for oxycodone for his pain.  We did discuss that he should only be taking at nighttime now and otherwise managing with Tylenol and naproxen.

## 2025-05-12 NOTE — PROGRESS NOTES
"Daily Note     Today's date: 2025  Patient name: Jay Stout  : 1940  MRN: [id number]  Referring provider: Mayank Oropeza, *  Dx:   Encounter Diagnosis     ICD-10-CM    1. Primary osteoarthritis of right knee  M17.11           Start Time: 1211          Subjective: Pt noted that he has been stretching at home and trying but feels like it gets stiff again.         Objective: See treatment diary below      Assessment:  Continued with treatment session unable to complete a full rotation on the bike with  R knee flexion due to decreased ROM as well as increase stiffness. Vc's for proper quad activation with Tolerated treatment well. Patient demonstrated fatigue post treatment, exhibited good technique with therapeutic exercises, and would benefit from continued PT s/p treatment session noted still feeling sore and tight.      Highly encouraged to continue with HEP multiple times a day to improve ROM of R knee into flexion and extension.       Plan: Continue per plan of care.        POC expires Unit limit Auth  expiration date PT/OT + Visit Limit?   25 N/A N/A BOMN                 Visit/Unit Tracking  AUTH Status:  Date  5/ 5      N/A Used 1 2 3 4 5 6 7 8  9        Remaining                  Precautions: right TKA (DOS: 25), DM, HTN, h/o MI      Manuals  5/2 5/5 5/7     Post-op RE NV GR           R knee PROM   GR BE BE TS GR SC SC    Gastroc str.       GR      Pat mobs       GR  SC PROM     Neuro Re-Ed             Supine SLR Reviewed        2x 10     Quad sets with heel prop Reviewed Reviewed     30x5\"  5\" 2x 10     TKE   8# 20x5\" 8# 20x5\" 8# 20x5\"        LAQ     3\" x20   3\" 2x 10  3\" 2x 10    SAQ   20x5\" 5\"x20 5\"x20   5\" 2x 10                                Ther Ex             Patient education: pathophysiology, diagnostic imaging review, signs/symptoms of infection, edema/pain management, graded activity, HEP review GR GR    " " HEP review      Ankle pumps Reviewed Reviewed           Seated knee extension LLPS Reviewed Reviewed           Seated knee flexion/extension AAROM at edge of plinthe Reviewed Reviewed           Seated knee flexion AAROM in chair  Reviewed           Standing knee flexion stretch         10\"x 10  10x 10\" on stairs     Long-sitting calf str. Reviewed      5x30\"      HR   2x10          Standing calf str. With SB   3x30\" 3x30\" 3x30\"        Nustep: knee ROM ; Rec bike  Nustep L1 10 min Rec bike 10 min Rec bike 10 min Rec bike 10 min Rec 10' full rev. Rec bike 10 min Rec 10 min  Rec bike rocking     Standing hip abd, ext with TB             Heel slides with strap     5\"x20 5\"x20  30x5\"      HS stretch         30\"X 3  30\"x 3                               Ther Activity             Squats             FSU     6\" x10 4\" 2x10    4\" 1x 10   1x 12     LSU      4\" x5       FSD             Total gym: squats    L19 2x10 L19 3x10 L19 3x10        Gait Training                                       Modalities                                                        "

## 2025-05-14 ENCOUNTER — TELEPHONE (OUTPATIENT)
Dept: FAMILY MEDICINE CLINIC | Facility: CLINIC | Age: 85
End: 2025-05-14

## 2025-05-14 ENCOUNTER — OFFICE VISIT (OUTPATIENT)
Dept: PHYSICAL THERAPY | Facility: CLINIC | Age: 85
End: 2025-05-14
Attending: ORTHOPAEDIC SURGERY
Payer: MEDICARE

## 2025-05-14 DIAGNOSIS — G47.00 INSOMNIA, UNSPECIFIED TYPE: Primary | ICD-10-CM

## 2025-05-14 DIAGNOSIS — M17.11 PRIMARY OSTEOARTHRITIS OF RIGHT KNEE: Primary | ICD-10-CM

## 2025-05-14 PROCEDURE — 97530 THERAPEUTIC ACTIVITIES: CPT

## 2025-05-14 PROCEDURE — 97140 MANUAL THERAPY 1/> REGIONS: CPT

## 2025-05-14 PROCEDURE — 97110 THERAPEUTIC EXERCISES: CPT

## 2025-05-14 RX ORDER — HYDROXYZINE HYDROCHLORIDE 25 MG/1
25 TABLET, FILM COATED ORAL
Qty: 30 TABLET | Refills: 1 | Status: SHIPPED | OUTPATIENT
Start: 2025-05-14 | End: 2025-05-19

## 2025-05-14 NOTE — TELEPHONE ENCOUNTER
Pt's wife stopped in office.  Her  is in PT.  He is having a hard time sleeping.  Pt is requesting you give him something for sleep. His last visit was 3/2025 pt had knee replacement. Pt was given oxy, but is unable to sleep.  Advise.

## 2025-05-14 NOTE — PROGRESS NOTES
"Daily Note    Today's date: 25  Patient name: Jay Stout  : 1940  MRN: [id number]  Referring provider: Mayank Oropeza, *  Dx:   Encounter Diagnosis     ICD-10-CM    1. Primary osteoarthritis of right knee  M17.11           Start Time: 1330  Stop Time: 1415  Total time in clinic (min): 45 minutes      Subjective: Octavio reports significant pain and stiffness in the knee. He has difficulty sleeping at night and is in pain 6/10 at rest. He is slightly elevating his leg at home with a towel underneath.    Objective: See treatment diary below. 96 degrees of knee flexion during PROM. 3+ pitting edema in RLE    Assessment: Octavio tolerated treatment well with consistent cuing throughout. TE's were performed with increased reps. New TE's were demonstrated with proper technique, and tolerated well. Following treatment, the patient demonstrated fatigue and would benefit from continued physical therapy. Patient and family were advised to contact PCP regarding his sleep dysfunction.    Plan: Continue per plan of care.  Progress treatment as tolerated.           POC expires Unit limit Auth  expiration date PT/OT + Visit Limit?   25 N/A N/A BOMN                 Visit/Unit Tracking  AUTH Status:  Date  5/2 5     N/A Used 1 2 3 4 5 6 7 8  9  10      Remaining                  Precautions: right TKA (DOS: 25), DM, HTN, h/o MI      Manuals  5/1 5/2 5/5 57    Post-op RE NV GR           R knee PROM   GR BE BE TS GR SC SC TS w/ MH - 96*   Gastroc str.       GR      Pat mobs       GR  SC PROM     Neuro Re-Ed             Supine SLR Reviewed        2x 10     Quad sets with heel prop Reviewed Reviewed     30x5\"  5\" 2x 10     TKE   8# 20x5\" 8# 20x5\" 8# 20x5\"        LAQ     3\" x20   3\" 2x 10  3\" 2x 10    SAQ   20x5\" 5\"x20 5\"x20   5\" 2x 10                                Ther Ex             Patient education: pathophysiology, diagnostic imaging " "review, signs/symptoms of infection, edema/pain management, graded activity, HEP review GR GR     HEP review   TS - sleep, hydration, swelling mgmt   Ankle pumps Reviewed Reviewed           Seated knee extension LLPS Reviewed Reviewed           Seated knee flexion/extension AAROM at edge of plinthe Reviewed Reviewed           Seated knee flexion AAROM in chair  Reviewed           Standing knee flexion stretch         10\"x 10  10x 10\" on stairs     Long-sitting calf str. Reviewed      5x30\"      HR   2x10          Standing calf str. With SB   3x30\" 3x30\" 3x30\"        Nustep: knee ROM ; Rec bike  Nustep L1 10 min Rec bike 10 min Rec bike 10 min Rec bike 10 min Rec 10' full rev. Rec bike 10 min Rec 10 min  Rec bike rocking     Standing hip abd, ext with TB             Heel slides with strap     5\"x20 5\"x20  30x5\"      HS stretch         30\"X 3  30\"x 3                               Ther Activity             Squats             FSU     6\" x10 4\" 2x10    4\" 1x 10   1x 12     LSU      4\" x5       FSD             Total gym: squats    L19 2x10 L19 3x10 L19 3x10     L22 3x10 5\" hold   Gait Training                                       Modalities             CP          HEP w/ elev.                                 Mickey Oneil, PT  5/14/2025,3:40 PM  "

## 2025-05-15 NOTE — TELEPHONE ENCOUNTER
Patient notified   This RN performed check pulseox while ambulating, pt desat to 86% at rest on room air. Respiratory Therapist aware, will perform home o2 evaluation.

## 2025-05-16 ENCOUNTER — OFFICE VISIT (OUTPATIENT)
Dept: PHYSICAL THERAPY | Facility: CLINIC | Age: 85
End: 2025-05-16
Attending: ORTHOPAEDIC SURGERY
Payer: MEDICARE

## 2025-05-16 DIAGNOSIS — M17.11 PRIMARY OSTEOARTHRITIS OF RIGHT KNEE: Primary | ICD-10-CM

## 2025-05-16 PROCEDURE — 97110 THERAPEUTIC EXERCISES: CPT

## 2025-05-16 PROCEDURE — 97530 THERAPEUTIC ACTIVITIES: CPT

## 2025-05-16 NOTE — PROGRESS NOTES
"Daily Note     Today's date: 2025  Patient name: Jay Stout  : 1940  MRN: [id number]  Referring provider: Mayank Oropeza, *  Dx:   Encounter Diagnosis     ICD-10-CM    1. Primary osteoarthritis of right knee  M17.11           Start Time: 1019  Stop Time: 1104  Total time in clinic (min): 45 minutes    Subjective: Pt noted that he has been having stiffness and woke up this morning around 4am with a lot of pain in his legs but noted that he ha been sleeping better since taking some sleeping medication from his family dr.   Pts wife noted that he c/o some heat feeling in his knee yesterday while completing the exercises.Declines heat upon arrival for treatment session.       Objective: See treatment diary below      Assessment:  Progressions made today and was able to make bwd rotations of rec bike.   Tolerated treatment well. Patient exhibited good technique with therapeutic exercises and would benefit from continued PT  Advised patient if the heat is associated with any redness to call dr and or ER.     Plan: Continue per plan of care.        POC expires Unit limit Auth  expiration date PT/OT + Visit Limit?   25 N/A N/A BOMN                 Visit/Unit Tracking  AUTH Status:  Date  5/2      N/A Used 1 2 3 4 5 6 7 8  9  10      Remaining                  Precautions: right TKA (DOS: 25), DM, HTN, h/o MI      Manuals  5 5/2 55 57    Post-op RE  GR           R knee PROM   GR BE BE TS GR SC SC TS w/ MH - 96*   Gastroc str.       GR      Pat mobs       GR  SC PROM     Neuro Re-Ed             Supine SLR 2x 10         2x 10     Quad sets with heel prop  Reviewed     30x5\"  5\" 2x 10     TKE   8# 20x5\" 8# 20x5\" 8# 20x5\"        LAQ     3\" x20   3\" 2x 10  3\" 2x 10    SAQ   20x5\" 5\"x20 5\"x20   5\" 2x 10                                Ther Ex             Patient education: pathophysiology, diagnostic imaging review, " "signs/symptoms of infection, edema/pain management, graded activity, HEP review  GR     HEP review   TS - sleep, hydration, swelling mgmt   Ankle pumps  Reviewed           Seated knee extension LLPS  Reviewed           Seated knee flexion/extension AAROM at edge of plinthe  Reviewed           Seated knee flexion AAROM in chair  Reviewed           Standing knee flexion stretch         10\"x 10  10x 10\" on stairs     Long-sitting calf str.       5x30\"      HR   2x10          Standing calf str. With SB   3x30\" 3x30\" 3x30\"        Nustep: knee ROM ; Rec bike Rec bike bwd rotations   10 min  Nustep L1 10 min Rec bike 10 min Rec bike 10 min Rec bike 10 min Rec 10' full rev. Rec bike 10 min Rec 10 min  Rec bike rocking     Standing hip abd, ext with TB No TB 2x 10             Heel slides with strap  30x 5\"    5\"x20 5\"x20  30x5\"      HS stretch  30\"x 4        30\"X 3  30\"x 3                               Ther Activity             Squats 2x 10  with UE             FSU 6\" 15x     6\" x10 4\" 2x10    4\" 1x 10   1x 12     LSU      4\" x5       FSD             Total gym: squats    L19 2x10 L19 3x10 L19 3x10     L22 3x10 5\" hold   Gait Training                                       Modalities             CP          HEP w/ elev.                                   "

## 2025-05-19 ENCOUNTER — OFFICE VISIT (OUTPATIENT)
Dept: PHYSICAL THERAPY | Facility: CLINIC | Age: 85
End: 2025-05-19
Attending: ORTHOPAEDIC SURGERY
Payer: MEDICARE

## 2025-05-19 ENCOUNTER — TELEPHONE (OUTPATIENT)
Dept: FAMILY MEDICINE CLINIC | Facility: CLINIC | Age: 85
End: 2025-05-19

## 2025-05-19 ENCOUNTER — TELEPHONE (OUTPATIENT)
Dept: OBGYN CLINIC | Facility: HOSPITAL | Age: 85
End: 2025-05-19

## 2025-05-19 DIAGNOSIS — M17.11 PRIMARY OSTEOARTHRITIS OF RIGHT KNEE: Primary | ICD-10-CM

## 2025-05-19 DIAGNOSIS — G47.00 INSOMNIA, UNSPECIFIED TYPE: Primary | ICD-10-CM

## 2025-05-19 PROCEDURE — 97140 MANUAL THERAPY 1/> REGIONS: CPT | Performed by: PHYSICAL THERAPIST

## 2025-05-19 PROCEDURE — 97110 THERAPEUTIC EXERCISES: CPT | Performed by: PHYSICAL THERAPIST

## 2025-05-19 PROCEDURE — 97530 THERAPEUTIC ACTIVITIES: CPT | Performed by: PHYSICAL THERAPIST

## 2025-05-19 RX ORDER — TRAZODONE HYDROCHLORIDE 50 MG/1
50 TABLET ORAL
Qty: 30 TABLET | Refills: 0 | Status: SHIPPED | OUTPATIENT
Start: 2025-05-19

## 2025-05-19 NOTE — TELEPHONE ENCOUNTER
Pt called in w/ questions regarding DYLAN stocking. Discussed per AVS below, DYLAN stocking until seen in office. PO visit 5/7/25. Discussed pt can continue to use DYLAN stocking as needed for swelling, for example during the day when ambulating, off at night while resting. Pt verbalizes understanding. No additional questions at this time.     WOUND CARE:  Keep the dressing clean and dry.  Light drainage may occur the first 2 days postop.  Mypilex dressing with Dylan stocking for 7 days then remove mypilex/bandage and continue with stocking until seen in the office.

## 2025-05-19 NOTE — PROGRESS NOTES
"Daily Note     Today's date: 2025  Patient name: Jay Stout  : 1940  MRN: [id number]  Referring provider: Mayank Oropeza, *  Dx:   Encounter Diagnosis     ICD-10-CM    1. Primary osteoarthritis of right knee  M17.11           Start Time: 1215  Stop Time: 1305  Total time in clinic (min): 50 minutes    Subjective: Patient reports that he continues to be unable to sleep in bed.      Objective: See treatment diary below      Assessment: Tolerated treatment fair. Patient demonstrated fatigue post treatment and would benefit from continued PT. Patient with improved quality of gait. Instructed to transition from standard walker to SPC. Patient reeducated on importance of consistently stretching into flexion and extension to prevent contracture.      Plan: Continue per plan of care.  Progress treatment as tolerated.         POC expires Unit limit Auth  expiration date PT/OT + Visit Limit?   25 N/A N/A BOMN                 Visit/Unit Tracking  AUTH Status:  Date     N/A Used 1 2 3 4 5 6 7 8  9  10 11     Remaining                  Precautions: right TKA (DOS: 25), DM, HTN, h/o MI      Manuals    Post-op RE             R knee PROM  GR  BE BE TS GR SC SC TS w/ MH - 96*   Gastroc str.       GR      Pat mobs  GR     GR  SC PROM     Neuro Re-Ed             Supine SLR 2x 10         2x 10     Quad sets with heel prop       30x5\"  5\" 2x 10     TKE    8# 20x5\" 8# 20x5\"        LAQ     3\" x20   3\" 2x 10  3\" 2x 10    SAQ    5\"x20 5\"x20   5\" 2x 10                                Ther Ex             Patient education: pathophysiology, diagnostic imaging review, signs/symptoms of infection, edema/pain management, graded activity, HEP review  GR     HEP review   TS - sleep, hydration, swelling mgmt   Ankle pumps             Seated knee extension LLPS             Seated knee flexion/extension AAROM at edge of " "plinthe             Seated knee flexion AAROM in chair             Standing knee flexion stretch         10\"x 10  10x 10\" on stairs     Long-sitting calf str.       5x30\"      HR             Standing calf str. With SB    3x30\" 3x30\"        Nustep: knee ROM ; Rec bike Rec bike bwd rotations   10 min  Rec bike 10 min  Rec bike 10 min Rec bike 10 min Rec 10' full rev. Rec bike 10 min Rec 10 min  Rec bike rocking     Standing hip abd, ext with TB No TB 2x 10             Heel slides with strap  30x 5\"    5\"x20 5\"x20  30x5\"      HS stretch  30\"x 4        30\"X 3  30\"x 3     Matrix knee flexion  10# 2x10           Matrix knee extension  10# 2x10           Ther Activity             Squats 2x 10  with UE             FSU 6\" 15x     6\" x10 4\" 2x10    4\" 1x 10   1x 12     LSU      4\" x5       FSD  6\" 2x10           Total gym: squats    L19 2x10 L19 3x10 L19 3x10     L22 3x10 5\" hold   Gait Training                                       Modalities             CP          HEP w/ elev.                                     "

## 2025-05-20 ENCOUNTER — OFFICE VISIT (OUTPATIENT)
Dept: PHYSICAL THERAPY | Facility: CLINIC | Age: 85
End: 2025-05-20
Attending: ORTHOPAEDIC SURGERY
Payer: MEDICARE

## 2025-05-20 DIAGNOSIS — M17.11 PRIMARY OSTEOARTHRITIS OF RIGHT KNEE: Primary | ICD-10-CM

## 2025-05-20 PROCEDURE — 97110 THERAPEUTIC EXERCISES: CPT

## 2025-05-20 PROCEDURE — 97530 THERAPEUTIC ACTIVITIES: CPT

## 2025-05-20 PROCEDURE — 97140 MANUAL THERAPY 1/> REGIONS: CPT

## 2025-05-20 NOTE — PROGRESS NOTES
"Daily Note    Today's date: 25  Patient name: Jay Stout  : 1940  MRN: [id number]  Referring provider: Mayank Oropeza, *  Dx:   Encounter Diagnosis     ICD-10-CM    1. Primary osteoarthritis of right knee  M17.11           Start Time: 1115  Stop Time: 1200  Total time in clinic (min): 45 minutes      Subjective: Octavio reports some stiffness in his R upper thigh.    Objective: See treatment diary below.    Assessment: Octavio tolerated treatment well with consistent cuing throughout. TE's were performed with increased reps. No new TE's were performed today. Following treatment, the patient demonstrated fatigue and would benefit from continued physical therapy.    Plan: Continue per plan of care.  Progress treatment as tolerated.           POC expires Unit limit Auth  expiration date PT/OT + Visit Limit?   25 N/A N/A BOMN                 Visit/Unit Tracking  AUTH Status:  Date    N/A Used 1 2 3 4 5 6 7 8  9  10 11 12    Remaining                  Precautions: right TKA (DOS: 25), DM, HTN, h/o MI      Manuals    Post-op RE             R knee PROM  GR TS  BE TS GR SC SC TS w/ MH - 96*   Gastroc str.       GR      Pat mobs  GR     GR  SC PROM     Neuro Re-Ed             Supine SLR 2x 10         2x 10     Quad sets with heel prop       30x5\"  5\" 2x 10     TKE     8# 20x5\"        LAQ     3\" x20   3\" 2x 10  3\" 2x 10    SAQ     5\"x20   5\" 2x 10                                Ther Ex             Patient education: pathophysiology, diagnostic imaging review, signs/symptoms of infection, edema/pain management, graded activity, HEP review  GR TS HEP     HEP review   TS - sleep, hydration, swelling mgmt   Ankle pumps             Seated knee extension LLPS             Seated knee flexion/extension AAROM at edge of plinthe             Seated knee flexion AAROM in chair             Standing knee flexion " "stretch         10\"x 10  10x 10\" on stairs     Long-sitting calf str.       5x30\"      HR             Standing calf str. With SB     3x30\"        Nustep: knee ROM ; Rec bike Rec bike bwd rotations   10 min  Rec bike 10 min Rec 10'   Rec bike 10 min Rec 10' full rev. Rec bike 10 min Rec 10 min  Rec bike rocking     Standing hip abd, ext with TB No TB 2x 10             Heel slides with strap  30x 5\"     5\"x20  30x5\"      HS stretch  30\"x 4        30\"X 3  30\"x 3     Matrix knee flexion  10# 2x10 3x7 10#          Matrix knee extension  10# 2x10 25# 3x10           Ther Activity             Squats 2x 10  with UE             FSU 6\" 15x     6\" x10 4\" 2x10    4\" 1x 10   1x 12     LSU      4\" x5       FSD  6\" 2x10           Total gym: squats   L22 3x10   L19 3x10 L19 3x10     L22 3x10 5\" hold   Gait Training                                       Modalities             CP          HEP w/ elev.                                     Mickey Oneil, PT  5/20/2025,11:56 AM  "

## 2025-05-21 ENCOUNTER — APPOINTMENT (OUTPATIENT)
Dept: PHYSICAL THERAPY | Facility: CLINIC | Age: 85
End: 2025-05-21
Attending: ORTHOPAEDIC SURGERY
Payer: MEDICARE

## 2025-05-21 ENCOUNTER — NURSE TRIAGE (OUTPATIENT)
Dept: OTHER | Facility: OTHER | Age: 85
End: 2025-05-21

## 2025-05-21 DIAGNOSIS — N13.8 BPH WITH OBSTRUCTION/LOWER URINARY TRACT SYMPTOMS: ICD-10-CM

## 2025-05-21 DIAGNOSIS — N40.1 BPH WITH OBSTRUCTION/LOWER URINARY TRACT SYMPTOMS: ICD-10-CM

## 2025-05-21 NOTE — TELEPHONE ENCOUNTER
"FOLLOW UP: As per on call Dr Chavez:  I would not recommend going to the ER. He can take 1000mg of Tylenol every 8 hours and oxycodone as needed. If pain is severe he could take another oxycodone. He is nearly a month out from surgery so if his pain is this severe I will work on getting him an appointment to be seen tomorrow.    Pt Sig Other (Evelyne) verbalized understanding of on call care advise. They only have two tablets of Oxycodone left. On call made aware. They will need a follow up call tomorrow regarding his pain.They also are aware to call back to the service if the pain management advised from the on call provider is not helpful.    REASON FOR CONVERSATION: Pain    SYMPTOMS: Nerve pain feels like electric shock     OTHER:     DISPOSITION: Call PCP Now  Esc to on call dr Chavez: Pt is  having Severe nerve pain feels numb like electric shock. Took oxy and tylenol 4:30 pm. 10/10 pain with no relief knee replacement was on 4/22/25. Tried ice as well and could not tolerate .Is there any additional pain management he can try or should he present to the Er?     Reason for Disposition   [1] SEVERE post-op pain (e.g., excruciating, pain scale 8-10) AND [2] not controlled with pain medications    Answer Assessment - Initial Assessment Questions  1. SYMPTOM: \"What's the main symptom you're concerned about?\" (e.g., pain, fever, vomiting)      Nerve pain down the leg and ankle. Rt leg having shocks down the leg.  2. ONSET: \"When did pain  start?\"      today  3. SURGERY: \"What surgery did you have?\"      Knee replace ment rt knee  4. DATE of SURGERY: \"When was the surgery?\"       4/22/2025  5. ANESTHESIA: \"What type of anesthesia did you have?\" (e.g., general, spinal, epidural, local)      general  6. DRAINS: \"Were any drains place in or around the wound?\" (e.g., Hemovac, Kit-Cardona, Penrose)      none  7. PAIN: \"Is there any pain?\" If Yes, ask: \"How bad is it?\"  (Scale 1-10; or mild, moderate, severe)      Severe " "nerve pain feels numb like electric shock. Took oxy and tylenol 4:30 pm. 10/10  8. FEVER: \"Do you have a fever?\" If Yes, ask: \"What is your temperature, how was it measured, and when did it start?\"      none  9. VOMITING: \"Is there any vomiting?\" If Yes, ask: \"How many times?\"      none  10. BLEEDING: \"Is there any bleeding?\" If Yes, ask: \"How much?\" and \"Where?\"        none  11. OTHER SYMPTOMS: \"Do you have any other symptoms?\" (e.g., drainage from wound, painful urination, constipation)        Mild swelling and redness.    Protocols used: Post-Op Symptoms and Questions-Adult-    "

## 2025-05-21 NOTE — TELEPHONE ENCOUNTER
Regarding: Post op issues /extreme nerve pain  ----- Message from Funmi VALENCIA sent at 5/21/2025  6:56 PM EDT -----  My significant other is 04/22/2025 with Dr Oropeza he is in extreme nerve pain . I am not sure if I should take to the ER. He was given oxycodone around 4:30 p.m and Tylenol around same time.

## 2025-05-21 NOTE — TELEPHONE ENCOUNTER
5/21 Left Message on Machine to Call Back   
Left voicemail to call back for below information  
Notified  
Ok stop the hydroxyzine.start Trazodone.  If not working will need appt to discuss   
The sleep medication you gave him is not working.  Pt takes medication at 10-10:30am and then wakes up at 2am.  Do you want to increase dose or change medication.  Advise  
Statement Selected

## 2025-05-22 RX ORDER — TAMSULOSIN HYDROCHLORIDE 0.4 MG/1
0.4 CAPSULE ORAL
Qty: 90 CAPSULE | Refills: 0 | Status: SHIPPED | OUTPATIENT
Start: 2025-05-22

## 2025-05-23 ENCOUNTER — EVALUATION (OUTPATIENT)
Dept: PHYSICAL THERAPY | Facility: CLINIC | Age: 85
End: 2025-05-23
Attending: ORTHOPAEDIC SURGERY
Payer: MEDICARE

## 2025-05-23 DIAGNOSIS — M17.11 PRIMARY OSTEOARTHRITIS OF RIGHT KNEE: Primary | ICD-10-CM

## 2025-05-23 PROCEDURE — 97140 MANUAL THERAPY 1/> REGIONS: CPT | Performed by: PHYSICAL THERAPIST

## 2025-05-23 PROCEDURE — 97110 THERAPEUTIC EXERCISES: CPT | Performed by: PHYSICAL THERAPIST

## 2025-05-23 NOTE — PROGRESS NOTES
PT Re-Evaluation     Today's date: 2025  Patient name: Jay Stout  : 1940  MRN: [id number]  Referring provider: Mayank Oropeza, *  Dx:   Encounter Diagnosis     ICD-10-CM    1. Primary osteoarthritis of right knee  M17.11           Start Time: 1100  Stop Time: 1205  Total time in clinic (min): 65 minutes    Assessment  Impairments: abnormal or restricted ROM, activity intolerance, impaired balance, impaired physical strength, lacks appropriate home exercise program, pain with function and weight-bearing intolerance    Assessment details: Jay Stout is a 84 y.o. male who presents with chronic right knee pain having failed conservative treatment, including steroid and viscosupplementation injections, as well as physical therapy. Patient has elected to undergo surgical intervention and is currently s/p right TKA, DOS: 25. Since beginning physical therapy, Patient has attended a total of 13 appointments and has maintained excellent compliance with POC. Patient is reporting gradual reduction of pain and improved overall mobility. Patient's quality of sleep remains poor. Examination findings demonstrate improved knee ROM into both flexion and extension, decreased edema, improved patellar mobility, increased LE strength, and improved overall mobility as demonstrated by ability to negotiate steps, having transitioned from standard walker to SPC, and reduced times needed to complete TUG and 5x STS tests. Despite these improvements, Octavio continues to report significant pain and presents with LE weakness, edema, and painful, limited knee ROM. Patient would benefit from skilled physical therapy to address their aforementioned impairments, improve their level of function and to improve their overall quality of life.  Understanding of Dx/Px/POC: excellent     Prognosis: excellent    Goals  Short Term Goals: to be achieved by 4 weeks  1) Patient to be independent with basic HEP. MET  2) Decrease  pain by 5/10 at its worst. PROGRESSED, BUT NOT MET  3) Increase knee flexion ROM to 90 deg. MET  4) Increase knee extension ROM by > 5 deg. MET  5) Increase LE strength by 1/2 MMT grade in all deficient planes. MET  6) Patient to negotiate steps with a step-to pattern with use of HR. MET  7) Patient to report decreased sleep interruption secondary to pain. MET  8) Increase ambulatory tolerance by 10 min with LRAD. MET    Long Term Goals: to be achieved by discharge ALL GOALS PROGRESSING  1) FOTO equal to or greater than 57.  2) Patient to be independent with comprehensive HEP.  3) Abolish pain for improved quality of life.  4) Increase LE strength to 5/5 MMT grade in all planes to improve a/iadls.  5) Achieve full knee extension ROM to improve a/iadls.  6) Increase knee flexion ROM to within 5 deg of contralateral LE to improve a/iadls.  7) Patient to negotiate steps with a reciprocal pattern with use of Hr.  8) Increase ambulatory tolerance to 60 min to improve participation in social activities.  9) Patient to report no sleep interruption secondary to pain.    Plan  Patient would benefit from: skilled PT  Planned modality interventions: biofeedback, cryotherapy, electrical stimulation/Russian stimulation, TENS and low level laser therapy    Planned therapy interventions: activity modification, ADL retraining, ADL training, balance, balance/weight bearing training, body mechanics training, dressing changes, functional ROM exercises, gait training, home exercise program, IADL retraining, joint mobilization, manual therapy, massage, neuromuscular re-education, patient education, self care, strengthening, stretching, therapeutic activities, therapeutic exercise and transfer training    Frequency: 2-3x week.  Duration in weeks: 8  Plan of Care beginning date: 5/23/2025  Plan of Care expiration date: 7/18/2025  Treatment plan discussed with: patient (Patient's partner, Evelyne, present for duration of  IE.)        Subjective Evaluation    History of Present Illness  Mechanism of injury: Patient reports that his right knee pain has significantly improved, in addition to his mobility. Patient is walking independently in the home without his SPC. Patient's swelling has reduced, but remains stiff. Patient's quality of sleep remains poor. Patient overall reports that his knee has improved up to 85% since his surgery.  Patient Goals  Patient goals for therapy: decreased pain and independence with ADLs/IADLs    Pain  Current pain rating: 3  At best pain ratin  At worst pain rating: 10  Location: right knee: diffuse  Quality: dull ache, sharp and tight  Exacerbated by: squatting, ballet, kneeling, don/doffing pants,    Social Support    Employment status: not working  Treatments  Current treatment: physical therapy        Objective     Observations   Left Knee   Negative for edema, effusion and incision.     Right Knee   Positive for edema, effusion and incision (mild eschar, decreased scar tissue mobility).     Active Range of Motion   Left Knee   Flexion: 105 degrees with pain  Extension: 5 (lacking terminal extension) degrees     Right Knee   Flexion: 87 degrees with pain  Extension: 5 (lacking terminal extension) degrees     Passive Range of Motion   Left Knee   Flexion: 112 degrees with pain  Extension: 5 (lacking terminal extension) degrees     Right Knee   Flexion: 99 degrees with pain  Extension: 5 (lacking terminal extension) degrees     Mobility   Patellar Mobility:   Left Knee   Hypomobile: left medial, left lateral, left superior and left inferior    Right Knee   Hypomobile: medial, lateral, superior and inferior     Strength/Myotome Testing     Left Hip   Planes of Motion   Flexion: 5    Right Hip   Planes of Motion   Flexion: 4    Left Knee   Normal strength  Quadriceps contraction: good    Right Knee   Flexion: 4+  Extension: 4 ((+) pain)  Quadriceps contraction: fair (unable to perform supine  "SLR)    Left Ankle/Foot   Dorsiflexion: 5    Right Ankle/Foot   Dorsiflexion: 5    Tests     Right Ankle/Foot   Negative for Homans' sign.     Ambulation   Weight-Bearing Status   Weight-Bearing Status (Left): full weight bearing   Weight-Bearing Status (Right): weight-bearing as tolerated    Assistive device used: single point cane    Ambulation: Level Surfaces   Ambulation with assistive device: independent    Observational Gait   Gait: antalgic     Functional Assessment        Comments  Preoperative Evaluation (4/16/25):    Timed Up and Go: 8.21 sec, Independent sit to/from stand    Five Times Sit to Stand Test: 16.05 sec, Independent, good eccentric control    Postoperative Evaluation (4/25/25):    Timed Up and Go: 27.83 sec, Mod I ambulation with standard walker, Mod I sit/from stand with b/l arm rests    Five Times Sit to Stand Test: 24.33 sec, Mod I with b/l arm rests, left knee extended with increased weightbearing through right    Reassessment (5/23/25):    Timed Up and Go: 10.84 sec, Mod I ambulation with SPC, Mod I sit to/from stand with bilateral arm rests    Five Times Sit to Stand Test: 14.46 sec, Mod I with b/l arm rests, good eccentric control    Bilateral squat: 0-40 deg, Mod I    FSU 8\" step: Mod I    FSD 8\" step: Mod I, fair eccentric control      Flowsheet Rows      Flowsheet Row Most Recent Value   PT/OT G-Codes    Current Score 26   Projected Score 57               POC expires Unit limit Auth  expiration date PT/OT + Visit Limit?   7/18/25 N/A N/A BOMN                 Visit/Unit Tracking  AUTH Status:  Date 5/23 4/28 5/1 5/2 5/5 5/7 5/9 5/12 5/14 5/19 5/20   N/A Used 13  3 4 5 6 7 8  9  10 11 12    Remaining                  Precautions: right TKA (DOS: 4/22/25), DM, HTN, h/o MI      Manuals 5/16 5/19 5/20 5/23 5/5 5/7 5/9 5/12 5/14   Post-op RE             R knee PROM  GR TS GR  TS GR SC SC TS w/ MH - 96*   Gastroc str.    GR   GR      Pat mobs  GR  GR   GR  SC PROM     Reassessment    GR " "        Neuro Re-Ed             Supine SLR 2x 10         2x 10     Quad sets with heel prop       30x5\"  5\" 2x 10     TKE             LAQ        3\" 2x 10  3\" 2x 10    SAQ        5\" 2x 10                                Ther Ex             Patient education: pathophysiology, diagnostic imaging review, signs/symptoms of infection, edema/pain management, graded activity, HEP review  GR TS HEP  GR   HEP review   TS - sleep, hydration, swelling mgmt   Ankle pumps             Seated knee extension LLPS             Seated knee flexion/extension AAROM at edge of plinthe             Seated knee flexion AAROM in chair             Standing knee flexion stretch         10\"x 10  10x 10\" on stairs     Long-sitting calf str.       5x30\"      HR             Standing calf str. With SB             Nustep: knee ROM ; Rec bike Rec bike bwd rotations   10 min  Rec bike 10 min Rec 10'  Rec bike 10 min  Rec 10' full rev. Rec bike 10 min Rec 10 min  Rec bike rocking     Standing hip abd, ext with TB No TB 2x 10             Heel slides with strap  30x 5\"       30x5\"      HS stretch  30\"x 4        30\"X 3  30\"x 3     Matrix knee flexion  10# 2x10 3x7 10#          Matrix knee extension  10# 2x10 25# 3x10           Ther Activity             Squats 2x 10  with UE             FSU 6\" 15x      4\" 2x10    4\" 1x 10   1x 12     LSU      4\" x5       FSD  6\" 2x10           Total gym: squats   L22 3x10    L19 3x10     L22 3x10 5\" hold   TUG, 5x STS, stair negotiation    Performed         Gait Training                                       Modalities             CP          HEP w/ elev.                     "

## 2025-05-27 ENCOUNTER — OFFICE VISIT (OUTPATIENT)
Dept: PHYSICAL THERAPY | Facility: CLINIC | Age: 85
End: 2025-05-27
Attending: ORTHOPAEDIC SURGERY
Payer: MEDICARE

## 2025-05-27 ENCOUNTER — TELEPHONE (OUTPATIENT)
Dept: OBGYN CLINIC | Facility: HOSPITAL | Age: 85
End: 2025-05-27

## 2025-05-27 DIAGNOSIS — M17.11 PRIMARY OSTEOARTHRITIS OF RIGHT KNEE: ICD-10-CM

## 2025-05-27 DIAGNOSIS — M17.11 PRIMARY OSTEOARTHRITIS OF RIGHT KNEE: Primary | ICD-10-CM

## 2025-05-27 PROCEDURE — 97110 THERAPEUTIC EXERCISES: CPT | Performed by: PHYSICAL THERAPIST

## 2025-05-27 PROCEDURE — 97530 THERAPEUTIC ACTIVITIES: CPT | Performed by: PHYSICAL THERAPIST

## 2025-05-27 PROCEDURE — 97140 MANUAL THERAPY 1/> REGIONS: CPT | Performed by: PHYSICAL THERAPIST

## 2025-05-27 RX ORDER — GABAPENTIN 300 MG/1
300 CAPSULE ORAL 2 TIMES DAILY
Qty: 28 CAPSULE | Refills: 0 | Status: SHIPPED | OUTPATIENT
Start: 2025-05-27 | End: 2025-06-10

## 2025-05-27 NOTE — TELEPHONE ENCOUNTER
Called and spoke to pt, made aware that gabapentin refill was sent to pharmacy. No additional needs at this time. Pt is appreciative.

## 2025-05-27 NOTE — PROGRESS NOTES
"Daily Note     Today's date: 2025  Patient name: Jay Stout  : 1940  MRN: [id number]  Referring provider: Mayank Oropeza, *  Dx:   Encounter Diagnosis     ICD-10-CM    1. Primary osteoarthritis of right knee  M17.11           Start Time: 1015  Stop Time: 1105  Total time in clinic (min): 50 minutes    Subjective: Patient reports that his swelling continues to fluctuate and that his sleep remains disrupted.      Objective: See treatment diary below      Assessment: Tolerated treatment fair. Patient demonstrated fatigue post treatment and would benefit from continued PT. Patient continues to present with limited knee flexion ROM. Minor limitation to extension this visit.      Plan: Continue per plan of care.  Progress treatment as tolerated.         POC expires Unit limit Auth  expiration date PT/OT + Visit Limit?   25 N/A N/A BOMN                 Visit/Unit Tracking  AUTH Status:  Date    N/A Used 13 14  4 5 6 7 8  9  10 11 12    Remaining                  Precautions: right TKA (DOS: 25), DM, HTN, h/o MI      Manuals    Post-op RE             R knee PROM  GR TS GR GR  GR SC SC TS w/ MH - 96*   Gastroc str.    GR   GR      Pat mobs  GR  GR GR  GR  SC PROM     Reassessment    GR         Neuro Re-Ed             Supine SLR 2x 10         2x 10     Quad sets with heel prop       30x5\"  5\" 2x 10     TKE             LAQ        3\" 2x 10  3\" 2x 10    SAQ        5\" 2x 10                                Ther Ex             Patient education: pathophysiology, diagnostic imaging review, signs/symptoms of infection, edema/pain management, graded activity, HEP review  GR TS HEP  GR   HEP review   TS - sleep, hydration, swelling mgmt   Ankle pumps             Seated knee extension LLPS             Seated knee flexion/extension AAROM at edge of plinthe             Seated knee flexion AAROM in chair        " "     Standing knee flexion stretch         10\"x 10  10x 10\" on stairs     Long-sitting calf str.       5x30\"      HR             Standing calf str. With SB             Nustep: knee ROM ; Rec bike Rec bike bwd rotations   10 min  Rec bike 10 min Rec 10'  Rec bike 10 min Rec bike 10 min  Rec bike 10 min Rec 10 min  Rec bike rocking     Standing hip abd, ext with TB No TB 2x 10             Heel slides with strap  30x 5\"       30x5\"      HS stretch  30\"x 4        30\"X 3  30\"x 3     Matrix knee flexion  10# 2x10 3x7 10#  25# 3x10        Matrix knee extension  10# 2x10 25# 3x10   10# 2x10        Ther Activity             Squats 2x 10  with UE             FSU 6\" 15x         4\" 1x 10   1x 12     LSU     4\" 2x10        FSD  6\" 2x10           Total gym: squats   L22 3x10   L22 3x10     L22 3x10 5\" hold   TUG, 5x STS, stair negotiation    Performed         Gait Training                                       Modalities             CP          HEP w/ elev.                     "

## 2025-05-28 ENCOUNTER — OFFICE VISIT (OUTPATIENT)
Dept: PHYSICAL THERAPY | Facility: CLINIC | Age: 85
End: 2025-05-28
Attending: ORTHOPAEDIC SURGERY
Payer: MEDICARE

## 2025-05-28 DIAGNOSIS — M17.11 PRIMARY OSTEOARTHRITIS OF RIGHT KNEE: Primary | ICD-10-CM

## 2025-05-28 PROCEDURE — 97140 MANUAL THERAPY 1/> REGIONS: CPT | Performed by: PHYSICAL THERAPIST

## 2025-05-28 PROCEDURE — 97110 THERAPEUTIC EXERCISES: CPT | Performed by: PHYSICAL THERAPIST

## 2025-05-28 PROCEDURE — 97530 THERAPEUTIC ACTIVITIES: CPT | Performed by: PHYSICAL THERAPIST

## 2025-05-28 NOTE — PROGRESS NOTES
"Daily Note     Today's date: 2025  Patient name: Jay Stout  : 1940  MRN: [id number]  Referring provider: Mayank Oropeza, *  Dx:   Encounter Diagnosis     ICD-10-CM    1. Primary osteoarthritis of right knee  M17.11           Start Time: 1130  Stop Time: 1220  Total time in clinic (min): 50 minutes    Subjective: Patient reports that his knee is more sore today.      Objective: See treatment diary below      Assessment: Tolerated treatment fair. Patient demonstrated fatigue post treatment and would benefit from continued PT. Patient able to stand independent from chair for first time. Good technique lunging. Patient's primary limitation continues to be knee flexion ROM.      Plan: Continue per plan of care.  Progress treatment as tolerated.         POC expires Unit limit Auth  expiration date PT/OT + Visit Limit?   25 N/A N/A BOMN                 Visit/Unit Tracking  AUTH Status:  Date    N/A Used 13 14 15  5 6 7 8  9  10 11 12    Remaining                  Precautions: right TKA (DOS: 25), DM, HTN, h/o MI      Manuals    Post-op RE             R knee PROM  GR TS GR GR GR  SC SC TS w/ MH - 96*   Gastroc str.    GR         Pat mobs  GR  GR GR GR   SC PROM     Reassessment    GR         Neuro Re-Ed             Supine SLR 2x 10         2x 10     Quad sets with heel prop         5\" 2x 10     TKE             LAQ        3\" 2x 10  3\" 2x 10    SAQ        5\" 2x 10                                Ther Ex             Patient education: pathophysiology, diagnostic imaging review, signs/symptoms of infection, edema/pain management, graded activity, HEP review  GR TS HEP  GR      TS - sleep, hydration, swelling mgmt   Ankle pumps             Seated knee extension LLPS             Seated knee flexion/extension AAROM at edge of plinthe             Seated knee flexion AAROM in chair           " "  Standing knee flexion stretch         10\"x 10  10x 10\" on stairs     Long-sitting calf str.             HR             Standing calf str. With SB             Nustep: knee ROM ; Rec bike Rec bike bwd rotations   10 min  Rec bike 10 min Rec 10'  Rec bike 10 min Rec bike 10 min Rec bike 15 min  Rec 10 min  Rec bike rocking     Standing hip abd, ext with TB No TB 2x 10             Heel slides with strap  30x 5\"             HS stretch  30\"x 4        30\"X 3  30\"x 3     Matrix knee flexion  10# 2x10 3x7 10#  25# 3x10        Matrix knee extension  10# 2x10 25# 3x10   10# 2x10        Ther Activity             Squats 2x 10  with UE      15# 2x10       FSU 6\" 15x         4\" 1x 10   1x 12     LSU     4\" 2x10        FSD  6\" 2x10           Total gym: squats   L22 3x10   L22 3x10     L22 3x10 5\" hold   TUG, 5x STS, stair negotiation    Performed         Stationary lunge      2x10       Gait Training                                       Modalities             CP          HEP w/ elev.                       "

## 2025-05-30 ENCOUNTER — OFFICE VISIT (OUTPATIENT)
Dept: PHYSICAL THERAPY | Facility: CLINIC | Age: 85
End: 2025-05-30
Attending: ORTHOPAEDIC SURGERY
Payer: MEDICARE

## 2025-05-30 DIAGNOSIS — M17.11 PRIMARY OSTEOARTHRITIS OF RIGHT KNEE: Primary | ICD-10-CM

## 2025-05-30 PROCEDURE — 97530 THERAPEUTIC ACTIVITIES: CPT | Performed by: PHYSICAL THERAPIST

## 2025-05-30 PROCEDURE — 97110 THERAPEUTIC EXERCISES: CPT | Performed by: PHYSICAL THERAPIST

## 2025-05-30 PROCEDURE — 97140 MANUAL THERAPY 1/> REGIONS: CPT | Performed by: PHYSICAL THERAPIST

## 2025-05-30 NOTE — PROGRESS NOTES
"Daily Note     Today's date: 2025  Patient name: Jay Stout  : 1940  MRN: [id number]  Referring provider: Mayank Oropeza, *  Dx:   Encounter Diagnosis     ICD-10-CM    1. Primary osteoarthritis of right knee  M17.11           Start Time: 1054  Stop Time: 1200  Total time in clinic (min): 66 minutes    Subjective: Patient reports that he was performing lunges at home earlier today.      Objective: See treatment diary below      Assessment: Tolerated treatment fair. Patient demonstrated fatigue post treatment and would benefit from continued PT. Patient continues to present with limited, painful knee flexion and extension ROM. Patient's knee extension approaching neutral, however, knee flexion remains limited at 100 deg. Patient reeducated regarding total end range time goal of 1 hour per day to achieve ROM goals.      Plan: Continue per plan of care.  Progress treatment as tolerated.         POC expires Unit limit Auth  expiration date PT/OT + Visit Limit?   25 N/A N/A BOMN                 Visit/Unit Tracking  AUTH Status:  Date    N/A Used 13 14 15 16  6 7 8  9  10 11 12    Remaining                  Precautions: right TKA (DOS: 25), DM, HTN, h/o MI      Manuals    Post-op RE             R knee PROM  GR TS GR GR GR GR  SC TS w/ MH - 96*   Gastroc str.    GR         Pat mobs  GR  GR GR GR GR  SC PROM     Reassessment    GR         Neuro Re-Ed             Supine SLR 2x 10       2# 3x10  2x 10     Quad sets with heel prop         5\" 2x 10     TKE             LAQ         3\" 2x 10    SAQ                                       Ther Ex             Patient education: pathophysiology, diagnostic imaging review, signs/symptoms of infection, edema/pain management, graded activity, HEP review  GR TS HEP  GR      TS - sleep, hydration, swelling mgmt   Ankle pumps             Seated knee extension " "LLPS             Seated knee flexion/extension AAROM at edge of plinthe             Seated knee flexion AAROM in chair             Standing knee flexion stretch          10x 10\" on stairs     Long-sitting calf str.             HR             Standing calf str. With SB             Nustep: knee ROM ; Rec bike Rec bike bwd rotations   10 min  Rec bike 10 min Rec 10'  Rec bike 10 min Rec bike 10 min Rec bike 15 min Rec bike 10 min  Rec bike rocking     Standing hip abd, ext with TB No TB 2x 10             Heel slides with strap  30x 5\"             HS stretch  30\"x 4         30\"x 3     Matrix knee flexion  10# 2x10 3x7 10#  25# 3x10  25# 2x10      Matrix knee extension  10# 2x10 25# 3x10   10# 2x10  4# 2x10      Ther Activity             Squats 2x 10  with UE      15# 2x10       FSU 6\" 15x       8\" 2x10  4\" 1x 10   1x 12     LSU     4\" 2x10        FSD  6\" 2x10     6\" 2x10      Total gym: squats   L22 3x10   L22 3x10     L22 3x10 5\" hold   TUG, 5x STS, stair negotiation    Performed         Stationary lunge      2x10       Gait Training                                       Modalities             CP          HEP w/ elev.                         "

## 2025-06-02 ENCOUNTER — OFFICE VISIT (OUTPATIENT)
Dept: PHYSICAL THERAPY | Facility: CLINIC | Age: 85
End: 2025-06-02
Attending: ORTHOPAEDIC SURGERY
Payer: MEDICARE

## 2025-06-02 DIAGNOSIS — M17.11 PRIMARY OSTEOARTHRITIS OF RIGHT KNEE: Primary | ICD-10-CM

## 2025-06-02 PROCEDURE — 97110 THERAPEUTIC EXERCISES: CPT | Performed by: PHYSICAL THERAPIST

## 2025-06-02 PROCEDURE — 97530 THERAPEUTIC ACTIVITIES: CPT | Performed by: PHYSICAL THERAPIST

## 2025-06-02 PROCEDURE — 97140 MANUAL THERAPY 1/> REGIONS: CPT | Performed by: PHYSICAL THERAPIST

## 2025-06-02 NOTE — PROGRESS NOTES
Daily Note     Today's date: 2025  Patient name: Jay Stout  : 1940  MRN: [id number]  Referring provider: Mayank Oropeza, *  Dx:   Encounter Diagnosis     ICD-10-CM    1. Primary osteoarthritis of right knee  M17.11           Start Time: 1155  Stop Time: 1258  Total time in clinic (min): 63 minutes    Subjective: Patient reports that his sleep has gotten slightly better.      Objective: See treatment diary below      Assessment: Tolerated treatment fair. Patient demonstrated fatigue post treatment and would benefit from continued PT. Patient continues to present with significant limitation to knee flexion ROM. Patient's flexion A/PROM = 84 / 95 deg. Reeducated Patient on importance of consistently performing stretches to prevent stiffening of joint. Patient admits to being not as consistent with stretching.      Plan: Continue per plan of care.  Progress treatment as tolerated.         POC expires Unit limit Auth  expiration date PT/OT + Visit Limit?   25 N/A N/A BOMN                 Visit/Unit Tracking  AUTH Status:  Date    N/A Used 13 14 15 16 17  7 8  9  10 11 12    Remaining                  Precautions: right TKA (DOS: 25), DM, HTN, h/o MI      Manuals  6   Post-op RE             R knee PROM  GR TS GR GR GR GR GR  TS w/ MH - 96*   Gastroc str.    GR         Pat mobs  GR  GR GR GR GR GR     Reassessment    GR         Neuro Re-Ed             Supine SLR 2x 10       2# 3x10      Quad sets with heel prop             TKE             LAQ        4# 3x10     SAQ                                       Ther Ex             Patient education: pathophysiology, diagnostic imaging review, signs/symptoms of infection, edema/pain management, graded activity, HEP review  GR TS HEP  GR      TS - sleep, hydration, swelling mgmt   Ankle pumps             Seated knee extension LLPS             Seated knee  "flexion/extension AAROM at edge of plinthe             Seated knee flexion AAROM in chair             Standing knee flexion stretch              Long-sitting calf str.             HR             Standing calf str. With SB             Nustep: knee ROM ; Rec bike Rec bike bwd rotations   10 min  Rec bike 10 min Rec 10'  Rec bike 10 min Rec bike 10 min Rec bike 15 min Rec bike 10 min Rec bike 10 min     Standing hip abd, ext with TB No TB 2x 10             Heel slides with strap  30x 5\"             HS stretch  30\"x 4             Matrix knee flexion  10# 2x10 3x7 10#  25# 3x10  25# 2x10      Matrix knee extension  10# 2x10 25# 3x10   10# 2x10  4# 2x10      Ther Activity             Squats 2x 10  with UE      15# 2x10  15# 2x10     FSU 6\" 15x       8\" 2x10      LSU     4\" 2x10        FSD  6\" 2x10     6\" 2x10 6\" 2x10     Total gym: squats   L22 3x10   L22 3x10     L22 3x10 5\" hold   TUG, 5x STS, stair negotiation    Performed         Stationary lunge      2x10  2x10     Gait Training                                       Modalities             CP          HEP w/ elev.                           "

## 2025-06-04 ENCOUNTER — OFFICE VISIT (OUTPATIENT)
Dept: PHYSICAL THERAPY | Facility: CLINIC | Age: 85
End: 2025-06-04
Attending: ORTHOPAEDIC SURGERY
Payer: MEDICARE

## 2025-06-04 DIAGNOSIS — M17.11 PRIMARY OSTEOARTHRITIS OF RIGHT KNEE: Primary | ICD-10-CM

## 2025-06-04 PROCEDURE — 97110 THERAPEUTIC EXERCISES: CPT | Performed by: PHYSICAL THERAPIST

## 2025-06-04 PROCEDURE — 97140 MANUAL THERAPY 1/> REGIONS: CPT | Performed by: PHYSICAL THERAPIST

## 2025-06-04 NOTE — PROGRESS NOTES
Daily Note     Today's date: 2025  Patient name: Jay Stout  : 1940  MRN: [id number]  Referring provider: Mayank Oropeza, *  Dx:   Encounter Diagnosis     ICD-10-CM    1. Primary osteoarthritis of right knee  M17.11           Start Time: 1145  Stop Time: 1250  Total time in clinic (min): 65 minutes    Subjective: Patient reports that he stretched for 3 twenty minute increments yesterday and was more sore. Patient still finds his knee to be swollen.      Objective: See treatment diary below      Assessment: Tolerated treatment well. Patient demonstrated fatigue post treatment and would benefit from continued PT. Patient with improved knee flexion PROM to 108 deg. Patient with near full knee extension. Encouraged Patient to keep up with consistency with home program.      Plan: Continue per plan of care.  Progress treatment as tolerated.         POC expires Unit limit Auth  expiration date PT/OT + Visit Limit?   25 N/A N/A BOMN                 Visit/Unit Tracking  AUTH Status:  Date    N/A Used 13 14 15 16 17 18  8  9  10 11 12    Remaining                  Precautions: right TKA (DOS: 25), DM, HTN, h/o MI      Manuals  6 6    Post-op RE             R knee PROM  GR TS GR GR GR GR GR GR    Gastroc str.    GR         Pat mobs  GR  GR GR GR GR GR     Reassessment    GR         Neuro Re-Ed             Supine SLR 2x 10       2# 3x10      Quad sets with heel prop             TKE             LAQ        4# 3x10     SAQ                                       Ther Ex             Patient education: pathophysiology, diagnostic imaging review, signs/symptoms of infection, edema/pain management, graded activity, HEP review  GR TS HEP  GR         Ankle pumps             Seated knee extension LLPS             Seated knee flexion/extension AAROM at edge of plinthe             Seated knee flexion AAROM in chair   "           Standing knee flexion stretch              Long-sitting calf str.             HR             Standing calf str. With SB             Nustep: knee ROM ; Rec bike Rec bike bwd rotations   10 min  Rec bike 10 min Rec 10'  Rec bike 10 min Rec bike 10 min Rec bike 15 min Rec bike 10 min Rec bike 10 min Rec bike 10 min    Standing hip abd, ext with TB No TB 2x 10             Heel slides with strap  30x 5\"             HS stretch  30\"x 4             Matrix knee flexion  10# 2x10 3x7 10#  25# 3x10  25# 2x10      Matrix knee extension  10# 2x10 25# 3x10   10# 2x10  4# 2x10      Supine knee flexion wall slides         17x10\"    Stool walks         1 lap around gym    Ther Activity             Squats 2x 10  with UE      15# 2x10  15# 2x10     FSU 6\" 15x       8\" 2x10      LSU     4\" 2x10    4\" 2x10    FSD  6\" 2x10     6\" 2x10 6\" 2x10     Total gym: squats   L22 3x10   L22 3x10    L22 3x10    TUG, 5x STS, stair negotiation    Performed         Stationary lunge      2x10  2x10     Gait Training                                       Modalities             CP                                       "

## 2025-06-06 ENCOUNTER — OFFICE VISIT (OUTPATIENT)
Dept: PHYSICAL THERAPY | Facility: CLINIC | Age: 85
End: 2025-06-06
Attending: ORTHOPAEDIC SURGERY
Payer: MEDICARE

## 2025-06-06 DIAGNOSIS — M17.11 PRIMARY OSTEOARTHRITIS OF RIGHT KNEE: Primary | ICD-10-CM

## 2025-06-06 PROCEDURE — 97110 THERAPEUTIC EXERCISES: CPT

## 2025-06-06 PROCEDURE — 97140 MANUAL THERAPY 1/> REGIONS: CPT

## 2025-06-06 PROCEDURE — 97530 THERAPEUTIC ACTIVITIES: CPT

## 2025-06-06 NOTE — PROGRESS NOTES
"Daily Note     Today's date: 2025  Patient name: Jay Stout  : 1940  MRN: [id number]  Referring provider: Mayank Oropeza, *  Dx:   Encounter Diagnosis     ICD-10-CM    1. Primary osteoarthritis of right knee  M17.11           Start Time: 1117  Stop Time: 1200  Total time in clinic (min): 43 minutes    Subjective: Pt chief complaint is having stiffness; otherwise no other new complaints noted.       Objective: See treatment diary below      Assessment: Tolerated treatment well. Required some VC's for proper from/count of ex's. Pt achieved 106 degrees of R knee flexion passively post manuals. Patient demonstrated fatigue post treatment, exhibited good technique with therapeutic exercises, and would benefit from continued PT      Plan: Continue per plan of care.        POC expires Unit limit Auth  expiration date PT/OT + Visit Limit?   25 N/A N/A BOMN                 Visit/Unit Tracking  AUTH Status:  Date  6 6/        N/A Used 13 14 15 16 17 18 19         Remaining                  Precautions: right TKA (DOS: 25), DM, HTN, h/o MI      Manuals  6 6 6/6   Post-op RE             R knee PROM  GR TS GR GR GR GR GR GR NA   Gastroc str.    GR         Pat mobs  GR  GR GR GR GR GR     Reassessment    GR         Neuro Re-Ed             Supine SLR 2x 10       2# 3x10      Quad sets with heel prop             TKE             LAQ        4# 3x10  4# 3x10 5\" holds   SAQ                                       Ther Ex             Patient education: pathophysiology, diagnostic imaging review, signs/symptoms of infection, edema/pain management, graded activity, HEP review  GR TS HEP  GR         Ankle pumps             Seated knee extension LLPS             Seated knee flexion/extension AAROM at edge of plinthe             Seated knee flexion AAROM in chair             Standing knee flexion stretch              Long-sitting calf str.       " "      HR             Standing calf str. With SB             Nustep: knee ROM ; Rec bike Rec bike bwd rotations   10 min  Rec bike 10 min Rec 10'  Rec bike 10 min Rec bike 10 min Rec bike 15 min Rec bike 10 min Rec bike 10 min Rec bike 10 min Rec bike 10 min   Standing hip abd, ext with TB No TB 2x 10             Heel slides with strap  30x 5\"             HS stretch  30\"x 4             Matrix knee flexion  10# 2x10 3x7 10#  25# 3x10  25# 2x10      Matrix knee extension  10# 2x10 25# 3x10   10# 2x10  4# 2x10      Supine knee flexion wall slides         17x10\"    Stool walks         1 lap around gym 1 lap around gym   Ther Activity             Squats 2x 10  with UE      15# 2x10  15# 2x10     FSU 6\" 15x       8\" 2x10      LSU     4\" 2x10    4\" 2x10 4\" 2x10   FSD  6\" 2x10     6\" 2x10 6\" 2x10     Total gym: squats   L22 3x10   L22 3x10    L22 3x10 L22 3x10    TUG, 5x STS, stair negotiation    Performed         Stationary lunge      2x10  2x10  2x10   Gait Training                                       Modalities             CP                                         "

## 2025-06-09 ENCOUNTER — TELEPHONE (OUTPATIENT)
Dept: OBGYN CLINIC | Facility: HOSPITAL | Age: 85
End: 2025-06-09

## 2025-06-09 ENCOUNTER — OFFICE VISIT (OUTPATIENT)
Dept: PHYSICAL THERAPY | Facility: CLINIC | Age: 85
End: 2025-06-09
Attending: ORTHOPAEDIC SURGERY
Payer: MEDICARE

## 2025-06-09 DIAGNOSIS — M17.11 PRIMARY OSTEOARTHRITIS OF RIGHT KNEE: Primary | ICD-10-CM

## 2025-06-09 DIAGNOSIS — Z96.651 STATUS POST TOTAL KNEE REPLACEMENT, RIGHT: Primary | ICD-10-CM

## 2025-06-09 PROCEDURE — 97110 THERAPEUTIC EXERCISES: CPT | Performed by: PHYSICAL THERAPIST

## 2025-06-09 PROCEDURE — 97140 MANUAL THERAPY 1/> REGIONS: CPT | Performed by: PHYSICAL THERAPIST

## 2025-06-09 RX ORDER — AMOXICILLIN 500 MG/1
2000 CAPSULE ORAL
Qty: 12 CAPSULE | Refills: 2 | Status: SHIPPED | OUTPATIENT
Start: 2025-06-09 | End: 2025-06-10

## 2025-06-09 NOTE — PROGRESS NOTES
"Daily Note     Today's date: 2025  Patient name: Jay Stout  : 1940  MRN: [id number]  Referring provider: Mayank Oropeza, *  Dx:   Encounter Diagnosis     ICD-10-CM    1. Primary osteoarthritis of right knee  M17.11           Start Time: 1103  Stop Time: 1145  Total time in clinic (min): 42 minutes    Subjective: Patient reports that he continues to perform his HEP as able.      Objective: See treatment diary below      Assessment: Tolerated treatment fair. Patient demonstrated fatigue post treatment and would benefit from continued PT. Patient continues to be restricted with his knee ROM, which remains his primary limitation.      Plan: Continue per plan of care.  Progress treatment as tolerated.         POC expires Unit limit Auth  expiration date PT/OT + Visit Limit?   25 N/A N/A BOMN                 Visit/Unit Tracking  AUTH Status:  Date  6 6 6       N/A Used 13 14 15 16 17 18 19 20        Remaining                  Precautions: right TKA (DOS: 25), DM, HTN, h/o MI      Manuals  6 6/ 6   Post-op RE             R knee PROM GR  TS GR GR GR GR GR GR NA   Gastroc str.    GR         Pat mobs    GR GR GR GR GR     Reassessment    GR         Neuro Re-Ed             Supine SLR       2# 3x10      Quad sets with heel prop             TKE             LAQ        4# 3x10  4# 3x10 5\" holds   SAQ                                       Ther Ex             Patient education: pathophysiology, diagnostic imaging review, signs/symptoms of infection, edema/pain management, graded activity, HEP review   TS HEP  GR         Ankle pumps             Seated knee extension LLPS             Seated knee flexion/extension AAROM at edge of plinthe             Seated knee flexion AAROM in chair             Standing knee flexion stretch              Long-sitting calf str.             HR             Standing calf str. With SB             Nustep: knee " "ROM ; Rec bike Re bike 10 min  Rec 10'  Rec bike 10 min Rec bike 10 min Rec bike 15 min Rec bike 10 min Rec bike 10 min Rec bike 10 min Rec bike 10 min   Standing hip abd, ext with TB             Heel slides with strap              HS stretch              Matrix knee flexion   3x7 10#  25# 3x10  25# 2x10      Matrix knee extension   25# 3x10   10# 2x10  4# 2x10      Supine knee flexion wall slides 10x10\"        17x10\"    Stool walks         1 lap around gym 1 lap around gym   Ther Activity             Squats 15# 2x10     15# 2x10  15# 2x10     FSU       8\" 2x10      LSU     4\" 2x10    4\" 2x10 4\" 2x10   FSD       6\" 2x10 6\" 2x10     Total gym: squats   L22 3x10   L22 3x10    L22 3x10 L22 3x10    TUG, 5x STS, stair negotiation    Performed         Stationary lunge      2x10  2x10  2x10   Gait Training                                       Modalities             CP                                           "

## 2025-06-11 ENCOUNTER — OFFICE VISIT (OUTPATIENT)
Dept: PHYSICAL THERAPY | Facility: CLINIC | Age: 85
End: 2025-06-11
Attending: ORTHOPAEDIC SURGERY
Payer: MEDICARE

## 2025-06-11 DIAGNOSIS — M17.11 PRIMARY OSTEOARTHRITIS OF RIGHT KNEE: Primary | ICD-10-CM

## 2025-06-11 PROCEDURE — 97140 MANUAL THERAPY 1/> REGIONS: CPT | Performed by: PHYSICAL THERAPIST

## 2025-06-11 PROCEDURE — 97530 THERAPEUTIC ACTIVITIES: CPT | Performed by: PHYSICAL THERAPIST

## 2025-06-11 PROCEDURE — 97110 THERAPEUTIC EXERCISES: CPT | Performed by: PHYSICAL THERAPIST

## 2025-06-11 NOTE — PROGRESS NOTES
"Daily Note     Today's date: 2025  Patient name: Jay Stout  : 1940  MRN: [id number]  Referring provider: Mayank Oropeza, *  Dx:   Encounter Diagnosis     ICD-10-CM    1. Primary osteoarthritis of right knee  M17.11           Start Time: 1131  Stop Time: 1235  Total time in clinic (min): 64 minutes    Subjective: Patient reports that he has been going to the gym and stretching but his knee remains stiff.      Objective: See treatment diary below      Assessment: Tolerated treatment fair. Patient demonstrated fatigue post treatment and would benefit from continued PT. Patient with gradually improving knee flexion PROM, but remains limited activity. Knee extension ROM stiff to start, but is close to neutral after manual stretching.      Plan: Continue per plan of care.  Progress treatment as tolerated.         POC expires Unit limit Auth  expiration date PT/OT + Visit Limit?   25 N/A N/A BOMN                 Visit/Unit Tracking  AUTH Status:  Date       N/A Used 13 14 15 16 17 18 19 20 21       Remaining                  Precautions: right TKA (DOS: 25), DM, HTN, h/o MI      Manuals    Post-op RE             R knee PROM GR GR  GR GR GR GR GR GR NA   Gastroc str.    GR         Pat mobs  GR  GR GR GR GR GR     Reassessment    GR         Neuro Re-Ed             Supine SLR       2# 3x10      Quad sets with heel prop             TKE             LAQ        4# 3x10  4# 3x10 5\" holds   SAQ                                       Ther Ex             Patient education: pathophysiology, diagnostic imaging review, signs/symptoms of infection, edema/pain management, graded activity, HEP review    GR         Ankle pumps             Seated knee extension LLPS             Seated knee flexion/extension AAROM at edge of plinthe             Seated knee flexion AAROM in chair             Standing knee flexion stretch         " "     Long-sitting calf str.             HR             Standing calf str. With SB             Nustep: knee ROM ; Rec bike Re bike 10 min Rec bike 10 min  Rec bike 10 min Rec bike 10 min Rec bike 15 min Rec bike 10 min Rec bike 10 min Rec bike 10 min Rec bike 10 min   Standing hip abd, ext with TB             Heel slides with strap   AROM     Performed           HS stretch              Matrix knee flexion     25# 3x10  25# 2x10      Matrix knee extension     10# 2x10  4# 2x10      Supine knee flexion wall slides 10x10\"        17x10\"    Stool walks         1 lap around gym 1 lap around gym   Standing knee extension stretch at step with self-overpressure  10x10\"           Standing knee flexion stretch with 20\" step  10x10\"                                     Ther Activity             Squats 15# 2x10     15# 2x10  15# 2x10     FSU  8\" 3x10     8\" 2x10      LSU  6\" 2x10   4\" 2x10    4\" 2x10 4\" 2x10   FSD  8\" 3x10     6\" 2x10 6\" 2x10     Total gym: squats  L22 3x10   L22 3x10    L22 3x10 L22 3x10    TUG, 5x STS, stair negotiation    Performed         Stationary lunge      2x10  2x10  2x10   Gait Training                                       Modalities             CP                                             "

## 2025-06-12 DIAGNOSIS — G47.00 INSOMNIA, UNSPECIFIED TYPE: ICD-10-CM

## 2025-06-12 RX ORDER — TRAZODONE HYDROCHLORIDE 50 MG/1
50 TABLET ORAL
Qty: 90 TABLET | Refills: 1 | Status: SHIPPED | OUTPATIENT
Start: 2025-06-12

## 2025-06-13 ENCOUNTER — OFFICE VISIT (OUTPATIENT)
Dept: PHYSICAL THERAPY | Facility: CLINIC | Age: 85
End: 2025-06-13
Attending: ORTHOPAEDIC SURGERY
Payer: MEDICARE

## 2025-06-13 DIAGNOSIS — M17.11 PRIMARY OSTEOARTHRITIS OF RIGHT KNEE: Primary | ICD-10-CM

## 2025-06-13 PROCEDURE — 97140 MANUAL THERAPY 1/> REGIONS: CPT | Performed by: PHYSICAL THERAPIST

## 2025-06-13 PROCEDURE — 97110 THERAPEUTIC EXERCISES: CPT | Performed by: PHYSICAL THERAPIST

## 2025-06-13 PROCEDURE — 97530 THERAPEUTIC ACTIVITIES: CPT | Performed by: PHYSICAL THERAPIST

## 2025-06-13 NOTE — PROGRESS NOTES
"Daily Note     Today's date: 2025  Patient name: Jay Stout  : 1940  MRN: [id number]  Referring provider: Mayank Oropeza, *  Dx:   Encounter Diagnosis     ICD-10-CM    1. Primary osteoarthritis of right knee  M17.11           Start Time: 1045  Stop Time: 1130  Total time in clinic (min): 45 minutes    Subjective: Patient offers no new complaints.      Objective: See treatment diary below      Assessment: Tolerated treatment fair. Patient demonstrated fatigue post treatment and would benefit from continued PT. No new changes to overall status.       Plan: Continue per plan of care.  Progress treatment as tolerated.         POC expires Unit limit Auth  expiration date PT/OT + Visit Limit?   25 N/A N/A BOMN                 Visit/Unit Tracking  AUTH Status:  Date      N/A Used 13 14 15 16 17 18 19 20 21 22      Remaining                  Precautions: right TKA (DOS: 25), DM, HTN, h/o MI      Manuals    Post-op RE             R knee PROM GR GR GR  GR GR GR GR GR NA   Gastroc str.             Pat mobs  GR GR  GR GR GR GR     Reassessment             Neuro Re-Ed             Supine SLR       2# 3x10      Quad sets with heel prop             TKE             LAQ        4# 3x10  4# 3x10 5\" holds   SAQ                                       Ther Ex             Patient education: pathophysiology, diagnostic imaging review, signs/symptoms of infection, edema/pain management, graded activity, HEP review             Ankle pumps             Seated knee extension LLPS             Seated knee flexion/extension AAROM at edge of plinthe             Seated knee flexion AAROM in chair             Standing knee flexion stretch              Long-sitting calf str.             HR             Standing calf str. With SB             Nustep: knee ROM ; Rec bike Re bike 10 min Rec bike 10 min Rec bike 10 min  Rec bike 10 min Rec " "bike 15 min Rec bike 10 min Rec bike 10 min Rec bike 10 min Rec bike 10 min   Standing hip abd, ext with TB   RTB 2x10          Heel slides with strap   AROM     Performed AROM performed          HS stretch              Matrix knee flexion     25# 3x10  25# 2x10      Matrix knee extension     10# 2x10  4# 2x10      Supine knee flexion wall slides 10x10\"        17x10\"    Stool walks         1 lap around gym 1 lap around gym   Standing knee extension stretch at step with self-overpressure  10x10\"           Standing knee flexion stretch with 20\" step  10x10\"                                     Ther Activity             Squats 15# 2x10     15# 2x10  15# 2x10     FSU  8\" 3x10     8\" 2x10      LSU  6\" 2x10 6\" 2x10  4\" 2x10    4\" 2x10 4\" 2x10   FSD  8\" 3x10     6\" 2x10 6\" 2x10     Total gym: squats  L22 3x10   L22 3x10    L22 3x10 L22 3x10    TUG, 5x STS, stair negotiation             Stationary lunge   4x10   2x10  2x10  2x10   Gait Training                                       Modalities             CP                                               "

## 2025-06-16 ENCOUNTER — EVALUATION (OUTPATIENT)
Dept: PHYSICAL THERAPY | Facility: CLINIC | Age: 85
End: 2025-06-16
Attending: ORTHOPAEDIC SURGERY
Payer: MEDICARE

## 2025-06-16 DIAGNOSIS — M17.11 PRIMARY OSTEOARTHRITIS OF RIGHT KNEE: Primary | ICD-10-CM

## 2025-06-16 PROCEDURE — 97140 MANUAL THERAPY 1/> REGIONS: CPT | Performed by: PHYSICAL THERAPIST

## 2025-06-16 PROCEDURE — 97110 THERAPEUTIC EXERCISES: CPT | Performed by: PHYSICAL THERAPIST

## 2025-06-16 NOTE — PROGRESS NOTES
PT Re-Evaluation     Today's date: 2025  Patient name: Jay Stout  : 1940  MRN: [id number]  Referring provider: Mayank Oropeza, *  Dx:   Encounter Diagnosis     ICD-10-CM    1. Primary osteoarthritis of right knee  M17.11           Start Time: 1210  Stop Time: 1303  Total time in clinic (min): 53 minutes    Assessment  Impairments: abnormal or restricted ROM, activity intolerance, impaired balance, impaired physical strength, lacks appropriate home exercise program, pain with function and weight-bearing intolerance    Assessment details: Jay Stout is a 84 y.o. male who presents with chronic right knee pain having failed conservative treatment, including steroid and viscosupplementation injections, as well as physical therapy. Patient has elected to undergo surgical intervention and is currently s/p right TKA, DOS: 25. Since beginning physical therapy, Patient has attended a total of 23 appointments and has maintained excellent compliance with POC. Patient is reporting significant reduction of pain and improved overall mobility. Patient's quality of sleep has improved, aided by use of Gabapentin and Trazadone. Examination findings demonstrate improved knee ROM into both flexion and extension, decreased edema, improved patellar mobility, increased LE strength, improved scar tissue mobility, and improved overall mobility as demonstrated by ability to negotiate steps, having transitioned from SPC to independent ambulation, and reduced times needed to complete TUG and 5x STS tests. Despite these improvements, Octavio continues to report intermittent pain / stiffness and presents with edema, and painful, limited knee ROM. Patient would benefit from skilled physical therapy to address their aforementioned impairments, improve their level of function and to improve their overall quality of life.  Understanding of Dx/Px/POC: excellent     Prognosis: excellent    Goals  Short Term Goals: to  be achieved by 4 weeks  1) Patient to be independent with basic HEP. MET  2) Decrease pain by 5/10 at its worst. MET  3) Increase knee flexion ROM to 90 deg. MET  4) Increase knee extension ROM by > 5 deg. MET  5) Increase LE strength by 1/2 MMT grade in all deficient planes. MET  6) Patient to negotiate steps with a step-to pattern with use of HR. MET  7) Patient to report decreased sleep interruption secondary to pain. MET  8) Increase ambulatory tolerance by 10 min with LRAD. MET    Long Term Goals: to be achieved by discharge ALL GOALS PROGRESSING  1) FOTO equal to or greater than 57.  2) Patient to be independent with comprehensive HEP.  3) Abolish pain for improved quality of life.  4) Increase LE strength to 5/5 MMT grade in all planes to improve a/iadls.  5) Achieve full knee extension ROM to improve a/iadls.  6) Increase knee flexion ROM to within 5 deg of contralateral LE to improve a/iadls.  7) Patient to negotiate steps with a reciprocal pattern with use of Hr.  8) Increase ambulatory tolerance to 60 min to improve participation in social activities.  9) Patient to report no sleep interruption secondary to pain.    Plan  Patient would benefit from: skilled PT  Planned modality interventions: biofeedback, cryotherapy, electrical stimulation/Russian stimulation, TENS and low level laser therapy    Planned therapy interventions: activity modification, ADL retraining, ADL training, balance, balance/weight bearing training, body mechanics training, dressing changes, functional ROM exercises, gait training, home exercise program, IADL retraining, joint mobilization, manual therapy, massage, neuromuscular re-education, patient education, self care, strengthening, stretching, therapeutic activities, therapeutic exercise and transfer training    Frequency: 2-3x week.  Duration in weeks: 6  Plan of Care beginning date: 6/16/2025  Plan of Care expiration date: 7/28/2025  Treatment plan discussed with: patient  (Patient's partner, Evelyne, present for duration of IE.)        Subjective Evaluation    History of Present Illness  Mechanism of injury: Patient reports that his right knee pain has significantly improved, in addition to his mobility. Patient is walking independently without his SPC. Patient's swelling has reduced, but remains stiff. Patient's quality of sleep has been improving. Patient is managing his sleep with Gabapentin and Trazadone.  Patient overall reports that his knee has improved up to 90% since his surgery.  Patient Goals  Patient goals for therapy: decreased pain and independence with ADLs/IADLs    Pain  Current pain rating: 3  At best pain ratin  At worst pain ratin  Location: right knee: diffuse  Quality: dull ache and tight  Exacerbated by: squatting, ballet, kneeling, don/doffing pants,    Social Support    Employment status: not working  Treatments  Current treatment: physical therapy        Objective     Observations   Left Knee   Negative for edema, effusion and incision.     Right Knee   Positive for edema, effusion and incision (clean, well-approximated, no signs of infection, good scar tissue mobility).     Active Range of Motion   Left Knee   Flexion: 105 degrees with pain  Extension: 5 (lacking terminal extension) degrees     Right Knee   Flexion: 95 degrees with pain  Extension: 3 (lacking terminal extension) degrees     Passive Range of Motion   Left Knee   Flexion: 112 degrees with pain  Extension: 5 (lacking terminal extension) degrees     Right Knee   Flexion: 105 degrees with pain  Extension: 0 degrees     Mobility   Patellar Mobility:   Left Knee   Hypomobile: left medial, left lateral, left superior and left inferior    Right Knee   WFL: medial, lateral, superior and inferior    Strength/Myotome Testing     Left Hip   Planes of Motion   Flexion: 5    Right Hip   Planes of Motion   Flexion: 5    Left Knee   Normal strength  Quadriceps contraction: good    Right Knee   Flexion:  "5  Extension: 5  Quadriceps contraction: fair (able to perform supine SLR)    Left Ankle/Foot   Dorsiflexion: 5    Right Ankle/Foot   Dorsiflexion: 5    Tests     Right Ankle/Foot   Negative for Homans' sign.     Ambulation   Weight-Bearing Status   Weight-Bearing Status (Left): full weight bearing   Weight-Bearing Status (Right): full weight-bearing      Ambulation: Level Surfaces   Ambulation with assistive device: independent    Observational Gait   Gait: antalgic     Functional Assessment        Comments  Preoperative Evaluation (4/16/25):    Timed Up and Go: 8.21 sec, Independent sit to/from stand    Five Times Sit to Stand Test: 16.05 sec, Independent, good eccentric control    Postoperative Evaluation (4/25/25):    Timed Up and Go: 27.83 sec, Mod I ambulation with standard walker, Mod I sit/from stand with b/l arm rests    Five Times Sit to Stand Test: 24.33 sec, Mod I with b/l arm rests, left knee extended with increased weightbearing through right    Reassessment (5/23/25):    Timed Up and Go: 10.84 sec, Mod I ambulation with SPC, Mod I sit to/from stand with bilateral arm rests    Five Times Sit to Stand Test: 14.46 sec, Mod I with b/l arm rests, good eccentric control    Bilateral squat: 0-40 deg, Mod I    FSU 8\" step: Mod I    FSD 8\" step: Mod I, fair eccentric control    Reassessment (6/16/25)    Timed Up and Go: 7.69 sec, Independent ambulation and sit to/from stand    Five Times Sit to Stand Test: 13.60 sec, Independent, good eccentric control ; 11.17 sec, Mod I with b/l arm rests    Bilateral squat: WFL, Independent    FSU 8\" step: Mod I    FSD 8\" step: Mod I, good eccentric control      Flowsheet Rows      Flowsheet Row Most Recent Value   PT/OT G-Codes    Current Score 26   Projected Score 57                 POC expires Unit limit Auth  expiration date PT/OT + Visit Limit?   7/28/25 N/A N/A BOMN                 Visit/Unit Tracking  AUTH Status:  Date 5/23 5/27 5/28 5/30 6/2 6/4 6/6 6/9 6/11 6/13 " "6/16    N/A Used 13 14 15 16 17 18 19 20 21 22 23     Remaining                  Precautions: right TKA (DOS: 4/22/25), DM, HTN, h/o MI      Manuals 6/9 6/11 6/13 6/16 5/28 5/30 6/2 6/4 6/6   Post-op RE             R knee PROM GR GR GR GR  GR GR GR GR NA   Gastroc str.             Pat mobs  GR GR GR  GR GR GR     Reassessment    GR         Neuro Re-Ed             Supine SLR       2# 3x10      Quad sets with heel prop             TKE             LAQ        4# 3x10  4# 3x10 5\" holds   SAQ                                       Ther Ex             Patient education: pathophysiology, diagnostic imaging review, signs/symptoms of infection, edema/pain management, graded activity, HEP review    GR         Ankle pumps             Seated knee extension LLPS             Seated knee flexion/extension AAROM at edge of plinthe             Seated knee flexion AAROM in chair             Standing knee flexion stretch              Long-sitting calf str.             HR             Standing calf str. With SB             Nustep: knee ROM ; Rec bike Re bike 10 min Rec bike 10 min Rec bike 10 min Rec bike 10 min  Rec bike 15 min Rec bike 10 min Rec bike 10 min Rec bike 10 min Rec bike 10 min   Standing hip abd, ext with TB   RTB 2x10          Heel slides with strap   AROM     Performed AROM performed          HS stretch              Matrix knee flexion       25# 2x10      Matrix knee extension       4# 2x10      Supine knee flexion wall slides 10x10\"        17x10\"    Stool walks         1 lap around gym 1 lap around gym   Standing knee extension stretch at step with self-overpressure  10x10\"           Standing knee flexion stretch with 20\" step  10x10\"                                     Ther Activity             Squats 15# 2x10     15# 2x10  15# 2x10     FSU  8\" 3x10     8\" 2x10      LSU  6\" 2x10 6\" 2x10      4\" 2x10 4\" 2x10   FSD  8\" 3x10     6\" 2x10 6\" 2x10     Total gym: squats  L22 3x10       L22 3x10 L22 3x10    TUG, 5x STS, stair " negotiation    Performed         Stationary lunge   4x10   2x10  2x10  2x10   Gait Training                                       Modalities             CP

## 2025-06-17 ENCOUNTER — TELEPHONE (OUTPATIENT)
Age: 85
End: 2025-06-17

## 2025-06-17 NOTE — TELEPHONE ENCOUNTER
Hello,    Please advise if a forced appointment can be accommodated for the patient:    Call back #: [phone]     Insurance: Medicare A&B    Reason for appointment: Patient was called to re-schedule 2nd post op, soonest opening is too far out. Please advise with sooner apt.     Requested doctor and/or location: Dr Oropeza / [facility]      Thank you.

## 2025-06-18 ENCOUNTER — OFFICE VISIT (OUTPATIENT)
Dept: OBGYN CLINIC | Facility: OTHER | Age: 85
End: 2025-06-18

## 2025-06-18 VITALS — HEIGHT: 68 IN | BODY MASS INDEX: 28.95 KG/M2 | WEIGHT: 191 LBS

## 2025-06-18 DIAGNOSIS — Z96.651 STATUS POST TOTAL KNEE REPLACEMENT, RIGHT: Primary | ICD-10-CM

## 2025-06-18 PROCEDURE — 99024 POSTOP FOLLOW-UP VISIT: CPT | Performed by: ORTHOPAEDIC SURGERY

## 2025-06-18 NOTE — PROGRESS NOTES
"Orthopaedic Surgery - Office Note  Jay Stout (84 y.o. male)   : 1940   MRN: [id number]  Encounter Date: 2025    Assessment & Plan  Status post total knee replacement, right       Activity as tolerated  Continue outpatient PT  Anti-inflammatories or Tylenol prn pain  8 weeks Status post right total knee replacement with robotic assistance on 2025   Follow-up:  Return in about 2 months (around 2025).      Chief Complaint / Date of Onset  Bilateral knee pain, right worse than left secondary to osteoarthritis  Injury Mechanism / Date  None  Surgery / Date  2025    History of Present Illness   Jay Stout is a 84 y.o. male who presents for follow up status post the above listed procedure. Overall, he is doing well and is making appropriate progressions. He is currently attending outpatient physical therapy and going to the gym. He is managing pain with Tylenol and Gabapentin as needed. He denies trauma or injury to the right knee. No distal numbness or tingling.     Treatment Summary  Medications / Modalities  Tylenol and Gabapentin  TraZODone for insomnia  Bracing / Immobilization  None  Physical Therapy  Completed 3+ months in the fall of   Complaint with HEP  Injections  Most recent B/L CSI on 2024   Long standing history of bilateral CSI since   Prior Surgeries  None  Other Treatments  None      Review of Systems  Pertinent items are noted in HPI.  All other systems were reviewed and are negative.      Physical Exam  Ht 5' 8\" (1.727 m)   Wt 86.6 kg (191 lb)   BMI 29.04 kg/m²   Cons: Appears well.  No apparent distress.  Psych: Alert. Oriented x3.  Mood and affect normal.  Eyes: PERRLA, EOMI  Resp: Normal effort.  No audible wheezing or stridor.  CV: Palpable pulse.  No discernable arrhythmia.  No LE edema.  Lymph:  No palpable cervical, axillary, or inguinal lymphadenopathy.  Skin: Warm.  No palpable masses.  No visible lesions.  Neuro: Normal muscle tone.  " Normal and symmetric DTR's.     Right Knee Exam  Alignment:  Normal knee alignment.  Inspection:  No erythema. No ecchymosis. No deformity.  Palpation:  No tenderness.  ROM:  Knee Extension 10. Knee Flexion 110.  Strength:  5/5 quadriceps and hamstrings. Able to actively extend knee against gravity.  Stability:  Stable at 0, 30, and 90 to Varus and Valgus stress  Tests:  No pertinent positive or negative tests.  Patella:  Normal patellar mobility.  Neurovascular:  Sensation intact in DP/SP/Pedroza/Sa/T nerve distributions.  2+ DP & PT pulses.  Gait:  Normal.        Studies Reviewed  No studies to review      Procedures  No procedures today.    Medical, Surgical, Family, and Social History  The patient's medical history, family history, and social history, were reviewed and updated as appropriate.    Past Medical History[1]    Past Surgical History[2]    Family History[3]    Social History     Occupational History    Not on file   Tobacco Use    Smoking status: Former     Current packs/day: 1.00     Average packs/day: 1 pack/day for 25.0 years (25.0 ttl pk-yrs)     Types: Cigarettes    Smokeless tobacco: Never    Tobacco comments:     Quit 45 yrs ago   Vaping Use    Vaping status: Never Used   Substance and Sexual Activity    Alcohol use: Yes     Comment: Social twice a month    Drug use: Never    Sexual activity: Not on file     Comment: defer       Allergies[4]    Current Medications[5]      Aj Cole    Scribe Attestation      I,:  Aj Cole am acting as a scribe while in the presence of the attending physician.:       I,:  Mayank Oropeza MD personally performed the services described in this documentation    as scribed in my presence.:                  [1]   Past Medical History:  Diagnosis Date    Arthritis     Chronic kidney disease     Dental crown present     Diabetes mellitus (HCC)     Does use hearing aid     to wear DOS    Exercise involving walking     3x/week at the gym    GERD  "(gastroesophageal reflux disease)     taking pantoprazole    Heart disease 05/09/2020    Heart attack    History of coronary artery stent placement     Hoarseness of voice     \"raspy\"    Hyperlipidemia     Myocardial infarction (HCC)     SL cardio yearly    Penile lesion     Prediabetes     Wears glasses    [2]   Past Surgical History:  Procedure Laterality Date    CARDIAC SURGERY  05/2020    stent placement    COLONOSCOPY      HERNIA REPAIR      LARYNGOSCOPY  1978    with vocal cord polyp removal    LARYNGOSCOPY N/A 8/3/2021    Procedure: MICRODIRECT LARYNGOSCOPY WITH  EXCISION OF VOCAL CORD LESION;  Surgeon: Pito Tomlin DO;  Location: [facility];  Service: ENT    IL ARTHRP KNE CONDYLE&PLATU MEDIAL&LAT COMPARTMENTS Right 4/22/2025    Procedure: ARTHROPLASTY KNEE TOTAL W ROBOT (psb same day dc);  Surgeon: Mayank Oropeza MD;  Location: [facility];  Service: Orthopedics    IL CIRCUMCISION AGE >28 DAYS N/A 2/20/2025    Procedure: CIRCUMCISION ADULT;  Surgeon: Saurav Gillis DO;  Location: [facility];  Service: Urology    IL EXC B9 LESION MRGN XCP SK TG S/N/H/F/G > 4.0CM N/A 2/20/2025    Procedure: EXCISION BIOPSY LESION/MASS PENILE;  Surgeon: Saurav Gillis DO;  Location: [facility];  Service: Urology   [3]   Family History  Problem Relation Name Age of Onset    Heart disease Mother          Cardiac Disorder    No Known Problems Father      No Known Problems Sister     [4]   Allergies  Allergen Reactions    Celebrex [Celecoxib] Other (See Comments)     Other reaction(s): itchy  Other reaction(s): itchy   [5]   Current Outpatient Medications:     acetaminophen (TYLENOL) 325 mg tablet, Take 650 mg by mouth every 6 (six) hours as needed for mild pain, Disp: , Rfl:     acetaminophen (TYLENOL) 650 mg CR tablet, Take 1 tablet (650 mg total) by mouth every 6 (six) hours as needed for mild pain, Disp: 90 tablet, Rfl: 0    atorvastatin (LIPITOR) 40 mg tablet, Take 1 tablet (40 mg total) by mouth daily, " Disp: 90 tablet, Rfl: 1    Blood Glucose Monitoring Suppl (FREESTYLE FREEDOM LITE) w/Device KIT, Check glucose levels once daily, Disp: 1 each, Rfl: 0    Cholecalciferol (VITAMIN D-3 PO), Take by mouth, Disp: , Rfl:     Diclofenac Sodium (VOLTAREN) 1 %, Apply 2 g topically 4 (four) times a day, Disp: 100 g, Rfl: 1    glucose blood (FREESTYLE LITE) test strip, Use 1 each in the morning, Disp: 100 strip, Rfl: 11    Lancets (FREESTYLE) lancets, TEST ONCE DAILY FASTING DXE11.9, Disp: 100 each, Rfl: 3    loratadine (CLARITIN) 10 mg tablet, Take 10 mg by mouth in the morning., Disp: , Rfl:     metFORMIN (GLUCOPHAGE) 500 mg tablet, Take 2 tablets (1,000 mg total) by mouth 2 (two) times a day with meals, Disp: 270 tablet, Rfl: 2    Multiple Vitamin (multivitamin) tablet, Take 1 tablet by mouth daily, Disp: 30 tablet, Rfl: 0    mupirocin (BACTROBAN) 2 % ointment, Apply topically 2 (two) times a day Apply to each nostril 2 times daily for 5 days before and including the day of surgery, Disp: 15 g, Rfl: 0    pantoprazole (PROTONIX) 40 mg tablet, TAKE 1 TABLET (40 MG TOTAL) BY MOUTH DAILY TAKE IN MORNING, Disp: 90 tablet, Rfl: 1    tamsulosin (FLOMAX) 0.4 mg, TAKE 1 CAPSULE BY MOUTH EVERY DAY WITH DINNER, Disp: 90 capsule, Rfl: 0    traZODone (DESYREL) 50 mg tablet, TAKE 1 TABLET BY MOUTH DAILY AT BEDTIME, Disp: 90 tablet, Rfl: 1    ascorbic acid (VITAMIN C) 500 MG tablet, Take 1 tablet (500 mg total) by mouth 2 (two) times a day, Disp: 60 tablet, Rfl: 0    aspirin (ECOTRIN LOW STRENGTH) 81 mg EC tablet, Take 1 tablet (81 mg total) by mouth daily (Patient taking differently: Take 81 mg by mouth daily Takes with breakfast), Disp: 60 tablet, Rfl: 0    aspirin (ECOTRIN LOW STRENGTH) 81 mg EC tablet, Take 1 tablet (81 mg total) by mouth 2 (two) times a day, Disp: 60 tablet, Rfl: 0    ferrous sulfate 324 (65 Fe) mg, Take 1 tablet (324 mg total) by mouth 2 (two) times a day before meals (Patient taking differently: Take 324 mg by  mouth 2 (two) times a day before meals Per pt/caregiver taking daily), Disp: 60 tablet, Rfl: 0    fluticasone (FLONASE) 50 mcg/act nasal spray, 1 spray into each nostril daily (Patient not taking: Reported on 5/7/2025), Disp: 48 g, Rfl: 1    folic acid (FOLVITE) 1 mg tablet, Take 1 tablet (1 mg total) by mouth daily, Disp: 30 tablet, Rfl: 0    gabapentin (Neurontin) 300 mg capsule, Take 1 capsule (300 mg total) by mouth 2 (two) times a day for 14 days, Disp: 28 capsule, Rfl: 0    metoprolol tartrate (LOPRESSOR) 25 mg tablet, TAKE 1 TABLET (25 MG TOTAL) BY MOUTH EVERY 12 (TWELVE) HOURS, Disp: 180 tablet, Rfl: 1    ondansetron (ZOFRAN-ODT) 4 mg disintegrating tablet, Take 1 tablet (4 mg total) by mouth every 8 (eight) hours as needed for nausea or vomiting (Patient not taking: Reported on 6/18/2025), Disp: 15 tablet, Rfl: 0

## 2025-06-19 ENCOUNTER — OFFICE VISIT (OUTPATIENT)
Dept: PHYSICAL THERAPY | Facility: CLINIC | Age: 85
End: 2025-06-19
Attending: ORTHOPAEDIC SURGERY
Payer: MEDICARE

## 2025-06-19 DIAGNOSIS — M17.11 PRIMARY OSTEOARTHRITIS OF RIGHT KNEE: Primary | ICD-10-CM

## 2025-06-19 PROCEDURE — 97530 THERAPEUTIC ACTIVITIES: CPT | Performed by: PHYSICAL THERAPIST

## 2025-06-19 PROCEDURE — 97110 THERAPEUTIC EXERCISES: CPT | Performed by: PHYSICAL THERAPIST

## 2025-06-19 PROCEDURE — 97140 MANUAL THERAPY 1/> REGIONS: CPT | Performed by: PHYSICAL THERAPIST

## 2025-06-19 NOTE — PROGRESS NOTES
"Daily Note     Today's date: 2025  Patient name: Jay Stout  : 1940  MRN: [id number]  Referring provider: Mayank Oropeza, *  Dx:   Encounter Diagnosis     ICD-10-CM    1. Primary osteoarthritis of right knee  M17.11           Start Time: 1100  Stop Time: 1200  Total time in clinic (min): 60 minutes    Subjective: Patient reports that he had a f/u appointment with his surgeon who said he is doing well and to f/u in 2 months.      Objective: See treatment diary below      Assessment: Tolerated treatment fair. Patient demonstrated fatigue post treatment and would benefit from continued PT. Discussed that Patient's preoperative knee flexion ROM was only about 110 deg and that he may be limited post-operatively d/t stiffness going into surgery. Knee ROM near equal to contralateral.      Plan: Continue per plan of care.  Progress treatment as tolerated.         POC expires Unit limit Auth  expiration date PT/OT + Visit Limit?   25 N/A N/A BOMN                 Visit/Unit Tracking  AUTH Status:  Date    N/A Used 13 14 15 16 17 18 19 20 21 22 23 24    Remaining                  Precautions: right TKA (DOS: 25), DM, HTN, h/o MI      Manuals    Post-op RE             R knee PROM GR GR GR GR GR  GR GR GR NA   Gastroc str.             Pat mobs  GR GR GR   GR GR     Reassessment    GR         Neuro Re-Ed             Supine SLR       2# 3x10      Quad sets with heel prop             TKE             LAQ        4# 3x10  4# 3x10 5\" holds   SAQ                                       Ther Ex             Patient education: pathophysiology, diagnostic imaging review, signs/symptoms of infection, edema/pain management, graded activity, HEP review    GR GR        Ankle pumps             Seated knee extension LLPS             Seated knee flexion/extension AAROM at edge of plinthe             Seated knee flexion " "AAROM in chair             Standing knee flexion stretch              Long-sitting calf str.             HR             Standing calf str. With SB             Nustep: knee ROM ; Rec bike Re bike 10 min Rec bike 10 min Rec bike 10 min Rec bike 10 min Rec bike 10 min  Rec bike 10 min Rec bike 10 min Rec bike 10 min Rec bike 10 min   Standing hip abd, ext with TB   RTB 2x10          Heel slides with strap   AROM     Performed AROM performed          HS stretch              Matrix knee flexion       25# 2x10      Matrix knee extension       4# 2x10      Supine knee flexion wall slides 10x10\"        17x10\"    Stool walks         1 lap around gym 1 lap around gym   Standing knee extension stretch at step with self-overpressure  10x10\"           Standing knee flexion stretch with 20\" step  10x10\"                                     Ther Activity             Squats 15# 2x10       15# 2x10     FSU  8\" 3x10   8\" 3x10  8\" 2x10      LSU  6\" 2x10 6\" 2x10  6\" 3x10    4\" 2x10 4\" 2x10   FSD  8\" 3x10   8\" 3x10  6\" 2x10 6\" 2x10     Total gym: squats  L22 3x10       L22 3x10 L22 3x10    TUG, 5x STS, stair negotiation    Performed         Stationary lunge   4x10  3x10   2x10  2x10   Gait Training                                       Modalities             CP                               "

## 2025-06-20 ENCOUNTER — OFFICE VISIT (OUTPATIENT)
Dept: PHYSICAL THERAPY | Facility: CLINIC | Age: 85
End: 2025-06-20
Attending: ORTHOPAEDIC SURGERY
Payer: MEDICARE

## 2025-06-20 DIAGNOSIS — M17.11 PRIMARY OSTEOARTHRITIS OF RIGHT KNEE: Primary | ICD-10-CM

## 2025-06-20 PROCEDURE — 97110 THERAPEUTIC EXERCISES: CPT | Performed by: PHYSICAL THERAPIST

## 2025-06-20 PROCEDURE — 97140 MANUAL THERAPY 1/> REGIONS: CPT | Performed by: PHYSICAL THERAPIST

## 2025-06-20 NOTE — PROGRESS NOTES
"Daily Note     Today's date: 2025  Patient name: Jay Stout  : 1940  MRN: [id number]  Referring provider: Mayank Oropeza, *  Dx:   Encounter Diagnosis     ICD-10-CM    1. Primary osteoarthritis of right knee  M17.11           Start Time: 1100  Stop Time: 1150  Total time in clinic (min): 50 minutes    Subjective: Patient offers no new complaints since yesterday's session.      Objective: See treatment diary below    Knee flexion A/PROM (deg): 100 / 108      Assessment: Tolerated treatment fair. Patient demonstrated fatigue post treatment and would benefit from continued PT. Patient with improved right knee AROM this visit. Patient's knee flexion ROM approaching preoperative level.      Plan: Continue per plan of care.  Progress treatment as tolerated.         POC expires Unit limit Auth  expiration date PT/OT + Visit Limit?   25 N/A N/A BOMN                 Visit/Unit Tracking  AUTH Status:  Date    N/A Used 25  15 16 17 18 19 20 21 22 23 24    Remaining                  Precautions: right TKA (DOS: 25), DM, HTN, h/o MI      Manuals    Post-op RE             R knee PROM GR GR GR GR GR GR  GR GR NA   Gastroc str.             Pat mobs  GR GR GR    GR     Reassessment    GR         Neuro Re-Ed             Supine SLR             Quad sets with heel prop             TKE             LAQ        4# 3x10  4# 3x10 5\" holds   SAQ                                       Ther Ex             Patient education: pathophysiology, diagnostic imaging review, signs/symptoms of infection, edema/pain management, graded activity, HEP review    GR GR        Ankle pumps             Seated knee extension LLPS             Seated knee flexion/extension AAROM at edge of plinthe             Seated knee flexion AAROM in chair             Standing knee flexion stretch              Long-sitting calf str.             HR       " "      Standing calf str. With SB             Nustep: knee ROM ; Rec bike Re bike 10 min Rec bike 10 min Rec bike 10 min Rec bike 10 min Rec bike 10 min Rec bike 10 min  Rec bike 10 min Rec bike 10 min Rec bike 10 min   Standing hip abd, ext with TB   RTB 2x10          Heel slides with strap   AROM     Performed AROM performed          HS stretch              Matrix knee flexion             Matrix knee extension             Supine knee flexion wall slides 10x10\"        17x10\"    Stool walks         1 lap around gym 1 lap around gym   Standing knee extension stretch at step with self-overpressure  10x10\"           Standing knee flexion stretch with 20\" step  10x10\"           Sidestepping with TB      GTB x10 laps       Seated knee flexion str. From plinthe to 14\"  step      15x10\"       Ther Activity             Squats 15# 2x10       15# 2x10     FSU  8\" 3x10   8\" 3x10        LSU  6\" 2x10 6\" 2x10  6\" 3x10    4\" 2x10 4\" 2x10   FSD  8\" 3x10   8\" 3x10   6\" 2x10     Total gym: squats  L22 3x10    L18 single leg 3x10   L22 3x10 L22 3x10    TUG, 5x STS, stair negotiation    Performed         Stationary lunge   4x10  3x10   2x10  2x10   Gait Training                                       Modalities             CP                                 "

## 2025-06-23 ENCOUNTER — APPOINTMENT (OUTPATIENT)
Dept: PHYSICAL THERAPY | Facility: CLINIC | Age: 85
End: 2025-06-23
Attending: ORTHOPAEDIC SURGERY
Payer: MEDICARE

## 2025-06-23 DIAGNOSIS — M17.11 PRIMARY OSTEOARTHRITIS OF RIGHT KNEE: Primary | ICD-10-CM

## 2025-06-23 NOTE — PROGRESS NOTES
"Daily Note     Today's date: 2025  Patient name: Jay Stout  : 1940  MRN: [id number]  Referring provider: Mayank Oropeza, *  Dx:   Encounter Diagnosis     ICD-10-CM    1. Primary osteoarthritis of right knee  M17.11                      Subjective: ***      Objective: See treatment diary below      Assessment: Tolerated treatment fair. Patient demonstrated fatigue post treatment and would benefit from continued PT.       Plan: Continue per plan of care.  Progress treatment as tolerated.         POC expires Unit limit Auth  expiration date PT/OT + Visit Limit?   25 N/A N/A BOMN                 Visit/Unit Tracking  AUTH Status:  Date    N/A Used 25 26  16 17 18 19 20 21 22 23 24    Remaining                  Precautions: right TKA (DOS: 25), DM, HTN, h/o MI      Manuals    Post-op RE             R knee PROM GR GR GR GR GR GR   GR NA   Gastroc str.             Pat mobs  GR GR GR         Reassessment    GR         Neuro Re-Ed             Supine SLR             Quad sets with heel prop             TKE             LAQ          4# 3x10 5\" holds   SAQ                                       Ther Ex             Patient education: pathophysiology, diagnostic imaging review, signs/symptoms of infection, edema/pain management, graded activity, HEP review    GR GR        Ankle pumps             Seated knee extension LLPS             Seated knee flexion/extension AAROM at edge of plinthe             Seated knee flexion AAROM in chair             Standing knee flexion stretch              Long-sitting calf str.             HR             Standing calf str. With SB             Nustep: knee ROM ; Rec bike Re bike 10 min Rec bike 10 min Rec bike 10 min Rec bike 10 min Rec bike 10 min Rec bike 10 min   Rec bike 10 min Rec bike 10 min   Standing hip abd, ext with TB   RTB 2x10          Heel slides with strap   " "AROM     Performed AROM performed          HS stretch              Matrix knee flexion             Matrix knee extension             Supine knee flexion wall slides 10x10\"        17x10\"    Stool walks         1 lap around gym 1 lap around gym   Standing knee extension stretch at step with self-overpressure  10x10\"           Standing knee flexion stretch with 20\" step  10x10\"           Sidestepping with TB      GTB x10 laps       Seated knee flexion str. From plinthe to 14\"  step      15x10\"       Ther Activity             Squats 15# 2x10            FSU  8\" 3x10   8\" 3x10        LSU  6\" 2x10 6\" 2x10  6\" 3x10    4\" 2x10 4\" 2x10   FSD  8\" 3x10   8\" 3x10        Total gym: squats  L22 3x10    L18 single leg 3x10   L22 3x10 L22 3x10    TUG, 5x STS, stair negotiation    Performed         Stationary lunge   4x10  3x10     2x10   Gait Training                                       Modalities             CP                                   "

## 2025-06-25 ENCOUNTER — OFFICE VISIT (OUTPATIENT)
Dept: PHYSICAL THERAPY | Facility: CLINIC | Age: 85
End: 2025-06-25
Attending: ORTHOPAEDIC SURGERY
Payer: MEDICARE

## 2025-06-25 DIAGNOSIS — M17.11 PRIMARY OSTEOARTHRITIS OF RIGHT KNEE: Primary | ICD-10-CM

## 2025-06-25 PROCEDURE — 97140 MANUAL THERAPY 1/> REGIONS: CPT | Performed by: PHYSICAL THERAPIST

## 2025-06-25 PROCEDURE — 97530 THERAPEUTIC ACTIVITIES: CPT | Performed by: PHYSICAL THERAPIST

## 2025-06-25 PROCEDURE — 97110 THERAPEUTIC EXERCISES: CPT | Performed by: PHYSICAL THERAPIST

## 2025-06-25 NOTE — PROGRESS NOTES
"Daily Note     Today's date: 2025  Patient name: Jay Stout  : 1940  MRN: [id number]  Referring provider: Mayank Oropeza, *  Dx:   Encounter Diagnosis     ICD-10-CM    1. Primary osteoarthritis of right knee  M17.11           Start Time: 1130  Stop Time: 1230  Total time in clinic (min): 60 minutes    Subjective: Patient reports that he is frustrated by the continued stiffness and disrupted sleep.      Objective: See treatment diary below      Assessment: Tolerated treatment fair. Patient demonstrated fatigue post treatment and would benefit from continued PT. Patient continues to require verbal encouragement. Patient overall continues to progress well per POC with improving mobility and ROM. Patient does have knee stiffness which is partially attributed to persistent edema t/o knee and lower leg.      Plan: Continue per plan of care.  Progress treatment as tolerated.         POC expires Unit limit Auth  expiration date PT/OT + Visit Limit?   25 N/A N/A BOMN                 Visit/Unit Tracking  AUTH Status:  Date    N/A Used 25 26   17 18 19 20 21 22 23 24    Remaining                  Precautions: right TKA (DOS: 25), DM, HTN, h/o MI      Manuals    Post-op RE             R knee PROM GR GR GR GR GR GR GR   NA   Gastroc str.             Pat mobs  GR GR GR   GR      Reassessment    GR         Neuro Re-Ed             Supine SLR             Quad sets with heel prop             TKE             LAQ          4# 3x10 5\" holds   SAQ                                       Ther Ex             Patient education: pathophysiology, diagnostic imaging review, signs/symptoms of infection, edema/pain management, graded activity, HEP review    GR GR        Ankle pumps             Seated knee extension LLPS             Seated knee flexion/extension AAROM at edge of plinthe             Seated knee flexion AAROM in " "chair             Standing knee flexion stretch              Long-sitting calf str.             HR             Standing calf str. With SB             Nustep: knee ROM ; Rec bike Re bike 10 min Rec bike 10 min Rec bike 10 min Rec bike 10 min Rec bike 10 min Rec bike 10 min Rec bike 10 min  Rec bike 10 min Rec bike 10 min   Standing hip abd, ext with TB   RTB 2x10          Heel slides with strap   AROM     Performed AROM performed          HS stretch              Matrix knee flexion       20# x20      Matrix knee extension       10# single leg x10      Supine knee flexion wall slides 10x10\"        17x10\"    Stool walks         1 lap around gym 1 lap around gym   Standing knee extension stretch at step with self-overpressure  10x10\"           Standing knee flexion stretch with 20\" step  10x10\"           Sidestepping with TB      GTB x10 laps       Seated knee flexion str. From plinthe to 14\"  step      15x10\"       Ther Activity             Squats 15# 2x10            FSU  8\" 3x10   8\" 3x10  8\" 2x10      LSU  6\" 2x10 6\" 2x10  6\" 3x10    4\" 2x10 4\" 2x10   FSD  8\" 3x10   8\" 3x10  8\" 2x10      Total gym: squats  L22 3x10    L18 single leg 3x10 L22 single leg 2x10  L22 3x10 L22 3x10    TUG, 5x STS, stair negotiation    Performed         Stationary lunge   4x10  3x10  2x10   2x10   Gait Training                                       Modalities             CP                                     "

## 2025-06-26 ENCOUNTER — OFFICE VISIT (OUTPATIENT)
Dept: FAMILY MEDICINE CLINIC | Facility: CLINIC | Age: 85
End: 2025-06-26
Payer: MEDICARE

## 2025-06-26 VITALS
DIASTOLIC BLOOD PRESSURE: 70 MMHG | BODY MASS INDEX: 29.04 KG/M2 | HEART RATE: 93 BPM | TEMPERATURE: 97.4 F | OXYGEN SATURATION: 99 % | HEIGHT: 68 IN | SYSTOLIC BLOOD PRESSURE: 132 MMHG

## 2025-06-26 DIAGNOSIS — G47.01 INSOMNIA DUE TO MEDICAL CONDITION: ICD-10-CM

## 2025-06-26 DIAGNOSIS — N18.30 TYPE 2 DIABETES MELLITUS WITH STAGE 3 CHRONIC KIDNEY DISEASE, WITHOUT LONG-TERM CURRENT USE OF INSULIN, UNSPECIFIED WHETHER STAGE 3A OR 3B CKD (HCC): ICD-10-CM

## 2025-06-26 DIAGNOSIS — M17.11 PRIMARY OSTEOARTHRITIS OF RIGHT KNEE: ICD-10-CM

## 2025-06-26 DIAGNOSIS — E11.22 TYPE 2 DIABETES MELLITUS WITH STAGE 3 CHRONIC KIDNEY DISEASE, WITHOUT LONG-TERM CURRENT USE OF INSULIN, UNSPECIFIED WHETHER STAGE 3A OR 3B CKD (HCC): ICD-10-CM

## 2025-06-26 DIAGNOSIS — M54.31 SCIATICA OF RIGHT SIDE: Primary | ICD-10-CM

## 2025-06-26 LAB
LEFT EYE DIABETIC RETINOPATHY: ABNORMAL
LEFT EYE IMAGE QUALITY: ABNORMAL
LEFT EYE MACULAR EDEMA: ABNORMAL
LEFT EYE OTHER RETINOPATHY: ABNORMAL
RIGHT EYE DIABETIC RETINOPATHY: ABNORMAL
RIGHT EYE IMAGE QUALITY: ABNORMAL
RIGHT EYE MACULAR EDEMA: ABNORMAL
RIGHT EYE OTHER RETINOPATHY: ABNORMAL
SEVERITY (EYE EXAM): ABNORMAL

## 2025-06-26 PROCEDURE — 99214 OFFICE O/P EST MOD 30 MIN: CPT | Performed by: FAMILY MEDICINE

## 2025-06-26 PROCEDURE — G2211 COMPLEX E/M VISIT ADD ON: HCPCS | Performed by: FAMILY MEDICINE

## 2025-06-26 RX ORDER — GABAPENTIN 300 MG/1
300 CAPSULE ORAL 2 TIMES DAILY
Qty: 60 CAPSULE | Refills: 0 | Status: SHIPPED | OUTPATIENT
Start: 2025-06-26 | End: 2025-07-26

## 2025-06-26 RX ORDER — METHYLPREDNISOLONE 4 MG/1
TABLET ORAL
Qty: 21 EACH | Refills: 0 | Status: SHIPPED | OUTPATIENT
Start: 2025-06-26

## 2025-06-26 NOTE — ASSESSMENT & PLAN NOTE
A1c stable, will monitor.   Due in July   Lab Results   Component Value Date    HGBA1C 7.4 (H) 04/01/2025       Orders:  •  IRIS Diabetic eye exam

## 2025-06-26 NOTE — PROGRESS NOTES
"Name: Jay Stout      : 1940      MRN: [id number]  Encounter Provider: Adrienne Richard MD  Encounter Date: 2025   Encounter department: [facility]  :  Assessment & Plan  Sciatica of right side  Will treat with Medrol - discussed side effects and this can elevate blood sugar temporarily.    Start Gabapentin. Discussed common side effects.    Refer to PT for sciatica       Orders:  •  methylPREDNISolone 4 MG tablet therapy pack; Use as directed on package  •  gabapentin (Neurontin) 300 mg capsule; Take 1 capsule (300 mg total) by mouth 2 (two) times a day  •  Ambulatory Referral to Physical Therapy; Future    Primary osteoarthritis of right knee  Following with Ortho and PT       Insomnia due to medical condition  Trazodone hasn't been very helpful. Will start Gabapentin.        Type 2 diabetes mellitus with stage 3 chronic kidney disease, without long-term current use of insulin, unspecified whether stage 3a or 3b CKD (HCC)  A1c stable, will monitor.   Due in July   Lab Results   Component Value Date    HGBA1C 7.4 (H) 2025       Orders:  •  IRIS Diabetic eye exam      Assessment & Plan           History of Present Illness   He is here for back pain which started after having his knee replaced.  He was driving the other day and developed pain in the R buttock and radiating down to the foot.  He is taking Tylenol and last night took Oxycodone but it made him feel funny. He has CKD and cannot take NSAIDs. Previously took Gabapentin from Ortho but no longer on it. He took this at night and is unsure if it helped.     Also having insomnia and Trazodone is not helping. Very frustrated with how he is feeling.       Review of Systems   Musculoskeletal:  Positive for arthralgias, back pain and joint swelling.   Psychiatric/Behavioral:  Positive for sleep disturbance.        Objective   /70   Pulse 93   Temp (!) 97.4 °F (36.3 °C)   Ht 5' 8\" (1.727 m)   SpO2 99%   " BMI 29.04 kg/m²      Physical Exam  Vitals and nursing note reviewed.   Constitutional:       Appearance: He is well-developed.   HENT:      Head: Normocephalic and atraumatic.   Pulmonary:      Effort: Pulmonary effort is normal. No respiratory distress.     Musculoskeletal:         General: Swelling present.        Back:       Right knee: Swelling present. Decreased range of motion.      Comments: Trace edema R ankle  Midline scar on R knee appears well healed      Neurological:      General: No focal deficit present.      Mental Status: He is alert.     Psychiatric:         Mood and Affect: Mood normal.         Behavior: Behavior normal.         Thought Content: Thought content normal.         Judgment: Judgment normal.

## 2025-06-27 ENCOUNTER — OFFICE VISIT (OUTPATIENT)
Dept: PHYSICAL THERAPY | Facility: CLINIC | Age: 85
End: 2025-06-27
Attending: ORTHOPAEDIC SURGERY
Payer: MEDICARE

## 2025-06-27 DIAGNOSIS — M17.11 PRIMARY OSTEOARTHRITIS OF RIGHT KNEE: Primary | ICD-10-CM

## 2025-06-27 PROCEDURE — 97110 THERAPEUTIC EXERCISES: CPT | Performed by: PHYSICAL THERAPIST

## 2025-06-27 PROCEDURE — 97530 THERAPEUTIC ACTIVITIES: CPT | Performed by: PHYSICAL THERAPIST

## 2025-06-27 PROCEDURE — 97140 MANUAL THERAPY 1/> REGIONS: CPT | Performed by: PHYSICAL THERAPIST

## 2025-06-27 NOTE — PROGRESS NOTES
"Daily Note     Today's date: 2025  Patient name: Jay Stout  : 1940  MRN: [id number]  Referring provider: Mayank Oropeza, *  Dx:   Encounter Diagnosis     ICD-10-CM    1. Primary osteoarthritis of right knee  M17.11                      Subjective: Patient saw PCP yesterday with complaints of sciatica.  He started taking Prdenisone and it is feeling better.      Objective: See treatment diary below      Assessment: Tolerated treatment well. Patient demonstrated fatigue post treatment and would benefit from continued PT.  Hard end-feel with knee flexion PROM.  Strength continues to improve.  Good functional mobility.      Plan: Continue per plan of care.  Progress treatment as tolerated.         POC expires Unit limit Auth  expiration date PT/OT + Visit Limit?   25 N/A N/A BOMN                 Visit/Unit Tracking  AUTH Status:  Date    N/A Used  26 27  17 18 19 20 21 22 23 24    Remaining                  Precautions: right TKA (DOS: 25), DM, HTN, h/o MI      Manuals    Post-op RE             R knee PROM GR GR GR GR GR GR GR JF  NA   Gastroc str.             Pat mobs  GR GR GR   GR JF     Reassessment    GR         Neuro Re-Ed             Supine SLR             Quad sets with heel prop             TKE             LAQ          4# 3x10 5\" holds   SAQ                                       Ther Ex             Patient education: pathophysiology, diagnostic imaging review, signs/symptoms of infection, edema/pain management, graded activity, HEP review    GR GR        Ankle pumps             Seated knee extension LLPS             Seated knee flexion/extension AAROM at edge of plinthe             Seated knee flexion AAROM in chair             Standing knee flexion stretch              Long-sitting calf str.             HR             Standing calf str. With SB             Nustep: knee ROM ; Rec " "bike Re bike 10 min Rec bike 10 min Rec bike 10 min Rec bike 10 min Rec bike 10 min Rec bike 10 min Rec bike 10 min Rec bike 10 min Rec bike 10 min Rec bike 10 min   Standing hip abd, ext with TB   RTB 2x10          Heel slides with strap   AROM     Performed AROM performed          HS stretch              Matrix knee flexion       20# x20 25# 3x10     Matrix knee extension       10# single leg x10 10# single leg x10     Supine knee flexion wall slides 10x10\"        17x10\"    Stool walks         1 lap around gym 1 lap around gym   Standing knee extension stretch at step with self-overpressure  10x10\"           Standing knee flexion stretch with 20\" step  10x10\"           Sidestepping with TB      GTB x10 laps  GTB x10 laps     Seated knee flexion str. From plinthe to 14\"  step      15x10\"       Ther Activity             Squats 15# 2x10            FSU  8\" 3x10   8\" 3x10  8\" 2x10 8\" 2x10     LSU  6\" 2x10 6\" 2x10  6\" 3x10    4\" 2x10 4\" 2x10   FSD  8\" 3x10   8\" 3x10  8\" 2x10 8\" 2x10     Total gym: squats  L22 3x10    L18 single leg 3x10 L22 single leg 2x10 L22 single leg 2x10 L22 3x10 L22 3x10    TUG, 5x STS, stair negotiation    Performed         Stationary lunge   4x10  3x10  2x10 x25  2x10   Gait Training                                       Modalities             CP                                       "

## 2025-06-30 ENCOUNTER — OFFICE VISIT (OUTPATIENT)
Dept: PHYSICAL THERAPY | Facility: CLINIC | Age: 85
End: 2025-06-30
Attending: ORTHOPAEDIC SURGERY
Payer: MEDICARE

## 2025-06-30 DIAGNOSIS — M17.11 PRIMARY OSTEOARTHRITIS OF RIGHT KNEE: Primary | ICD-10-CM

## 2025-06-30 PROCEDURE — 97140 MANUAL THERAPY 1/> REGIONS: CPT

## 2025-06-30 PROCEDURE — 97110 THERAPEUTIC EXERCISES: CPT

## 2025-06-30 PROCEDURE — 97530 THERAPEUTIC ACTIVITIES: CPT

## 2025-06-30 NOTE — PROGRESS NOTES
"Daily Note     Today's date: 2025  Patient name: Jay Stout  : 1940  MRN: [id number]  Referring provider: Mayank Oropeza, *  Dx:   Encounter Diagnosis     ICD-10-CM    1. Primary osteoarthritis of right knee  M17.11                      Subjective: Pt reports no changes since LV.      Objective: See treatment diary below      Assessment: Tolerated treatment well. Continued with PROM and functional strengthening, pt able to complete all exercises today with good technique and no increase in pain.  Patient would benefit from continued PT      Plan: Continue per plan of care.        POC expires Unit limit Auth  expiration date PT/OT + Visit Limit?   25 N/A N/A BOMN                 Visit/Unit Tracking  AUTH Status:  Date    N/A Used 25 26 27  17 18 19 20 21 22 23 24    Remaining                  Precautions: right TKA (DOS: 25), DM, HTN, h/o MI      Manuals    Post-op RE             R knee PROM GR GR GR GR GR GR GR JF JK NA   Gastroc str.             Pat mobs  GR GR GR   GR JF JK    Reassessment    GR         Neuro Re-Ed             Supine SLR             Quad sets with heel prop             TKE             LAQ          4# 3x10 5\" holds   SAQ                                       Ther Ex             Patient education: pathophysiology, diagnostic imaging review, signs/symptoms of infection, edema/pain management, graded activity, HEP review    GR GR        Ankle pumps             Seated knee extension LLPS             Seated knee flexion/extension AAROM at edge of plinthe             Seated knee flexion AAROM in chair             Standing knee flexion stretch              Long-sitting calf str.             HR             Standing calf str. With SB             Nustep: knee ROM ; Rec bike Re bike 10 min Rec bike 10 min Rec bike 10 min Rec bike 10 min Rec bike 10 min Rec bike 10 min Rec bike " "10 min Rec bike 10 min Rec bike 10 min Rec bike 10 min   Standing hip abd, ext with TB   RTB 2x10          Heel slides with strap   AROM     Performed AROM performed          HS stretch              Matrix knee flexion       20# x20 25# 3x10 25# 3x10    Matrix knee extension       10# single leg x10 10# single leg x10 10# single leg x10    Supine knee flexion wall slides 10x10\"            Stool walks          1 lap around gym   Standing knee extension stretch at step with self-overpressure  10x10\"           Standing knee flexion stretch with 20\" step  10x10\"           Sidestepping with TB      GTB x10 laps  GTB x10 laps GTB x10 laps    Seated knee flexion str. From plinthe to 14\"  step      15x10\"       Ther Activity             Squats 15# 2x10            FSU  8\" 3x10   8\" 3x10  8\" 2x10 8\" 2x10 8\" 2x10    LSU  6\" 2x10 6\" 2x10  6\" 3x10    8''2x10 4\" 2x10   FSD  8\" 3x10   8\" 3x10  8\" 2x10 8\" 2x10     Total gym: squats  L22 3x10    L18 single leg 3x10 L22 single leg 2x10 L22 single leg 2x10 L22 single leg 2x10 L22 3x10    TUG, 5x STS, stair negotiation    Performed         Stationary lunge   4x10  3x10  2x10 x25 2x10 2x10   Gait Training                                       Modalities             CP                                         "

## 2025-07-02 ENCOUNTER — OFFICE VISIT (OUTPATIENT)
Dept: PHYSICAL THERAPY | Facility: CLINIC | Age: 85
End: 2025-07-02
Attending: ORTHOPAEDIC SURGERY
Payer: MEDICARE

## 2025-07-02 DIAGNOSIS — M17.11 PRIMARY OSTEOARTHRITIS OF RIGHT KNEE: Primary | ICD-10-CM

## 2025-07-02 PROCEDURE — 97110 THERAPEUTIC EXERCISES: CPT | Performed by: PHYSICAL THERAPIST

## 2025-07-02 NOTE — PROGRESS NOTES
Daily Note     Today's date: 2025  Patient name: Jay Stout  : 1940  MRN: [id number]  Referring provider: Mayank Oropeza, *  Dx:   Encounter Diagnosis     ICD-10-CM    1. Primary osteoarthritis of right knee  M17.11           Start Time: 915  Stop Time: 1000  Total time in clinic (min): 45 minutes    Subjective: Patient reports that he has finished his steroid pack and his knee feels a little stiff still, but better.      Objective: See treatment diary below      Assessment: Tolerated treatment well. Patient demonstrated fatigue post treatment and would benefit from continued PT. Patient with less visible swelling t/o his right knee and lower leg. Patient reported lightheadedness towards end of session, improved after sitting.      Plan: Continue per plan of care.  Progress treatment as tolerated.         POC expires Unit limit Auth  expiration date PT/OT + Visit Limit?   25 N/A N/A BOMN                 Visit/Unit Tracking  AUTH Status:  Date    N/A Used 25 26 27 28  18 19 20 21 22 23 24    Remaining                  Precautions: right TKA (DOS: 25), DM, HTN, h/o MI      Manuals  7   Post-op RE             R knee PROM GR GR GR GR GR GR GR JF JK NV   Gastroc str.             Pat mobs  GR GR GR   GR ANDREW HURLEYK NV   Reassessment    GR         Neuro Re-Ed             Supine SLR             Quad sets with heel prop             TKE             LAQ             SAQ                                       Ther Ex             Patient education: pathophysiology, diagnostic imaging review, signs/symptoms of infection, edema/pain management, graded activity, HEP review    GR GR        Ankle pumps             Seated knee extension LLPS             Seated knee flexion/extension AAROM at edge of plinthe             Seated knee flexion AAROM in chair             Standing knee flexion stretch             "  Long-sitting calf str.             HR             Standing calf str. With SB             Nustep: knee ROM ; Rec bike Re bike 10 min Rec bike 10 min Rec bike 10 min Rec bike 10 min Rec bike 10 min Rec bike 10 min Rec bike 10 min Rec bike 10 min Rec bike 10 min Rec bike 10 min   Standing hip abd, ext with TB   RTB 2x10       In SLS off 2\" step GTB x20 ea. B/l   Heel slides with strap   AROM     Performed AROM performed          HS stretch              Matrix knee flexion       20# x20 25# 3x10 25# 3x10    Matrix knee extension       10# single leg x10 10# single leg x10 10# single leg x10    Supine knee flexion wall slides 10x10\"            Stool walks             Standing knee extension stretch at step with self-overpressure  10x10\"           Standing knee flexion stretch with 20\" step  10x10\"           Sidestepping with TB      GTB x10 laps  GTB x10 laps GTB x10 laps GTB 3 laps (length of gym)   Seated knee flexion str. From plinthe to 14\"  step      15x10\"       Standing isometric hip abduction against wall at 90 deg flexion          15x5\" b/l   Hip abduction clock          GTB 2x5 b/l   Ther Activity             Squats 15# 2x10            FSU  8\" 3x10   8\" 3x10  8\" 2x10 8\" 2x10 8\" 2x10    LSU  6\" 2x10 6\" 2x10  6\" 3x10    8''2x10    FSD  8\" 3x10   8\" 3x10  8\" 2x10 8\" 2x10     Total gym: squats  L22 3x10    L18 single leg 3x10 L22 single leg 2x10 L22 single leg 2x10 L22 single leg 2x10    TUG, 5x STS, stair negotiation    Performed         Stationary lunge   4x10  3x10  2x10 x25 2x10    Gait Training                                       Modalities             CP                                           "

## 2025-07-04 DIAGNOSIS — K21.9 LARYNGOPHARYNGEAL REFLUX (LPR): ICD-10-CM

## 2025-07-04 DIAGNOSIS — I21.21 ST ELEVATION MYOCARDIAL INFARCTION INVOLVING LEFT CIRCUMFLEX CORONARY ARTERY (HCC): ICD-10-CM

## 2025-07-06 RX ORDER — METOPROLOL TARTRATE 25 MG/1
25 TABLET, FILM COATED ORAL 2 TIMES DAILY
Qty: 180 TABLET | Refills: 1 | Status: SHIPPED | OUTPATIENT
Start: 2025-07-06

## 2025-07-06 RX ORDER — PANTOPRAZOLE SODIUM 40 MG/1
40 TABLET, DELAYED RELEASE ORAL DAILY
Qty: 90 TABLET | Refills: 1 | Status: SHIPPED | OUTPATIENT
Start: 2025-07-06

## 2025-07-07 ENCOUNTER — OFFICE VISIT (OUTPATIENT)
Dept: PHYSICAL THERAPY | Facility: CLINIC | Age: 85
End: 2025-07-07
Attending: ORTHOPAEDIC SURGERY
Payer: MEDICARE

## 2025-07-07 DIAGNOSIS — M17.11 PRIMARY OSTEOARTHRITIS OF RIGHT KNEE: Primary | ICD-10-CM

## 2025-07-07 PROCEDURE — 97140 MANUAL THERAPY 1/> REGIONS: CPT

## 2025-07-07 PROCEDURE — 97110 THERAPEUTIC EXERCISES: CPT

## 2025-07-07 NOTE — PROGRESS NOTES
Daily Note     Today's date: 2025  Patient name: Jay Stout  : 1940  MRN: [id number]  Referring provider: Mayank Oropeza, *  Dx:   Encounter Diagnosis     ICD-10-CM    1. Primary osteoarthritis of right knee  M17.11           Start Time: 1055  Stop Time: 1137  Total time in clinic (min): 42 minutes    Subjective: pt noted that he still wakes up stiff but does complete the exercises at home to help.       Objective: See treatment diary below      Assessment: Continued with treatment session with focus on R knee. With strengthening as well as ROM. Flexion > /.    Tolerated treatment well. Patient demonstrated fatigue post treatment, exhibited good technique with therapeutic exercises, and would benefit from continued PT  S/p treatment session,  no changes in pain status.     Plan: Continue per plan of care.        POC expires Unit limit Auth  expiration date PT/OT + Visit Limit?   25 N/A N/A BOMN                 Visit/Unit Tracking  AUTH Status:  Date    N/A Used 25 26 27 28 29  19 20 21 22 23 24    Remaining                  Precautions: right TKA (DOS: 25), DM, HTN, h/o MI      Manuals    Post-op RE             R knee PROM SC  GR GR GR GR GR JF JK NV   Gastroc str.             Pat mobs PROM SC  GR GR   GR JF JK NV   Reassessment    GR         Neuro Re-Ed             Supine SLR             Quad sets with heel prop             TKE             LAQ             SAQ                                       Ther Ex             Patient education: pathophysiology, diagnostic imaging review, signs/symptoms of infection, edema/pain management, graded activity, HEP review DOMS and HEP    GR GR        Ankle pumps             Seated knee extension LLPS             Seated knee flexion/extension AAROM at edge of plinthe             Seated knee flexion AAROM in chair             Standing knee flexion stretch   "            Long-sitting calf str.             HR             Standing calf str. With SB             Nustep: knee ROM ; Rec bike Rec bike 10 min   Rec bike 10 min Rec bike 10 min Rec bike 10 min Rec bike 10 min Rec bike 10 min Rec bike 10 min Rec bike 10 min Rec bike 10 min   Standing hip abd, ext with TB GTB 3x 10   RTB 2x10       In SLS off 2\" step GTB x20 ea. B/l   Heel slides with strap    AROM performed          HS stretch              Matrix knee flexion Single LE 15#  3x 10       20# x20 25# 3x10 25# 3x10    Matrix knee extension 10# single LE 3x 10       10# single leg x10 10# single leg x10 10# single leg x10    Supine knee flexion wall slides             Stool walks             Standing knee extension stretch at step with self-overpressure             Standing knee flexion stretch with 20\" step             Sidestepping with TB GTB 3 laps length of gym      GTB x10 laps  GTB x10 laps GTB x10 laps GTB 3 laps (length of gym)   Seated knee flexion str. From plinthe to 14\"  step      15x10\"       Standing isometric hip abduction against wall at 90 deg flexion 5\" 10x          15x5\" b/l   Hip abduction clock          GTB 2x5 b/l   Ther Activity             Squats             FSU     8\" 3x10  8\" 2x10 8\" 2x10 8\" 2x10    LSU   6\" 2x10  6\" 3x10    8''2x10    FSD     8\" 3x10  8\" 2x10 8\" 2x10     Total gym: squats      L18 single leg 3x10 L22 single leg 2x10 L22 single leg 2x10 L22 single leg 2x10    TUG, 5x STS, stair negotiation    Performed         Stationary lunge   4x10  3x10  2x10 x25 2x10    Gait Training                                       Modalities             CP                                             "

## 2025-07-09 ENCOUNTER — OFFICE VISIT (OUTPATIENT)
Dept: PHYSICAL THERAPY | Facility: CLINIC | Age: 85
End: 2025-07-09
Attending: ORTHOPAEDIC SURGERY
Payer: MEDICARE

## 2025-07-09 DIAGNOSIS — M17.11 PRIMARY OSTEOARTHRITIS OF RIGHT KNEE: Primary | ICD-10-CM

## 2025-07-09 PROCEDURE — 97530 THERAPEUTIC ACTIVITIES: CPT | Performed by: PHYSICAL THERAPIST

## 2025-07-09 PROCEDURE — 97110 THERAPEUTIC EXERCISES: CPT | Performed by: PHYSICAL THERAPIST

## 2025-07-09 NOTE — PROGRESS NOTES
"Daily Note     Today's date: 2025  Patient name: Jay Stout  : 1940  MRN: [id number]  Referring provider: Mayank Oropeza, *  Dx:   Encounter Diagnosis     ICD-10-CM    1. Primary osteoarthritis of right knee  M17.11           Start Time: 1359  Stop Time: 1445  Total time in clinic (min): 46 minutes    Subjective: Patient estimates his right knee overall level of function to be approximately 90% of his premorbid norm. Patient states that his knee remains a little stiff, but his pain is significantly less. Patient was able to walk the dog a mile earlier today.       Objective: See treatment diary below    Right knee A/PROM (deg): 105 / 107      Assessment: Tolerated treatment well. Patient demonstrated fatigue post treatment and would benefit from continued PT. Patient able to descend 8\" step with good eccentric control. Patient with significantly improved knee flexion ROM. Patient requesting for additional 2 weeks to address continued stiffness. Anticipate D/C to HEP following next 2 weeks. Significant portion of treatment session spent educating Patient regarding expectations following TKA and rehab timeline.      Plan: Continue per plan of care.  Progress treatment as tolerated.         POC expires Unit limit Auth  expiration date PT/OT + Visit Limit?   25 N/A N/A BOMN                 Visit/Unit Tracking  AUTH Status:  Date    N/A Used 25 26 27 28 29 30  20 21 22 23 24    Remaining                  Precautions: right TKA (DOS: 25), DM, HTN, h/o MI      Manuals    Post-op RE             R knee PROM SC   GR GR GR GR JF JK NV   Gastroc str.             Pat mobs PROM SC   GR   GR JF JK NV   Reassessment    GR         Neuro Re-Ed             Supine SLR             Quad sets with heel prop             TKE             LAQ             SAQ                                       Ther Ex           " "  Patient education: pathophysiology, diagnostic imaging review, signs/symptoms of infection, edema/pain management, graded activity, HEP review DOMS and HEP  GR  GR GR        Ankle pumps             Seated knee extension LLPS             Seated knee flexion/extension AAROM at edge of plinthe             Seated knee flexion AAROM in chair             Standing knee flexion stretch              Long-sitting calf str.             HR             Standing calf str. With SB             Nustep: knee ROM ; Rec bike Rec bike 10 min  Rec bike 10 min  Rec bike 10 min Rec bike 10 min Rec bike 10 min Rec bike 10 min Rec bike 10 min Rec bike 10 min Rec bike 10 min   Standing hip abd, ext with TB GTB 3x 10          In SLS off 2\" step GTB x20 ea. B/l   Heel slides with strap              HS stretch              Matrix knee flexion Single LE 15#  3x 10       20# x20 25# 3x10 25# 3x10    Matrix knee extension 10# single LE 3x 10       10# single leg x10 10# single leg x10 10# single leg x10    Supine knee flexion wall slides             Stool walks             Standing knee extension stretch at step with self-overpressure             Standing knee flexion stretch with 20\" step             Sidestepping with TB GTB 3 laps length of gym      GTB x10 laps  GTB x10 laps GTB x10 laps GTB 3 laps (length of gym)   Seated knee flexion str. From plinthe to 14\"  step      15x10\"       Standing isometric hip abduction against wall at 90 deg flexion 5\" 10x          15x5\" b/l   Hip abduction clock          GTB 2x5 b/l   Ther Activity             Squats             FSU     8\" 3x10  8\" 2x10 8\" 2x10 8\" 2x10    LSU     6\" 3x10    8''2x10    FSD  Reviewed   8\" 3x10  8\" 2x10 8\" 2x10     Total gym: squats  L22 b/l x30    L18 single leg 3x10 L22 single leg 2x10 L22 single leg 2x10 L22 single leg 2x10    TUG, 5x STS, stair negotiation    Performed         Stationary lunge     3x10  2x10 x25 2x10    Gait Training                                     "   Modalities             CP

## 2025-07-14 ENCOUNTER — OFFICE VISIT (OUTPATIENT)
Dept: PHYSICAL THERAPY | Facility: CLINIC | Age: 85
End: 2025-07-14
Attending: ORTHOPAEDIC SURGERY
Payer: MEDICARE

## 2025-07-14 DIAGNOSIS — M17.11 PRIMARY OSTEOARTHRITIS OF RIGHT KNEE: Primary | ICD-10-CM

## 2025-07-14 PROCEDURE — 97530 THERAPEUTIC ACTIVITIES: CPT | Performed by: PHYSICAL THERAPIST

## 2025-07-14 PROCEDURE — 97140 MANUAL THERAPY 1/> REGIONS: CPT | Performed by: PHYSICAL THERAPIST

## 2025-07-14 PROCEDURE — 97110 THERAPEUTIC EXERCISES: CPT | Performed by: PHYSICAL THERAPIST

## 2025-07-14 NOTE — PROGRESS NOTES
Daily Note     Today's date: 2025  Patient name: Jay Stout  : 1940  MRN: [id number]  Referring provider: Mayank Oropeza, *  Dx:   Encounter Diagnosis     ICD-10-CM    1. Primary osteoarthritis of right knee  M17.11           Start Time: 1135  Stop Time: 1220  Total time in clinic (min): 45 minutes    Subjective: Patient offers no new complaints. Patient's knee remains stiff at times.       Objective: See treatment diary below    Knee flexion PROM (deg): 110 deg      Assessment: Tolerated treatment well. Patient demonstrated fatigue post treatment and would benefit from continued PT. Patient with improved knee flexion ROM with less aggressive stretching needed to reach end range.      Plan: Continue per plan of care.  Progress treatment as tolerated.         POC expires Unit limit Auth  expiration date PT/OT + Visit Limit?   25 N/A N/A BOMN                 Visit/Unit Tracking  AUTH Status:  Date    N/A Used 25 26 27 28 29 30 31  21 22 23 24    Remaining                  Precautions: right TKA (DOS: 25), DM, HTN, h/o MI      Manuals    Post-op RE             R knee PROM SC  GR  GR GR GR JF JK NV   Gastroc str.             Pat mobs PROM SC  GR    GR JF JK NV   Reassessment             Neuro Re-Ed             Supine SLR             Quad sets with heel prop             TKE             LAQ             SAQ                                       Ther Ex             Patient education: pathophysiology, diagnostic imaging review, signs/symptoms of infection, edema/pain management, graded activity, HEP review DOMS and HEP  GR   GR        Ankle pumps             Seated knee extension LLPS             Seated knee flexion/extension AAROM at edge of plinthe             Seated knee flexion AAROM in chair             Standing knee flexion stretch              Long-sitting calf str.             HR            "  Standing calf str. With SB             Nustep: knee ROM ; Rec bike Rec bike 10 min  Rec bike 10 min Rec bike 10 min  Rec bike 10 min Rec bike 10 min Rec bike 10 min Rec bike 10 min Rec bike 10 min Rec bike 10 min   Standing hip abd, ext with TB GTB 3x 10          In SLS off 2\" step GTB x20 ea. B/l   Heel slides with strap              HS stretch              Matrix knee flexion Single LE 15#  3x 10       20# x20 25# 3x10 25# 3x10    Matrix knee extension 10# single LE 3x 10       10# single leg x10 10# single leg x10 10# single leg x10    Supine knee flexion wall slides             Stool walks             Standing knee extension stretch at step with self-overpressure             Standing knee flexion stretch with 20\" step             Sidestepping with TB GTB 3 laps length of gym   GTB 5 laps   GTB x10 laps  GTB x10 laps GTB x10 laps GTB 3 laps (length of gym)   Seated knee flexion str. From plinthe to 14\"  step      15x10\"       Standing isometric hip abduction against wall at 90 deg flexion 5\" 10x          15x5\" b/l   Hip abduction clock   GTB 2x5 b/l       GTB 2x5 b/l   Ther Activity             Squats             FSU     8\" 3x10  8\" 2x10 8\" 2x10 8\" 2x10    LSU   LSD 6\" 2x10  6\" 3x10    8''2x10    FSD  Reviewed   8\" 3x10  8\" 2x10 8\" 2x10     Total gym: squats  L22 b/l x30 L22 b/l x30   L18 single leg 3x10 L22 single leg 2x10 L22 single leg 2x10 L22 single leg 2x10    TUG, 5x STS, stair negotiation             Stationary lunge     3x10  2x10 x25 2x10    Gait Training                                       Modalities             CP                                                 "

## 2025-07-15 DIAGNOSIS — N13.8 BPH WITH OBSTRUCTION/LOWER URINARY TRACT SYMPTOMS: ICD-10-CM

## 2025-07-15 DIAGNOSIS — N40.1 BPH WITH OBSTRUCTION/LOWER URINARY TRACT SYMPTOMS: ICD-10-CM

## 2025-07-15 RX ORDER — TAMSULOSIN HYDROCHLORIDE 0.4 MG/1
0.4 CAPSULE ORAL
Qty: 90 CAPSULE | Refills: 1 | Status: SHIPPED | OUTPATIENT
Start: 2025-07-15

## 2025-07-16 ENCOUNTER — APPOINTMENT (OUTPATIENT)
Dept: PHYSICAL THERAPY | Facility: CLINIC | Age: 85
End: 2025-07-16
Attending: ORTHOPAEDIC SURGERY
Payer: MEDICARE

## 2025-07-18 ENCOUNTER — OFFICE VISIT (OUTPATIENT)
Dept: PHYSICAL THERAPY | Facility: CLINIC | Age: 85
End: 2025-07-18
Attending: ORTHOPAEDIC SURGERY
Payer: MEDICARE

## 2025-07-18 DIAGNOSIS — M17.11 PRIMARY OSTEOARTHRITIS OF RIGHT KNEE: Primary | ICD-10-CM

## 2025-07-18 PROCEDURE — 97140 MANUAL THERAPY 1/> REGIONS: CPT

## 2025-07-18 PROCEDURE — 97110 THERAPEUTIC EXERCISES: CPT

## 2025-07-18 PROCEDURE — 97530 THERAPEUTIC ACTIVITIES: CPT

## 2025-07-18 NOTE — PROGRESS NOTES
Daily Note     Today's date: 2025  Patient name: Jay Stout  : 1940  MRN: [id number]  Referring provider: Mayank Oropeza, *  Dx:   Encounter Diagnosis     ICD-10-CM    1. Primary osteoarthritis of right knee  M17.11           Start Time: 950          Subjective: Noted no changes in pain status.       Objective: See treatment diary below      Assessment:  Continued with treatment session at this time no changes noted. Overall was able to complete all exercises with no increase in pain. Still noticing tightness and tension with ROM of knee flexion and extension. Tolerated treatment well. Patient exhibited good technique with therapeutic exercises and would benefit from continued PT. No changes noted at this time s/p treatment session.       Plan: Continue per plan of care.        POC expires Unit limit Auth  expiration date PT/OT + Visit Limit?   25 N/A N/A BOMN                 Visit/Unit Tracking  AUTH Status:  Date    N/A Used 25 26 27 28 29 30 31 32  22 23 24    Remaining                  Precautions: right TKA (DOS: 25), DM, HTN, h/o MI      Manuals    Post-op RE             R knee PROM SC  GR SC  GR GR JF JK NV   Gastroc str.             Pat mobs PROM SC  GR PROM   GR JF JK NV   Reassessment             Neuro Re-Ed             Supine SLR             Quad sets with heel prop             TKE             LAQ             SAQ                                       Ther Ex             Patient education: pathophysiology, diagnostic imaging review, signs/symptoms of infection, edema/pain management, graded activity, HEP review DOMS and HEP  GR           Ankle pumps             Seated knee extension LLPS             Seated knee flexion/extension AAROM at edge of plinthe             Seated knee flexion AAROM in chair             Standing knee flexion stretch              Long-sitting calf str.  "            HR             Standing calf str. With SB             Nustep: knee ROM ; Rec bike Rec bike 10 min  Rec bike 10 min Rec bike 10 min Nustep L7   10 min bike was busy  Rec bike 10 min Rec bike 10 min Rec bike 10 min Rec bike 10 min Rec bike 10 min   Standing hip abd, ext with TB GTB 3x 10          In SLS off 2\" step GTB x20 ea. B/l   Heel slides with strap              HS stretch              Matrix knee flexion Single LE 15#  3x 10       20# x20 25# 3x10 25# 3x10    Matrix knee extension 10# single LE 3x 10       10# single leg x10 10# single leg x10 10# single leg x10    Supine knee flexion wall slides             Stool walks             Standing knee extension stretch at step with self-overpressure             Standing knee flexion stretch with 20\" step             Sidestepping with TB GTB 3 laps length of gym   GTB 5 laps GTB 5 laps   GTB x10 laps  GTB x10 laps GTB x10 laps GTB 3 laps (length of gym)   Seated knee flexion str. From plinthe to 14\"  step      15x10\"       Standing isometric hip abduction against wall at 90 deg flexion 5\" 10x          15x5\" b/l   Hip abduction clock   GTB 2x5 b/l GTB 2x 10 b/l       GTB 2x5 b/l                                          Ther Activity             Squats             FSU       8\" 2x10 8\" 2x10 8\" 2x10    LSU   LSD 6\" 2x10 LSD 6\" 2x 10      8''2x10    FSD  Reviewed     8\" 2x10 8\" 2x10     Total gym: squats  L22 b/l x30 L22 b/l x30 L33 b/l 30x   L18 single leg 3x10 L22 single leg 2x10 L22 single leg 2x10 L22 single leg 2x10    TUG, 5x STS, stair negotiation             Stationary lunge       2x10 x25 2x10    Gait Training                                       Modalities             CP                                                   "

## 2025-07-21 ENCOUNTER — OFFICE VISIT (OUTPATIENT)
Dept: PHYSICAL THERAPY | Facility: CLINIC | Age: 85
End: 2025-07-21
Attending: ORTHOPAEDIC SURGERY
Payer: MEDICARE

## 2025-07-21 DIAGNOSIS — M17.11 PRIMARY OSTEOARTHRITIS OF RIGHT KNEE: Primary | ICD-10-CM

## 2025-07-21 PROCEDURE — 97110 THERAPEUTIC EXERCISES: CPT

## 2025-07-21 PROCEDURE — 97140 MANUAL THERAPY 1/> REGIONS: CPT

## 2025-07-21 PROCEDURE — 97530 THERAPEUTIC ACTIVITIES: CPT

## 2025-07-21 NOTE — PROGRESS NOTES
Daily Note     Today's date: 2025  Patient name: Jay Stout  : 1940  MRN: [id number]  Referring provider: Mayank Oropeza, *  Dx:   Encounter Diagnosis     ICD-10-CM    1. Primary osteoarthritis of right knee  M17.11           Start Time: 1153  Stop Time: 1235  Total time in clinic (min): 42 minutes    Subjective: Pt noted that he has been feeling okay but does have 1 spot where he still has pain on (anterior) R knee.       Objective: See treatment diary below      Assessment:  Continued with treatment session,  progressions noted as able introduced leg press s well as KB sit to stands.  Tolerated treatment well.  Verbal cues for proper form and technique.   Patient exhibited good technique with therapeutic exercises and would benefit from continued PT  May have some additional DOMS due to progressions.     Plan: Continue per plan of care.        POC expires Unit limit Auth  expiration date PT/OT + Visit Limit?   25 N/A N/A BOMN                 Visit/Unit Tracking  AUTH Status:  Date    N/A Used 25 26 27 28 29 30 31 32 33  23 24    Remaining                  Precautions: right TKA (DOS: 25), DM, HTN, h/o MI      Manuals    Post-op RE             R knee PROM SC  GR SC SC  GR JF JK NV   Gastroc str.             Pat mobs PROM SC  GR PROM   GR JF JK NV   Reassessment             Neuro Re-Ed             Supine SLR             Quad sets with heel prop             TKE             LAQ             SAQ                                       Ther Ex             Patient education: pathophysiology, diagnostic imaging review, signs/symptoms of infection, edema/pain management, graded activity, HEP review DOMS and HEP  GR           Ankle pumps             Seated knee extension LLPS             Seated knee flexion/extension AAROM at edge of plinthe             Seated knee flexion AAROM in chair            "  Standing knee flexion stretch              Long-sitting calf str.             HR             Standing calf str. With SB             Nustep: knee ROM ; Rec bike Rec bike 10 min  Rec bike 10 min Rec bike 10 min Nustep L7   10 min bike was busy Rec bike 10 min   Rec bike 10 min Rec bike 10 min Rec bike 10 min Rec bike 10 min   Standing hip abd, ext with TB GTB 3x 10          In SLS off 2\" step GTB x20 ea. B/l   Heel slides with strap      D/C for Hep         HS stretch              Matrix knee flexion Single LE 15#  3x 10     Unable at TV location   20# x20 25# 3x10 25# 3x10    Matrix knee extension 10# single LE 3x 10     Unable at TV location   10# single leg x10 10# single leg x10 10# single leg x10    Supine knee flexion wall slides             Stool walks             Standing knee extension stretch at step with self-overpressure             Standing knee flexion stretch with 20\" step             Sidestepping with TB GTB 3 laps length of gym   GTB 5 laps GTB 5 laps  GTB 3 laps 1/2 gym    GTB x10 laps GTB x10 laps GTB 3 laps (length of gym)   Seated knee flexion str. From plinthe to 14\"  step             Standing isometric hip abduction against wall at 90 deg flexion 5\" 10x          15x5\" b/l   Hip abduction clock   GTB 2x5 b/l GTB 2x 10 b/l  GTB 2x 10 b/l      GTB 2x5 b/l                                          Ther Activity             Squats     Sit to stands  10#   3x 10         FSU       8\" 2x10 8\" 2x10 8\" 2x10    LSU   LSD 6\" 2x10 LSD 6\" 2x 10      8''2x10    FSD  Reviewed     8\" 2x10 8\" 2x10     Total gym: squats  L22 b/l x30 L22 b/l x30 L 22  b/l 30x  Leg press 35#  3x 10   L22 single leg 2x10 L22 single leg 2x10 L22 single leg 2x10    TUG, 5x STS, stair negotiation             Stationary lunge       2x10 x25 2x10    Gait Training                                       Modalities             CP                                                     "

## 2025-07-23 DIAGNOSIS — M54.31 SCIATICA OF RIGHT SIDE: ICD-10-CM

## 2025-07-24 ENCOUNTER — OFFICE VISIT (OUTPATIENT)
Dept: PHYSICAL THERAPY | Facility: CLINIC | Age: 85
End: 2025-07-24
Attending: ORTHOPAEDIC SURGERY
Payer: MEDICARE

## 2025-07-24 DIAGNOSIS — M17.11 PRIMARY OSTEOARTHRITIS OF RIGHT KNEE: Primary | ICD-10-CM

## 2025-07-24 PROCEDURE — 97110 THERAPEUTIC EXERCISES: CPT | Performed by: PHYSICAL THERAPIST

## 2025-07-24 PROCEDURE — 97530 THERAPEUTIC ACTIVITIES: CPT | Performed by: PHYSICAL THERAPIST

## 2025-07-24 PROCEDURE — 97140 MANUAL THERAPY 1/> REGIONS: CPT | Performed by: PHYSICAL THERAPIST

## 2025-07-24 RX ORDER — GABAPENTIN 300 MG/1
300 CAPSULE ORAL 2 TIMES DAILY
Qty: 60 CAPSULE | Refills: 5 | Status: SHIPPED | OUTPATIENT
Start: 2025-07-24

## 2025-07-24 NOTE — PROGRESS NOTES
Daily Note     Today's date: 2025  Patient name: Jay Stout  : 1940  MRN: [id number]  Referring provider: Mayank Oropeza, *  Dx:   Encounter Diagnosis     ICD-10-CM    1. Primary osteoarthritis of right knee  M17.11           Start Time: 1138  Stop Time: 1230  Total time in clinic (min): 52 minutes    Subjective: Patient reports that overall his knee is feeling better, but remains stiff and painful at times. Patient is independent with HEP and is prepared for D/C at this time.      Objective: See treatment diary below      Assessment: Tolerated treatment well. Patient demonstrated fatigue post treatment and exhibited good technique with therapeutic exercises. Patient at this time has maximized outpatient services. Patient's knee flexion/extension ROM WFL. Patient overall is happy with his progress to date and is independent with comprehensive HEP. Patient will be discharged at this time. Patient has been instructed to contact PT if he has any questions/concerns or if he experiences a decline in function.      Plan: Discharge to comprehensive Hep / gym program.        POC expires Unit limit Auth  expiration date PT/OT + Visit Limit?   25 N/A N/A BOMN                 Visit/Unit Tracking  AUTH Status:  Date    N/A Used 25 26 27 28 29 30 31 32 33 34  24    Remaining                  Precautions: right TKA (DOS: 25), DM, HTN, h/o MI      Manuals    Post-op RE             R knee PROM SC  GR SC SC GR  JF JK NV   Gastroc str.             Pat mobs PROM SC  GR PROM    JF JK NV   Reassessment             Neuro Re-Ed             Supine SLR             Quad sets with heel prop             TKE             LAQ             SAQ                                       Ther Ex             Patient education: pathophysiology, diagnostic imaging review, signs/symptoms of infection, edema/pain management,  "graded activity, HEP review DOMS and HEP  GR           Ankle pumps             Seated knee extension LLPS             Seated knee flexion/extension AAROM at edge of plinthe             Seated knee flexion AAROM in chair             Standing knee flexion stretch              Long-sitting calf str.             HR             Standing calf str. With SB             Nustep: knee ROM ; Rec bike Rec bike 10 min  Rec bike 10 min Rec bike 10 min Nustep L7   10 min bike was busy Rec bike 10 min  Rec bike 10 min  Rec bike 10 min Rec bike 10 min Rec bike 10 min   Standing hip abd, ext with TB GTB 3x 10          In SLS off 2\" step GTB x20 ea. B/l   Heel slides with strap      D/C for Hep         HS stretch              Matrix knee flexion Single LE 15#  3x 10     Unable at TV location    25# 3x10 25# 3x10    Matrix knee extension 10# single LE 3x 10     Unable at TV location    10# single leg x10 10# single leg x10    Supine knee flexion wall slides             Stool walks             Standing knee extension stretch at step with self-overpressure             Standing knee flexion stretch with 20\" step             Sidestepping with TB GTB 3 laps length of gym   GTB 5 laps GTB 5 laps  GTB 3 laps 1/2 gym  GTB 1 lap across gym  GTB x10 laps GTB x10 laps GTB 3 laps (length of gym)   Seated knee flexion str. From plinthe to 14\"  step             Standing isometric hip abduction against wall at 90 deg flexion 5\" 10x          15x5\" b/l   Hip abduction clock   GTB 2x5 b/l GTB 2x 10 b/l  GTB 2x 10 b/l      GTB 2x5 b/l                                          Ther Activity             Squats     Sit to stands  10#   3x 10         FSU        8\" 2x10 8\" 2x10    LSU   LSD 6\" 2x10 LSD 6\" 2x 10      8''2x10    FSD  Reviewed      8\" 2x10     Total gym: squats  L22 b/l x30 L22 b/l x30 L 22  b/l 30x  Leg press 35#  3x 10  Leg press 85# 3x10  L22 single leg 2x10 L22 single leg 2x10    TUG, 5x STS, stair negotiation             Stationary lunge      " Bosu 2x10  x25 2x10    Gait Training                                       Modalities             CP

## 2025-08-05 DIAGNOSIS — M54.31 SCIATICA OF RIGHT SIDE: ICD-10-CM

## 2025-08-05 RX ORDER — METHYLPREDNISOLONE 4 MG/1
TABLET ORAL
Qty: 21 EACH | Refills: 0 | Status: SHIPPED | OUTPATIENT
Start: 2025-08-05

## 2025-08-05 RX ORDER — METHYLPREDNISOLONE 4 MG/1
TABLET ORAL
Qty: 21 EACH | Refills: 0 | OUTPATIENT
Start: 2025-08-05

## 2025-08-07 ENCOUNTER — EVALUATION (OUTPATIENT)
Dept: PHYSICAL THERAPY | Facility: CLINIC | Age: 85
End: 2025-08-07
Attending: FAMILY MEDICINE
Payer: MEDICARE

## 2025-08-07 DIAGNOSIS — M54.31 SCIATICA OF RIGHT SIDE: ICD-10-CM

## 2025-08-07 PROCEDURE — 97161 PT EVAL LOW COMPLEX 20 MIN: CPT | Performed by: PHYSICAL THERAPIST

## 2025-08-07 PROCEDURE — 97110 THERAPEUTIC EXERCISES: CPT | Performed by: PHYSICAL THERAPIST

## 2025-08-07 PROCEDURE — 97112 NEUROMUSCULAR REEDUCATION: CPT | Performed by: PHYSICAL THERAPIST

## 2025-08-14 ENCOUNTER — OFFICE VISIT (OUTPATIENT)
Dept: PHYSICAL THERAPY | Facility: CLINIC | Age: 85
End: 2025-08-14
Attending: FAMILY MEDICINE
Payer: MEDICARE

## 2025-08-21 ENCOUNTER — OFFICE VISIT (OUTPATIENT)
Dept: PHYSICAL THERAPY | Facility: CLINIC | Age: 85
End: 2025-08-21
Attending: FAMILY MEDICINE
Payer: MEDICARE

## 2025-08-21 DIAGNOSIS — M54.31 SCIATICA OF RIGHT SIDE: Primary | ICD-10-CM

## 2025-08-21 PROCEDURE — 97110 THERAPEUTIC EXERCISES: CPT

## 2025-08-21 PROCEDURE — 97112 NEUROMUSCULAR REEDUCATION: CPT

## (undated) DEVICE — GAUZE SPONGES,16 PLY: Brand: CURITY

## (undated) DEVICE — SAW BLADE OSCILLATING BRAZOL 167

## (undated) DEVICE — LAPAROTOMY SPONGE - RF AND X-RAY DETECTABLE PRE-WASHED: Brand: SITUATE

## (undated) DEVICE — SMOKE EVAC FLUID SUCTION HEPA FILTER

## (undated) DEVICE — 10FR FRAZIER SUCTION HANDLE: Brand: CARDINAL HEALTH

## (undated) DEVICE — GLOVE SRG BIOGEL 8

## (undated) DEVICE — VELYS SATEL DRAPE

## (undated) DEVICE — GLOVE INDICATOR PI UNDERGLOVE SZ 7.5 BLUE

## (undated) DEVICE — EXOFIN PRECISION PEN HIGH VISCOSITY TOPICAL SKIN ADHESIVE: Brand: EXOFIN PRECISION PEN, 1G

## (undated) DEVICE — GLOVE SRG BIOGEL 6.5

## (undated) DEVICE — VELYS ROBOTIC-ASSISTED SOLUTION ARRAY SET - KNEE: Brand: VELYS

## (undated) DEVICE — GLOVE INDICATOR PI UNDERGLOVE SZ 7 BLUE

## (undated) DEVICE — CHLORHEXIDINE 4PCT 4 OZ

## (undated) DEVICE — DRAPE EQUIPMENT RF WAND

## (undated) DEVICE — VELYS ROBOT DRAPE

## (undated) DEVICE — MEDI-VAC YANKAUER SUCTION HANDLE W/BULBOUS AND CONTROL VENT: Brand: CARDINAL HEALTH

## (undated) DEVICE — SUT CHROMIC 4-0 SH-1 27 IN G181H

## (undated) DEVICE — DRAPE SHEET THREE QUARTER

## (undated) DEVICE — SCD SEQUENTIAL COMPRESSION COMFORT SLEEVE MEDIUM KNEE LENGTH: Brand: KENDALL SCD

## (undated) DEVICE — NEEDLE BLUNT 18 G X 1 1/2 W FILTER

## (undated) DEVICE — GLOVE SRG BIOGEL ORTHOPEDIC 7

## (undated) DEVICE — BLADE SAW RECIP 12.5 X 76MM 0.9/0.9MM THCK

## (undated) DEVICE — GROUNDING PAD RFL

## (undated) DEVICE — INTENDED FOR TISSUE SEPARATION, AND OTHER PROCEDURES THAT REQUIRE A SHARP SURGICAL BLADE TO PUNCTURE OR CUT.: Brand: BARD-PARKER SAFETY BLADES SIZE 15, STERILE

## (undated) DEVICE — TELFA NON-ADHERENT ABSORBENT DRESSING: Brand: TELFA

## (undated) DEVICE — SPECIMEN CONTAINER STERILE PEEL PACK

## (undated) DEVICE — MAYO STAND COVER: Brand: CONVERTORS

## (undated) DEVICE — BETHLEHEM UNIV TOTAL KNEE, KIT: Brand: CARDINAL HEALTH

## (undated) DEVICE — TIBURON SPLIT SHEET: Brand: CONVERTORS

## (undated) DEVICE — ELECTRODE BLADE MOD E-Z CLEAN 2.5IN 6.4CM -0012M

## (undated) DEVICE — 4-PORT MANIFOLD: Brand: NEPTUNE 2

## (undated) DEVICE — NEPTUNE E-SEP SMOKE EVACUATION PENCIL, COATED, 70MM BLADE, PUSH BUTTON SWITCH: Brand: NEPTUNE E-SEP

## (undated) DEVICE — ATTUNE KNEE SYSTEM TIBIAL BASE AFFIXIUM FIXED BEARING SIZE 5
Type: IMPLANTABLE DEVICE | Site: KNEE | Status: NON-FUNCTIONAL
Brand: ATTUNE AFFIXIUM

## (undated) DEVICE — STRETCH BANDAGE: Brand: CURITY

## (undated) DEVICE — NEEDLE 18 G X 1 1/2

## (undated) DEVICE — TUBING SUCTION 5MM X 12 FT

## (undated) DEVICE — SUT STRATAFIX SPIRAL MONOCRYL PLUS 2-0 CT-1 30CM SXMP1B412

## (undated) DEVICE — VELYS BLADE SAW OSC 85 X 19 X 2MM

## (undated) DEVICE — VELYS ROBOT-ASSISTED SOLUTION ARRAY DRILL PIN 125 MM X 4 MM: Brand: VELYS

## (undated) DEVICE — ANTI-FOG SOLUTION WITH FOAM PAD: Brand: DEVON

## (undated) DEVICE — SURGICAL GOWN, XL SMARTSLEEVE: Brand: CONVERTORS

## (undated) DEVICE — SUT VICRYL 1 CTX 36 IN J977H

## (undated) DEVICE — NEURO PATTIES 1/2 X 3

## (undated) DEVICE — SYRINGE 3ML LL

## (undated) DEVICE — 2000CC GUARDIAN II: Brand: GUARDIAN

## (undated) DEVICE — BOWL: 16OZ PEELPOUCH 75/CS 16/PLT: Brand: MEDEGEN MEDICAL PRODUCTS, LLC

## (undated) DEVICE — 3M™ STERI-DRAPE™ U-DRAPE 1015: Brand: STERI-DRAPE™

## (undated) DEVICE — I DISPOSABLE MIXING BOWL AND SPATULA: Brand: HOWMEDICA, MIX-KIT

## (undated) DEVICE — HOOD: Brand: T7PLUS

## (undated) DEVICE — BETHLEHEM UNIVERSAL MINOR GEN: Brand: CARDINAL HEALTH

## (undated) DEVICE — DRESSING MEPILEX AG BORDER POST-OP 4 X 10 IN

## (undated) DEVICE — COBAN 6 IN STERILE

## (undated) DEVICE — GROUNDING PAD UNIVERSAL SLW

## (undated) DEVICE — SUT VICRYL 0 CT-1 36 IN J946H

## (undated) DEVICE — SKIN MARKER DUAL TIP WITH RULER CAP, FLEXIBLE RULER AND LABELS: Brand: DEVON

## (undated) DEVICE — REM POLYHESIVE ADULT PATIENT RETURN ELECTRODE: Brand: VALLEYLAB

## (undated) DEVICE — SYRINGE 50ML LL

## (undated) DEVICE — CAPIT KNEE ATTUNE FB AOX POR

## (undated) DEVICE — SUT CHROMIC 3-0 SH 27 IN G122H

## (undated) DEVICE — SUT STRATAFIX SPIRAL PDS PLUS 1 CTX 18 IN SXPP1A400

## (undated) DEVICE — MEDI-VAC NON-CONDUCTIVE SUCTION TUBING 6MM X 1.8M (6FT.) L: Brand: CARDINAL HEALTH

## (undated) DEVICE — DECANTER: Brand: UNBRANDED

## (undated) DEVICE — NEEDLE 25G X 1 1/2

## (undated) DEVICE — ROSEBUD DISSECTORS: Brand: DEROYAL

## (undated) DEVICE — SPONGE 4 X 4 XRAY 16 PLY STRL LF RFD

## (undated) DEVICE — GLOVE SRG BIOGEL 7

## (undated) DEVICE — STERILIZATION TEETH GUARDS

## (undated) DEVICE — SUT STRATAFIX SPIRAL PLUS 3-0 PS-2 30 X 30 CM SXMP2B408

## (undated) DEVICE — IMPERVIOUS STOCKINETTE: Brand: DEROYAL

## (undated) DEVICE — SYRINGE 10ML LL CONTROL TOP

## (undated) DEVICE — HANDPIECE SET WITH RETRACTABLE COAXIAL FAN SPRAY TIP AND SUCTION TUBE: Brand: INTERPULSE

## (undated) DEVICE — GLOVE SRG BIOGEL 7.5

## (undated) DEVICE — MEDI-VAC YANKAUER SUCTION HANDLE W/STRAIGHT TIP & CONTROL VENT: Brand: CARDINAL HEALTH

## (undated) DEVICE — GLOVE INDICATOR PI UNDERGLOVE SZ 8.5 BLUE